# Patient Record
Sex: MALE | Race: WHITE | NOT HISPANIC OR LATINO | Employment: OTHER | URBAN - METROPOLITAN AREA
[De-identification: names, ages, dates, MRNs, and addresses within clinical notes are randomized per-mention and may not be internally consistent; named-entity substitution may affect disease eponyms.]

---

## 2017-05-25 ENCOUNTER — ALLSCRIPTS OFFICE VISIT (OUTPATIENT)
Dept: OTHER | Facility: OTHER | Age: 77
End: 2017-05-25

## 2018-01-16 NOTE — PROCEDURES
Results/Data    Procedure: Electromyogram and Nerve Conduction Study  Indication: Left Upper and Lower Extremity   Referred by Dr Echeverria Friday  The procedure's were discussed with the patient  Written consent was obtained prior to the procedure and is detailed in the patient's record  Prior to the start of the procedure a time out was taken and the identity of the patient was confirmed via name and date of birth with the patient  The correct site and the procedure to be performed were confirmed  The correct side was confirmed if applicable  The positioning of the patient was verified  The availability of the correct equipment was verified  Procedure Start Time: 1:45 pm    Technique: A sterile concentric needle electrode was used  The patient tolerated the procedure well  There were no complications  Results  EMG LEFT UPPER/LOWER EXTREMITY    Motor and sensory conduction studies were performed on the left median, ulnar, peroneal, tibial and sural nerves  The distal motor latencies were normal  The motor action potential amplitudes were normal  Motor conduction velocities of the tibial nerve were slow at 39 m/s in the peroneal nerve below the fibular head slow at 34 m/s  The other motor conduction velocities were normal including conduction velocity of the ulnar nerve across the elbow and peroneal nerve across the fibular head  F waves were prolonged in the lower extremity and normal in the upper extremity    The left sural distal sensory latency was at the upper limit of normal with reduced sensory action potential amplitudes  The median and ulnar sensory latencies were normal with normal palmar conduction with median stimulation  Action potential amplitudes were normal     H  reflexes were not obtainable       Concentric needle EMG was performed in the left APB, FDI, EDC, brachial radialis, biceps, triceps, EDB, tibialis anterior, gastrocnemius medius, vastus lateralis, biceps femoralis short head in the  low cervical (C7) and lumbar (L4 and L5) paraspinal regions  There were complex repetitive discharges in the lumbar paraspinal region at L4 with no evidence of spontaneous activity seen in other muscles tested  Rapid firing large amplitude potentials with reduced interference patterns were noted in the quadriceps region    The other compound motor unit potentials were of normal configuration and interference patterns were full were full for effort  IMPRESSION: This is an abnormal EMG of the left upper and lower extremity due to 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

## 2018-01-16 NOTE — PROCEDURES
Results/Data  Procedure: Electroencephalography (EEG)   Indications for the procedure include Memory Difficulties  Were discussed with the patient  Written consent was obtained prior to the procedure and is detailed in the patient's record  Prior to the start of the procedure, a time out was taken and the identity of the patient was confirmed via name and date of birth with the patient  The correct site(s) and the procedure to be performed were confirmed and the site(s) were marked appropriately  The positioning of the patient and the availabilty of the correct equipment were verified  Certain medications (such as anticonvulsants and tranquilizers), stimulants, and alcohol were avoided for at least 24-48 hours prior to the procedure  Procedure Note:   Performed by: Migue Every  Start Time: 2:30   End Time: 3:00 pm    Electrode(s) Placement: Fp1, Fp2, F7, F3, Fz, F4, F8, T3, C3, CZ, C4, T4, T5, T6, P3, PZ, P4, O1, O2, A1 and A2  They were placed in a bipolar montage, referential montage, average reference montage, laplacian montage  The EEG was performed while the patient was exposed to of photic stimulation and awake and drowsy, but not hyperventilated and not sedated  Findings: EEG    This is a routine 18 channel EEG recording performed on a 31-year-old man with a history of memory difficulty    Background activities during wakefulness consist of mid amplitude cycle 8-9 per second activities emanating from the posterior head region  These activities are reactive to eye opening  Intermingled with background activities are a moderate amount of lower amplitude beta activities emanating from the frontocentral head regions  Episodes of drowsiness and sleep are manifest by attenuation of background activities, V waves and K complexes    Photic stimulation was performed over a wide range of flash frequencies and produced a symmetrical driving response   Hyperventilation was not obtained  Concomitant EKG revealed a sinus rhythm  IMPRESSION: This is a normal routine EEG    KASHMIR Avila  Impression:    Post-Procedure:   the patient tolerated the procedure well  Complications: There were no complications        Signatures   Electronically signed by : Sera Hendrickson MD; Apr 5 2016  5:15PM EST                       (Author)

## 2018-03-08 ENCOUNTER — HOSPITAL ENCOUNTER (INPATIENT)
Facility: HOSPITAL | Age: 78
LOS: 7 days | DRG: 444 | End: 2018-03-15
Attending: EMERGENCY MEDICINE | Admitting: FAMILY MEDICINE
Payer: MEDICARE

## 2018-03-08 ENCOUNTER — APPOINTMENT (EMERGENCY)
Dept: RADIOLOGY | Facility: HOSPITAL | Age: 78
DRG: 444 | End: 2018-03-08
Payer: MEDICARE

## 2018-03-08 ENCOUNTER — APPOINTMENT (EMERGENCY)
Dept: CT IMAGING | Facility: HOSPITAL | Age: 78
DRG: 444 | End: 2018-03-08
Payer: MEDICARE

## 2018-03-08 ENCOUNTER — APPOINTMENT (INPATIENT)
Dept: CT IMAGING | Facility: HOSPITAL | Age: 78
DRG: 444 | End: 2018-03-08
Payer: MEDICARE
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
## 2018-03-08 DIAGNOSIS — R41.82 ALTERED MENTAL STATUS: Primary | ICD-10-CM

## 2018-03-08 DIAGNOSIS — R74.01 TRANSAMINITIS: ICD-10-CM

## 2018-03-08 DIAGNOSIS — E87.6 HYPOKALEMIA: ICD-10-CM

## 2018-03-08 DIAGNOSIS — Z71.3 WEIGHT LOSS COUNSELING, ENCOUNTER FOR: ICD-10-CM

## 2018-03-08 DIAGNOSIS — R17 TOTAL BILIRUBIN, ELEVATED: ICD-10-CM

## 2018-03-08 DIAGNOSIS — Z85.46 H/O PROSTATE CANCER: ICD-10-CM

## 2018-03-08 DIAGNOSIS — K83.1 COMMON BILE DUCT (CBD) OBSTRUCTION: ICD-10-CM

## 2018-03-08 DIAGNOSIS — I21.3 STEMI (ST ELEVATION MYOCARDIAL INFARCTION) (HCC): ICD-10-CM

## 2018-03-08 DIAGNOSIS — E87.1 HYPONATREMIA: ICD-10-CM

## 2018-03-08 LAB
ALBUMIN SERPL BCP-MCNC: 3.3 G/DL (ref 3.5–5)
ALP SERPL-CCNC: 335 U/L (ref 46–116)
ALT SERPL W P-5'-P-CCNC: 209 U/L (ref 12–78)
AMMONIA PLAS-SCNC: <10 UMOL/L (ref 11–35)
AMPHETAMINES SERPL QL SCN: NEGATIVE
AMYLASE SERPL-CCNC: 84 IU/L (ref 25–115)
ANION GAP SERPL CALCULATED.3IONS-SCNC: 10 MMOL/L (ref 4–13)
APAP SERPL-MCNC: <2 UG/ML (ref 10–30)
APTT PPP: 25 SECONDS (ref 23–35)
AST SERPL W P-5'-P-CCNC: 283 U/L (ref 5–45)
ATRIAL RATE: 69 BPM
BACTERIA UR QL AUTO: NORMAL /HPF
BARBITURATES UR QL: NEGATIVE
BASOPHILS # BLD AUTO: 0.02 THOUSANDS/ΜL (ref 0–0.1)
BASOPHILS NFR BLD AUTO: 0 % (ref 0–1)
BENZODIAZ UR QL: NEGATIVE
BILIRUB DIRECT SERPL-MCNC: 3.68 MG/DL (ref 0–0.2)
BILIRUB SERPL-MCNC: 5.5 MG/DL (ref 0.2–1)
BILIRUB UR QL STRIP: ABNORMAL
BUN SERPL-MCNC: 18 MG/DL (ref 5–25)
CALCIUM SERPL-MCNC: 9 MG/DL (ref 8.3–10.1)
CHLORIDE SERPL-SCNC: 91 MMOL/L (ref 100–108)
CLARITY UR: CLEAR
CO2 SERPL-SCNC: 28 MMOL/L (ref 21–32)
COCAINE UR QL: NEGATIVE
COLOR UR: ABNORMAL
CREAT SERPL-MCNC: 1.02 MG/DL (ref 0.6–1.3)
EOSINOPHIL # BLD AUTO: 0.01 THOUSAND/ΜL (ref 0–0.61)
EOSINOPHIL NFR BLD AUTO: 0 % (ref 0–6)
ERYTHROCYTE [DISTWIDTH] IN BLOOD BY AUTOMATED COUNT: 13 % (ref 11.6–15.1)
EST. AVERAGE GLUCOSE BLD GHB EST-MCNC: 111 MG/DL
ETHANOL SERPL-MCNC: <3 MG/DL (ref 0–3)
GFR SERPL CREATININE-BSD FRML MDRD: 72 ML/MIN/1.73SQ M
GLUCOSE SERPL-MCNC: 109 MG/DL (ref 65–140)
GLUCOSE UR STRIP-MCNC: NEGATIVE MG/DL
HBA1C MFR BLD: 5.5 % (ref 4.2–6.3)
HCT VFR BLD AUTO: 44.2 % (ref 36.5–49.3)
HGB BLD-MCNC: 16 G/DL (ref 12–17)
HGB UR QL STRIP.AUTO: NEGATIVE
INR PPP: 0.92 (ref 0.86–1.16)
KETONES UR STRIP-MCNC: ABNORMAL MG/DL
LACTATE SERPL-SCNC: 2.2 MMOL/L (ref 0.5–2)
LEUKOCYTE ESTERASE UR QL STRIP: NEGATIVE
LIPASE SERPL-CCNC: 569 U/L (ref 73–393)
LYMPHOCYTES # BLD AUTO: 0.72 THOUSANDS/ΜL (ref 0.6–4.47)
LYMPHOCYTES NFR BLD AUTO: 10 % (ref 14–44)
MAGNESIUM SERPL-MCNC: 2.5 MG/DL (ref 1.6–2.6)
MCH RBC QN AUTO: 32.7 PG (ref 26.8–34.3)
MCHC RBC AUTO-ENTMCNC: 36.2 G/DL (ref 31.4–37.4)
MCV RBC AUTO: 90 FL (ref 82–98)
METHADONE UR QL: NEGATIVE
MONOCYTES # BLD AUTO: 0.34 THOUSAND/ΜL (ref 0.17–1.22)
MONOCYTES NFR BLD AUTO: 5 % (ref 4–12)
NEUTROPHILS # BLD AUTO: 6.3 THOUSANDS/ΜL (ref 1.85–7.62)
NEUTS SEG NFR BLD AUTO: 85 % (ref 43–75)
NITRITE UR QL STRIP: POSITIVE
NON-SQ EPI CELLS URNS QL MICRO: NORMAL /HPF
NRBC BLD AUTO-RTO: 0 /100 WBCS
OPIATES UR QL SCN: NEGATIVE
P AXIS: -53 DEGREES
PCP UR QL: NEGATIVE
PH UR STRIP.AUTO: 6.5 [PH] (ref 4.5–8)
PHOSPHATE SERPL-MCNC: 3.6 MG/DL (ref 2.3–4.1)
PLATELET # BLD AUTO: 136 THOUSANDS/UL (ref 149–390)
PLATELET # BLD AUTO: 187 THOUSANDS/UL (ref 149–390)
PMV BLD AUTO: 10.1 FL (ref 8.9–12.7)
PMV BLD AUTO: 9.9 FL (ref 8.9–12.7)
POTASSIUM SERPL-SCNC: 2.9 MMOL/L (ref 3.5–5.3)
PR INTERVAL: 126 MS
PROT SERPL-MCNC: 6.4 G/DL (ref 6.4–8.2)
PROT UR STRIP-MCNC: ABNORMAL MG/DL
PROTHROMBIN TIME: 12.5 SECONDS (ref 12.1–14.4)
QRS AXIS: 55 DEGREES
QRSD INTERVAL: 84 MS
QT INTERVAL: 430 MS
QTC INTERVAL: 460 MS
RBC # BLD AUTO: 4.9 MILLION/UL (ref 3.88–5.62)
RBC #/AREA URNS AUTO: NORMAL /HPF
SALICYLATES SERPL-MCNC: <3 MG/DL (ref 3–20)
SODIUM SERPL-SCNC: 129 MMOL/L (ref 136–145)
SP GR UR STRIP.AUTO: 1.01 (ref 1–1.03)
T WAVE AXIS: 81 DEGREES
THC UR QL: NEGATIVE
TROPONIN I SERPL-MCNC: <0.02 NG/ML
TSH SERPL DL<=0.05 MIU/L-ACNC: 1.25 UIU/ML (ref 0.36–3.74)
UROBILINOGEN UR QL STRIP.AUTO: >=8 E.U./DL
VENTRICULAR RATE: 69 BPM
WBC # BLD AUTO: 7.42 THOUSAND/UL (ref 4.31–10.16)
WBC #/AREA URNS AUTO: NORMAL /HPF

## 2018-03-08 PROCEDURE — 84484 ASSAY OF TROPONIN QUANT: CPT | Performed by: PHYSICIAN ASSISTANT

## 2018-03-08 PROCEDURE — 85049 AUTOMATED PLATELET COUNT: CPT | Performed by: INTERNAL MEDICINE

## 2018-03-08 PROCEDURE — 93005 ELECTROCARDIOGRAM TRACING: CPT | Performed by: PHYSICIAN ASSISTANT

## 2018-03-08 PROCEDURE — 36415 COLL VENOUS BLD VENIPUNCTURE: CPT | Performed by: PHYSICIAN ASSISTANT

## 2018-03-08 PROCEDURE — 85610 PROTHROMBIN TIME: CPT | Performed by: PHYSICIAN ASSISTANT

## 2018-03-08 PROCEDURE — 99223 1ST HOSP IP/OBS HIGH 75: CPT | Performed by: INTERNAL MEDICINE

## 2018-03-08 PROCEDURE — 81001 URINALYSIS AUTO W/SCOPE: CPT | Performed by: PHYSICIAN ASSISTANT

## 2018-03-08 PROCEDURE — 71260 CT THORAX DX C+: CPT

## 2018-03-08 PROCEDURE — 85730 THROMBOPLASTIN TIME PARTIAL: CPT | Performed by: PHYSICIAN ASSISTANT

## 2018-03-08 PROCEDURE — 80048 BASIC METABOLIC PNL TOTAL CA: CPT | Performed by: PHYSICIAN ASSISTANT

## 2018-03-08 PROCEDURE — 82150 ASSAY OF AMYLASE: CPT | Performed by: INTERNAL MEDICINE

## 2018-03-08 PROCEDURE — 83036 HEMOGLOBIN GLYCOSYLATED A1C: CPT | Performed by: INTERNAL MEDICINE

## 2018-03-08 PROCEDURE — 93010 ELECTROCARDIOGRAM REPORT: CPT | Performed by: INTERNAL MEDICINE

## 2018-03-08 PROCEDURE — 80076 HEPATIC FUNCTION PANEL: CPT | Performed by: PHYSICIAN ASSISTANT

## 2018-03-08 PROCEDURE — 85025 COMPLETE CBC W/AUTO DIFF WBC: CPT | Performed by: PHYSICIAN ASSISTANT

## 2018-03-08 PROCEDURE — 86255 FLUORESCENT ANTIBODY SCREEN: CPT | Performed by: PSYCHIATRY & NEUROLOGY

## 2018-03-08 PROCEDURE — 84443 ASSAY THYROID STIM HORMONE: CPT | Performed by: PHYSICIAN ASSISTANT

## 2018-03-08 PROCEDURE — 99222 1ST HOSP IP/OBS MODERATE 55: CPT | Performed by: PSYCHIATRY & NEUROLOGY

## 2018-03-08 PROCEDURE — 84100 ASSAY OF PHOSPHORUS: CPT | Performed by: PHYSICIAN ASSISTANT

## 2018-03-08 PROCEDURE — 74177 CT ABD & PELVIS W/CONTRAST: CPT

## 2018-03-08 PROCEDURE — 80329 ANALGESICS NON-OPIOID 1 OR 2: CPT | Performed by: PHYSICIAN ASSISTANT

## 2018-03-08 PROCEDURE — 99285 EMERGENCY DEPT VISIT HI MDM: CPT

## 2018-03-08 PROCEDURE — 83690 ASSAY OF LIPASE: CPT | Performed by: INTERNAL MEDICINE

## 2018-03-08 PROCEDURE — 71046 X-RAY EXAM CHEST 2 VIEWS: CPT

## 2018-03-08 PROCEDURE — 83735 ASSAY OF MAGNESIUM: CPT | Performed by: PHYSICIAN ASSISTANT

## 2018-03-08 PROCEDURE — 80320 DRUG SCREEN QUANTALCOHOLS: CPT | Performed by: PHYSICIAN ASSISTANT

## 2018-03-08 PROCEDURE — 82140 ASSAY OF AMMONIA: CPT | Performed by: INTERNAL MEDICINE

## 2018-03-08 PROCEDURE — 83605 ASSAY OF LACTIC ACID: CPT | Performed by: INTERNAL MEDICINE

## 2018-03-08 PROCEDURE — 80307 DRUG TEST PRSMV CHEM ANLYZR: CPT | Performed by: PHYSICIAN ASSISTANT

## 2018-03-08 PROCEDURE — 70450 CT HEAD/BRAIN W/O DYE: CPT

## 2018-03-08 RX ORDER — SODIUM CHLORIDE 9 MG/ML
75 INJECTION, SOLUTION INTRAVENOUS CONTINUOUS
Status: DISCONTINUED | OUTPATIENT
Start: 2018-03-08 | End: 2018-03-09

## 2018-03-08 RX ORDER — POTASSIUM CHLORIDE 29.8 MG/ML
40 INJECTION INTRAVENOUS ONCE
Status: DISCONTINUED | OUTPATIENT
Start: 2018-03-08 | End: 2018-03-08

## 2018-03-08 RX ORDER — HEPARIN SODIUM 5000 [USP'U]/ML
5000 INJECTION, SOLUTION INTRAVENOUS; SUBCUTANEOUS EVERY 8 HOURS SCHEDULED
Status: DISCONTINUED | OUTPATIENT
Start: 2018-03-08 | End: 2018-03-09 | Stop reason: SDUPTHER

## 2018-03-08 RX ORDER — POTASSIUM CHLORIDE 20 MEQ/1
40 TABLET, EXTENDED RELEASE ORAL ONCE
Status: COMPLETED | OUTPATIENT
Start: 2018-03-08 | End: 2018-03-08

## 2018-03-08 RX ORDER — POTASSIUM CHLORIDE 14.9 MG/ML
20 INJECTION INTRAVENOUS
Status: COMPLETED | OUTPATIENT
Start: 2018-03-08 | End: 2018-03-09

## 2018-03-08 RX ORDER — NICOTINE 21 MG/24HR
1 PATCH, TRANSDERMAL 24 HOURS TRANSDERMAL DAILY
Status: DISCONTINUED | OUTPATIENT
Start: 2018-03-08 | End: 2018-03-15 | Stop reason: HOSPADM

## 2018-03-08 RX ORDER — HYDRALAZINE HYDROCHLORIDE 20 MG/ML
10 INJECTION INTRAMUSCULAR; INTRAVENOUS EVERY 6 HOURS PRN
Status: DISCONTINUED | OUTPATIENT
Start: 2018-03-08 | End: 2018-03-09

## 2018-03-08 RX ADMIN — HEPARIN SODIUM 5000 UNITS: 5000 INJECTION, SOLUTION INTRAVENOUS; SUBCUTANEOUS at 15:57

## 2018-03-08 RX ADMIN — IOHEXOL 100 ML: 350 INJECTION, SOLUTION INTRAVENOUS at 17:26

## 2018-03-08 RX ADMIN — FOLIC ACID 1 MG: 5 INJECTION, SOLUTION INTRAMUSCULAR; INTRAVENOUS; SUBCUTANEOUS at 11:18

## 2018-03-08 RX ADMIN — POTASSIUM CHLORIDE 20 MEQ: 200 INJECTION, SOLUTION INTRAVENOUS at 15:57

## 2018-03-08 RX ADMIN — HEPARIN SODIUM 5000 UNITS: 5000 INJECTION, SOLUTION INTRAVENOUS; SUBCUTANEOUS at 22:12

## 2018-03-08 RX ADMIN — POTASSIUM CHLORIDE 20 MEQ: 200 INJECTION, SOLUTION INTRAVENOUS at 20:52

## 2018-03-08 RX ADMIN — POTASSIUM CHLORIDE 40 MEQ: 1500 TABLET, EXTENDED RELEASE ORAL at 10:07

## 2018-03-08 RX ADMIN — IOHEXOL 50 ML: 240 INJECTION, SOLUTION INTRATHECAL; INTRAVASCULAR; INTRAVENOUS; ORAL at 17:26

## 2018-03-08 RX ADMIN — NICOTINE 1 PATCH: 14 PATCH, EXTENDED RELEASE TRANSDERMAL at 15:57

## 2018-03-08 RX ADMIN — SODIUM CHLORIDE 75 ML/HR: 0.9 INJECTION, SOLUTION INTRAVENOUS at 15:57

## 2018-03-08 RX ADMIN — THIAMINE HYDROCHLORIDE 100 MG: 100 INJECTION, SOLUTION INTRAMUSCULAR; INTRAVENOUS at 09:53

## 2018-03-08 NOTE — CONSULTS
Consultation - Neurology   Prince Amador 78 y o  male MRN: [id number]  Unit/Bed#: ED 17 Encounter: [id number]      Physician Requesting Consult: Sangeeta Carrasco DO  Reason for Consult: Altered mental status  Hx and PE limited by:  None    HPI: Prince Amador is a right handed  78 y o  male who  has been followed up with my colleague with a history of mild cognitive impairment and was last seen in October of last year  Patient was brought in by his family members since over the last 4-5 days has been more confused, disoriented, with severe anorexia, and in the recent past has also lost considerable amount of weight  Patient also has been upset since he did not been the election for , and has been a  in this [place] for several years  After he lost the election the patient went on alcohol binge for the next few months till December but as per his wife he does not drink at this time  Patient also is a chronic smoker and has lost considerable amount of weight  He denies any headaches, blurred vision, diplopia, perioral numbness vertigo or dizziness and denies any motor or sensory symptoms in the upper lower extremities  He has been more and more confused and disoriented in the recent past but denies hallucinations or being confabulatory  He has been socially more withdrawn and has not been eating well, But continues to smoke  He was on multi vitamin supplements Cerefolin for mild cognitive impairment until recently  Over the last 6 months his wife has noticed significant cognitive decline  Review of Systems:  See history of present illness for pertinent neurological symptoms  All other systems are negative      Historical Information   Past Medical History:   Diagnosis Date    Cancer [facility])     prostate, skin     Dementia      Past Surgical History:   Procedure Laterality Date    PROSTATE SURGERY       Social History   History   Smoking Status    Current Every Day Smoker    Packs/day: 0 50 Smokeless Tobacco    Never Used     History   Alcohol Use No     Comment: former     History   Drug use: Unknown       Family History:   History reviewed  No pertinent family history  No Known Allergies    Meds:  All current active meds have been reviewed    Scheduled Meds:  PRN Meds:     Physical Exam:   Objective   Vitals:   Vitals:    03/08/18 1100   BP: 127/67   Pulse: 66   Resp: 15   Temp:    SpO2: 100%   ,Body mass index is 17 28 kg/m²  Patient was examined in emergency department bed  General appearance: Cooperative in no acute distress  Head & neck head is atraumatic and normocephalic  Neck is supple with full range of motion  Cardiovascular: Carotid arteriesno carotid bruits  Neurologic:   Patient is alert awake , disoriented to time and place, high functions could not be evaluated, speech is fluent  No evidence of any aphasia or dysarthria  Cranial nerve examination reveals visual fields are full to threat, pupils equal and reactive, extraocular movements intact and also show evidence of a gaze evoked horizontal nystagmus also noted on primary gaze, fundi showed sharp disc margins, sensation in the V1 V2 V3 distribution is symmetric, no obvious facial asymmetry noted, tongue is midline and gag is adequate  Motor examination reveals normal tone and bulk, no evidence of any drift to the outstretched extremities, strength is 5/5 preserved bilaterally in both upper and lower extremities, deep tendon reflexes are hypoactive throughout, toes are downgoing  Sensory examination to pinprick light touch is diminished in both lower extremities, patient does tend to extinguish double simultaneous stimuli on the left side  Coordination no evidence of any finger-to-nose dysmetria, no evidence of any dysdiadochokinesia,  Gait is normal based Romberg sign is negative  Lab Results:Lab Results:   I have personally reviewed pertinent reports    , CBC:   Results from last 7 days  Lab Units 03/08/18  0831   WBC Thousand/uL 7 42   RBC Million/uL 4 90   HEMOGLOBIN g/dL 16 0   HEMATOCRIT % 44 2   MCV fL 90   PLATELETS Thousands/uL 187   , BMP/CMP:   Results from last 7 days  Lab Units 03/08/18  0831   SODIUM mmol/L 129*   POTASSIUM mmol/L 2 9*   CHLORIDE mmol/L 91*   CO2 mmol/L 28   ANION GAP mmol/L 10   BUN mg/dL 18   CREATININE mg/dL 1 02   GLUCOSE RANDOM mg/dL 109   CALCIUM mg/dL 9 0   AST U/L 283*   ALT U/L 209*   ALK PHOS U/L 335*   TOTAL PROTEIN g/dL 6 4   BILIRUBIN TOTAL mg/dL 5 50*   EGFR ml/min/1 73sq m 72     I have personally reviewed pertinent reports  EEG, Echo, Pathology, and Other Studies: I have personally reviewed pertinent films in PACS    Family, was present at the bedside for history and examination  Assessment:  1  Altered mental status, with nystagmus, hepatic dysfunction, possibly secondary to metabolic encephalopathy, possible Wernicke's encephalopathy  2  Weight loss, nystagmus with altered mental status history of chronic smoking 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

## 2018-03-08 NOTE — ED NOTES
Nurse made aware that no orders from [facility] were put in from 1030 this morning         Tiffanie Diaz RN  03/08/18 0821

## 2018-03-08 NOTE — ED PROVIDER NOTES
History  Chief Complaint   Patient presents with    Altered Mental Status     Family stated that the pt has become more confused since Friday  Family stated he fell on Friday and has two bumps on his head  Pt not eating or drinking a lot  Pt has hx of dementia     79 y/o here for increasing AMS over the past 6 days  His mental status is been slowly declining for the past 1-2 years but rapidly over the past week  He had a fall last Friday which was unwitnessed but does have signs of head trauma  He is now refusing to eat or drink  Family states his mental status waxes and wanes rapidly  At times he will not even know the name of his daughter and wife  No recent illnesses  No fevers or coughs  No nausea vomiting  The patient himself has not been making any complaints recently  Currently denies any complaints  He has past medical history of prostate cancer treated with radiotherapy as well as skin cancer treated with the excision  Wife notes that patient has been coming under increased stress recently  Per wife, he recently lost re-election for  for the [employer], which caused him a great deal of stress  He is apparently drinking 1 bottle of liquor per day  History provided by:  Patient   used: No    Altered Mental Status   Presenting symptoms: behavior changes, confusion, disorientation and memory loss    Presenting symptoms: no combativeness, no lethargy, no partial responsiveness and no unresponsiveness    Severity:  Moderate  Most recent episode: Today  Episode history:  Continuous  Duration:  1 week  Timing:  Constant  Progression:  Worsening  Chronicity: acute on chronic    Context: alcohol use, dementia and head injury    Context: not drug use, not homeless, taking medications as prescribed, not nursing home resident, not recent change in medication, not recent illness and not recent infection    Recent head injury:  Over 24 hours ago  Associated symptoms: decreased appetite    Associated symptoms: no abdominal pain, normal movement, no agitation, no bladder incontinence, no depression, no difficulty breathing, no eye deviation, no fever, no hallucinations, no headaches, no light-headedness, no nausea, no palpitations, no rash, no seizures, no slurred speech, no suicidal behavior, no visual change, no vomiting and no weakness        None       Past Medical History:   Diagnosis Date    Cancer ([facility] [address] )     prostate, skin     Dementia        Past Surgical History:   Procedure Laterality Date    PROSTATE SURGERY         History reviewed  No pertinent family history  I have reviewed and agree with the history as documented  Social History   Substance Use Topics    Smoking status: Current Every Day Smoker     Packs/day: 0 50    Smokeless tobacco: Never Used    Alcohol use No      Comment: former        Review of Systems   Constitutional: Positive for decreased appetite  Negative for activity change, appetite change, chills, diaphoresis, fatigue, fever and unexpected weight change  HENT: Negative for congestion, rhinorrhea, sinus pressure, sore throat and trouble swallowing  Eyes: Negative for photophobia and visual disturbance  Respiratory: Negative for apnea, cough, choking, chest tightness, shortness of breath, wheezing and stridor  Cardiovascular: Negative for chest pain, palpitations and leg swelling  Gastrointestinal: Negative for abdominal distention, abdominal pain, blood in stool, constipation, diarrhea, nausea and vomiting  Genitourinary: Negative for bladder incontinence, decreased urine volume, difficulty urinating, dysuria, enuresis, flank pain, frequency, hematuria and urgency  Musculoskeletal: Negative for arthralgias, myalgias, neck pain and neck stiffness  Skin: Negative for color change, pallor, rash and wound  Allergic/Immunologic: Negative      Neurological: Negative for dizziness, tremors, seizures, syncope, weakness, light-headedness, numbness and headaches  Hematological: Negative  Psychiatric/Behavioral: Positive for confusion and memory loss  Negative for agitation and hallucinations  All other systems reviewed and are negative  Physical Exam  ED Triage Vitals   Temperature Pulse Respirations Blood Pressure SpO2   03/08/18 0812 03/08/18 0812 03/08/18 0812 03/08/18 0812 03/08/18 0812   97 5 °F (36 4 °C) 80 16 137/79 100 %      Temp Source Heart Rate Source Patient Position - Orthostatic VS BP Location FiO2 (%)   03/08/18 0812 03/08/18 0812 03/08/18 0812 03/08/18 0812 --   Oral Monitor Lying Right arm       Pain Score       03/08/18 0930       No Pain           Orthostatic Vital Signs  Vitals:    03/08/18 0812 03/08/18 0930   BP: 137/79 143/81   Pulse: 80 59   Patient Position - Orthostatic VS: Lying Lying       Physical Exam   Constitutional: He is oriented to person, place, and time  He appears well-developed and well-nourished  Non-toxic appearance  He does not have a sickly appearance  He does not appear ill  No distress  HENT:   Head: Normocephalic and atraumatic  Eyes: EOM and lids are normal  Pupils are equal, round, and reactive to light  Neck: Normal range of motion  Neck supple  Cardiovascular: Normal rate, regular rhythm, S1 normal, S2 normal, normal heart sounds, intact distal pulses and normal pulses  Exam reveals no gallop, no distant heart sounds, no friction rub and no decreased pulses  No murmur heard  Pulses:       Radial pulses are 2+ on the right side, and 2+ on the left side  Pulmonary/Chest: Effort normal and breath sounds normal  No accessory muscle usage  No apnea, no tachypnea and no bradypnea  No respiratory distress  He has no decreased breath sounds  He has no wheezes  He has no rhonchi  He has no rales  Abdominal: Soft  Normal appearance and bowel sounds are normal  He exhibits no distension and no mass  There is no tenderness  There is no rigidity, no rebound and no guarding  No hernia  Musculoskeletal: Normal range of motion  He exhibits no edema, tenderness or deformity  Neurological: He is alert and oriented to person, place, and time  No cranial nerve deficit  GCS eye subscore is 4  GCS verbal subscore is 5  GCS motor subscore is 6  GCS 15  AAOx3  No focal neuro deficits  CN II-XII grossly intact  Speech normal, no aphasia or dysarthria  No pronator drift  Cerebellar function normal  Finger to nose slow, but able to perform  PERRL  EOMI  Peripheral vision intact  Horizontal nystagmus is present  Upper and lower extremity strength 5/5 through   strength 5/5 b/l  Gross sensation intact b/l  Skin: Skin is warm, dry and intact  No rash noted  He is not diaphoretic  No erythema  No pallor  Psychiatric: His speech is normal    Nursing note and vitals reviewed        ED Medications  Medications   thiamine (VITAMIN B1) 100 mg in sodium chloride 0 9 % 50 mL IVPB (not administered)   folic acid 1 mg in sodium chloride 0 9 % 50 mL IVPB (not administered)   potassium chloride (K-DUR,KLOR-CON) CR tablet 40 mEq (not administered)       Diagnostic Studies  Results Reviewed     Procedure Component Value Units Date/Time    Salicylate level [[id number]]  (Abnormal) Collected:  03/08/18 0831    Lab Status:  Final result Specimen:  Blood from Arm, Left Updated:  90/92/55 9089     Salicylate Lvl <3 (L) mg/dL     Hepatic function panel [[id number]]  (Abnormal) Collected:  03/08/18 0831    Lab Status:  Final result Specimen:  Blood from Arm, Left Updated:  03/08/18 0911     Total Bilirubin 5 50 (H) mg/dL      Bilirubin, Direct 3 68 (H) mg/dL      Alkaline Phosphatase 335 (H) U/L       (H) U/L       (H) U/L      Total Protein 6 4 g/dL      Albumin 3 3 (L) g/dL     TSH [[id number]]  (Normal) Collected:  03/08/18 0831    Lab Status:  Final result Specimen:  Blood from Arm, Left Updated:  03/08/18 0911     TSH 3RD GENERATON 1 249 uIU/mL     Narrative:         Patients undergoing fluorescein dye angiography may retain small amounts of fluorescein in the body for 48-72 hours post procedure  Samples containing fluorescein can produce falsely depressed TSH values  If the patient had this procedure,a specimen should be resubmitted post fluorescein clearance  Acetaminophen level [[id number]]  (Abnormal) Collected:  03/08/18 0831    Lab Status:  Final result Specimen:  Blood from Arm, Left Updated:  03/08/18 0911     Acetaminophen Level <2 0 (L) ug/mL     Basic metabolic panel [[id number]]  (Abnormal) Collected:  03/08/18 0831    Lab Status:  Final result Specimen:  Blood from Arm, Left Updated:  03/08/18 9316     Sodium 129 (L) mmol/L      Potassium 2 9 (L) mmol/L      Chloride 91 (L) mmol/L      CO2 28 mmol/L      Anion Gap 10 mmol/L      BUN 18 mg/dL      Creatinine 1 02 mg/dL      Glucose 109 mg/dL      Calcium 9 0 mg/dL      eGFR 72 ml/min/1 73sq m     Narrative:         National Kidney Disease Education Program recommendations are as follows:  GFR calculation is accurate only with a steady state creatinine  Chronic Kidney disease less than 60 ml/min/1 73 sq  meters  Kidney failure less than 15 ml/min/1 73 sq  meters      Magnesium [[id number]]  (Normal) Collected:  03/08/18 0831    Lab Status:  Final result Specimen:  Blood from Arm, Left Updated:  03/08/18 0911     Magnesium 2 5 mg/dL     Phosphorus [[id number]]  (Normal) Collected:  03/08/18 0831    Lab Status:  Final result Specimen:  Blood from Arm, Left Updated:  03/08/18 0911     Phosphorus 3 6 mg/dL     Ethanol [[id number]]  (Normal) Collected:  03/08/18 0831    Lab Status:  Final result Specimen:  Blood from Arm, Left Updated:  03/08/18 0910     Ethanol Lvl <3 mg/dL     Troponin I [[id number]]  (Normal) Collected:  03/08/18 0831    Lab Status:  Final result Specimen:  Blood from Arm, Left Updated:  03/08/18 0900     Troponin I <0 02 ng/mL     Narrative:         [employer] Chemistry analyzer 99% cutoff is > 0 04 ng/mL in network labs    o cTnI 99% cutoff is useful only when applied to patients in the clinical setting of myocardial ischemia  o cTnI 99% cutoff should be interpreted in the context of clinical history, ECG findings and possibly cardiac imaging to establish correct diagnosis  o cTnI 99% cutoff may be suggestive but clearly not indicative of a coronary event without the clinical setting of myocardial ischemia  Protime-INR [[id number]]  (Normal) Collected:  03/08/18 0831    Lab Status:  Final result Specimen:  Blood from Arm, Left Updated:  03/08/18 0851     Protime 12 5 seconds      INR 0 92    APTT [[id number]]  (Normal) Collected:  03/08/18 0831    Lab Status:  Final result Specimen:  Blood from Arm, Left Updated:  03/08/18 0851     PTT 25 seconds     Narrative: Therapeutic Heparin Range = 60-90 seconds    CBC and differential [[id number]]  (Abnormal) Collected:  03/08/18 0831    Lab Status:  Final result Specimen:  Blood from Arm, Left Updated:  03/08/18 0839     WBC 7 42 Thousand/uL      RBC 4 90 Million/uL      Hemoglobin 16 0 g/dL      Hematocrit 44 2 %      MCV 90 fL      MCH 32 7 pg      MCHC 36 2 g/dL      RDW 13 0 %      MPV 9 9 fL      Platelets 097 Thousands/uL      nRBC 0 /100 WBCs      Neutrophils Relative 85 (H) %      Lymphocytes Relative 10 (L) %      Monocytes Relative 5 %      Eosinophils Relative 0 %      Basophils Relative 0 %      Neutrophils Absolute 6 30 Thousands/µL      Lymphocytes Absolute 0 72 Thousands/µL      Monocytes Absolute 0 34 Thousand/µL      Eosinophils Absolute 0 01 Thousand/µL      Basophils Absolute 0 02 Thousands/µL     UA w Reflex to Microscopic w Reflex to Culture [[id number]]     Lab Status:  No result Specimen:  Urine     Rapid drug screen, urine [[id number]]     Lab Status:  No result Specimen:  Urine                  XR chest 2 views   Final Result by Isha Jin DO (03/08 0920)      No acute cardiopulmonary disease              Workstation performed: [id number]         CT head without contrast   Final Result by Aly Virk MD (03/08 4601)      No CT evidence of acute intracranial process  Chronic microangiopathy  Mild progressive enlargement of the lateral ventricles commensurate with generalized volume loss and age  No acute hydrocephalus                    Workstation performed: [id number]                    Procedures  ECG 12 Lead Documentation  Date/Time: 3/8/2018 9:41 AM  Performed by: Golden Valladares  Authorized by: Claudette Brenner     Indications / Diagnosis:  Ams  ECG reviewed by me, the ED Provider: yes    Patient location:  ED  Previous ECG:     Previous ECG:  Unavailable    Comparison to cardiac monitor: Yes    Interpretation:     Interpretation: abnormal    Quality:     Tracing quality:  Limited by artifact  Rate:     ECG rate:  69    ECG rate assessment: normal    Rhythm:     Rhythm: sinus rhythm    Ectopy:     Ectopy: PAC    QRS:     QRS axis:  Normal    QRS intervals:  Normal  Conduction:     Conduction: normal    ST segments:     ST segments:  Normal  T waves:     T waves: non-specific             Phone Contacts  ED Phone Contact    ED Course  ED Course          HEART Risk Score    Flowsheet Row Most Recent Value   History  0 Filed at: 03/08/2018 0940   ECG  1 Filed at: 03/08/2018 0940   Age  2 Filed at: 03/08/2018 0940   Risk Factors  0 Filed at: 03/08/2018 0940   Troponin  0 Filed at: 03/08/2018 0940   Heart Score Risk Calculator   History  0 Filed at: 03/08/2018 0940   ECG  1 Filed at: 03/08/2018 0940   Age  2 Filed at: 03/08/2018 0940   Risk Factors  0 Filed at: 03/08/2018 0940   Troponin  0 Filed at: 03/08/2018 0940   HEART Score  3 Filed at: 03/08/2018 0940   HEART Score  3 Filed at: 03/08/2018 0940          NIH Stroke Scale    Flowsheet Row Most Recent Value   Level of Consciousness (1a )  0 Filed at: 03/08/2018 0940   LOC Questions (1b )  0 Filed at: 03/08/2018 0940   LOC Commands (1c )  0 Filed at: 03/08/2018 0940   Best Gaze (2 )  0 Filed at: 03/08/2018 0940   Visual (3 )  0 Filed at: 03/08/2018 0940   Facial Palsy (4 )  0 Filed at: 03/08/2018 0940   Motor Arm, Left (5a )  0 Filed at: 03/08/2018 0940   Motor Arm, Right (5b )  0 Filed at: 03/08/2018 0940   Motor Leg, Left (6a )  0 Filed at: 03/08/2018 0940   Motor Leg, Right (6b )  0 Filed at: 03/08/2018 0940   Limb Ataxia (7 )  0 Filed at: 03/08/2018 0940   Sensory (8 )  0 Filed at: 03/08/2018 0940   Best Language (9 )  0 Filed at: 03/08/2018 0940   Dysarthria (10 )  0 Filed at: 03/08/2018 0940   Extinction and Inattention (11 ) (Formerly Neglect)  0 Filed at: 03/08/2018 0940   Total  0 Filed at: 03/08/2018 0940                        MDM  Number of Diagnoses or Management Options  Altered mental status: new and requires workup  Fall: new and requires workup  Hypokalemia: new and requires workup  Hyponatremia: new and requires workup  Total bilirubin, elevated: new and requires workup  Transaminitis: new and requires workup  Diagnosis management comments: DDx including but not limited to: metabolic abnormality, intracranial etiology, cardiac etiology, substance abuse, infectious etiology including UTI, thyroid disease, hyperammonemia  Plan: Cardiac workup  COMA panel  Pt not a tPA candidate, symptoms present for 1 week, recent trauma  Amount and/or Complexity of Data Reviewed  Clinical lab tests: reviewed and ordered  Tests in the radiology section of CPT®: reviewed and ordered  Discuss the patient with other providers: yes  Independent visualization of images, tracings, or specimens: yes    Risk of Complications, Morbidity, and/or Mortality  Presenting problems: moderate  Management options: low  General comments: 29-year-old male with altered mental status  CT head negative for trauma or other signs of acute abnormalities  Laboratory evaluation shows transaminitis with elevated total bilirubin  Could be related to his recent alcohol abuse  He is hyponatremic and hypokalemic    All these findings in conjunction with his physical exam are concerning for possible Wernicke ease encephalopathy  He was given IV thiamine and folic acid  Potassium was repleted  Given his altered mental status and severity of his transaminitis as well as falls at home he will need inpatient admission  I discussed the case with on-call Internal Medicine who agrees with plan  Will have Neurology consult  I do not suspect stroke or TIA  Patient Progress  Patient progress: stable    CritCare Time    Disposition  Final diagnoses: Altered mental status   Transaminitis   Hyponatremia   Hypokalemia   Total bilirubin, elevated   Fall     Time reflects when diagnosis was documented in both MDM as applicable and the Disposition within this note     Time User Action Codes Description Comment    3/8/2018  9:33 AM Danella Duane L Add [R41 82] Altered mental status     3/8/2018  9:33 AM Danella Duane L Add [R74 0] Transaminitis     3/8/2018  9:33 AM Danella Duane L Add [E87 1] Hyponatremia     3/8/2018  9:33 AM Danella Duane L Add [E87 6] Hypokalemia     3/8/2018  9:33 AM Danella Duane L Add [R17] Total bilirubin, elevated     3/8/2018  9:38 AM Ketty Elizhouse Add [M46  XXXA] Fall       ED Disposition     ED Disposition Condition Comment    Admit  Case was discussed with Dr Nicholas Ortiz and the patient's admission status was agreed to be Admission Status: inpatient status to the service of Dr Nicholas Ortiz  Follow-up Information    None       Patient's Medications    No medications on file     No discharge procedures on file      ED Provider  Electronically Signed by           William Moseley PA-C  03/08/18 6967

## 2018-03-08 NOTE — H&P
History and Physical - [facility] Internal Medicine    Patient Information: Ghulam Bender 78 y o  male MRN: [id number]  Unit/Bed#: -01 Encounter: [id number]  Admitting Physician: Leandra Badillo MD  PCP: Javier Begum DO  Date of Admission:  03/08/18    Assessment/Plan:    Hospital Problem List:     Principal Problem:    Altered mental status  Active Problems:    Transaminitis    Hypokalemia    Fall    Total bilirubin, elevated    Weight loss counseling, encounter for      Plan for the Primary Problem(s):    1  Altered mental status  Unknown cause at this time  Possibly hepatic encephalopathy versus early onset dementia  This could simply be also due to severe depression  Patient reports a lot of social/psychosocial/financial troubles  Wernicke encephalopathy family comes to mind  Case discussed with Neurology  MRI of the brain will be obtained  Due to extensive weight loss  Will obtain CT chest abdomen pelvis  Patient also reports some abdominal pain ongoing  2   Alcohol abuse  Family reports daily alcohol abuse  Patient denies this  3/4 of Moiz Lease some more  Less strength approximately 15 days ago  Will closely monitor in telemetry setting for acute withdrawals      3  Hypokalemia/hyponatremia  Will replace  Asymptomatic repeat labs in a m  VTE Prophylaxis: Heparin  / sequential compression device   Code Status:  Full code  POLST: There is no POLST form on file for this patient (pre-hospital)    Anticipated Length of Stay:  Patient will be admitted on an Inpatient basis with an anticipated length of stay of  greater than 2 midnights  Justification for Hospital Stay:  Altered mental status    Total Time for Visit, including Counseling / Coordination of Care: 45 minutes  Greater than 50% of this total time spent on direct patient counseling and coordination of care      Chief Complaint:   Brought here by family due to not acting right    History of Present Illness:    Roberts Butts Mary Ch is very pleasant  78 y o  male who  was brought in by his family members since over the last 4-5 days has been more confused, disoriented, with severe anorexia, and in the recent past has also lost considerable amount of weight  patient's family also report the symptoms actually started approximately 1-2 years ago but have been progressively in substantially worse within the last week or so  Patient also has been upset since he did not been the election for , and has been a  in this [place] for several years  After he lost the election the patient went on alcohol binge for the next few months till December but as per his wife he does not drink at this time  Patient also is a chronic smoker and has lost considerable amount of weight  He denies any headaches, blurred vision, diplopia, perioral numbness vertigo or dizziness and denies any motor or sensory symptoms in the upper lower extremities  He has been more and more confused and disoriented in the recent past but denies hallucinations or being confabulatory  He has been socially more withdrawn and has not been eating well, But continues to smoke  Reporting more than 20 lb weight loss within last 6 months  Review of Systems:    Review of Systems    Past Medical and Surgical History:     Past Medical History:   Diagnosis Date    Cancer [facility])     prostate, skin     Dementia        Past Surgical History:   Procedure Laterality Date    PROSTATE SURGERY         Meds/Allergies:    Prior to Admission medications    Medication Sig Start Date End Date Taking? 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   Patient Pre-hospital Living Situation:  At home  Patient Pre-hospital Level of Mobility:  Independent  Patient Pre-hospital Diet Restrictions:  None  Substance Use History:   History   Alcohol Use No     Comment: former     History   Smoking Status    Current Every Day Smoker    Packs/day: 0 50   Smokeless Tobacco    Never Used     History   Drug use: Unknown       Family History:    non-contributory    Physical Exam:     Vitals:   Blood Pressure: 118/75 (03/08/18 1426)  Pulse: 91 (03/08/18 1426)  Temperature: 97 5 °F (36 4 °C) (03/08/18 1426)  Temp Source: Oral (03/08/18 1426)  Respirations: 16 (03/08/18 1426)  Height: 5' 9" (175 3 cm) (03/08/18 1426)  Weight - Scale: 60 3 kg (132 lb 15 oz) (Previous weight was stated by wife) (03/08/18 1426)  SpO2: 98 % (03/08/18 1426)    Physical Exam  Constitutional:  Well developed, well nourished, no acute distress, non-toxic appearance   Eyes:  PERRL, conjunctiva normal   HENT:  Atraumatic, external ears normal, nose normal, oropharynx moist, no pharyngeal exudates  Neck- normal range of motion, no tenderness, supple   Respiratory:  No respiratory distress, normal breath sounds, no rales, no wheezing   Cardiovascular:  Normal rate, normal rhythm, no murmurs, no gallops, no rubs   GI:  Soft, nondistended, normal bowel sounds, nontender, no organomegaly, no mass, no rebound, no guarding   :  No costovertebral angle tenderness   Musculoskeletal:  No edema, no tenderness, no deformities  Back- no tenderness  Integument:  Well hydrated, no rash   Lymphatic:  No lymphadenopathy noted   Neurologic:  Alert & oriented x 3, unable to obtain full neurologic exam patient noncompliance does not want also questions  I would like to go home     Additional Data:     Lab Results: I have personally reviewed pertinent reports          Results from last 7 days  Lab Units 03/08/18  0831   WBC Thousand/uL 7 42   HEMOGLOBIN g/dL 16 0   HEMATOCRIT % 44 2   PLATELETS Thousands/uL 187   NEUTROS PCT % 85*   LYMPHS PCT % 10*   MONOS PCT % 5   EOS PCT % 0       Results from last 7 days  Lab Units 03/08/18  0831   SODIUM mmol/L 129*   POTASSIUM mmol/L 2 9*   CHLORIDE mmol/L 91*   CO2 mmol/L 28   BUN mg/dL 18   CREATININE mg/dL 1 02   CALCIUM mg/dL 9 0   TOTAL PROTEIN g/dL 6 4   BILIRUBIN TOTAL mg/dL 5 50*   ALK PHOS U/L 335*   ALT U/L 209*   AST U/L 283*   GLUCOSE RANDOM mg/dL 109       Results from last 7 days  Lab Units 03/08/18  0831   INR  0 92       Imaging: I have personally reviewed pertinent reports  Xr Chest 2 Views    Result Date: 3/8/2018  Narrative: CHEST INDICATION:  Fall, altered mental status  COMPARISON:  None EXAM PERFORMED/VIEWS:  XR CHEST PA & LATERAL FINDINGS: Cardiomediastinal silhouette appears unremarkable  The lungs are clear  Probable nipple shadows noted  No pneumothorax or pleural effusion  Osseous structures appear within normal limits for patient age  Impression: No acute cardiopulmonary disease  Workstation performed: [id number]     Ct Head Without Contrast    Result Date: 3/8/2018  Narrative: CT BRAIN - WITHOUT CONTRAST INDICATION: fall, AMS COMPARISON:  MRI brain 4/12/2016 TECHNIQUE:  CT examination of the brain was performed  In addition to axial images, coronal 2D reformatted images were created and submitted for interpretation  Radiation dose length product (DLP) for this visit:  990 mGy-cm   This examination, like all CT scans performed in the [facility], was performed utilizing techniques to minimize radiation dose exposure, including the use of iterative reconstruction and automated exposure control  IMAGE QUALITY:  Diagnostic  FINDINGS: PARENCHYMA:  No intracranial mass, mass effect or midline shift  No CT signs of acute infarction  No acute intracranial hemorrhage  Periventricular white matter hypodensity is a nonspecific finding likely representing chronic microangiopathy   Calcification bilateral cavernous internal carotid arteries  VENTRICLES AND EXTRA-AXIAL SPACES:  Mild progressive enlargement of the lateral ventricles likely commiserate with generalized volume loss and age  No acute hydrocephalus  Mild prominence of CSF spaces diffusely attributed to generalized volume loss  VISUALIZED ORBITS AND PARANASAL SINUSES:  Unremarkable  CALVARIUM AND EXTRACRANIAL SOFT TISSUES:  Normal      Impression: No CT evidence of acute intracranial process  Chronic microangiopathy  Mild progressive enlargement of the lateral ventricles commensurate with noncontributory generalized volume loss and age  No acute hydrocephalus  Workstation performed: [id number]       EKG, Pathology, and Other Studies Reviewed on Admission:   · EKG:  Noncontributory    Allscripts / Epic Records Reviewed: Yes     ** Please Note: This note has been constructed using a voice recognition system   **

## 2018-03-09 ENCOUNTER — ANESTHESIA (INPATIENT)
Dept: PERIOP | Facility: HOSPITAL | Age: 78
DRG: 444 | End: 2018-03-09
Payer: MEDICARE

## 2018-03-09 ENCOUNTER — APPOINTMENT (INPATIENT)
Dept: RADIOLOGY | Facility: HOSPITAL | Age: 78
DRG: 444 | End: 2018-03-09
Payer: MEDICARE

## 2018-03-09 ENCOUNTER — ANESTHESIA EVENT (INPATIENT)
Dept: PERIOP | Facility: HOSPITAL | Age: 78
DRG: 444 | End: 2018-03-09
Payer: MEDICARE

## 2018-03-09 PROBLEM — K83.1 COMMON BILE DUCT (CBD) OBSTRUCTION: Status: ACTIVE | Noted: 2018-03-08

## 2018-03-09 LAB
ALBUMIN SERPL BCP-MCNC: 2.5 G/DL (ref 3.5–5)
ALP SERPL-CCNC: 301 U/L (ref 46–116)
ALT SERPL W P-5'-P-CCNC: 337 U/L (ref 12–78)
AMMONIA PLAS-SCNC: 19 UMOL/L (ref 11–35)
ANION GAP SERPL CALCULATED.3IONS-SCNC: 11 MMOL/L (ref 4–13)
ANION GAP SERPL CALCULATED.3IONS-SCNC: 12 MMOL/L (ref 4–13)
AST SERPL W P-5'-P-CCNC: 425 U/L (ref 5–45)
BILIRUB SERPL-MCNC: 8.3 MG/DL (ref 0.2–1)
BUN SERPL-MCNC: 12 MG/DL (ref 5–25)
BUN SERPL-MCNC: 14 MG/DL (ref 5–25)
CALCIUM SERPL-MCNC: 7.9 MG/DL (ref 8.3–10.1)
CALCIUM SERPL-MCNC: 8.4 MG/DL (ref 8.3–10.1)
CHLORIDE SERPL-SCNC: 96 MMOL/L (ref 100–108)
CHLORIDE SERPL-SCNC: 99 MMOL/L (ref 100–108)
CO2 SERPL-SCNC: 23 MMOL/L (ref 21–32)
CO2 SERPL-SCNC: 24 MMOL/L (ref 21–32)
CREAT SERPL-MCNC: 0.86 MG/DL (ref 0.6–1.3)
CREAT SERPL-MCNC: 0.9 MG/DL (ref 0.6–1.3)
ERYTHROCYTE [DISTWIDTH] IN BLOOD BY AUTOMATED COUNT: 13.3 % (ref 11.6–15.1)
FOLATE SERPL-MCNC: 17.8 NG/ML (ref 3.1–17.5)
GFR SERPL CREATININE-BSD FRML MDRD: 83 ML/MIN/1.73SQ M
GFR SERPL CREATININE-BSD FRML MDRD: 85 ML/MIN/1.73SQ M
GLUCOSE SERPL-MCNC: 67 MG/DL (ref 65–140)
GLUCOSE SERPL-MCNC: 91 MG/DL (ref 65–140)
HCT VFR BLD AUTO: 38.5 % (ref 36.5–49.3)
HGB BLD-MCNC: 13.7 G/DL (ref 12–17)
INR PPP: 1.1 (ref 0.86–1.16)
LACTATE SERPL-SCNC: 2.3 MMOL/L (ref 0.5–2)
MAGNESIUM SERPL-MCNC: 1.9 MG/DL (ref 1.6–2.6)
MCH RBC QN AUTO: 32.4 PG (ref 26.8–34.3)
MCHC RBC AUTO-ENTMCNC: 35.6 G/DL (ref 31.4–37.4)
MCV RBC AUTO: 91 FL (ref 82–98)
PHOSPHATE SERPL-MCNC: 2.8 MG/DL (ref 2.3–4.1)
PLATELET # BLD AUTO: 122 THOUSANDS/UL (ref 149–390)
PLATELET # BLD AUTO: 88 THOUSANDS/UL (ref 149–390)
PMV BLD AUTO: 10 FL (ref 8.9–12.7)
PMV BLD AUTO: 9.7 FL (ref 8.9–12.7)
POTASSIUM SERPL-SCNC: 2.9 MMOL/L (ref 3.5–5.3)
POTASSIUM SERPL-SCNC: 3.5 MMOL/L (ref 3.5–5.3)
PROT SERPL-MCNC: 5.3 G/DL (ref 6.4–8.2)
PROTHROMBIN TIME: 14.4 SECONDS (ref 12.1–14.4)
RBC # BLD AUTO: 4.23 MILLION/UL (ref 3.88–5.62)
SODIUM SERPL-SCNC: 131 MMOL/L (ref 136–145)
SODIUM SERPL-SCNC: 134 MMOL/L (ref 136–145)
VIT B12 SERPL-MCNC: >6000 PG/ML (ref 100–900)
WBC # BLD AUTO: 8.88 THOUSAND/UL (ref 4.31–10.16)

## 2018-03-09 PROCEDURE — 43264 ERCP REMOVE DUCT CALCULI: CPT | Performed by: INTERNAL MEDICINE

## 2018-03-09 PROCEDURE — 43273 ENDOSCOPIC PANCREATOSCOPY: CPT | Performed by: INTERNAL MEDICINE

## 2018-03-09 PROCEDURE — 80048 BASIC METABOLIC PNL TOTAL CA: CPT | Performed by: PHYSICIAN ASSISTANT

## 2018-03-09 PROCEDURE — 85027 COMPLETE CBC AUTOMATED: CPT | Performed by: INTERNAL MEDICINE

## 2018-03-09 PROCEDURE — 97163 PT EVAL HIGH COMPLEX 45 MIN: CPT

## 2018-03-09 PROCEDURE — G8978 MOBILITY CURRENT STATUS: HCPCS

## 2018-03-09 PROCEDURE — 43275 ERCP REMOVE FORGN BODY DUCT: CPT | Performed by: INTERNAL MEDICINE

## 2018-03-09 PROCEDURE — 83735 ASSAY OF MAGNESIUM: CPT | Performed by: PHYSICIAN ASSISTANT

## 2018-03-09 PROCEDURE — 99222 1ST HOSP IP/OBS MODERATE 55: CPT | Performed by: PHYSICIAN ASSISTANT

## 2018-03-09 PROCEDURE — 82140 ASSAY OF AMMONIA: CPT | Performed by: PSYCHIATRY & NEUROLOGY

## 2018-03-09 PROCEDURE — G8979 MOBILITY GOAL STATUS: HCPCS

## 2018-03-09 PROCEDURE — C1726 CATH, BAL DIL, NON-VASCULAR: HCPCS | Performed by: INTERNAL MEDICINE

## 2018-03-09 PROCEDURE — 83605 ASSAY OF LACTIC ACID: CPT | Performed by: PHYSICIAN ASSISTANT

## 2018-03-09 PROCEDURE — C1769 GUIDE WIRE: HCPCS | Performed by: INTERNAL MEDICINE

## 2018-03-09 PROCEDURE — 97167 OT EVAL HIGH COMPLEX 60 MIN: CPT

## 2018-03-09 PROCEDURE — 84100 ASSAY OF PHOSPHORUS: CPT | Performed by: PHYSICIAN ASSISTANT

## 2018-03-09 PROCEDURE — 82746 ASSAY OF FOLIC ACID SERUM: CPT | Performed by: PSYCHIATRY & NEUROLOGY

## 2018-03-09 PROCEDURE — 74328 X-RAY BILE DUCT ENDOSCOPY: CPT

## 2018-03-09 PROCEDURE — C2617 STENT, NON-COR, TEM W/O DEL: HCPCS | Performed by: INTERNAL MEDICINE

## 2018-03-09 PROCEDURE — 80053 COMPREHEN METABOLIC PANEL: CPT | Performed by: INTERNAL MEDICINE

## 2018-03-09 PROCEDURE — 82607 VITAMIN B-12: CPT | Performed by: PSYCHIATRY & NEUROLOGY

## 2018-03-09 PROCEDURE — 0F798DZ DILATION OF COMMON BILE DUCT WITH INTRALUMINAL DEVICE, VIA NATURAL OR ARTIFICIAL OPENING ENDOSCOPIC: ICD-10-PCS | Performed by: INTERNAL MEDICINE

## 2018-03-09 PROCEDURE — G8988 SELF CARE GOAL STATUS: HCPCS

## 2018-03-09 PROCEDURE — 85610 PROTHROMBIN TIME: CPT | Performed by: INTERNAL MEDICINE

## 2018-03-09 PROCEDURE — 85049 AUTOMATED PLATELET COUNT: CPT | Performed by: PHYSICIAN ASSISTANT

## 2018-03-09 PROCEDURE — 0FC98ZZ EXTIRPATION OF MATTER FROM COMMON BILE DUCT, VIA NATURAL OR ARTIFICIAL OPENING ENDOSCOPIC: ICD-10-PCS | Performed by: INTERNAL MEDICINE

## 2018-03-09 PROCEDURE — G8987 SELF CARE CURRENT STATUS: HCPCS

## 2018-03-09 DEVICE — BILIARY STENT
Type: IMPLANTABLE DEVICE | Site: BILE DUCT | Status: NON-FUNCTIONAL
Brand: ADVANIX™ BILIARY
Removed: 2018-04-13

## 2018-03-09 RX ORDER — METOCLOPRAMIDE HYDROCHLORIDE 5 MG/ML
10 INJECTION INTRAMUSCULAR; INTRAVENOUS ONCE
Status: COMPLETED | OUTPATIENT
Start: 2018-03-09 | End: 2018-03-09

## 2018-03-09 RX ORDER — SODIUM CHLORIDE, SODIUM LACTATE, POTASSIUM CHLORIDE, CALCIUM CHLORIDE 600; 310; 30; 20 MG/100ML; MG/100ML; MG/100ML; MG/100ML
INJECTION, SOLUTION INTRAVENOUS CONTINUOUS PRN
Status: DISCONTINUED | OUTPATIENT
Start: 2018-03-09 | End: 2018-03-09 | Stop reason: SURG

## 2018-03-09 RX ORDER — POTASSIUM CHLORIDE 14.9 MG/ML
20 INJECTION INTRAVENOUS ONCE
Status: COMPLETED | OUTPATIENT
Start: 2018-03-09 | End: 2018-03-10

## 2018-03-09 RX ORDER — PROMETHAZINE HYDROCHLORIDE 25 MG/ML
12.5 INJECTION, SOLUTION INTRAMUSCULAR; INTRAVENOUS ONCE AS NEEDED
Status: DISCONTINUED | OUTPATIENT
Start: 2018-03-09 | End: 2018-03-09 | Stop reason: HOSPADM

## 2018-03-09 RX ORDER — ONDANSETRON 2 MG/ML
INJECTION INTRAMUSCULAR; INTRAVENOUS AS NEEDED
Status: DISCONTINUED | OUTPATIENT
Start: 2018-03-09 | End: 2018-03-09 | Stop reason: SURG

## 2018-03-09 RX ORDER — MIDAZOLAM HYDROCHLORIDE 1 MG/ML
INJECTION INTRAMUSCULAR; INTRAVENOUS AS NEEDED
Status: DISCONTINUED | OUTPATIENT
Start: 2018-03-09 | End: 2018-03-09 | Stop reason: SURG

## 2018-03-09 RX ORDER — SODIUM CHLORIDE, SODIUM GLUCONATE, SODIUM ACETATE, POTASSIUM CHLORIDE, MAGNESIUM CHLORIDE, SODIUM PHOSPHATE, DIBASIC, AND POTASSIUM PHOSPHATE .53; .5; .37; .037; .03; .012; .00082 G/100ML; G/100ML; G/100ML; G/100ML; G/100ML; G/100ML; G/100ML
100 INJECTION, SOLUTION INTRAVENOUS CONTINUOUS
Status: DISCONTINUED | OUTPATIENT
Start: 2018-03-09 | End: 2018-03-10

## 2018-03-09 RX ORDER — ONDANSETRON 2 MG/ML
4 INJECTION INTRAMUSCULAR; INTRAVENOUS ONCE AS NEEDED
Status: DISCONTINUED | OUTPATIENT
Start: 2018-03-09 | End: 2018-03-09 | Stop reason: HOSPADM

## 2018-03-09 RX ORDER — LORAZEPAM 2 MG/ML
1 INJECTION INTRAMUSCULAR ONCE
Status: DISCONTINUED | OUTPATIENT
Start: 2018-03-09 | End: 2018-03-09 | Stop reason: HOSPADM

## 2018-03-09 RX ORDER — FOLIC ACID 1 MG/1
1 TABLET ORAL DAILY
Status: DISCONTINUED | OUTPATIENT
Start: 2018-03-09 | End: 2018-03-15 | Stop reason: HOSPADM

## 2018-03-09 RX ORDER — HEPARIN SODIUM 5000 [USP'U]/ML
5000 INJECTION, SOLUTION INTRAVENOUS; SUBCUTANEOUS EVERY 8 HOURS SCHEDULED
Status: DISCONTINUED | OUTPATIENT
Start: 2018-03-09 | End: 2018-03-15 | Stop reason: HOSPADM

## 2018-03-09 RX ORDER — FENTANYL CITRATE 50 UG/ML
INJECTION, SOLUTION INTRAMUSCULAR; INTRAVENOUS AS NEEDED
Status: DISCONTINUED | OUTPATIENT
Start: 2018-03-09 | End: 2018-03-09 | Stop reason: SURG

## 2018-03-09 RX ORDER — CHLORHEXIDINE GLUCONATE 0.12 MG/ML
15 RINSE ORAL EVERY 12 HOURS SCHEDULED
Status: DISCONTINUED | OUTPATIENT
Start: 2018-03-09 | End: 2018-03-15 | Stop reason: HOSPADM

## 2018-03-09 RX ORDER — METOCLOPRAMIDE HYDROCHLORIDE 5 MG/ML
10 INJECTION INTRAMUSCULAR; INTRAVENOUS ONCE AS NEEDED
Status: DISCONTINUED | OUTPATIENT
Start: 2018-03-09 | End: 2018-03-09 | Stop reason: HOSPADM

## 2018-03-09 RX ORDER — FENTANYL CITRATE/PF 50 MCG/ML
25 SYRINGE (ML) INJECTION
Status: DISCONTINUED | OUTPATIENT
Start: 2018-03-09 | End: 2018-03-09 | Stop reason: HOSPADM

## 2018-03-09 RX ORDER — SODIUM CHLORIDE, SODIUM LACTATE, POTASSIUM CHLORIDE, CALCIUM CHLORIDE 600; 310; 30; 20 MG/100ML; MG/100ML; MG/100ML; MG/100ML
50 INJECTION, SOLUTION INTRAVENOUS CONTINUOUS
Status: DISCONTINUED | OUTPATIENT
Start: 2018-03-09 | End: 2018-03-09

## 2018-03-09 RX ORDER — LIDOCAINE HYDROCHLORIDE 10 MG/ML
INJECTION, SOLUTION INFILTRATION; PERINEURAL AS NEEDED
Status: DISCONTINUED | OUTPATIENT
Start: 2018-03-09 | End: 2018-03-09 | Stop reason: SURG

## 2018-03-09 RX ORDER — ALBUTEROL SULFATE 2.5 MG/3ML
2.5 SOLUTION RESPIRATORY (INHALATION) ONCE AS NEEDED
Status: DISCONTINUED | OUTPATIENT
Start: 2018-03-09 | End: 2018-03-09 | Stop reason: HOSPADM

## 2018-03-09 RX ORDER — SODIUM CHLORIDE, SODIUM GLUCONATE, SODIUM ACETATE, POTASSIUM CHLORIDE, MAGNESIUM CHLORIDE, SODIUM PHOSPHATE, DIBASIC, AND POTASSIUM PHOSPHATE .53; .5; .37; .037; .03; .012; .00082 G/100ML; G/100ML; G/100ML; G/100ML; G/100ML; G/100ML; G/100ML
1000 INJECTION, SOLUTION INTRAVENOUS ONCE
Status: COMPLETED | OUTPATIENT
Start: 2018-03-09 | End: 2018-03-10

## 2018-03-09 RX ORDER — PROPOFOL 10 MG/ML
INJECTION, EMULSION INTRAVENOUS AS NEEDED
Status: DISCONTINUED | OUTPATIENT
Start: 2018-03-09 | End: 2018-03-09 | Stop reason: SURG

## 2018-03-09 RX ORDER — THIAMINE MONONITRATE (VIT B1) 100 MG
100 TABLET ORAL DAILY
Status: DISCONTINUED | OUTPATIENT
Start: 2018-03-09 | End: 2018-03-15 | Stop reason: HOSPADM

## 2018-03-09 RX ORDER — SUCCINYLCHOLINE CHLORIDE 20 MG/ML
INJECTION INTRAMUSCULAR; INTRAVENOUS AS NEEDED
Status: DISCONTINUED | OUTPATIENT
Start: 2018-03-09 | End: 2018-03-09 | Stop reason: SURG

## 2018-03-09 RX ADMIN — SODIUM CHLORIDE, SODIUM GLUCONATE, SODIUM ACETATE, POTASSIUM CHLORIDE, MAGNESIUM CHLORIDE, SODIUM PHOSPHATE, DIBASIC, AND POTASSIUM PHOSPHATE 100 ML/HR: .53; .5; .37; .037; .03; .012; .00082 INJECTION, SOLUTION INTRAVENOUS at 16:45

## 2018-03-09 RX ADMIN — SODIUM CHLORIDE, SODIUM LACTATE, POTASSIUM CHLORIDE, AND CALCIUM CHLORIDE: .6; .31; .03; .02 INJECTION, SOLUTION INTRAVENOUS at 10:42

## 2018-03-09 RX ADMIN — SODIUM CHLORIDE, SODIUM LACTATE, POTASSIUM CHLORIDE, AND CALCIUM CHLORIDE: .6; .31; .03; .02 INJECTION, SOLUTION INTRAVENOUS at 12:15

## 2018-03-09 RX ADMIN — HEPARIN SODIUM 5000 UNITS: 5000 INJECTION, SOLUTION INTRAVENOUS; SUBCUTANEOUS at 06:37

## 2018-03-09 RX ADMIN — PIPERACILLIN SODIUM,TAZOBACTAM SODIUM 3.38 G: 3; .375 INJECTION, POWDER, FOR SOLUTION INTRAVENOUS at 21:17

## 2018-03-09 RX ADMIN — POTASSIUM CHLORIDE 20 MEQ: 200 INJECTION, SOLUTION INTRAVENOUS at 15:51

## 2018-03-09 RX ADMIN — SODIUM CHLORIDE 75 ML/HR: 0.9 INJECTION, SOLUTION INTRAVENOUS at 07:41

## 2018-03-09 RX ADMIN — GLUCAGON HYDROCHLORIDE 0.5 MG: KIT at 11:56

## 2018-03-09 RX ADMIN — NICOTINE 1 PATCH: 14 PATCH, EXTENDED RELEASE TRANSDERMAL at 17:28

## 2018-03-09 RX ADMIN — FENTANYL CITRATE 50 MCG: 50 INJECTION, SOLUTION INTRAMUSCULAR; INTRAVENOUS at 11:42

## 2018-03-09 RX ADMIN — SUCCINYLCHOLINE CHLORIDE 100 MG: 20 INJECTION, SOLUTION INTRAMUSCULAR; INTRAVENOUS at 11:42

## 2018-03-09 RX ADMIN — ONDANSETRON 4 MG: 2 INJECTION INTRAMUSCULAR; INTRAVENOUS at 12:34

## 2018-03-09 RX ADMIN — SODIUM CHLORIDE, SODIUM LACTATE, POTASSIUM CHLORIDE, AND CALCIUM CHLORIDE 50 ML/HR: .6; .31; .03; .02 INJECTION, SOLUTION INTRAVENOUS at 14:20

## 2018-03-09 RX ADMIN — PIPERACILLIN SODIUM,TAZOBACTAM SODIUM 3.38 G: 3; .375 INJECTION, POWDER, FOR SOLUTION INTRAVENOUS at 09:54

## 2018-03-09 RX ADMIN — GLUCAGON HYDROCHLORIDE 0.5 MG: KIT at 12:20

## 2018-03-09 RX ADMIN — PIPERACILLIN SODIUM,TAZOBACTAM SODIUM 3.38 G: 3; .375 INJECTION, POWDER, FOR SOLUTION INTRAVENOUS at 17:20

## 2018-03-09 RX ADMIN — METOCLOPRAMIDE 10 MG: 5 INJECTION, SOLUTION INTRAMUSCULAR; INTRAVENOUS at 09:54

## 2018-03-09 RX ADMIN — SODIUM CHLORIDE, SODIUM GLUCONATE, SODIUM ACETATE, POTASSIUM CHLORIDE, MAGNESIUM CHLORIDE, SODIUM PHOSPHATE, DIBASIC, AND POTASSIUM PHOSPHATE 100 ML/HR: .53; .5; .37; .037; .03; .012; .00082 INJECTION, SOLUTION INTRAVENOUS at 14:57

## 2018-03-09 RX ADMIN — MIDAZOLAM 2 MG: 1 INJECTION INTRAMUSCULAR; INTRAVENOUS at 11:37

## 2018-03-09 RX ADMIN — FENTANYL CITRATE 25 MCG: 50 INJECTION, SOLUTION INTRAMUSCULAR; INTRAVENOUS at 12:45

## 2018-03-09 RX ADMIN — LIDOCAINE HYDROCHLORIDE 50 MG: 10 INJECTION, SOLUTION INFILTRATION; PERINEURAL at 11:42

## 2018-03-09 RX ADMIN — CHLORHEXIDINE GLUCONATE 15 ML: 1.2 RINSE ORAL at 22:40

## 2018-03-09 RX ADMIN — SODIUM CHLORIDE, SODIUM GLUCONATE, SODIUM ACETATE, POTASSIUM CHLORIDE, MAGNESIUM CHLORIDE, SODIUM PHOSPHATE, DIBASIC, AND POTASSIUM PHOSPHATE 1000 ML: .53; .5; .37; .037; .03; .012; .00082 INJECTION, SOLUTION INTRAVENOUS at 15:43

## 2018-03-09 RX ADMIN — FENTANYL CITRATE 25 MCG: 50 INJECTION, SOLUTION INTRAMUSCULAR; INTRAVENOUS at 12:30

## 2018-03-09 RX ADMIN — PROPOFOL 200 MG: 10 INJECTION, EMULSION INTRAVENOUS at 11:42

## 2018-03-09 RX ADMIN — POTASSIUM CHLORIDE 20 MEQ: 200 INJECTION, SOLUTION INTRAVENOUS at 19:02

## 2018-03-09 RX ADMIN — HEPARIN SODIUM 5000 UNITS: 5000 INJECTION, SOLUTION INTRAVENOUS; SUBCUTANEOUS at 22:40

## 2018-03-09 RX ADMIN — HEPARIN SODIUM 5000 UNITS: 5000 INJECTION, SOLUTION INTRAVENOUS; SUBCUTANEOUS at 15:01
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
## 2018-03-09 NOTE — CONSULTS
Consultation - [facility]  Malaika Baughs 78 y o  male MRN: [id number]  Unit/Bed#: -01 Encounter: [id number]         Reason for Consult / Principal Problem: AMS and transaminitis     HPI:  All Worley is a 68-year-old male who presented to the ED with worsening altered mental status  Much of the patient's history was obtained by the EHR and patient's daughter who was at the bedside  She reports that his mental status has been declining for the past year  She reports that 8 months ago the patient began abusing alcohol  Last Friday, the patient also had a unwitnessed fall  Patient developed very poor appetite over the past week as well  Given this recent history and declining mental status, the patient's wife and daughter were concerned and decided to have him be evaluated in the emergency room  To their knowledge, he was not complaining of any GI symptoms  When asked about fevers or chills, the daughter reports that the patient is always cold  On admission, ALT and AST elevated at 209 and 283 respectively  Patient also has a elevation in alkaline phosphatase, and total bilirubin on admission was 5 5  No white count  CT of the abdomen that revealed that the patient had a prominent CBD measuring up to 1 2 cm  One or more loose and stones noted in the CBD as well  The gallbladder is also distended with stones  Other findings included a liver that is diffusely decreased in density with fatty infiltration  The patient was seen this morning with his daughter at the bedside  Patient continues to deny abdominal pain, nausea or vomiting  Review of Systems:    CONSTITUTIONAL: Denies any fever, chills, or rigors  Good appetite, and no recent weight loss  HEENT: No earache or tinnitus  Denies hearing loss or visual disturbances  CARDIOVASCULAR: No chest pain or palpitations  RESPIRATORY: Denies any cough, hemoptysis, shortness of breath or dyspnea on exertion    GASTROINTESTINAL: As noted in the History of Present Illness  GENITOURINARY: No problems with urination  Denies any hematuria or dysuria  NEUROLOGIC: No dizziness or vertigo, denies headaches  MUSCULOSKELETAL: Denies any muscle or joint pain  SKIN: Denies skin rashes or itching  ENDOCRINE: Denies excessive thirst  Denies intolerance to heat or cold  PSYCHOSOCIAL: Denies depression or anxiety  Denies any recent memory loss  Historical Information   Past Medical History:   Diagnosis Date    Cancer [facility])     prostate, skin     Dementia      Past Surgical History:   Procedure Laterality Date    PROSTATE SURGERY       Social History   History   Alcohol Use No     Comment: former     History   Drug use: Unknown     History   Smoking Status    Current Every Day Smoker    Packs/day: 0 50   Smokeless Tobacco    Never Used     History reviewed  No pertinent family history  Meds/Allergies     Current Facility-Administered Medications   Medication Dose Route Frequency    heparin (porcine) subcutaneous injection 5,000 Units  5,000 Units Subcutaneous Q8H [facility]    hydrALAZINE (APRESOLINE) injection 10 mg  10 mg Intravenous Q6H PRN    nicotine (NICODERM CQ) 14 mg/24hr TD 24 hr patch 1 patch  1 patch Transdermal Daily    piperacillin-tazobactam (ZOSYN) 3 375 g in sodium chloride 0 9 % 50 mL IVPB  3 375 g Intravenous Q6H    sodium chloride 0 9 % infusion  75 mL/hr Intravenous Continuous       No Known Allergies      Objective     Blood pressure 105/75, pulse 60, temperature 97 9 °F (36 6 °C), temperature source Oral, resp  rate 18, height 5' 9" (1 753 m), weight 60 kg (132 lb 4 4 oz), SpO2 99 %  Intake/Output Summary (Last 24 hours) at 03/09/18 0912  Last data filed at 03/09/18 0740   Gross per 24 hour   Intake             1055 ml   Output              300 ml   Net              755 ml         PHYSICAL EXAM:      General Appearance:   Alert and oriented x 2  Cooperative, and in no respiratory distress   Scleral icterus noted  No asterixis  HEENT:   Normocephalic, atraumatic, anicteric      Neck:  Supple, symmetrical, trachea midline   Lungs:   Clear to auscultation bilaterally; no rales, rhonchi or wheezing; respirations unlabored    Heart[de-identified]   Regular rate and rhythm   Abdomen:   Soft, non-tender, non-distended; normal bowel sounds; no masses, no organomegaly    Genitalia:   Deferred    Rectal:   Deferred    Extremities:  No cyanosis, clubbing or edema    Pulses:  2+ and symmetric all extremities    Skin:  Skin color, texture, turgor normal, no rashes or lesions    Lymph nodes:  No palpable cervical or supraclavicular lymphadenopathy        Lab Results:     Results from last 7 days  Lab Units 03/09/18  0447  03/08/18  0831   WBC Thousand/uL 8 88  --  7 42   HEMOGLOBIN g/dL 13 7  --  16 0   HEMATOCRIT % 38 5  --  44 2   PLATELETS Thousands/uL 122*  < > 187   NEUTROS PCT %  --   --  85*   LYMPHS PCT %  --   --  10*   MONOS PCT %  --   --  5   EOS PCT %  --   --  0   < > = values in this interval not displayed  Results from last 7 days  Lab Units 03/09/18  0447   SODIUM mmol/L 131*   POTASSIUM mmol/L 3 5   CHLORIDE mmol/L 96*   CO2 mmol/L 24   BUN mg/dL 12   CREATININE mg/dL 0 90   CALCIUM mg/dL 8 4   TOTAL PROTEIN g/dL 5 3*   BILIRUBIN TOTAL mg/dL 8 30*   ALK PHOS U/L 301*   ALT U/L 337*   AST U/L 425*   GLUCOSE RANDOM mg/dL 91       Results from last 7 days  Lab Units 03/09/18  0121   INR  1 10       Results from last 7 days  Lab Units 03/08/18  1801   LIPASE u/L 569*   AMYLASE IU/L 84       Imaging Studies: I have personally reviewed pertinent imaging studies  Xr Chest 2 Views    Result Date: 3/8/2018  Impression: No acute cardiopulmonary disease  Workstation performed: [id number]     Ct Head Without Contrast    Result Date: 3/8/2018  Impression: No CT evidence of acute intracranial process  Chronic microangiopathy  Mild progressive enlargement of the lateral ventricles commensurate with generalized volume loss and age  No acute hydrocephalus  Workstation performed: [id number]     Ct Chest Abdomen Pelvis W Contrast    Result Date: 3/8/2018  Impression: 1  No obvious esophageal abnormality suspicious for malignancy but endoscopy would be more sensitive  2   A few tiny pulmonary nodules measuring up to 3 mm in the right upper lobe  Based on current Fleischner Society 2017 Guidelines on incidental pulmonary nodule, patients with a known malignancy are at increased risk of metastasis and should receive initial three month follow-up chest CT  3   Prominent CBD with one or more lucent stones compatible with choledocholithiasis  Recommend follow-up with ERCP or MRCP  4  Gallbladder is distended with lucent stones  Consider follow-up evaluation as warranted  5  Additional findings as noted above  Workstation performed: [id number]       ASSESSMENT and PLAN:    Principal Problem:    Altered mental status  Active Problems:    Transaminitis    Hypokalemia    Fall    Total bilirubin, elevated    Weight loss counseling, encounter for    Common bile duct (CBD) obstruction    1) Mixed transaminitis with CBD stones seen on CT - AST and ALT trending upward  Total bilirubin at 8 today  Patient is not a reliable historian  However, he denies any abdominal pain, nausea or vomiting  No fevers or leukocytosis documented  Patient is at risk for cholangitis given recent altered mental status  I discussed the case with the patient's primary team and nursing  ERCP was explained to the daughter who was present during this encounter and offered much of his history    Patient's daughter or wife will be giving consent   - Start Zosyn for suspected cholangitis given altered mental status  - Patient's last dose of heparin was at 6:00 a m  this morning, hold heparin  - Surgery consulted  - NPO now  - Continue to monitor abdominal exams, fever and leukocytosis  - We will plan for ERCP possibly today      The patient was seen and examined by Dr Luis Manuel Carreon, all key medical decisions were made with Dr Lawrence Dias  Thank you for allowing us to participate in the care of this pleasant patient  We will follow up with you closely
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
## 2018-03-09 NOTE — OP NOTE
**** GI/ENDOSCOPY REPORT ****:     PATIENT NAME: Beverly Robert  - VISIT ID:  Patient ID: [id number]   YOB: 1940     INTRODUCTION:Endoscopic Retrograde Cholangiopancreatography - A 78 male   patient presents for inpatient Endoscopic Retrograde   Cholangiopancreatography at [facility]  INDICATIONS: Jaundiced  CONSENT:  The benefits, risks, and alternatives to the procedure were   discussed and informed consent was obtained from the patient  PREPARATION: EKG, pulse, pulse oximetry, and blood pressure were monitored   throughout the procedure  ASA Classification: Class 3 - Patient has severe   systemic disturbance that may or may not be related to the disorder   requiring surgery  The patient was kept NPO for eight hours prior to the   procedure  MEDICATIONS: Anesthesia-check records Anesthesia administered by   anesthesiologist      PROCEDURE: The endoscope was passed through the mouth under direct   visualization and advanced to the duodenum  The scope was withdrawn and   the mucosa was carefully examined  FINDINGS:  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 and the CBD was   cannulated sideways  It was injected and over ten large >1cm stones were   noted  A sphincterotomy was performed and the ampulla was dilated with a   10x4mm XL balloon  Multiple stones and pus were removed  Multiple large   stones remain  A 7Fx9cm stents was deployed and there was good drainage  COMPLICATIONS: There were no complications  IMPRESSIONS: 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 and the CBD was   cannulated sideways  It was injected and over ten large >1cm stones were   noted  A sphincterotomy was performed and the ampulla was dilated with a   10x4mm XL balloon  Multiple stones and pus were removed  Multiple large   stones remain  A 7Fx9cm stents was deployed and there was good drainage  RECOMMENDATIONS: Zosyn  ERCP with stone removal in 6 weeks   Alcohol cessation  ESTIMATED BLOOD LOSS: None  PROCEDURE CODES: 00564 - ERCP with balloon dilation (w/ sphincterotomy);   and 28115 - ERCP with sphincterotomy 86535 - ERCP with stent placement   73020 - ERCP with stone removal     ICD-9 Codes: 782 4 Jaundice, unspecified, not of      ICD-10 Codes: R17 Unspecified jaundice     PERFORMED BY: KASHMIR Howard III  on 2018  Version 1, electronically signed by KASHMIR Pratt  on   2018 at 12:40
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
## 2018-03-09 NOTE — CONSULTS
GENERAL SURGERY CONSULT     Alva Little 78 y o  male MRN: [id number]  Unit/Bed#: OR POOL Encounter: [id number]      Assessment/Plan   Patient Active Problem List   Diagnosis    Transaminitis    Hypokalemia    Fall    Altered mental status    Total bilirubin, elevated    Weight loss counseling, encounter for    Common bile duct (CBD) obstruction     Patient was CBD stones pending ERCP today  Will re-evaluate patient tomorrow, if he is doing well and has tolerated anesthesia with no difficulty will tentatively plan for lap libia on Sunday  Given his history of mental status changes alcohol use I would like to give him appeared of time to recover from anesthesia before proceeding with surgery  Also evaluate for possible post ERCP pancreatitis  Discussed the plan with the patient wife and daughter present and will further discuss surgical plan after evaluation tomorrow  Chief Complaint choledocholithiasis    HPI: Alva Little is a 78y o  year old male who presents with initially mental status changes  Patient had an acute worsening starting last Friday during the stone arm with forgetfulness and questionable falls  Patient has had poor oral intake that became acutely worse starting last Friday as well  Patient denies abdominal pain  Brought in for evaluation  CT scan showed evidence of common bile duct dilatation with obstructing stones  Patient is scheduled to undergo ERCP today  Surgery consulted for laparoscopic cholecystectomy        ROS:  12 Point ROS reviewed and negative except for:  Mental status changes poor oral intake    Historical Information   Past Medical History:   Diagnosis Date    Cancer ([address] )     prostate, skin     Dementia      Past Surgical History:   Procedure Laterality Date    PROSTATE SURGERY       Social History   History   Alcohol Use No     Comment: former     History   Drug Use No     History   Smoking Status    Current Every Day Smoker    Packs/day: 0 25 Smokeless Tobacco    Never Used     Family History: no pertinent family history  No Known Allergies  Meds/Allergies   all current active meds have been reviewed      Objective   Vitals: Blood pressure 115/76, pulse 72, temperature 97 8 °F (36 6 °C), temperature source Oral, resp  rate 16, height 5' 9" (1 753 m), weight 60 kg (132 lb 4 4 oz), SpO2 99 %  ,Body mass index is 19 53 kg/m²  Intake/Output Summary (Last 24 hours) at 03/09/18 1134  Last data filed at 03/09/18 0740   Gross per 24 hour   Intake             1005 ml   Output              300 ml   Net              705 ml     Invasive Devices     Peripheral Intravenous Line            Peripheral IV 03/09/18 Right Forearm less than 1 day                Physical Exam:    General appearance: Awake alert oriented no acute distress    HEENT: Sclera icterus present  Neck: Trachea is midline, no adenopathy  Lungs: No respiratory distress, clear to auscultation bilaterally  Heart[de-identified] RRR  Abdomen: soft, nondistended, nontender  Extremities: No edema, nontender  Skin:No rashes or lesions  Neurologic: No weakness or loss of sensation  Psych: Affect appropriate    CT:  Dilated common bile duct with debris present within  Largely dilated gallbladder with obvious stones present  No pericholecystic fluid      Pilar Montes De Oca MD  3/9/2018
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
## 2018-03-09 NOTE — ANESTHESIA POSTPROCEDURE EVALUATION
Post-Op Assessment Note      CV Status:  Stable    Mental Status:  Alert and awake    Hydration Status:  Stable    PONV Controlled:  None    Airway Patency:  Patent and adequate    Post Op Vitals Reviewed: Yes          Staff: AnesthesiologistDAYANNA           BP   138/72   Temp (P) 97 9 °F (36 6 °C) (03/09/18 1251)    Pulse  88   Resp   18   SpO2 (P) 100 % (03/09/18 1251)
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
## 2018-03-09 NOTE — PLAN OF CARE
Discharge to home or other facility with appropriate resources Progressing      Maintains adequate nutritional intake Progressing      Absence or prevention of progression during hospitalization Progressing      Patient/family/caregiver demonstrates understanding of disease process, treatment plan, medications, and discharge instructions Progressing      Electrolytes maintained within normal limits Progressing      Fluid balance maintained Progressing      Achieves stable or improved neurological status Progressing      Verbalizes/displays adequate comfort level or baseline comfort level Progressing      Patient will remain free of falls Progressing      Maintain or return to baseline ADL function Progressing      Skin integrity remains intact Progressing

## 2018-03-09 NOTE — SOCIAL WORK
Pt's daughter followed up with Complex CM to make her aware she contacted pt's  and pt does not have POA/AD  CM inquired as to if pt was previously  and pt's daughter confirmed he was  Pt's daughter reports she has a sibling who lives out of the area  CM explained if pt's daughter, Alexx Solitario is unable to make decisions healthcare representative would default to her  CM further explained she wanted pt's daughter to be aware but in the event pt cannot make decisions for himself a care team meeting would occur to further discuss  Pt's daughter understood and is in agreement  Pt's daughter confirmed pt uses [facility] in [place]  CM updated ICU AP  Complex CM to follow pt through dc  Pt has no other needs at this time

## 2018-03-09 NOTE — ANESTHESIA PREPROCEDURE EVALUATION
Review of Systems/Medical History  Patient summary reviewed  Chart reviewed  No history of anesthetic complications (family history: father  of intraoperative MI during a hernia surgery)     Cardiovascular  EKG reviewed,   Comment: Unusual P axis, possible ectopic atrial rhythm with Premature supraventricular complexes and with occasional Premature ventricular complexes  Nonspecific ST abnormality  Abnormal ECG  No previous ECGs available  Confirmed by Alfonso Marvin ([id number]) on 3/8/2018 3:20:32 PM,  Pulmonary  Negative pulmonary ROS        GI/Hepatic      Comment: transaminitis  Common bile duct obstruction  Cholangitis  Sepsis  Lactemia, resolved       Negative  ROS        Endo/Other  Negative endo/other ROS      GYN  Negative gynecology ROS          Hematology  Negative hematology ROS      Musculoskeletal  Negative musculoskeletal ROS        Neurology  Negative neurology ROS      Psychology   Negative psychology ROS              Physical Exam    Airway    Mallampati score: II  TM Distance: >3 FB  Neck ROM: full     Dental   No notable dental hx     Cardiovascular  Cardiovascular exam normal    Pulmonary  Pulmonary exam normal Breath sounds clear to auscultation,     Other Findings        Anesthesia Plan  ASA Score- 2 Emergent    Anesthesia Type- general with ASA Monitors  Additional Monitors:   Airway Plan: ETT  Plan Factors- Patient instructed to abstain from smoking on day of procedure  Patient did not smoke on day of surgery  Induction- intravenous and rapid sequence induction  Postoperative Plan- Plan for postoperative opioid use  Planned trial extubation    Informed Consent- Anesthetic plan and risks discussed with patient  I personally reviewed this patient with the CRNA  Discussed and agreed on the Anesthesia Plan with the CRNA             Lab Results   Component Value Date    WBC 8 88 2018    HGB 13 7 2018    HCT 38 5 2018    MCV 91 2018     (L) 03/09/2018     Lab Results   Component Value Date    GLUCOSE 91 03/09/2018    CALCIUM 8 4 03/09/2018     (L) 03/09/2018    K 3 5 03/09/2018    CO2 24 03/09/2018    CL 96 (L) 03/09/2018    BUN 12 03/09/2018    CREATININE 0 90 03/09/2018     Lab Results   Component Value Date    INR 1 10 03/09/2018    INR 0 92 03/08/2018    PROTIME 14 4 03/09/2018    PROTIME 12 5 03/08/2018     Lab Results   Component Value Date    PTT 25 03/08/2018         I, Dr Emi Linares, the attending physician, have personally seen and evaluated the patient prior to anesthetic care  I have reviewed the pre-anesthetic record, and other medical records if appropriate to the anesthetic care  If a CRNA is involved in the case, I have reviewed the CRNA assessment, if present, and agree  The patient is in a suitable condition to proceed with my formulated anesthetic plan

## 2018-03-10 LAB
ALBUMIN SERPL BCP-MCNC: 1.8 G/DL (ref 3.5–5)
ALP SERPL-CCNC: 208 U/L (ref 46–116)
ALT SERPL W P-5'-P-CCNC: 205 U/L (ref 12–78)
ANION GAP SERPL CALCULATED.3IONS-SCNC: 13 MMOL/L (ref 4–13)
AST SERPL W P-5'-P-CCNC: 175 U/L (ref 5–45)
BASOPHILS # BLD AUTO: 0.01 THOUSANDS/ΜL (ref 0–0.1)
BASOPHILS NFR BLD AUTO: 0 % (ref 0–1)
BILIRUB DIRECT SERPL-MCNC: 3.34 MG/DL (ref 0–0.2)
BILIRUB SERPL-MCNC: 3.9 MG/DL (ref 0.2–1)
BUN SERPL-MCNC: 12 MG/DL (ref 5–25)
CALCIUM SERPL-MCNC: 7.5 MG/DL (ref 8.3–10.1)
CHLORIDE SERPL-SCNC: 101 MMOL/L (ref 100–108)
CO2 SERPL-SCNC: 21 MMOL/L (ref 21–32)
CREAT SERPL-MCNC: 0.82 MG/DL (ref 0.6–1.3)
EOSINOPHIL # BLD AUTO: 0.02 THOUSAND/ΜL (ref 0–0.61)
EOSINOPHIL NFR BLD AUTO: 0 % (ref 0–6)
ERYTHROCYTE [DISTWIDTH] IN BLOOD BY AUTOMATED COUNT: 13.7 % (ref 11.6–15.1)
GFR SERPL CREATININE-BSD FRML MDRD: 87 ML/MIN/1.73SQ M
GLUCOSE SERPL-MCNC: 71 MG/DL (ref 65–140)
HCT VFR BLD AUTO: 31.7 % (ref 36.5–49.3)
HGB BLD-MCNC: 11.1 G/DL (ref 12–17)
LACTATE SERPL-SCNC: 1.3 MMOL/L (ref 0.5–2)
LYMPHOCYTES # BLD AUTO: 0.49 THOUSANDS/ΜL (ref 0.6–4.47)
LYMPHOCYTES NFR BLD AUTO: 5 % (ref 14–44)
MAGNESIUM SERPL-MCNC: 2.2 MG/DL (ref 1.6–2.6)
MCH RBC QN AUTO: 32.9 PG (ref 26.8–34.3)
MCHC RBC AUTO-ENTMCNC: 35 G/DL (ref 31.4–37.4)
MCV RBC AUTO: 94 FL (ref 82–98)
MONOCYTES # BLD AUTO: 0.34 THOUSAND/ΜL (ref 0.17–1.22)
MONOCYTES NFR BLD AUTO: 4 % (ref 4–12)
NEUTROPHILS # BLD AUTO: 8.02 THOUSANDS/ΜL (ref 1.85–7.62)
NEUTS SEG NFR BLD AUTO: 89 % (ref 43–75)
NRBC BLD AUTO-RTO: 0 /100 WBCS
PHOSPHATE SERPL-MCNC: 3.2 MG/DL (ref 2.3–4.1)
PLATELET # BLD AUTO: 84 THOUSANDS/UL (ref 149–390)
PMV BLD AUTO: 11.1 FL (ref 8.9–12.7)
POTASSIUM SERPL-SCNC: 3.7 MMOL/L (ref 3.5–5.3)
PROT SERPL-MCNC: 4.4 G/DL (ref 6.4–8.2)
RBC # BLD AUTO: 3.37 MILLION/UL (ref 3.88–5.62)
SODIUM SERPL-SCNC: 135 MMOL/L (ref 136–145)
WBC # BLD AUTO: 9 THOUSAND/UL (ref 4.31–10.16)

## 2018-03-10 PROCEDURE — 83605 ASSAY OF LACTIC ACID: CPT | Performed by: PHYSICIAN ASSISTANT

## 2018-03-10 PROCEDURE — 84100 ASSAY OF PHOSPHORUS: CPT | Performed by: PHYSICIAN ASSISTANT

## 2018-03-10 PROCEDURE — 99232 SBSQ HOSP IP/OBS MODERATE 35: CPT | Performed by: PSYCHIATRY & NEUROLOGY

## 2018-03-10 PROCEDURE — 99232 SBSQ HOSP IP/OBS MODERATE 35: CPT | Performed by: INTERNAL MEDICINE

## 2018-03-10 PROCEDURE — 85025 COMPLETE CBC W/AUTO DIFF WBC: CPT | Performed by: PHYSICIAN ASSISTANT

## 2018-03-10 PROCEDURE — 80048 BASIC METABOLIC PNL TOTAL CA: CPT | Performed by: PHYSICIAN ASSISTANT

## 2018-03-10 PROCEDURE — 80076 HEPATIC FUNCTION PANEL: CPT | Performed by: PHYSICIAN ASSISTANT

## 2018-03-10 PROCEDURE — 83735 ASSAY OF MAGNESIUM: CPT | Performed by: PHYSICIAN ASSISTANT

## 2018-03-10 RX ADMIN — PIPERACILLIN SODIUM,TAZOBACTAM SODIUM 3.38 G: 3; .375 INJECTION, POWDER, FOR SOLUTION INTRAVENOUS at 02:31

## 2018-03-10 RX ADMIN — CHLORHEXIDINE GLUCONATE 15 ML: 1.2 RINSE ORAL at 08:22

## 2018-03-10 RX ADMIN — PIPERACILLIN SODIUM,TAZOBACTAM SODIUM 3.38 G: 3; .375 INJECTION, POWDER, FOR SOLUTION INTRAVENOUS at 21:52

## 2018-03-10 RX ADMIN — FOLIC ACID 1 MG: 1 TABLET ORAL at 08:23

## 2018-03-10 RX ADMIN — Medication 100 MG: at 08:23

## 2018-03-10 RX ADMIN — PIPERACILLIN SODIUM,TAZOBACTAM SODIUM 3.38 G: 3; .375 INJECTION, POWDER, FOR SOLUTION INTRAVENOUS at 08:35

## 2018-03-10 RX ADMIN — HEPARIN SODIUM 5000 UNITS: 5000 INJECTION, SOLUTION INTRAVENOUS; SUBCUTANEOUS at 06:26

## 2018-03-10 RX ADMIN — HEPARIN SODIUM 5000 UNITS: 5000 INJECTION, SOLUTION INTRAVENOUS; SUBCUTANEOUS at 21:52

## 2018-03-10 RX ADMIN — PIPERACILLIN SODIUM,TAZOBACTAM SODIUM 3.38 G: 3; .375 INJECTION, POWDER, FOR SOLUTION INTRAVENOUS at 16:21

## 2018-03-10 RX ADMIN — Medication 1 TABLET: at 08:23

## 2018-03-10 RX ADMIN — CHLORHEXIDINE GLUCONATE 15 ML: 1.2 RINSE ORAL at 21:52

## 2018-03-10 RX ADMIN — HEPARIN SODIUM 5000 UNITS: 5000 INJECTION, SOLUTION INTRAVENOUS; SUBCUTANEOUS at 16:21

## 2018-03-10 RX ADMIN — NICOTINE 1 PATCH: 14 PATCH, EXTENDED RELEASE TRANSDERMAL at 08:24

## 2018-03-10 NOTE — PLAN OF CARE
DISCHARGE PLANNING     Discharge to home or other facility with appropriate resources Progressing        DISCHARGE PLANNING - CARE MANAGEMENT     Discharge to post-acute care or home with appropriate resources Progressing        GASTROINTESTINAL - ADULT     Maintains adequate nutritional intake Progressing        INFECTION - ADULT     Absence or prevention of progression during hospitalization Progressing        Knowledge Deficit     Patient/family/caregiver demonstrates understanding of disease process, treatment plan, medications, and discharge instructions Progressing        METABOLIC, FLUID AND ELECTROLYTES - ADULT     Electrolytes maintained within normal limits Progressing     Fluid balance maintained Progressing        NEUROSENSORY - ADULT     Achieves stable or improved neurological status Progressing        Nutrition/Hydration-ADULT     Nutrient/Hydration intake appropriate for improving, restoring or maintaining nutritional needs Progressing        PAIN - ADULT     Verbalizes/displays adequate comfort level or baseline comfort level Progressing        Prexisting or High Potential for Compromised Skin Integrity     Skin integrity is maintained or improved Progressing        SAFETY ADULT     Patient will remain free of falls Progressing     Maintain or return to baseline ADL function Progressing        SKIN/TISSUE INTEGRITY - ADULT     Skin integrity remains intact Progressing

## 2018-03-10 NOTE — PROGRESS NOTES
Progress Note - Critical Care   Chandler Choudhury 78 y o  male MRN: [id number]  Unit/Bed#:  Encounter: [id number]    Attending Physician: Olive Maya MD      ______________________________________________________________________  Assessment and Plan:   Principal Problem:    Altered mental status  Active Problems:    Transaminitis    Hypokalemia    Fall    Total bilirubin, elevated    Weight loss counseling, encounter for    Common bile duct (CBD) obstruction  Resolved Problems:    * No resolved hospital problems  *        Neuro:   Encephalopathy-this has resolved  Most likely related to infection on admission  Continue to monitor closely  ETOH use-CIWA protocol, monitor for withdrawal symptoms  [facility] ICU  Sleep hygiene    CV:   Has been hemodynamically stable, continue to monitor    Pulm:   Encourage good pulmonary hygiene    GI:   Choledocholithiasis status post ERCP-sphincterotomy with multiple stones in the common bile duct and yuliana purulent drainage  Initially there was concern for potential ulcers/sepsis developing overnight, however the patient has remained hemodynamically stable and afebrile  Will continue broad-spectrum antibiotics with Zosyn  House diet as tolerated    : Monitor renal indices and I/Os    F/E/N:   Monitor lytes and replace as needed    ID:   Choledocholithiasis status post ERCP with sphincterectomy and stone removal-fortunately patient has remained hemodynamically stable and afebrile  Continue antibiotics  Heme:   Subcu heparin for DVT prophylaxis    Endo:   Monitor glucose on daily BMP     Msk/Skin:   Early mobilization and out of bed as able    Disposition:   Can be transferred back to [facility] today    Code Status: Level 1 - Full Code  ____________________________________________________________________    Chief Complaint:  No complaints    24 Hour Events:     Review of Systems   Constitutional: Negative for chills and fever  HENT: Negative  Eyes: Negative  Respiratory: Negative for cough, shortness of breath, wheezing and stridor  Cardiovascular: Negative for chest pain, palpitations and leg swelling  Gastrointestinal: Negative for abdominal distention, abdominal pain, blood in stool, diarrhea, nausea and vomiting  Endocrine: Negative  Genitourinary: Negative for dysuria, flank pain, frequency and urgency  Musculoskeletal: Negative  Skin: Negative for rash and wound  Allergic/Immunologic: Negative  Neurological: Negative for dizziness, syncope, facial asymmetry, weakness, light-headedness, numbness and headaches  Hematological: Negative for adenopathy  Does not bruise/bleed easily  Psychiatric/Behavioral: Negative       ____________________________________________________________________  Physical Exam   Constitutional: Oriented to person, place, and time and well-developed, well-nourished, and in no acute distress  Head: Normocephalic and atraumatic  Eyes: Conjunctivae are normal  Pupils are equal, round, and reactive to light  Sclera icteric  Neck: Neck supple  No JVD present  Cardiovascular: Normal rate, regular rhythm and normal heart sounds  Exam reveals no gallop and no friction rub  No murmur heard  Pulmonary/Chest: Effort normal and breath sounds normal    Abdominal: Soft  Bowel sounds are normal  No distension  No tenderness  Genitourinary: Deferred   Musculoskeletal: Normal range of motion  No clubbing, cyanosis, or edema  Lymphadenopathy:   No cervical adenopathy  Neurological: Alert and oriented to person, place, and time  No cranial nerve deficit  GCS score is 15  Skin: Skin is warm and dry  No rash noted     Psychiatric: Mood and affect normal      ________________________________________________________  Vitals:    03/10/18 0000 03/10/18 0100 03/10/18 0200 03/10/18 0300   BP: 103/69 116/69 110/66 116/71   Pulse: 67 64 66 67   Resp: 19 16 15 18   Temp:       TempSrc:       SpO2: 99% 99% 100% 100%   Weight: Height:           Temperature:   Temp (24hrs), Av °F (36 7 °C), Min:97 5 °F (36 4 °C), Max:98 9 °F (37 2 °C)    Current Temperature: 98 9 °F (37 2 °C)  Weights:   IBW: 70 7 kg    Body mass index is 18 13 kg/m²  Weight (last 2 days)     Date/Time   Weight    18 1435  55 7 (122 8)    18 0600  60 (132 28)    18 1426  60 3 (132 94)    Weight: Previous weight was stated by wife at 18 1426    18 0812  53 1 (117)            Hemodynamic Monitoring:  N/A     Non-Invasive/Invasive Ventilation Settings:  Respiratory    Lab Data (Last 4 hours)    None         O2/Vent Data (Last 4 hours)    None              No results found for: PHART, OKF8XKJ, PO2ART, UJZ8EEC, G7VHKFSI, BEART, SOURCE  SpO2: SpO2: 99 %  Intake and Outputs:  I/O        07 -  07 - 03/10 0700    P  O   180    I V  (mL/kg)  4105 (73 7)    IV Piggyback 55 200    Total Intake(mL/kg) 55 (0 9) 4485 (80 5)    Urine (mL/kg/hr) 300 1275 (1)    Total Output 300 1275    Net -245 +3210          Unmeasured Urine Occurrence 4 x           Nutrition:        Diet Orders            Start     Ordered    18 1633  Diet Regular; Regular House  Diet effective now     Question Answer Comment   Diet Type Regular    Regular Regular House    RD to adjust diet per protocol?  Yes        18 1633    18 1633  Dietary nutrition supplements  Once     Question Answer Comment   Select Supplement: Ensure Enlive-Vanilla    Frequency Breakfast, Lunch        18 1633          Labs:     Results from last 7 days  Lab Units 18  1453 18  0447 18  1618 18  0831   WBC Thousand/uL  --  8 88  --  7 42   HEMOGLOBIN g/dL  --  13 7  --  16 0   HEMATOCRIT %  --  38 5  --  44 2   PLATELETS Thousands/uL 88* 122* 136* 187   NEUTROS PCT %  --   --   --  85*   MONOS PCT %  --   --   --  5       Results from last 7 days  Lab Units 18  1452 18  0447 18  0831   SODIUM mmol/L 134* 131* 129* POTASSIUM mmol/L 2 9* 3 5 2 9*   CHLORIDE mmol/L 99* 96* 91*   CO2 mmol/L 23 24 28   BUN mg/dL 14 12 18   CREATININE mg/dL 0 86 0 90 1 02   CALCIUM mg/dL 7 9* 8 4 9 0   TOTAL PROTEIN g/dL  --  5 3* 6 4   BILIRUBIN TOTAL mg/dL  --  8 30* 5 50*   ALK PHOS U/L  --  301* 335*   ALT U/L  --  337* 209*   AST U/L  --  425* 283*   GLUCOSE RANDOM mg/dL 67 91 109       Results from last 7 days  Lab Units 03/09/18  1452 03/08/18  0831   MAGNESIUM mg/dL 1 9 2 5     Lab Results   Component Value Date    PHOS 2 8 03/09/2018    PHOS 3 6 03/08/2018        Results from last 7 days  Lab Units 03/09/18  0121 03/08/18  0831   INR  1 10 0 92   PTT seconds  --  25       0  Lab Value Date/Time   TROPONINI <0 02 03/08/2018 0831       Results from last 7 days  Lab Units 03/09/18  1453 03/08/18  1619   LACTIC ACID mmol/L 2 3* 2 2*     ABG:No results found for: PHART, GOI7YMX, PO2ART, NDZ0ZHY, F0VTATJV, BEART, SOURCE    EKG: Nsr  Micro:  No results found for: Alric Humbles, WOUNDCULT, SPUTUMCULTUR  Allergies: No Known Allergies  Medications:   Scheduled Meds:  Current Facility-Administered Medications:  chlorhexidine 15 mL [employer] Q12H [address] Vandana Devi PA-C    folic acid 1 mg Oral Daily Vandana Devi PA-C    heparin (porcine) 5,000 Units Subcutaneous [facility] Vandana Devi PA-C    multi-electrolyte 100 mL/hr Intravenous Continuous Vandana Devi PA-C Last Rate: 100 mL/hr (03/09/18 1645)   multivitamin-minerals 1 tablet Oral Daily Vandana Devi PA-C    nicotine 1 patch Transdermal Daily Reynold Merritt MD    piperacillin-tazobactam 3 375 g Intravenous Q6H Pete Ignacio III, MD Last Rate: 3 375 g (03/10/18 0231)   thiamine 100 mg Oral Daily Vandana Devi, PA-C      Continuous Infusions:  multi-electrolyte 100 mL/hr Last Rate: 100 mL/hr (03/09/18 2017)     PRN Meds:     VTE Pharmacologic Prophylaxis: Heparin  VTE Mechanical Prophylaxis: sequential compression device  Invasive lines and devices:   Invasive Devices     Peripheral Intravenous Line            Peripheral IV 03/09/18 Right Forearm 1 day    Peripheral IV 03/09/18 Left Wrist less than 1 day                     Portions of the record may have been created with voice recognition software  Occasional wrong word or "sound a like" substitutions may have occurred due to the inherent limitations of voice recognition software  Read the chart carefully and recognize, using context, where substitutions have occurred      Linus Gosper

## 2018-03-10 NOTE — PROGRESS NOTES
Progress Note - Neurology   Reginaldo Ruiz 78 y o  male MRN: [id number]  Unit/Bed#:  Encounter: [id number]      Subjective: Vin Newby is awake and alert without complaints at present  Daughter is at the bedside  She reports a subacute decline in mental status earlier this week prompting hospitalization  He underwent ERCP yesterday and was found to have cholangitis secondary to gallstones with yuliana pus  Several gallstones were removed and he is placed on antibiotic therapy  His daughter reports that he is much closer to baseline at present, continues to be intermittently confused but nothing compared to a how he was earlier this week  He currently denies any headaches or dizziness  He denies any localizing numbness or weakness    ROS:  Pertinent review of systems as per HPI, otherwise negative     Vitals:   Vitals:    03/10/18 0800   BP: 113/74   Pulse: 64   Resp: 16   Temp:    SpO2: 100%   ,Body mass index is 18 13 kg/m²  MEDS:    Current Facility-Administered Medications:  chlorhexidine 15 mL [employer] Q12H [address] Cecil Andrews    folic acid 1 mg Oral Daily Ivis Diaz PA-C    heparin (porcine) 5,000 Units Subcutaneous [facility] Ivis Diaz PA-C    multivitamin-minerals 1 tablet Oral Daily Cecil Andrews    nicotine 1 patch Transdermal Daily Selin Sultana MD    piperacillin-tazobactam 3 375 g Intravenous Q6H Eugenia Russell III, MD Last Rate: 3 375 g (03/10/18 0835)   thiamine 100 mg Oral Daily Ivis Diaz PA-C    :    Physical Exam:  GENERAL:  Well-developed well-nourished man in no acute distress  HEENT/NECK: Head is atraumatic normocephalic, neck is supple  NEUROLOGIC:  Mental Status: Awake and alert without aphasia  [address] was March 10, 2018 and knew it was Saturday  He was able to recall 3 of 3 objects immediately and again after a few minutes  Cranial Nerves: Extraocular movements are full   Face is symmetrical  Motor:  No drift is noted on arm extension  No asterixis is noted  Sensory:  Intact cortical function  Coordination:  Finger-to-nose testing is performed accurately  Reflexes:  1/4 and symmetrical          Lab Results: I have personally reviewed pertinent reports  Imaging Studies: I have personally reviewed pertinent films in PACS, CT scan of the head performed at admission demonstrates no acute intracranial process with mild generalized atrophy without evidence of hydrocephalus      Assessment/plan,  Change in mental status secondary to metabolic encephalopathy related to:  cholangitis in the face of elevated liver function studies, definitely improved following ERCP and antibiotics  Mild cognitive impairment - continue current management  Question benefit of an MRI of the brain at this point given results of recent CT head  He will follow up with me as an outpatient        Tnoi Douglas MD  3/10/2018,12:09 PM    Dictation voice to text software has been used in the creation of this document

## 2018-03-10 NOTE — PROGRESS NOTES
Progress Note - Nadiya Higginbotham 78 y o  male MRN: [id number]    Unit/Bed#:  Encounter: [id number]      Assessment:  Choledocholithiasis with developing cholangitis  S/p ERCP with sphincterotomy and stent placement, retained CBD stones    Plan:  Patient will need repeat ERCP in 6 weeks  Continue IV antibiotics  Defer lap libia until duct is cleared  Patient may require surgical CBD exploration if repeat ERCP is unable to clear the duct  No other surgical issues at this time  Please call if any further concerns    Subjective:   Patient feels better    Objective:     Vitals: Blood pressure 121/71, pulse 58, temperature 98 6 °F (37 °C), temperature source Oral, resp  rate 17, height 5' 9" (1 753 m), weight 55 7 kg (122 lb 12 7 oz), SpO2 100 %  ,Body mass index is 18 13 kg/m²  Intake/Output Summary (Last 24 hours) at 03/10/18 0933  Last data filed at 03/10/18 0600   Gross per 24 hour   Intake             3885 ml   Output             1275 ml   Net             2610 ml       Physical Exam:  Awake somewhat somnolent but no acute distress  Abdomen is soft nontender       Invasive Devices     Peripheral Intravenous Line            Peripheral IV 03/09/18 Right Forearm 1 day    Peripheral IV 03/09/18 Left Wrist less than 1 day                Lab, Imaging and other studies: I have personally reviewed pertinent reports     and Total bilirubin 3 9 from 8 3, ERCP showing innumerable large stones in the CBD  VTE Pharmacologic Prophylaxis: Heparin  VTE Mechanical Prophylaxis: sequential compression device

## 2018-03-11 LAB
ALBUMIN SERPL BCP-MCNC: 1.8 G/DL (ref 3.5–5)
ALP SERPL-CCNC: 196 U/L (ref 46–116)
ALT SERPL W P-5'-P-CCNC: 148 U/L (ref 12–78)
AST SERPL W P-5'-P-CCNC: 85 U/L (ref 5–45)
BILIRUB DIRECT SERPL-MCNC: 1.41 MG/DL (ref 0–0.2)
BILIRUB SERPL-MCNC: 1.9 MG/DL (ref 0.2–1)
LACTATE SERPL-SCNC: 0.7 MMOL/L (ref 0.5–2)
PROT SERPL-MCNC: 4.6 G/DL (ref 6.4–8.2)

## 2018-03-11 PROCEDURE — 97116 GAIT TRAINING THERAPY: CPT

## 2018-03-11 PROCEDURE — 83605 ASSAY OF LACTIC ACID: CPT | Performed by: INTERNAL MEDICINE

## 2018-03-11 PROCEDURE — 80076 HEPATIC FUNCTION PANEL: CPT | Performed by: INTERNAL MEDICINE

## 2018-03-11 PROCEDURE — 97110 THERAPEUTIC EXERCISES: CPT

## 2018-03-11 PROCEDURE — 99233 SBSQ HOSP IP/OBS HIGH 50: CPT | Performed by: INTERNAL MEDICINE

## 2018-03-11 RX ADMIN — NICOTINE 1 PATCH: 14 PATCH, EXTENDED RELEASE TRANSDERMAL at 08:42

## 2018-03-11 RX ADMIN — PIPERACILLIN SODIUM,TAZOBACTAM SODIUM 3.38 G: 3; .375 INJECTION, POWDER, FOR SOLUTION INTRAVENOUS at 03:41

## 2018-03-11 RX ADMIN — Medication 100 MG: at 08:41

## 2018-03-11 RX ADMIN — HEPARIN SODIUM 5000 UNITS: 5000 INJECTION, SOLUTION INTRAVENOUS; SUBCUTANEOUS at 16:14

## 2018-03-11 RX ADMIN — HEPARIN SODIUM 5000 UNITS: 5000 INJECTION, SOLUTION INTRAVENOUS; SUBCUTANEOUS at 21:41

## 2018-03-11 RX ADMIN — Medication 1 TABLET: at 08:41

## 2018-03-11 RX ADMIN — PIPERACILLIN SODIUM,TAZOBACTAM SODIUM 3.38 G: 3; .375 INJECTION, POWDER, FOR SOLUTION INTRAVENOUS at 21:41

## 2018-03-11 RX ADMIN — FOLIC ACID 1 MG: 1 TABLET ORAL at 08:41

## 2018-03-11 RX ADMIN — CHLORHEXIDINE GLUCONATE 15 ML: 1.2 RINSE ORAL at 08:54

## 2018-03-11 RX ADMIN — PIPERACILLIN SODIUM,TAZOBACTAM SODIUM 3.38 G: 3; .375 INJECTION, POWDER, FOR SOLUTION INTRAVENOUS at 08:54

## 2018-03-11 RX ADMIN — PIPERACILLIN SODIUM,TAZOBACTAM SODIUM 3.38 G: 3; .375 INJECTION, POWDER, FOR SOLUTION INTRAVENOUS at 16:13

## 2018-03-11 RX ADMIN — CHLORHEXIDINE GLUCONATE 15 ML: 1.2 RINSE ORAL at 21:41

## 2018-03-11 RX ADMIN — HEPARIN SODIUM 5000 UNITS: 5000 INJECTION, SOLUTION INTRAVENOUS; SUBCUTANEOUS at 05:09

## 2018-03-11 NOTE — PLAN OF CARE
Problem: PHYSICAL THERAPY ADULT  Goal: Performs mobility at highest level of function for planned discharge setting  See evaluation for individualized goals  Treatment/Interventions: Functional transfer training, LE strengthening/ROM, Elevations, Therapeutic exercise, Endurance training, Cognitive reorientation, Patient/family training, Equipment eval/education, Bed mobility, Gait training, Spoke to nursing, Spoke to case management, OT, Family  Equipment Recommended:  (continue trials with RW vs SPC)       See flowsheet documentation for full assessment, interventions and recommendations  Outcome: Progressing  Prognosis: Good  Problem List: Decreased strength, Decreased endurance, Impaired balance, Decreased mobility, Decreased cognition, Decreased safety awareness  Assessment: pt demonstrating progress c PT  required encouragement for ambulation from PT + dtr  able to complete bed mobility c (S) c encouragement to perform s (A)  progressed ambulation distance to 70'x2 + 15' c RW c standing rests btwn trials  min verbal cues required for safe RW advancement  initiated B/L LE ther ex in supine x10 reps c AROM  pt encouraged to sit OOB in chair, however declined at this time  pt's dtr stated she will (A) him OOB>chair for meals  pt + family educated on importance of sitting OOB in preventing further medical complications  family also encouraged to ambulate pt TID or c (A) of nsg staff  pt's dtr also instructed on technique for LE ther ex to perform c pt  will cont skilled PT to further maximize functional mobility + improve quality of life  cont to recommend STR upon d/c   Barriers to Discharge: Decreased caregiver support, Inaccessible home environment     Recommendation: Short-term skilled PT     PT - OK to Discharge: Yes (to STR )    See flowsheet documentation for full assessment

## 2018-03-11 NOTE — PLAN OF CARE
GASTROINTESTINAL - ADULT     Maintains adequate nutritional intake Progressing        INFECTION - ADULT     Absence or prevention of progression during hospitalization Progressing        Knowledge Deficit     Patient/family/caregiver demonstrates understanding of disease process, treatment plan, medications, and discharge instructions Progressing        METABOLIC, FLUID AND ELECTROLYTES - ADULT     Electrolytes maintained within normal limits Progressing     Fluid balance maintained Progressing        NEUROSENSORY - ADULT     Achieves stable or improved neurological status Progressing        Nutrition/Hydration-ADULT     Nutrient/Hydration intake appropriate for improving, restoring or maintaining nutritional needs Progressing        PAIN - ADULT     Verbalizes/displays adequate comfort level or baseline comfort level Progressing        Prexisting or High Potential for Compromised Skin Integrity     Skin integrity is maintained or improved Progressing        SAFETY ADULT     Patient will remain free of falls Progressing     Maintain or return to baseline ADL function Progressing        SKIN/TISSUE INTEGRITY - ADULT     Skin integrity remains intact Progressing

## 2018-03-11 NOTE — PROGRESS NOTES
Progress Note- Doc Din 78 y o  male MRN: [id number]    Unit/Bed#: -01 Encounter: [id number]      Assessment and Plan:    Choledocholithiasis:  He has recovered well since his ERCP and will need elective ERCP to remove the stent and the remaining CBD stones in about six weeks  His liver enzymes are improving and we will check him again tomorrow to make sure they are continuing to trend down     ______________________________________________________________________    Subjective:     He has tried a he had an ERCP with sphincterotomy, stone extraction, and stent placement because not all of the stones could be removed  He has done well since the procedure and currently denies any abdominal pain, nausea, vomiting, or bleeding      Medication Administration - last 24 hours from 03/09/2018 1924 to 03/10/2018 1924       Date/Time Order Dose Route Action Action by     03/10/2018 1337 nicotine (NICODERM CQ) 14 mg/24hr TD 24 hr patch 1 patch   Transdermal MAR Unhold Fred Lee RN     03/10/2018 1334 nicotine (NICODERM CQ) 14 mg/24hr TD 24 hr patch 1 patch   Transdermal MAR Hold Automatic Transfer Provider     03/10/2018 0824 nicotine (NICODERM CQ) 14 mg/24hr TD 24 hr patch 1 patch 1 patch Transdermal Medication Applied Feliz Núñez RN     03/10/2018 5451 nicotine (NICODERM CQ) 14 mg/24hr TD 24 hr patch 1 patch 1 patch Transdermal Patch Removed Feliz Núñez RN     03/10/2018 1439 sodium chloride 0 9 % infusion 0 mL/hr Intravenous Stopped Fred Lee RN     03/10/2018 1621 piperacillin-tazobactam (ZOSYN) 3 375 g in sodium chloride 0 9 % 50 mL IVPB 3 375 g Intravenous [facility] Filemon Lee RN     03/10/2018 1337 piperacillin-tazobactam (ZOSYN) 3 375 g in sodium chloride 0 9 % 50 mL IVPB   Intravenous MAR Unhold Fred Lee RN     03/10/2018 1334 piperacillin-tazobactam (ZOSYN) 3 375 g in sodium chloride 0 9 % 50 mL IVPB   Intravenous [facility] Hold Automatic Transfer Provider     03/10/2018 1534 piperacillin-tazobactam (ZOSYN) 3 375 g in sodium chloride 0 9 % 50 mL IVPB 3 375 g Intravenous [address] [place] Boy, RN     03/10/2018 0231 piperacillin-tazobactam (ZOSYN) 3 375 g in sodium chloride 0 9 % 50 mL IVPB 3 375 g Intravenous New Bag Diane Donaldson, RN     03/09/2018 2201 piperacillin-tazobactam (ZOSYN) 3 375 g in sodium chloride 0 9 % 50 mL IVPB 0 g Intravenous Stopped Diane Donaldson, RN     03/09/2018 2117 piperacillin-tazobactam (ZOSYN) 3 375 g in sodium chloride 0 9 % 50 mL IVPB 3 375 g Intravenous New Bag Diane Donaldson, RN     03/10/2018 1439 lactated ringers infusion 0 mL/hr Intravenous Stopped Fred Gonzalez, RN     03/10/2018 3387 multi-electrolyte (ISOLYTE-S PH 7 4 equivalent) IV solution 0 mL/hr Intravenous Stopped [place] Bernardo, RN     03/10/2018 1337 chlorhexidine (PERIDEX) 0 12 % oral rinse 15 mL   Swish & Spit MAR Unhold Fred Gonzalez, RN     03/10/2018 1334 chlorhexidine (PERIDEX) 0 12 % oral rinse 15 mL   Swish & Spit [facility] Hold Automatic Transfer Provider     03/10/2018 0822 chlorhexidine (PERIDEX) 0 12 % oral rinse 15 mL 15 mL Swish & Spit Given Vonever Chang, RN     03/09/2018 2240 chlorhexidine (PERIDEX) 0 12 % oral rinse 15 mL 15 mL Swish & Spit Given Diane Donaldson, RN     03/10/2018 1621 heparin (porcine) subcutaneous injection 5,000 Units 5,000 Units Subcutaneous Given Fred Gonzalez, RN     03/10/2018 1337 heparin (porcine) subcutaneous injection 5,000 Units   Subcutaneous MAR Unhold Fred Gonzalez, RN     03/10/2018 1334 heparin (porcine) subcutaneous injection 5,000 Units   Subcutaneous MAR Hold Automatic Transfer Provider     03/10/2018 0626 heparin (porcine) subcutaneous injection 5,000 Units 5,000 Units Subcutaneous Given Diane Donaldson, RN     03/09/2018 2240 heparin (porcine) subcutaneous injection 5,000 Units 5,000 Units Subcutaneous Given Diane Donaldson, RN     03/10/2018 1448 thiamine (VITAMIN B1) tablet 100 mg   Oral MAR Domenic Gonzalez, MARTI     03/10/2018 9299 thiamine (VITAMIN B1) tablet 100 mg   Oral MAR Hold Automatic Transfer Provider     03/10/2018 7861 thiamine (VITAMIN B1) tablet 100 mg 100 mg Oral Given Hesham Alireza, RN     73/34/6574 5155 folic acid (FOLVITE) tablet 1 mg   Oral MAR Unhold Fred Gonzalez, RN     59/61/8981 4532 folic acid (FOLVITE) tablet 1 mg   Oral MAR Hold Automatic Transfer Provider     60/44/6226 8058 folic acid (FOLVITE) tablet 1 mg 1 mg Oral Given Hesham Alireza, RN     03/10/2018 1337 multivitamin-minerals (CENTRUM) tablet 1 tablet   Oral MAR Unhold Fred Gonzalez, RN     03/10/2018 1334 multivitamin-minerals (CENTRUM) tablet 1 tablet   Oral MAR Hold Automatic Transfer Provider     03/10/2018 0823 multivitamin-minerals (CENTRUM) tablet 1 tablet 1 tablet Oral Given Hesham Alireza, RN     03/10/2018 1439 potassium chloride 20 mEq IVPB (premix) 0 mEq Intravenous Stopped Fred Gonzalez, MARTI     03/10/2018 1438 potassium chloride 20 mEq IVPB (premix) 0 mEq Intravenous Stopped Fred Gonzalez, MARTI     03/10/2018 1439 multi-electrolyte (ISOLYTE-S PH 7 4) bolus 1,000 mL 0 mL Intravenous Stopped Fred Gonzalez, MARTI          Objective:     Vitals: Blood pressure 101/68, pulse 61, temperature 97 9 °F (36 6 °C), temperature source Oral, resp  rate 18, height 5' 9" (1 753 m), weight 55 7 kg (122 lb 12 7 oz), SpO2 98 %  ,Body mass index is 18 13 kg/m²        Intake/Output Summary (Last 24 hours) at 03/10/18 1924  Last data filed at 03/10/18 4585   Gross per 24 hour   Intake             1375 ml   Output             1225 ml   Net              150 ml       Physical Exam:   General Appearance: Awake and alert, in no acute distress  Abdomen: Soft, non-tender, non-distended; bowel sounds normal; no masses or no organomegaly    Invasive Devices     Peripheral Intravenous Line            Peripheral IV 03/09/18 Left Wrist 1 day    Peripheral IV 03/09/18 Right Forearm 1 day                Lab Results:  Admission on 03/08/2018   Component Date Value    WBC 03/08/2018 7 42     RBC 03/08/2018 4 90     Hemoglobin 03/08/2018 16 0     Hematocrit 03/08/2018 44 2     MCV 03/08/2018 90     MCH 03/08/2018 32 7     MCHC 03/08/2018 36 2     RDW 03/08/2018 13 0     MPV 03/08/2018 9 9     Platelets 47/52/7068 187     nRBC 03/08/2018 0     Neutrophils Relative 03/08/2018 85*    Lymphocytes Relative 03/08/2018 10*    Monocytes Relative 03/08/2018 5     Eosinophils Relative 03/08/2018 0     Basophils Relative 03/08/2018 0     Neutrophils Absolute 03/08/2018 6 30     Lymphocytes Absolute 03/08/2018 0 72     Monocytes Absolute 03/08/2018 0 34     Eosinophils Absolute 03/08/2018 0 01     Basophils Absolute 03/08/2018 0 02     Sodium 03/08/2018 129*    Potassium 03/08/2018 2 9*    Chloride 03/08/2018 91*    CO2 03/08/2018 28     Anion Gap 03/08/2018 10     BUN 03/08/2018 18     Creatinine 03/08/2018 1 02     Glucose 03/08/2018 109     Calcium 03/08/2018 9 0     eGFR 03/08/2018 72     Total Bilirubin 03/08/2018 5 50*    Bilirubin, Direct 03/08/2018 3 68*    Alkaline Phosphatase 03/08/2018 335*    AST 03/08/2018 283*    ALT 03/08/2018 209*    Total Protein 03/08/2018 6 4     Albumin 03/08/2018 3 3*    Protime 03/08/2018 12 5     INR 03/08/2018 0 92     PTT 03/08/2018 25     Magnesium 03/08/2018 2 5     Phosphorus 03/08/2018 3 6     Troponin I 03/08/2018 <0 02     Ventricular Rate 03/08/2018 69     Atrial Rate 03/08/2018 69     OK Interval 03/08/2018 126     QRSD Interval 03/08/2018 84     QT Interval 03/08/2018 430     QTC Interval 03/08/2018 460     P Axis 03/08/2018 -53     QRS Axis 03/08/2018 55     T Wave Axis 03/08/2018 81     Color, UA 03/08/2018 Giuliana     Clarity, UA 03/08/2018 Clear     Specific Gravity, UA 03/08/2018 1 010     pH, UA 03/08/2018 6 5     Leukocytes, UA 03/08/2018 Negative     Nitrite, UA 03/08/2018 Positive*    Protein, UA 03/08/2018 Trace*    Glucose, UA 03/08/2018 Negative     Ketones, UA 03/08/2018 Trace*    Urobilinogen, UA 03/08/2018 >=8 0*    Bilirubin, UA 03/08/2018 Large*    Blood, UA 03/08/2018 Negative     TSH 3RD GENERATON 03/08/2018 1 249     Amph/Meth UR 03/08/2018 Negative     Barbiturate Ur 03/08/2018 Negative     Benzodiazepine Urine 03/08/2018 Negative     Cocaine Urine 03/08/2018 Negative     Methadone Urine 03/08/2018 Negative     Opiate Urine 03/08/2018 Negative     PCP Ur 03/08/2018 Negative     THC Urine 03/08/2018 Negative     Ethanol Lvl 59/86/9210 <3     Salicylate Lvl 91/54/2228 <3*    Acetaminophen Level 03/08/2018 <2 0*    Platelets 12/03/5580 136*    MPV 03/08/2018 10 1     Hemoglobin A1C 03/08/2018 5 5     EAG 03/08/2018 111     Ammonia 03/08/2018 <10*    Amylase 03/08/2018 84     Lipase 03/08/2018 569*    LACTIC ACID 03/08/2018 2 2*    RBC, UA 03/08/2018 None Seen     WBC, UA 03/08/2018 None Seen     Epithelial Cells 03/08/2018 None Seen     Bacteria, UA 03/08/2018 None Seen     Protime 03/09/2018 14 4     INR 03/09/2018 1 10     Vitamin B-12 03/09/2018 >6000*    Folate 03/09/2018 17 8*    Ammonia 03/09/2018 19     Sodium 03/09/2018 131*    Potassium 03/09/2018 3 5     Chloride 03/09/2018 96*    CO2 03/09/2018 24     Anion Gap 03/09/2018 11     BUN 03/09/2018 12     Creatinine 03/09/2018 0 90     Glucose 03/09/2018 91     Calcium 03/09/2018 8 4     AST 03/09/2018 425*    ALT 03/09/2018 337*    Alkaline Phosphatase 03/09/2018 301*    Total Protein 03/09/2018 5 3*    Albumin 03/09/2018 2 5*    Total Bilirubin 03/09/2018 8 30*    eGFR 03/09/2018 83     WBC 03/09/2018 8 88     RBC 03/09/2018 4 23     Hemoglobin 03/09/2018 13 7     Hematocrit 03/09/2018 38 5     MCV 03/09/2018 91     MCH 03/09/2018 32 4     MCHC 03/09/2018 35 6     RDW 03/09/2018 13 3     Platelets 79/02/0947 122*    MPV 03/09/2018 9 7     Sodium 03/09/2018 134*    Potassium 03/09/2018 2 9*    Chloride 03/09/2018 99*    CO2 03/09/2018 23     Anion Gap 03/09/2018 12     BUN 03/09/2018 14     Creatinine 03/09/2018 0 86     Glucose 03/09/2018 67     Calcium 03/09/2018 7 9*    eGFR 03/09/2018 85     Magnesium 03/09/2018 1 9     Phosphorus 03/09/2018 2 8     LACTIC ACID 03/09/2018 2 3*    Platelets 84/64/8450 88*    MPV 03/09/2018 10 0     WBC 03/10/2018 9 00     RBC 03/10/2018 3 37*    Hemoglobin 03/10/2018 11 1*    Hematocrit 03/10/2018 31 7*    MCV 03/10/2018 94     MCH 03/10/2018 32 9     MCHC 03/10/2018 35 0     RDW 03/10/2018 13 7     MPV 03/10/2018 11 1     Platelets 42/77/1904 84*    nRBC 03/10/2018 0     Neutrophils Relative 03/10/2018 89*    Lymphocytes Relative 03/10/2018 5*    Monocytes Relative 03/10/2018 4     Eosinophils Relative 03/10/2018 0     Basophils Relative 03/10/2018 0     Neutrophils Absolute 03/10/2018 8 02*    Lymphocytes Absolute 03/10/2018 0 49*    Monocytes Absolute 03/10/2018 0 34     Eosinophils Absolute 03/10/2018 0 02     Basophils Absolute 03/10/2018 0 01     Sodium 03/10/2018 135*    Potassium 03/10/2018 3 7     Chloride 03/10/2018 101     CO2 03/10/2018 21     Anion Gap 03/10/2018 13     BUN 03/10/2018 12     Creatinine 03/10/2018 0 82     Glucose 03/10/2018 71     Calcium 03/10/2018 7 5*    eGFR 03/10/2018 87     Magnesium 03/10/2018 2 2     Phosphorus 03/10/2018 3 2     Total Bilirubin 03/10/2018 3 90*    Bilirubin, Direct 03/10/2018 3 34*    Alkaline Phosphatase 03/10/2018 208*    AST 03/10/2018 175*    ALT 03/10/2018 205*    Total Protein 03/10/2018 4 4*    Albumin 03/10/2018 1 8*    LACTIC ACID 03/10/2018 1 3        Imaging Studies: I have personally reviewed pertinent imaging studies

## 2018-03-11 NOTE — PROGRESS NOTES
[facility] Progress Note  Patient: Herlinda Horner 78 y o  male   MRN: [id number]  PCP: Park Abdullahi DO  Unit/Bed#: -Michael Encounter: [id number]  Date Of Visit: 18    Assessment:    Principal Problem:    Altered mental status  Active Problems:    Transaminitis    Hypokalemia    Fall    Total bilirubin, elevated    Weight loss counseling, encounter for    Common bile duct (CBD) obstruction      Plan:    1  Altered mentation  Likely multifactorial including transaminitis/cholangitis hepatic encephalopathy/metabolic encephalopathy due to recent acute kidney injury  Status post ERCP with removal of stones  Significantly improved mentation notice  Family reports cognitively patient is currently at [address] all current care for now  Including treatment with Zosyn  Repeat labs in a m  Initiate PT/OT consultation  He LFT results imaging/procedure resolved discussed with family       VTE Pharmacologic Prophylaxis:   Pharmacologic: Heparin  Mechanical VTE Prophylaxis in Place: Yes    Patient Centered Rounds: I have performed bedside rounds with nursing staff today  Discussions with Specialists or Other Care Team Provider:  Yes    Education and Discussions with Family / Patient:  Yes    Time Spent for Care: 45 minutes  More than 50% of total time spent on counseling and coordination of care as described above  Current Length of Stay: 3 day(s)    Current Patient Status: Inpatient   Certification Statement: The patient will continue to require additional inpatient hospital stay due to :  Sally/cholangitis    Discharge Plan / Estimated Discharge Date:   To be determined              Code Status: Level 1 - Full Code      Subjective:   No complaint    Objective:     Vitals:   Temp (24hrs), Av 1 °F (36 7 °C), Min:97 4 °F (36 3 °C), Max:98 7 °F (37 1 °C)    HR:  [58-64] 59  Resp:  [17-18] 18  BP: (101-124)/(68-85) 113/83  SpO2:  [97 %-99 %] 97 %  Body mass index is 18 2 kg/m²  Input and Output Summary (last 24 hours): Intake/Output Summary (Last 24 hours) at 03/11/18 1015  Last data filed at 03/11/18 0716   Gross per 24 hour   Intake              180 ml   Output              725 ml   Net             -545 ml       Physical Exam:     Physical Exam  Constitutional:  Well developed, well nourished, no acute distress, non-toxic appearance   Eyes:  PERRL, conjunctiva normal   HENT:  Atraumatic, external ears normal, nose normal, oropharynx moist, no pharyngeal exudates  Neck- normal range of motion, no tenderness, supple   Respiratory:  No respiratory distress, normal breath sounds, no rales, no wheezing   Cardiovascular:  Normal rate, normal rhythm, no murmurs, no gallops, no rubs   GI:  Soft, nondistended, normal bowel sounds, nontender, no organomegaly, no mass, no rebound, no guarding   :  No costovertebral angle tenderness   Musculoskeletal:  No edema, no tenderness, no deformities   Back- no tenderness  Integument:  Well hydrated, no rash   Lymphatic:  No lymphadenopathy noted   Neurologic:  Alert & oriented x 3, CN 2-12 normal, normal motor function, normal sensory function, no focal deficits noted   Psychiatric:  Speech and behavior appropriate  Additional Data:     Labs:      Results from last 7 days  Lab Units 03/10/18  0522   WBC Thousand/uL 9 00   HEMOGLOBIN g/dL 11 1*   HEMATOCRIT % 31 7*   PLATELETS Thousands/uL 84*   NEUTROS PCT % 89*   LYMPHS PCT % 5*   MONOS PCT % 4   EOS PCT % 0       Results from last 7 days  Lab Units 03/11/18  0435 03/10/18  0522   SODIUM mmol/L  --  135*   POTASSIUM mmol/L  --  3 7   CHLORIDE mmol/L  --  101   CO2 mmol/L  --  21   BUN mg/dL  --  12   CREATININE mg/dL  --  0 82   CALCIUM mg/dL  --  7 5*   TOTAL PROTEIN g/dL 4 6* 4 4*   BILIRUBIN TOTAL mg/dL 1 90* 3 90*   ALK PHOS U/L 196* 208*   ALT U/L 148* 205*   AST U/L 85* 175*   GLUCOSE RANDOM mg/dL  --  71       Results from last 7 days  Lab Units 03/09/18  0121   INR  1 10       * I Have Reviewed All Lab Data Listed Above  * Additional Pertinent Lab Tests Reviewed: [facility] Admission Reviewed    Imaging:    Imaging Reports Reviewed Today Include:  S  Imaging Personally Reviewed by Myself Includes:  S    Recent Cultures (last 7 days):           Last 24 Hours Medication List:     Current Facility-Administered Medications:  chlorhexidine 15 mL Swish & Spit Q12H [facility] Roberta Denney PA-C    folic acid 1 mg Oral Daily Roberta Denney PA-C    heparin (porcine) 5,000 Units Subcutaneous [facility] Roberta Denney PA-C    multivitamin-minerals 1 tablet Oral Daily Roberta Denney PA-C    nicotine 1 patch Transdermal Daily Andrea Gu MD    piperacillin-tazobactam 3 375 g Intravenous Q6H Abel Simpson III, MD Last Rate: 3 375 g (03/11/18 0854)   thiamine 100 mg Oral Daily Roberta Denney PA-C         Today, Patient Was Seen By: Andrea Gu MD    ** Please Note: This note has been constructed using a voice recognition system   **

## 2018-03-11 NOTE — PLAN OF CARE
DISCHARGE PLANNING     Discharge to home or other facility with appropriate resources Progressing        DISCHARGE PLANNING - CARE MANAGEMENT     Discharge to post-acute care or home with appropriate resources Progressing        GASTROINTESTINAL - ADULT     Maintains adequate nutritional intake Progressing        INFECTION - ADULT     Absence or prevention of progression during hospitalization Progressing        Knowledge Deficit     Patient/family/caregiver demonstrates understanding of disease process, treatment plan, medications, and discharge instructions Progressing        METABOLIC, FLUID AND ELECTROLYTES - ADULT     Electrolytes maintained within normal limits Progressing     Fluid balance maintained Progressing        NEUROSENSORY - ADULT     Achieves stable or improved neurological status Progressing        Nutrition/Hydration-ADULT     Nutrient/Hydration intake appropriate for improving, restoring or maintaining nutritional needs Progressing        PAIN - ADULT     Verbalizes/displays adequate comfort level or baseline comfort level Progressing        Potential for Falls     Patient will remain free of falls Progressing        Prexisting or High Potential for Compromised Skin Integrity     Skin integrity is maintained or improved Progressing        SAFETY ADULT     Patient will remain free of falls Progressing     Maintain or return to baseline ADL function Progressing        SKIN/TISSUE INTEGRITY - ADULT     Skin integrity remains intact Progressing

## 2018-03-11 NOTE — PHYSICAL THERAPY NOTE
PT Progress Note (25min)  (15:10-15:35)       03/11/18 8865   Pain Assessment   Pain Assessment No/denies pain   Restrictions/Precautions   Other Precautions Cognitive; Bed Alarm; Chair Alarm; Fall Risk;Aspiration   General   Chart Reviewed Yes   Response to Previous Treatment Patient with no complaints from previous session  Family/Caregiver Present Yes  (pt's dtr "Megan Matute")   Cognition   Orientation Level Oriented to person;Oriented to time   Subjective   Subjective pt agreed to PT session c encouragement  pt's dtr present + supportive  Bed Mobility   Supine to Sit 5  Supervision   Additional items HOB elevated; Increased time required;Verbal cues   Sit to Supine 5  Supervision   Additional items Verbal cues; Increased time required   Transfers   Sit to Stand 4  Minimal assistance   Additional items Assist x 1;Verbal cues; Increased time required   Stand to Sit 4  Minimal assistance   Additional items Assist x 1;Verbal cues; Increased time required   Ambulation/Elevation   Gait pattern Narrow FELICITAS; Decreased foot clearance; Inconsistent lavon   Gait Assistance 4  Minimal assist   Additional items Assist x 1;Verbal cues   Assistive Device Rolling walker   Distance 70'x2 + 15' c standing rests   Balance   Static Sitting Good   Dynamic Sitting Good   Static Standing Fair -   Dynamic Standing Fair -   Ambulatory Fair -   Activity Tolerance   Activity Tolerance Patient limited by fatigue   Nurse Made Aware Ivan   Exercises   Quad Sets Supine;10 reps;AROM; Bilateral   Heelslides Supine;10 reps;AROM; Bilateral   Hip Abduction Supine;10 reps;AROM; Bilateral   Knee AROM Short Arc Quad Supine;10 reps;AROM  (SLR)   Ankle Pumps Supine;10 reps;AROM; Bilateral   TKR (family instructed on ther ex)   Assessment   Prognosis Good   Problem List Decreased strength;Decreased endurance; Impaired balance;Decreased mobility; Decreased cognition;Decreased safety awareness   Assessment pt demonstrating progress c PT  required encouragement for ambulation from PT + dtr  able to complete bed mobility c (S) c encouragement to perform s (A)  progressed ambulation distance to 70'x2 + 15' c RW c standing rests btwn trials  min verbal cues required for safe RW advancement  initiated B/L LE ther ex in supine x10 reps c AROM  pt encouraged to sit OOB in chair, however declined at this time  pt's dtr stated she will (A) him OOB>chair for meals  pt + family educated on importance of sitting OOB in preventing further medical complications  family also encouraged to ambulate pt TID or c (A) of nsg staff  pt's dtr also instructed on technique for LE ther ex to perform c pt  will cont skilled PT to further maximize functional mobility + improve quality of life  cont to recommend STR upon d/c    Barriers to Discharge Decreased caregiver support; Inaccessible home environment   Goals   Patient Goals "to go home"  STG Expiration Date 03/19/18   Treatment Day 1   Plan   Treatment/Interventions Functional transfer training;LE strengthening/ROM; Elevations; Therapeutic exercise; Endurance training;Patient/family training;Equipment eval/education; Bed mobility;Gait training;Spoke to nursing;Family   Progress Progressing toward goals   PT Frequency 5x/wk   Recommendation   Recommendation Short-term skilled PT   PT - OK to Discharge Yes  (to STR )     Clarissa Preciado, PT

## 2018-03-12 ENCOUNTER — APPOINTMENT (INPATIENT)
Dept: RADIOLOGY | Facility: HOSPITAL | Age: 78
DRG: 444 | End: 2018-03-12
Payer: MEDICARE

## 2018-03-12 ENCOUNTER — APPOINTMENT (INPATIENT)
Dept: MRI IMAGING | Facility: HOSPITAL | Age: 78
DRG: 444 | End: 2018-03-12
Payer: MEDICARE

## 2018-03-12 LAB
ALBUMIN SERPL BCP-MCNC: 2 G/DL (ref 3.5–5)
ALP SERPL-CCNC: 215 U/L (ref 46–116)
ALT SERPL W P-5'-P-CCNC: 113 U/L (ref 12–78)
ANION GAP SERPL CALCULATED.3IONS-SCNC: 9 MMOL/L (ref 4–13)
AST SERPL W P-5'-P-CCNC: 48 U/L (ref 5–45)
BASOPHILS # BLD AUTO: 0.01 THOUSANDS/ΜL (ref 0–0.1)
BASOPHILS NFR BLD AUTO: 0 % (ref 0–1)
BILIRUB SERPL-MCNC: 1.8 MG/DL (ref 0.2–1)
BUN SERPL-MCNC: 10 MG/DL (ref 5–25)
CALCIUM SERPL-MCNC: 7.6 MG/DL (ref 8.3–10.1)
CHLORIDE SERPL-SCNC: 101 MMOL/L (ref 100–108)
CO2 SERPL-SCNC: 23 MMOL/L (ref 21–32)
CREAT SERPL-MCNC: 0.64 MG/DL (ref 0.6–1.3)
EOSINOPHIL # BLD AUTO: 0.04 THOUSAND/ΜL (ref 0–0.61)
EOSINOPHIL NFR BLD AUTO: 1 % (ref 0–6)
ERYTHROCYTE [DISTWIDTH] IN BLOOD BY AUTOMATED COUNT: 14 % (ref 11.6–15.1)
GFR SERPL CREATININE-BSD FRML MDRD: 96 ML/MIN/1.73SQ M
GLUCOSE SERPL-MCNC: 85 MG/DL (ref 65–140)
HCT VFR BLD AUTO: 32.6 % (ref 36.5–49.3)
HGB BLD-MCNC: 11.2 G/DL (ref 12–17)
LYMPHOCYTES # BLD AUTO: 0.81 THOUSANDS/ΜL (ref 0.6–4.47)
LYMPHOCYTES NFR BLD AUTO: 18 % (ref 14–44)
MCH RBC QN AUTO: 32.5 PG (ref 26.8–34.3)
MCHC RBC AUTO-ENTMCNC: 34.4 G/DL (ref 31.4–37.4)
MCV RBC AUTO: 95 FL (ref 82–98)
MONOCYTES # BLD AUTO: 0.36 THOUSAND/ΜL (ref 0.17–1.22)
MONOCYTES NFR BLD AUTO: 8 % (ref 4–12)
NEUTROPHILS # BLD AUTO: 3.31 THOUSANDS/ΜL (ref 1.85–7.62)
NEUTS SEG NFR BLD AUTO: 72 % (ref 43–75)
NRBC BLD AUTO-RTO: 0 /100 WBCS
PLATELET # BLD AUTO: 107 THOUSANDS/UL (ref 149–390)
PMV BLD AUTO: 10.8 FL (ref 8.9–12.7)
POTASSIUM SERPL-SCNC: 3.3 MMOL/L (ref 3.5–5.3)
PROT SERPL-MCNC: 4.9 G/DL (ref 6.4–8.2)
RBC # BLD AUTO: 3.45 MILLION/UL (ref 3.88–5.62)
SODIUM SERPL-SCNC: 133 MMOL/L (ref 136–145)
WBC # BLD AUTO: 4.6 THOUSAND/UL (ref 4.31–10.16)

## 2018-03-12 PROCEDURE — 85025 COMPLETE CBC W/AUTO DIFF WBC: CPT | Performed by: INTERNAL MEDICINE

## 2018-03-12 PROCEDURE — 80053 COMPREHEN METABOLIC PANEL: CPT | Performed by: INTERNAL MEDICINE

## 2018-03-12 PROCEDURE — 74022 RADEX COMPL AQT ABD SERIES: CPT

## 2018-03-12 PROCEDURE — 70551 MRI BRAIN STEM W/O DYE: CPT

## 2018-03-12 PROCEDURE — 99233 SBSQ HOSP IP/OBS HIGH 50: CPT | Performed by: INTERNAL MEDICINE

## 2018-03-12 PROCEDURE — 99232 SBSQ HOSP IP/OBS MODERATE 35: CPT | Performed by: INTERNAL MEDICINE

## 2018-03-12 RX ORDER — POTASSIUM CHLORIDE 29.8 MG/ML
40 INJECTION INTRAVENOUS ONCE
Status: DISCONTINUED | OUTPATIENT
Start: 2018-03-12 | End: 2018-03-12

## 2018-03-12 RX ORDER — POTASSIUM CHLORIDE 14.9 MG/ML
20 INJECTION INTRAVENOUS
Status: COMPLETED | OUTPATIENT
Start: 2018-03-12 | End: 2018-03-13

## 2018-03-12 RX ADMIN — CHLORHEXIDINE GLUCONATE 15 ML: 1.2 RINSE ORAL at 08:51

## 2018-03-12 RX ADMIN — HEPARIN SODIUM 5000 UNITS: 5000 INJECTION, SOLUTION INTRAVENOUS; SUBCUTANEOUS at 21:41

## 2018-03-12 RX ADMIN — NICOTINE 1 PATCH: 14 PATCH, EXTENDED RELEASE TRANSDERMAL at 08:50

## 2018-03-12 RX ADMIN — HEPARIN SODIUM 5000 UNITS: 5000 INJECTION, SOLUTION INTRAVENOUS; SUBCUTANEOUS at 05:25

## 2018-03-12 RX ADMIN — Medication 100 MG: at 08:51

## 2018-03-12 RX ADMIN — HEPARIN SODIUM 5000 UNITS: 5000 INJECTION, SOLUTION INTRAVENOUS; SUBCUTANEOUS at 15:47

## 2018-03-12 RX ADMIN — FOLIC ACID 1 MG: 1 TABLET ORAL at 08:51

## 2018-03-12 RX ADMIN — PIPERACILLIN SODIUM,TAZOBACTAM SODIUM 3.38 G: 3; .375 INJECTION, POWDER, FOR SOLUTION INTRAVENOUS at 08:57

## 2018-03-12 RX ADMIN — PIPERACILLIN SODIUM,TAZOBACTAM SODIUM 3.38 G: 3; .375 INJECTION, POWDER, FOR SOLUTION INTRAVENOUS at 20:04

## 2018-03-12 RX ADMIN — POTASSIUM CHLORIDE 20 MEQ: 200 INJECTION, SOLUTION INTRAVENOUS at 17:03

## 2018-03-12 RX ADMIN — PIPERACILLIN SODIUM,TAZOBACTAM SODIUM 3.38 G: 3; .375 INJECTION, POWDER, FOR SOLUTION INTRAVENOUS at 17:03

## 2018-03-12 RX ADMIN — POTASSIUM CHLORIDE 20 MEQ: 200 INJECTION, SOLUTION INTRAVENOUS at 14:22

## 2018-03-12 RX ADMIN — CHLORHEXIDINE GLUCONATE 15 ML: 1.2 RINSE ORAL at 20:04

## 2018-03-12 RX ADMIN — PIPERACILLIN SODIUM,TAZOBACTAM SODIUM 3.38 G: 3; .375 INJECTION, POWDER, FOR SOLUTION INTRAVENOUS at 02:54

## 2018-03-12 RX ADMIN — Medication 1 TABLET: at 08:51

## 2018-03-12 NOTE — PROGRESS NOTES
[facility] Progress Note  Patient: Yanelis Sanchez 78 y o  male   MRN: [id number]  PCP: Concetta Bailey DO  Unit/Bed#: MS [facility] Encounter: [id number]  Date Of Visit: 03/12/18    Assessment:    Principal Problem:    Altered mental status  Active Problems:    Transaminitis    Hypokalemia    Fall    Total bilirubin, elevated    Weight loss counseling, encounter for    Common bile duct (CBD) obstruction      Plan:    1  Altered mentation  Likely multifactorial including transaminitis/cholangitis hepatic encephalopathy/metabolic encephalopathy due to recent acute kidney injury  As per GI recommendations continue with Zosyn additional 24 hours  For total of 5 days  Status post ERCP with removal of stones  Significantly improved mentation noticed by myself as well as family  Family reports cognitively patient is currently at [facility] is still confused but it improving   Continue all current care for now  Including treatment with Zosyn  Repeat labs in a   Initiate PT/OT consultation  Case discussed with Gastroenterology no further recommendations monitor LFTs for 1-2 days if improving can be discharged, needs to follow up for repeat ERCP  X4 weeks  He LFT results imaging/procedure resolved discussed with family daughter and wife by bedside  2   Hypokalemia  Resolved    3  Ambulatory dysfunction  PT/OT consultation pending  May need inpatient rehabilitation this yet needs to be determined    4  Weight loss  Moderate protein-calorie malnutrition in setting of chronic illness as evidenced by poor po intake as evidenced by somewhat hollow orbitals with slightly dark circles, temples with a slight depression, visible clavicle and 27% (46 lb) weight loss in 9 months treated with nutritional supplements    Disposition:  1-2 days home with VNA versus acute rehabilitation            VTE Pharmacologic Prophylaxis:   Pharmacologic: Heparin  Mechanical VTE Prophylaxis in Place: Yes    Patient Centered Rounds: I have performed bedside rounds with nursing staff today  Discussions with Specialists or Other Care Team Provider:  Yes    Education and Discussions with Family / Patient:  Yes    Time Spent for Care: 45 minutes  More than 50% of total time spent on counseling and coordination of care as described above  Current Length of Stay: 4 day(s)    Current Patient Status: Inpatient   Certification Statement: The patient will continue to require additional inpatient hospital stay due to :  Sally/cholangitis    Discharge Plan / Estimated Discharge Date: To be determined              Code Status: Level 1 - Full Code      Subjective:   No complaint    Objective:     Vitals:   Temp (24hrs), Av 1 °F (36 7 °C), Min:97 7 °F (36 5 °C), Max:98 3 °F (36 8 °C)    HR:  [55-58] 57  Resp:  [18] 18  BP: (115-133)/(76-89) 126/82  SpO2:  [97 %-99 %] 98 %  Body mass index is 21 06 kg/m²  Input and Output Summary (last 24 hours): Intake/Output Summary (Last 24 hours) at 18 1128  Last data filed at 18 9569   Gross per 24 hour   Intake              330 ml   Output              950 ml   Net             -620 ml       Physical Exam:     Physical Exam  Constitutional:  Well developed, well nourished, no acute distress, non-toxic appearance   Eyes:  PERRL, conjunctiva normal   HENT:  Atraumatic, external ears normal, nose normal, oropharynx moist, no pharyngeal exudates  Neck- normal range of motion, no tenderness, supple   Respiratory:  No respiratory distress, normal breath sounds, no rales, no wheezing   Cardiovascular:  Normal rate, normal rhythm, no murmurs, no gallops, no rubs   GI:  Soft, nondistended, normal bowel sounds, nontender, no organomegaly, no mass, no rebound, no guarding   :  No costovertebral angle tenderness   Musculoskeletal:  No edema, no tenderness, no deformities   Back- no tenderness  Integument:  Well hydrated, no rash   Lymphatic:  No lymphadenopathy noted   Neurologic:  Alert & oriented x 3, CN 2-12 normal, normal motor function, normal sensory function, no focal deficits noted   Psychiatric:  Speech and behavior appropriate  Additional Data:     Labs:      Results from last 7 days  Lab Units 03/12/18  0609   WBC Thousand/uL 4 60   HEMOGLOBIN g/dL 11 2*   HEMATOCRIT % 32 6*   PLATELETS Thousands/uL 107*   NEUTROS PCT % 72   LYMPHS PCT % 18   MONOS PCT % 8   EOS PCT % 1       Results from last 7 days  Lab Units 03/12/18  0609   SODIUM mmol/L 133*   POTASSIUM mmol/L 3 3*   CHLORIDE mmol/L 101   CO2 mmol/L 23   BUN mg/dL 10   CREATININE mg/dL 0 64   CALCIUM mg/dL 7 6*   TOTAL PROTEIN g/dL 4 9*   BILIRUBIN TOTAL mg/dL 1 80*   ALK PHOS U/L 215*   ALT U/L 113*   AST U/L 48*   GLUCOSE RANDOM mg/dL 85       Results from last 7 days  Lab Units 03/09/18  0121   INR  1 10       * I Have Reviewed All Lab Data Listed Above  * Additional Pertinent Lab Tests Reviewed: [facility] Admission Reviewed    Imaging:    Imaging Reports Reviewed Today Include:  S  Imaging Personally Reviewed by Myself Includes:  S    Recent Cultures (last 7 days):           Last 24 Hours Medication List:     Current Facility-Administered Medications:  chlorhexidine 15 mL [employer] Q12H [address] Pj Contreras PA-C    folic acid 1 mg Oral Daily Pj Contreras PA-C    heparin (porcine) 5,000 Units Subcutaneous [facility] Pj Contreras PA-C    multivitamin-minerals 1 tablet Oral Daily Pj Contreras PA-C    nicotine 1 patch Transdermal Daily Estefany Cao MD    piperacillin-tazobactam 3 375 g Intravenous Q6H Maikel Cabral III, MD Last Rate: 3 375 g (03/12/18 0857)   thiamine 100 mg Oral Daily Pj Contreras PA-C         Today, Patient Was Seen By: Estefany Cao MD    ** Please Note: This note has been constructed using a voice recognition system   **

## 2018-03-12 NOTE — CASE MANAGEMENT
Continued Stay Review    Date: 3/12    Vital Signs: /75 (BP Location: Left arm)   Pulse 60   Temp 97 9 °F (36 6 °C) (Oral)   Resp 18   Ht 5' 9" (1 753 m)   Wt 64 7 kg (142 lb 10 2 oz)   SpO2 99%   BMI 21 06 kg/m²     Medications:   Scheduled Meds:   Current Facility-Administered Medications:  chlorhexidine 15 mL [employer] Q12H [address] Dufm Sandusky, PA-C    folic acid 1 mg Oral Daily Dufm Sandusky, PA-C    heparin (porcine) 5,000 Units Subcutaneous [facility] Dufm Sandusky, PA-C    multivitamin-minerals 1 tablet Oral Daily Dufm Sandusky, PA-C    nicotine 1 patch Transdermal Daily Trena Troncoso MD    piperacillin-tazobactam 3 375 g Intravenous Q6H Jeevan Medrano MD Last Rate: 3 375 g (03/12/18 0857)   thiamine 100 mg Oral Daily Dufm Sandusky, PA-C      Continuous Infusions:    PRN Meds:     Abnormal Labs/Diagnostic Results:  Lab Units 03/12/18  0609   WBC Thousand/uL 4 60   HEMOGLOBIN g/dL 11 2*   HEMATOCRIT % 32 6*   PLATELETS Thousands/uL 107*     Results from last 7 days  Lab Units 03/12/18  0609   SODIUM mmol/L 133*   POTASSIUM mmol/L 3 3*   CHLORIDE mmol/L 101   CO2 mmol/L 23   BUN mg/dL 10   CREATININE mg/dL 0 64   CALCIUM mg/dL 7 6*   TOTAL PROTEIN g/dL 4 9*   BILIRUBIN TOTAL mg/dL 1 80*   ALK PHOS U/L 215*   ALT U/L 113*   AST U/L 48*   GLUCOSE RANDOM mg/dL 85      Age/Sex: 78 y o  male     Assessment/Plan:   Principal Problem:    Altered mental status  Active Problems:    Transaminitis    Hypokalemia    Fall    Total bilirubin, elevated    Weight loss counseling, encounter for    Common bile duct (CBD) obstruction        Plan:     1  Altered mentation  Likely multifactorial including transaminitis/cholangitis hepatic encephalopathy/metabolic encephalopathy due to recent acute kidney injury  As per GI recommendations continue with Zosyn additional 24 hours  For total of 5 days  Status post ERCP with removal of stones    Significantly improved mentation noticed by myself as well as family  Family reports cognitively patient is currently at [facility] is still confused but it improving   Continue all current care for now  Including treatment with Zosyn  Repeat labs in a m  Initiate PT/OT consultation  Case discussed with Gastroenterology no further recommendations monitor LFTs for 1-2 days if improving can be discharged, needs to follow up for repeat ERCP  X4 weeks  He LFT results imaging/procedure resolved discussed with family daughter and wife by bedside      2  Hypokalemia  Resolved     3  Ambulatory dysfunction  PT/OT consultation pending  May need inpatient rehabilitation this yet needs to be determined     4   Weight loss  Moderate protein-calorie malnutrition in setting of chronic illness as evidenced by poor po intake as evidenced by somewhat hollow orbitals with slightly dark circles, temples with a slight depression, visible clavicle and 27% (46 lb) weight loss in 9 months treated with nutritional supplements     Disposition:  1-2 days home with VNA versus acute rehabilitation      VTE Pharmacologic Prophylaxis:   Pharmacologic: Heparin  Mechanical VTE Prophylaxis in Place:  Yes     Current Length of Stay: 4 day(s)     Current Patient Status: Inpatient   Certification Statement: The patient will continue to require additional inpatient hospital stay due to :  Sally/cholangitis       Discharge Plan: TBD

## 2018-03-12 NOTE — PLAN OF CARE
GASTROINTESTINAL - ADULT     Maintains adequate nutritional intake Progressing        INFECTION - ADULT     Absence or prevention of progression during hospitalization Progressing        Knowledge Deficit     Patient/family/caregiver demonstrates understanding of disease process, treatment plan, medications, and discharge instructions Progressing        METABOLIC, FLUID AND ELECTROLYTES - ADULT     Electrolytes maintained within normal limits Progressing     Fluid balance maintained Progressing        NEUROSENSORY - ADULT     Achieves stable or improved neurological status Progressing        Nutrition/Hydration-ADULT     Nutrient/Hydration intake appropriate for improving, restoring or maintaining nutritional needs Progressing        PAIN - ADULT     Verbalizes/displays adequate comfort level or baseline comfort level Progressing        Potential for Falls     Patient will remain free of falls Progressing        Prexisting or High Potential for Compromised Skin Integrity     Skin integrity is maintained or improved Progressing        SAFETY ADULT     Patient will remain free of falls Progressing     Maintain or return to baseline ADL function Progressing        SKIN/TISSUE INTEGRITY - ADULT     Skin integrity remains intact Progressing
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
## 2018-03-13 LAB
BASOPHILS # BLD AUTO: 0.02 THOUSANDS/ΜL (ref 0–0.1)
BASOPHILS NFR BLD AUTO: 0 % (ref 0–1)
EOSINOPHIL # BLD AUTO: 0.02 THOUSAND/ΜL (ref 0–0.61)
EOSINOPHIL NFR BLD AUTO: 0 % (ref 0–6)
ERYTHROCYTE [DISTWIDTH] IN BLOOD BY AUTOMATED COUNT: 14.2 % (ref 11.6–15.1)
HCT VFR BLD AUTO: 34.4 % (ref 36.5–49.3)
HGB BLD-MCNC: 11.8 G/DL (ref 12–17)
LYMPHOCYTES # BLD AUTO: 0.88 THOUSANDS/ΜL (ref 0.6–4.47)
LYMPHOCYTES NFR BLD AUTO: 19 % (ref 14–44)
MCH RBC QN AUTO: 32.2 PG (ref 26.8–34.3)
MCHC RBC AUTO-ENTMCNC: 34.3 G/DL (ref 31.4–37.4)
MCV RBC AUTO: 94 FL (ref 82–98)
MONOCYTES # BLD AUTO: 0.3 THOUSAND/ΜL (ref 0.17–1.22)
MONOCYTES NFR BLD AUTO: 6 % (ref 4–12)
NEUTROPHILS # BLD AUTO: 3.35 THOUSANDS/ΜL (ref 1.85–7.62)
NEUTS SEG NFR BLD AUTO: 71 % (ref 43–75)
NRBC BLD AUTO-RTO: 0 /100 WBCS
PLATELET # BLD AUTO: 130 THOUSANDS/UL (ref 149–390)
PMV BLD AUTO: 10.4 FL (ref 8.9–12.7)
RBC # BLD AUTO: 3.66 MILLION/UL (ref 3.88–5.62)
WBC # BLD AUTO: 4.74 THOUSAND/UL (ref 4.31–10.16)

## 2018-03-13 PROCEDURE — 99233 SBSQ HOSP IP/OBS HIGH 50: CPT | Performed by: INTERNAL MEDICINE

## 2018-03-13 PROCEDURE — 97535 SELF CARE MNGMENT TRAINING: CPT

## 2018-03-13 PROCEDURE — 97530 THERAPEUTIC ACTIVITIES: CPT

## 2018-03-13 PROCEDURE — 85025 COMPLETE CBC W/AUTO DIFF WBC: CPT | Performed by: INTERNAL MEDICINE

## 2018-03-13 PROCEDURE — 97110 THERAPEUTIC EXERCISES: CPT

## 2018-03-13 RX ORDER — MORPHINE SULFATE 2 MG/ML
1 INJECTION, SOLUTION INTRAMUSCULAR; INTRAVENOUS ONCE
Status: COMPLETED | OUTPATIENT
Start: 2018-03-13 | End: 2018-03-13

## 2018-03-13 RX ORDER — POTASSIUM CHLORIDE 20 MEQ/1
40 TABLET, EXTENDED RELEASE ORAL DAILY
Status: COMPLETED | OUTPATIENT
Start: 2018-03-13 | End: 2018-03-14

## 2018-03-13 RX ADMIN — HEPARIN SODIUM 5000 UNITS: 5000 INJECTION, SOLUTION INTRAVENOUS; SUBCUTANEOUS at 05:17

## 2018-03-13 RX ADMIN — Medication 100 MG: at 09:00

## 2018-03-13 RX ADMIN — HEPARIN SODIUM 5000 UNITS: 5000 INJECTION, SOLUTION INTRAVENOUS; SUBCUTANEOUS at 21:14

## 2018-03-13 RX ADMIN — PIPERACILLIN SODIUM,TAZOBACTAM SODIUM 3.38 G: 3; .375 INJECTION, POWDER, FOR SOLUTION INTRAVENOUS at 15:20

## 2018-03-13 RX ADMIN — CHLORHEXIDINE GLUCONATE 15 ML: 1.2 RINSE ORAL at 20:26

## 2018-03-13 RX ADMIN — PIPERACILLIN SODIUM,TAZOBACTAM SODIUM 3.38 G: 3; .375 INJECTION, POWDER, FOR SOLUTION INTRAVENOUS at 20:18

## 2018-03-13 RX ADMIN — MORPHINE SULFATE 1 MG: 2 INJECTION, SOLUTION INTRAMUSCULAR; INTRAVENOUS at 16:47

## 2018-03-13 RX ADMIN — HEPARIN SODIUM 5000 UNITS: 5000 INJECTION, SOLUTION INTRAVENOUS; SUBCUTANEOUS at 15:20

## 2018-03-13 RX ADMIN — FOLIC ACID 1 MG: 1 TABLET ORAL at 09:00

## 2018-03-13 RX ADMIN — CHLORHEXIDINE GLUCONATE 15 ML: 1.2 RINSE ORAL at 08:59

## 2018-03-13 RX ADMIN — PIPERACILLIN SODIUM,TAZOBACTAM SODIUM 3.38 G: 3; .375 INJECTION, POWDER, FOR SOLUTION INTRAVENOUS at 09:00

## 2018-03-13 RX ADMIN — Medication 1 TABLET: at 09:00

## 2018-03-13 RX ADMIN — POTASSIUM CHLORIDE 40 MEQ: 1500 TABLET, EXTENDED RELEASE ORAL at 15:20

## 2018-03-13 RX ADMIN — PIPERACILLIN SODIUM,TAZOBACTAM SODIUM 3.38 G: 3; .375 INJECTION, POWDER, FOR SOLUTION INTRAVENOUS at 03:11

## 2018-03-13 NOTE — OCCUPATIONAL THERAPY NOTE
OCCUPATIONAL THERAPY TREATMENT  NOTE       03/13/18 1315   Restrictions/Precautions   Weight Bearing Precautions Per Order No   Other Precautions Cognitive; Chair Alarm; Bed Alarm;Aspiration   Pain Assessment   Pain Assessment No/denies pain   Pain Score No Pain   Bed Mobility   Rolling R 4  Minimal assistance   Additional items Assist x 1;Bedrails; Increased time required;Verbal cues   Rolling L 4  Minimal assistance   Additional items Assist x 1;Bedrails; Increased time required;Verbal cues   Additional items (Pt able to slide up in bed with use of bed rails /vc's)   Additional Comments Pt refused to attempt txfer stating he was OOB all day yesterday  Transfers   Additional Comments Pt refused to attempt txfers   Cognition   Arousal/Participation Alert; Responsive; Cooperative;Persistent stimuli required   Attention Attends with cues to redirect   Orientation Level Oriented X4   Memory Decreased recall of biographical information   Following Commands Follows one step commands with increased time or repetition   Additional Activities   Additional Activities (Pt able to successfully perform PLB technique)   Activity Tolerance   Activity Tolerance Patient limited by fatigue;Treatment limited secondary to agitation   Assessment   Assessment Pt required encouragement to participate in skilled OT session today, with focus on improving functional mobility and safety awarenss  Pt supine in bed upon start of session, insisting on remaining in  bed due to fatigue  Pt slightly agitated but became more cooperative as session progressed, however pt refused request to txfer bed>RW  Pt demonstrated poor activity tolerance requiring periodic rest breaks during presented tasks  Pt  educated in energy conservation awareness/ techniques and bed mobility, and safe txfer technique   Pt performance demonstrated fair carryover of learned techniques and strategies to facilitate safety during self care and daily routine, however pt continues to demonstrate deficits in the areas of activity tolerance   Pt would continue to benefit from skilled OT POC to facilitate return to PLOF  Plan   Treatment Interventions ADL retraining;Visual perceptual retraining;Functional transfer training;UE strengthening/ROM; Endurance training;Cognitive reorientation;Patient/family training;Equipment evaluation/education; Fine motor coordination activities; Compensatory technique education;Continued evaluation; Energy conservation; Activityengagement   Goal Expiration Date 03/23/18   OT Frequency 3-5x/wk   Recommendation   OT Discharge Recommendation Short Term Rehab   OT - OK to Discharge Yes  (upon medical clearance)   Modified Ashley Scale   Modified Ashley Scale 4                                                       ANDRIY Aldrich/KALEB

## 2018-03-13 NOTE — PHYSICAL THERAPY NOTE
Physical Therapy Treatment Note       03/13/18 1345   Pain Assessment   Pain Assessment No/denies pain   Pain Score No Pain   Restrictions/Precautions   Weight Bearing Precautions Per Order No   Braces or Orthoses (none per pt)   Other Precautions Cognitive; Chair Alarm; Bed Alarm;Aspiration;Telemetry; Fall Risk   General   Chart Reviewed Yes   Response to Previous Treatment Patient with no complaints from previous session  Family/Caregiver Present No   Cognition   Overall Cognitive Status Impaired   Arousal/Participation Alert; Responsive; Cooperative;Persistent stimuli required   Attention Attends with cues to redirect   Orientation Level Oriented X4   Memory Decreased recall of biographical information   Following Commands Follows one step commands with increased time or repetition   Comments pt agreeable to bedlevel session   Subjective   Subjective "I do not want to get OOB today, like I told everyone else"   Bed Mobility   Additional Comments pt refusing to transfer OOB today, education provided for importance of mobility to deter secondary effects of immobility, pt continuing to refuse   Endurance Deficit   Endurance Deficit Yes   Activity Tolerance   Activity Tolerance Patient limited by fatigue   Medical Staff Made Aware yes Patrick Rodriguez  Spoke to [employer] concerning pt's progress   Nurse Made Aware yes, RN Amber Elizondo verbalized pt appropriate to see, made aware of session outcome/recs   Exercises   Heelslides Supine;10 reps;AROM; Bilateral   Hip Abduction Supine;10 reps;AROM; Bilateral   Hip Adduction Supine;10 reps;AROM; Bilateral   Knee AROM Short Arc Quad Supine;10 reps;AROM; Bilateral   Ankle Pumps Supine;10 reps;AROM; Bilateral   Assessment   Prognosis Fair   Problem List Decreased strength;Decreased endurance; Impaired balance;Decreased mobility; Decreased cognition;Decreased safety awareness   Assessment Pt seen for PT treatment session this date with interventions consisting of bedlevel Therapeutic exercise consisting of: AROM 10 B LE in supine position  Pt agreeable to bedlevel PT treatment session upon arrival, pt found supine in bed w/ HOB elevated, in no apparent distress, A&O x 4 and responsive  In comparison to previous session, pt with no improvements as evidenced by refusal to transfer OOB/mobilize at this time, education provided for importance of such- pt continued to refuse  Pt agreeable to bedlevel exercises, pt able to perform in full range pain free with cues for technique  Post session: bed alarm engaged, all needs in reach and RN notified of session findings/recommendations and NSG staff educated/encouraged to ambulate pt ad merrick with A of 1  Continue to recommend STR vs  Home PT at time of d/c in order to maximize pt's functional independence and safety w/ mobility  Pt continues to be functioning below baseline level, and remains limited 2* factors listed above  PT will continue to see pt while here in order to address the deficits listed above and provide interventions consistent w/ POC in effort to achieve STGs  Barriers to Discharge Decreased caregiver support; Inaccessible home environment   Goals   Patient Goals to rest   STG Expiration Date 03/19/18   Treatment Day 2   Plan   Treatment/Interventions Functional transfer training;LE strengthening/ROM; Elevations; Therapeutic exercise; Endurance training;Cognitive reorientation;Patient/family training;Equipment eval/education; Bed mobility;Gait training;Spoke to nursing;Spoke to case management;OT   Progress Slow progress, decreased activity tolerance   PT Frequency 5x/wk   Recommendation   Recommendation Short-term skilled PT   Equipment Recommended (TBD)   PT - OK to Discharge Yes  (when medically cleared if to STR)       Kalpana Villafana, PT, DPT    Time of PT treatment session: [id number]

## 2018-03-13 NOTE — PROGRESS NOTES
[facility]ist Service - Internal Medicine Progress Note      PATIENT INFORMATION      Patient: Taylor Green 78 y o  male   MRN: [id number]  PCP: Char Mccormack DO  Unit/Bed#: [facility] Encounter: [id number]  Date Of Visit: 18       ASSESSMENTS       1  Jaundice - Choledocholithiasis  2  Acute Encephalopathy on admission   2  Abnormal LFTs - Alcoholic liver disease  3  Protein calorie malnutrition  4  Hypokalemia  5  Physical deconditioning  6  Tobacco abuse      PLAN       · Status post ERCP with sphincterotomy and stenting with removal of multiple stones - LFTs are generally improving - continue as needed pain control - avoid hepatotoxins if possible - gastroenterology following   · CT of head/MRI brain negative for acute etiology - acute encephalopathy on admission likely attributed to transaminitis/jaundice with improvement to baseline mentation status post ERCP with sphincterotomy  · Counseled on alcohol cessation/abstinence - continue folic acid/thiamine supplementation   · BMI of 17 19 with decreased appetite and evidence of muscle wasting - on Ensure supplementation with meals  · Replete and monitor serum potassium - serum magnesium within normal limits - encourage increased nutritional intake  · Will appreciate PT/OT evaluation  · Transdermal nicotine patch on board - counseled on tobacco cessation      DVT Prophylaxis:  Heparin SC       SUBJECTIVE     Seen and examined earlier this morning with wife and daughter at bedside  He remains generally tired/week  Still complains of a decreased appetite  No acute complaints at this time  OBJECTIVE     Vitals:   Temp (24hrs), Av 8 °F (36 6 °C), Min:97 5 °F (36 4 °C), Max:98 °F (36 7 °C)    HR:  [52-67] 58  Resp:  [16-20] 20  BP: (110-144)/(74-91) 126/74  SpO2:  [98 %-100 %] 99 %  Body mass index is 17 19 kg/m²  Input and Output Summary (last 24 hours):        Intake/Output Summary (Last 24 hours) at 18 1234  Last data filed at 03/13/18 0341   Gross per 24 hour   Intake              474 ml   Output              650 ml   Net             -176 ml       Physical Exam:     GENERAL:  Frail/cachectic - no acute distress  HEAD:  Normocephalic - atraumatic - temporal wasting noted   EYES: PERRL - EOMI   MOUTH:  Mucosa moist  NECK:  Supple - full range of motion  CARDIAC:  Regular rate/rhythm - S1/S2 positive  PULMONARY:  Clear breath sounds bilaterally - nonlabored respirations  ABDOMEN:  Soft - nontender/nondistended - active bowel sounds  MUSCULOSKELETAL:  Motor strength/range of motion deconditioned   NEUROLOGIC:  Alert/oriented x 3  SKIN:  chronic wrinkles/blemishes - improving jaundice   PSYCHIATRIC:  Flat affect       ADDITIONAL DATA     Labs & Recent Cultures:       Results from last 7 days  Lab Units 03/13/18  0511   WBC Thousand/uL 4 74   HEMOGLOBIN g/dL 11 8*   HEMATOCRIT % 34 4*   PLATELETS Thousands/uL 130*   NEUTROS PCT % 71   LYMPHS PCT % 19   MONOS PCT % 6   EOS PCT % 0       Results from last 7 days  Lab Units 03/12/18  0609   SODIUM mmol/L 133*   POTASSIUM mmol/L 3 3*   CHLORIDE mmol/L 101   CO2 mmol/L 23   BUN mg/dL 10   CREATININE mg/dL 0 64   CALCIUM mg/dL 7 6*   TOTAL PROTEIN g/dL 4 9*   BILIRUBIN TOTAL mg/dL 1 80*   ALK PHOS U/L 215*   ALT U/L 113*   AST U/L 48*   GLUCOSE RANDOM mg/dL 85       Results from last 7 days  Lab Units 03/09/18  0121   INR  1 10         Last 24 Hours Medication List:     Current Facility-Administered Medications:  chlorhexidine 15 mL Swish & Spit Q12H [facility] Carrie Hobson PA-C    folic acid 1 mg Oral Daily Carrie Hobson PA-C    heparin (porcine) 5,000 Units Subcutaneous Q8H [facility] Carrie Hobson PA-C    multivitamin-minerals 1 tablet Oral Daily Carrie Hobson PA-C    nicotine 1 patch Transdermal Daily Deneen Swift MD    piperacillin-tazobactam 3 375 g Intravenous Q6H Stevie Garrett III, MD Last Rate: 3 375 g (03/13/18 0900)   potassium chloride 40 mEq Oral Daily Carmen Biswas MD thiamine 100 mg Oral Daily Sukumar Ibanez PA-C           Time Spent for Care: 37 minutes  More than 50% of total time spent on counseling and coordination of care as described above  Current Length of Stay: 5 day(s)      Code Status: Level 1 - Full Code          ** Please Note: This note is constructed using a voice recognition dictation system   **

## 2018-03-13 NOTE — PLAN OF CARE
Problem: PHYSICAL THERAPY ADULT  Goal: Performs mobility at highest level of function for planned discharge setting  See evaluation for individualized goals  Treatment/Interventions: Functional transfer training, LE strengthening/ROM, Elevations, Therapeutic exercise, Endurance training, Cognitive reorientation, Patient/family training, Equipment eval/education, Bed mobility, Gait training, Spoke to nursing, Spoke to case management, OT, Family  Equipment Recommended:  (continue trials with RW vs SPC)       See flowsheet documentation for full assessment, interventions and recommendations  Outcome: Progressing  Prognosis: Fair  Problem List: Decreased strength, Decreased endurance, Impaired balance, Decreased mobility, Decreased cognition, Decreased safety awareness  Assessment: Pt seen for PT treatment session this date with interventions consisting of bedlevel Therapeutic exercise consisting of: AROM 10 B LE in supine position  Pt agreeable to bedlevel PT treatment session upon arrival, pt found supine in bed w/ HOB elevated, in no apparent distress, A&O x 4 and responsive  In comparison to previous session, pt with no improvements as evidenced by refusal to transfer OOB/mobilize at this time, education provided for importance of such- pt continued to refuse  Pt agreeable to bedlevel exercises, pt able to perform in full range pain free with cues for technique  Post session: bed alarm engaged, all needs in reach and RN notified of session findings/recommendations and NSG staff educated/encouraged to ambulate pt ad merrick with A of 1  Continue to recommend STR vs  Home PT at time of d/c in order to maximize pt's functional independence and safety w/ mobility  Pt continues to be functioning below baseline level, and remains limited 2* factors listed above   PT will continue to see pt while here in order to address the deficits listed above and provide interventions consistent w/ POC in effort to achieve STGs   Barriers to Discharge: Decreased caregiver support, Inaccessible home environment     Recommendation: Short-term skilled PT     PT - OK to Discharge: Yes (when medically cleared if to STR)    See flowsheet documentation for full assessment

## 2018-03-13 NOTE — PLAN OF CARE
Problem: DISCHARGE PLANNING - CARE MANAGEMENT  Goal: Discharge to post-acute care or home with appropriate resources  INTERVENTIONS:  - Conduct assessment to determine patient/family and health care team treatment goals, and need for post-acute services based on payer coverage, community resources, and patient preferences, and barriers to discharge  - Address psychosocial, clinical, and financial barriers to discharge as identified in assessment in conjunction with the patient/family and health care team  - Arrange appropriate level of post-acute services according to patients   needs and preference and payer coverage in collaboration with the physician and health care team  - Communicate with and update the patient/family, physician, and health care team regarding progress on the discharge plan  - Arrange appropriate transportation to post-acute venues   Outcome: Progressing  CM met with pt and daughter at bedside  Both pt and daughter are interested in acute rehab-only at [facility]  He is not open to the [facility]  CM will place referral and keep pt and family informed of decision  CM left message with PT to eval pt today if possible  Cm also discussed STR and pt is not open to this at the present time

## 2018-03-13 NOTE — PLAN OF CARE
Problem: OCCUPATIONAL THERAPY ADULT  Goal: Performs self-care activities at highest level of function for planned discharge setting  See evaluation for individualized goals  Treatment Interventions: ADL retraining, Functional transfer training, UE strengthening/ROM, Endurance training, Cognitive reorientation, Patient/family training, Equipment evaluation/education, Compensatory technique education, Continued evaluation, Energy conservation, Activityengagement          See flowsheet documentation for full assessment, interventions and recommendations  Outcome: Progressing  Limitation: Decreased ADL status, Decreased endurance, Decreased Safe judgement during ADL, Decreased self-care trans, Decreased high-level ADLs  Prognosis: Good  Assessment: Pt required encouragement to participate in skilled OT session today, with focus on improving functional mobility and safety awarenss  Pt supine in bed upon start of session, insisting on remaining in  bed due to fatigue  Pt slightly agitated but became more cooperative as session progressed, however pt refused request to txfer bed>RW  Pt demonstrated poor activity tolerance requiring periodic rest breaks during presented tasks  Pt  educated in energy conservation awareness/ techniques and bed mobility, and safe txfer technique  Pt performance demonstrated fair carryover of learned techniques and strategies to facilitate safety during self care and daily routine, however pt continues to demonstrate deficits in the areas of activity tolerance   Pt would continue to benefit from skilled OT POC to facilitate return to PLOF        OT Discharge Recommendation: Short Term Rehab  OT - OK to Discharge: Yes (upon medical clearance)

## 2018-03-14 LAB
ALBUMIN SERPL BCP-MCNC: 2.3 G/DL (ref 3.5–5)
ALP SERPL-CCNC: 227 U/L (ref 46–116)
ALT SERPL W P-5'-P-CCNC: 79 U/L (ref 12–78)
ANION GAP SERPL CALCULATED.3IONS-SCNC: 7 MMOL/L (ref 4–13)
AST SERPL W P-5'-P-CCNC: 29 U/L (ref 5–45)
BASOPHILS # BLD AUTO: 0.03 THOUSANDS/ΜL (ref 0–0.1)
BASOPHILS NFR BLD AUTO: 1 % (ref 0–1)
BILIRUB SERPL-MCNC: 1.4 MG/DL (ref 0.2–1)
BUN SERPL-MCNC: 11 MG/DL (ref 5–25)
CALCIUM SERPL-MCNC: 8.6 MG/DL (ref 8.3–10.1)
CHLORIDE SERPL-SCNC: 103 MMOL/L (ref 100–108)
CO2 SERPL-SCNC: 24 MMOL/L (ref 21–32)
CREAT SERPL-MCNC: 0.71 MG/DL (ref 0.6–1.3)
EOSINOPHIL # BLD AUTO: 0.04 THOUSAND/ΜL (ref 0–0.61)
EOSINOPHIL NFR BLD AUTO: 1 % (ref 0–6)
ERYTHROCYTE [DISTWIDTH] IN BLOOD BY AUTOMATED COUNT: 14.2 % (ref 11.6–15.1)
GFR SERPL CREATININE-BSD FRML MDRD: 92 ML/MIN/1.73SQ M
GLUCOSE SERPL-MCNC: 103 MG/DL (ref 65–140)
HCT VFR BLD AUTO: 35.1 % (ref 36.5–49.3)
HGB BLD-MCNC: 11.9 G/DL (ref 12–17)
LYMPHOCYTES # BLD AUTO: 1.06 THOUSANDS/ΜL (ref 0.6–4.47)
LYMPHOCYTES NFR BLD AUTO: 20 % (ref 14–44)
MCH RBC QN AUTO: 32.3 PG (ref 26.8–34.3)
MCHC RBC AUTO-ENTMCNC: 33.9 G/DL (ref 31.4–37.4)
MCV RBC AUTO: 95 FL (ref 82–98)
MONOCYTES # BLD AUTO: 0.43 THOUSAND/ΜL (ref 0.17–1.22)
MONOCYTES NFR BLD AUTO: 8 % (ref 4–12)
NEUTROPHILS # BLD AUTO: 3.48 THOUSANDS/ΜL (ref 1.85–7.62)
NEUTS SEG NFR BLD AUTO: 65 % (ref 43–75)
NRBC BLD AUTO-RTO: 0 /100 WBCS
PLATELET # BLD AUTO: 159 THOUSANDS/UL (ref 149–390)
PMV BLD AUTO: 10.7 FL (ref 8.9–12.7)
POTASSIUM SERPL-SCNC: 4.2 MMOL/L (ref 3.5–5.3)
PROT SERPL-MCNC: 5.5 G/DL (ref 6.4–8.2)
RBC # BLD AUTO: 3.68 MILLION/UL (ref 3.88–5.62)
SODIUM SERPL-SCNC: 134 MMOL/L (ref 136–145)
WBC # BLD AUTO: 5.33 THOUSAND/UL (ref 4.31–10.16)

## 2018-03-14 PROCEDURE — 80053 COMPREHEN METABOLIC PANEL: CPT | Performed by: INTERNAL MEDICINE

## 2018-03-14 PROCEDURE — 97116 GAIT TRAINING THERAPY: CPT

## 2018-03-14 PROCEDURE — 85025 COMPLETE CBC W/AUTO DIFF WBC: CPT | Performed by: INTERNAL MEDICINE

## 2018-03-14 PROCEDURE — G8988 SELF CARE GOAL STATUS: HCPCS

## 2018-03-14 PROCEDURE — G8987 SELF CARE CURRENT STATUS: HCPCS

## 2018-03-14 PROCEDURE — 97530 THERAPEUTIC ACTIVITIES: CPT

## 2018-03-14 PROCEDURE — 97535 SELF CARE MNGMENT TRAINING: CPT

## 2018-03-14 PROCEDURE — 97110 THERAPEUTIC EXERCISES: CPT

## 2018-03-14 PROCEDURE — 99232 SBSQ HOSP IP/OBS MODERATE 35: CPT | Performed by: FAMILY MEDICINE

## 2018-03-14 RX ADMIN — PIPERACILLIN SODIUM,TAZOBACTAM SODIUM 3.38 G: 3; .375 INJECTION, POWDER, FOR SOLUTION INTRAVENOUS at 02:13

## 2018-03-14 RX ADMIN — PIPERACILLIN SODIUM,TAZOBACTAM SODIUM 3.38 G: 3; .375 INJECTION, POWDER, FOR SOLUTION INTRAVENOUS at 14:47

## 2018-03-14 RX ADMIN — HEPARIN SODIUM 5000 UNITS: 5000 INJECTION, SOLUTION INTRAVENOUS; SUBCUTANEOUS at 21:03

## 2018-03-14 RX ADMIN — CHLORHEXIDINE GLUCONATE 15 ML: 1.2 RINSE ORAL at 20:25

## 2018-03-14 RX ADMIN — POTASSIUM CHLORIDE 40 MEQ: 1500 TABLET, EXTENDED RELEASE ORAL at 10:30

## 2018-03-14 RX ADMIN — NICOTINE 1 PATCH: 14 PATCH, EXTENDED RELEASE TRANSDERMAL at 10:29

## 2018-03-14 RX ADMIN — FOLIC ACID 1 MG: 1 TABLET ORAL at 10:30

## 2018-03-14 RX ADMIN — HEPARIN SODIUM 5000 UNITS: 5000 INJECTION, SOLUTION INTRAVENOUS; SUBCUTANEOUS at 05:37

## 2018-03-14 RX ADMIN — PIPERACILLIN SODIUM,TAZOBACTAM SODIUM 3.38 G: 3; .375 INJECTION, POWDER, FOR SOLUTION INTRAVENOUS at 10:31

## 2018-03-14 RX ADMIN — Medication 100 MG: at 10:30

## 2018-03-14 RX ADMIN — CHLORHEXIDINE GLUCONATE 15 ML: 1.2 RINSE ORAL at 10:29

## 2018-03-14 RX ADMIN — PIPERACILLIN SODIUM,TAZOBACTAM SODIUM 3.38 G: 3; .375 INJECTION, POWDER, FOR SOLUTION INTRAVENOUS at 20:24

## 2018-03-14 RX ADMIN — Medication 1 TABLET: at 10:30

## 2018-03-14 RX ADMIN — HEPARIN SODIUM 5000 UNITS: 5000 INJECTION, SOLUTION INTRAVENOUS; SUBCUTANEOUS at 14:47

## 2018-03-14 NOTE — PLAN OF CARE
Problem: DISCHARGE PLANNING - CARE MANAGEMENT  Goal: Discharge to post-acute care or home with appropriate resources  INTERVENTIONS:  - Conduct assessment to determine patient/family and health care team treatment goals, and need for post-acute services based on payer coverage, community resources, and patient preferences, and barriers to discharge  - Address psychosocial, clinical, and financial barriers to discharge as identified in assessment in conjunction with the patient/family and health care team  - Arrange appropriate level of post-acute services according to patients   needs and preference and payer coverage in collaboration with the physician and health care team  - Communicate with and update the patient/family, physician, and health care team regarding progress on the discharge plan  - Arrange appropriate transportation to post-acute venues   Outcome: Progressing  CM met with pt and family at bedside  Pt did work with PT and OT today (yesterday he had refused)  Elena from [address] [place] [address] notified of this and note is in  She will review and let CM know decision  Pt continues to refuse STR

## 2018-03-14 NOTE — OCCUPATIONAL THERAPY NOTE
Occupational Therapy Treatment Note      Juliann Praseamus    3/14/2018    Patient Active Problem List   Diagnosis    Transaminitis    Hypokalemia    Fall    Altered mental status    Total bilirubin, elevated    Weight loss counseling, encounter for    Common bile duct (CBD) obstruction       Past Medical History:   Diagnosis Date    Cancer [facility])     prostate, skin     Dementia        Past Surgical History:   Procedure Laterality Date    ERCP N/A 3/9/2018    Procedure: ENDOSCOPIC RETROGRADE CHOLANGIOPANCREATOGRAPHY (ERCP); Surgeon: Cecelia Caraballo MD;  Location: [facility];  Service: Gastroenterology    PROSTATE SURGERY          03/14/18 1208   ADL   Grooming Assistance 4  Minimal Assistance   Grooming Deficit Setup;Steadying;Verbal cueing;Supervision/safety; Increased time to complete   Toileting Assistance  4  Minimal Assistance   Toileting Deficit Steadying;Setup;Verbal cueing;Supervison/safety; Increased time to complete   Bed Mobility   Rolling R 4  Minimal assistance   Additional items Assist x 1;Bedrails; Increased time required;Verbal cues   Rolling L 4  Minimal assistance   Additional items Assist x 1;Bedrails; Increased time required;Verbal cues   Supine to Sit 4  Minimal assistance   Additional items Assist x 1;Verbal cues; Increased time required;LE management;HOB elevated; Bedrails   Sit to Supine 4  Minimal assistance   Additional items Assist x 1;HOB elevated; Bedrails;Verbal cues;LE management; Increased time required   Transfers   Sit to Stand 4  Minimal assistance   Additional items Assist x 1;Bedrails; Increased time required;Verbal cues   Stand to Sit 4  Minimal assistance   Additional items Assist x 1;Bedrails; Increased time required;Verbal cues   Functional Mobility   Functional Mobility 4  Minimal assistance   Additional Comments rw and vcs   Toilet Transfers   Toilet Transfer From [place] Type To and from   Toilet Transfer to Raised toilet seat with rails   Toilet Transfer Technique Ambulating   Toilet Transfers Minimal assistance   Cognition   Overall Cognitive Status Impaired   Arousal/Participation Alert; Cooperative;Responsive   Attention Attends with cues to redirect   Orientation Level Oriented X4   Memory Decreased recall of biographical information   Following Commands Follows one step commands with increased time or repetition   Comments Pt agreeable to OT tx session, but very irritable and short with responses throughout   Additional Activities   Additional Activities Family conference   Activity Tolerance   Activity Tolerance Patient tolerated treatment well  (Pt olegario about 7-10 minuts of continous dynamic standing task )   Medical Staff Made Aware yes, spoke with pts RN and CM  made aware of outcomes   Assessment   Assessment Pt participated in skilled OT session today addressing the following interventions ADL retraining with proper body mechanics, Patient / Family Education, Transfer Training, Engergy Conservation Training, Therapeutic Activity, Deep and/or pursed lipped breathing, Safety Awareness, Fall Prevention, DME training, Activity tolerance training, and Standing tolerance training  Patient agreeable to OT treatment session, upon arrival patient was found alert, responsive  and in no apparent distress  In comparison to previous session, patient with improvements in act participation, act olegario, safety, ADLs, and xfers    Patient demonstrated ability to participate and complete tasks as requested  Patient requiring vcs and encouragement throughout for participation  Patient continues to be functioning below baseline level, occupational performance remains limited secondary to factors listed above and increased risk for falls and injury  From OT standpoint, recommendation at time of d/c would be STR     Patient to benefit from continued Occupational Therapy treatment while in the hospital to address deficits as defined above and maximize level of functional independence with ADLs and functional mobility  Plan   Treatment Interventions ADL retraining;Functional transfer training; Endurance training;Patient/family training; Compensatory technique education;Continued evaluation; Energy conservation; Activityengagement;Equipment evaluation/education   OT Frequency 3-5x/wk   Recommendation   OT Discharge Recommendation Short Term Rehab   OT - OK to Discharge Yes  (when medically cleared and agreeable to STR)   Barthel Index   Feeding 10   Bathing 0   Grooming Score 0   Dressing Score 5   Bladder Score 10   Bowels Score 10   Toilet Use Score 5   Transfers (Bed/Chair) Score 10   Mobility (Level Surface) Score 5   Stairs Score 0   Barthel Index Score 55     Glenna Quinteros MS OTR/L

## 2018-03-14 NOTE — SOCIAL WORK
CM met with pt and family at bedside  Pt did work with PT and OT today (yesterday he had refused)  Elena from [facility] notified of this and note is in  She will review and let CM know decision  Pt continues to refuse STR

## 2018-03-14 NOTE — PLAN OF CARE
Problem: PHYSICAL THERAPY ADULT  Goal: Performs mobility at highest level of function for planned discharge setting  See evaluation for individualized goals  Treatment/Interventions: Functional transfer training, LE strengthening/ROM, Elevations, Therapeutic exercise, Endurance training, Cognitive reorientation, Patient/family training, Equipment eval/education, Bed mobility, Gait training, Spoke to nursing, Spoke to case management, OT, Family  Equipment Recommended:  (continue trials with RW vs SPC)       See flowsheet documentation for full assessment, interventions and recommendations  Outcome: Progressing  Prognosis: Good  Problem List: Decreased strength, Decreased endurance, Impaired balance, Decreased mobility, Decreased cognition, Decreased safety awareness  Assessment: Pt seen for PT treatment session this date with interventions consisting of gait training w/ emphasis on improving pt's ability to ambulate level surfaces x 50' x2 with min-mod A provided by therapist with trials of RW vs no AD; Therapeutic exercise consisting of: AROM 10 B LE in sitting and supine position  Pt agreeable to PT treatment session upon arrival, pt found supine in bed w/ HOB elevated, in no apparent distress, A&O x 4 and responsive  Pt motivated and agreeable to mobilize OOB this date; still flat affect; dtr and wife present to provide encouragement  In comparison to previous session, pt with significant improvements in gait trial with RW vs no AD, note pt requiring moderate assistance without use of RW 2* gait deviations and unsteadiness with staggerred steps and veering; pt able to continue bedlevel [place] with complete range pain free  Post session: pt returned BTB, all needs in reach and RN notified of session findings/recommendations and NSG staff educated/encouraged to ambulate pt ad merrick with use of RW and staff A   Continue to recommend aggressive STR at time of d/c in order to maximize pt's functional independence and safety w/ mobility  Pt continues to be functioning below baseline level, and remains limited 2* factors listed above  PT will continue to see pt while here in order to address the deficits listed above and provide interventions consistent w/ POC in effort to achieve STGs  Barriers to Discharge: Decreased caregiver support, Inaccessible home environment     Recommendation: Short-term skilled PT     PT - OK to Discharge: Yes (when medically cleared if to STR)    See flowsheet documentation for full assessment

## 2018-03-14 NOTE — ASSESSMENT & PLAN NOTE
Improved   It was likely secondary to acute event, Status port ERCP with sphincterectomy and stenting removal of multiples stones  Closely monitor

## 2018-03-14 NOTE — PLAN OF CARE
Problem: Nutrition/Hydration-ADULT  Goal: Nutrient/Hydration intake appropriate for improving, restoring or maintaining nutritional needs  Monitor and assess patient's nutrition/hydration status for malnutrition (ex- brittle hair, bruises, dry skin, pale skin and conjunctiva, muscle wasting, smooth red tongue, and disorientation)  Collaborate with interdisciplinary team and initiate plan and interventions as ordered  Monitor patient's weight and dietary intake as ordered or per policy  Utilize nutrition screening tool and intervene per policy  Determine patient's food preferences and provide high-protein, high-caloric foods as appropriate  INTERVENTIONS:  - Monitor oral intake, urinary output, labs, and treatment plans  - Assess nutrition and hydration status and recommend course of action  - Evaluate amount of meals eaten  - Assist patient with eating if necessary   - Allow adequate time for meals  - Recommend/ encourage appropriate diets, oral nutritional supplements, and vitamin/mineral supplements  - Order, calculate, and assess calorie counts as needed  - Assess need for intravenous fluids  - Provide specific nutrition/hydration education as appropriate  - Include patient/family/caregiver in decisions related to nutrition    Outcome: Progressing  Pt drinking 4 Ensure Enlive/day, PO intake at meals slightly improved

## 2018-03-14 NOTE — PROGRESS NOTES
Progress Note - Steffi Trevino 1940, 78 y o  male MRN: [id number]    Unit/Bed#: -01 Encounter: [id number]    Primary Care Provider: Lynn Ortega DO   Date and time admitted to hospital: 3/8/2018  8:03 AM        * Altered mental status   Assessment & Plan    Improved  It was likely secondary to acute event, Status port ERCP with sphincterectomy and stenting removal of multiples stones  Closely monitor          Total bilirubin, elevated   Assessment & Plan    Still elevated with decrease in trending  Closely monitor        Fall   Assessment & Plan    Doing better  PT/OT Short term rehab  Pending disposition         Hypokalemia   Assessment & Plan    improved        Transaminitis   Assessment & Plan    improved          VTE Pharmacologic Prophylaxis:   Pharmacologic: Heparin  Mechanical VTE Prophylaxis in Place: Yes    Patient Centered Rounds: I have performed bedside rounds with nursing staff today  Discussions with Specialists or Other Care Team Provider:     Education and Discussions with Family / Patient: patient and family member at bedside     Time Spent for Care: 20 minutes  More than 50% of total time spent on counseling and coordination of care as described above  Current Length of Stay: 6 day(s)    Current Patient Status: Inpatient   Certification Statement: The patient will continue to require additional inpatient hospital stay due to acute above conditions     Discharge Plan: likely next 24-48hrs    Code Status: Level 1 - Full Code      Subjective:   Patient reported still being weak but some improvement compared as yesterday  Patient denied any nausea, vomiting or abdominal pain    Objective:     Vitals:   Temp (24hrs), Av 8 °F (36 6 °C), Min:97 5 °F (36 4 °C), Max:98 °F (36 7 °C)    HR:  [55-71] 61  Resp:  [18-20] 18  BP: (106-142)/(60-79) 137/74  SpO2:  [98 %-99 %] 99 %  Body mass index is 16 7 kg/m²  Input and Output Summary (last 24 hours):        Intake/Output Summary (Last 24 hours) at 03/14/18 1012  Last data filed at 03/14/18 0524   Gross per 24 hour   Intake               50 ml   Output              675 ml   Net             -625 ml       Physical Exam:     Physical Exam   Constitutional: He is oriented to person, place, and time  No distress  Cardiovascular: Normal rate and normal heart sounds  Exam reveals no gallop  No murmur heard  Pulmonary/Chest: No respiratory distress  He has no wheezes  He has no rales  He exhibits no tenderness  Abdominal: He exhibits no distension and no mass  There is no tenderness  There is no rebound and no guarding  Musculoskeletal: He exhibits no edema, tenderness or deformity  Neurological: He is alert and oriented to person, place, and time  Coordination abnormal    Skin: Skin is warm  He is not diaphoretic  Additional Data:     Labs:      Results from last 7 days  Lab Units 03/14/18  0529   WBC Thousand/uL 5 33   HEMOGLOBIN g/dL 11 9*   HEMATOCRIT % 35 1*   PLATELETS Thousands/uL 159   NEUTROS PCT % 65   LYMPHS PCT % 20   MONOS PCT % 8   EOS PCT % 1       Results from last 7 days  Lab Units 03/14/18  0530   SODIUM mmol/L 134*   POTASSIUM mmol/L 4 2   CHLORIDE mmol/L 103   CO2 mmol/L 24   BUN mg/dL 11   CREATININE mg/dL 0 71   CALCIUM mg/dL 8 6   TOTAL PROTEIN g/dL 5 5*   BILIRUBIN TOTAL mg/dL 1 40*   ALK PHOS U/L 227*   ALT U/L 79*   AST U/L 29   GLUCOSE RANDOM mg/dL 103       Results from last 7 days  Lab Units 03/09/18  0121   INR  1 10       * I Have Reviewed All Lab Data Listed Above  * Additional Pertinent Lab Tests Reviewed:  All Labs Within Last 24 Hours Reviewed    Imaging:    Imaging Reports Reviewed Today Include:  Imaging Personally Reviewed by Myself Includes:     Recent Cultures (last 7 days):           Last 24 Hours Medication List:     Current Facility-Administered Medications:  chlorhexidine 15 mL Swish [employer] Spit Q12H [address] Princella Bussing, PA-C    folic acid 1 mg Oral Daily Princella Bussing, PA-C    heparin (porcine) 5,000 Units Subcutaneous [facility] Zhou Paul PA-C    multivitamin-minerals 1 tablet Oral Daily Cecil Bradley    nicotine 1 patch Transdermal Daily Trena Troncoso MD    piperacillin-tazobactam 3 375 g Intravenous Q6H Jeevan Medrano MD Last Rate: Stopped (03/14/18 0243)   potassium chloride 40 mEq Oral Daily Toni Baltazar MD    thiamine 100 mg Oral Daily Zhou Paul PA-C         Today, Patient Was Seen By: Negar Perea MD    ** Please Note: Dragon 360 Dictation voice to text software may have been used in the creation of this document   **

## 2018-03-14 NOTE — PHYSICAL THERAPY NOTE
Physical Therapy Treatment Note       03/14/18 0909   Pain Assessment   Pain Assessment No/denies pain   Pain Score No Pain   Restrictions/Precautions   Weight Bearing Precautions Per Order No   Braces or Orthoses (none per pt)   Other Precautions Cognitive; Chair Alarm; Bed Alarm;Aspiration;Telemetry; Fall Risk   General   Chart Reviewed Yes   Response to Previous Treatment Patient with no complaints from previous session  Family/Caregiver Present Yes  (dtr and wife)   Cognition   Overall Cognitive Status Impaired   Arousal/Participation Alert; Responsive; Cooperative   Attention Attends with cues to redirect   Orientation Level Oriented X4   Memory Decreased recall of biographical information   Following Commands Follows one step commands with increased time or repetition   Comments pt agreeable to PT session   Subjective   Subjective "I feel good, I want to get walk"   Bed Mobility   Supine to Sit 4  Minimal assistance   Additional items Assist x 1;HOB elevated; Bedrails; Increased time required;Verbal cues   Sit to Supine 4  Minimal assistance   Additional items Assist x 1; Increased time required;Verbal cues   Transfers   Sit to Stand 4  Minimal assistance  (CGA)   Additional items Assist x 1; Armrests; Increased time required;Verbal cues   Stand to Sit 4  Minimal assistance  (CGA)   Additional items Assist x 1; Armrests; Increased time required;Verbal cues   Ambulation/Elevation   Gait pattern Decreased foot clearance; Short stride; Step to; Foward flexed; Inconsistent lavon  (staggered steps, veering, lateral sway)   Gait Assistance 4  Minimal assist  (min A with RW, mod A without AD)   Additional items Assist x 1;Verbal cues; Tactile cues  (for RW management)   Assistive Device Rolling walker   Distance 50' x2   Stair Management Assistance Not tested   Endurance Deficit   Endurance Deficit Yes   Activity Tolerance   Activity Tolerance Patient limited by fatigue   Medical Staff Made Aware yes spoke to Dr Pietro Bernal- verbalized pt appropriate to see and mobilize   Nurse Made Aware yes, RN Tova Calhoun verbalized pt appropriate to see, made aware of session outcome/recs   Exercises   Quad Sets Supine;10 reps;AROM; Bilateral   Glute Sets Supine;10 reps;AROM; Bilateral   Hip Abduction Supine;10 reps;AROM; Bilateral   Hip Adduction Supine;10 reps;AROM; Bilateral   Knee AROM Long Arc Quad Sitting;10 reps;AROM; Bilateral   Ankle Pumps Supine;10 reps;AROM; Bilateral   Marching Sitting;10 reps;AROM; Bilateral   Assessment   Prognosis Good   Problem List Decreased strength;Decreased endurance; Impaired balance;Decreased mobility; Decreased cognition;Decreased safety awareness   Assessment Pt seen for PT treatment session this date with interventions consisting of gait training w/ emphasis on improving pt's ability to ambulate level surfaces x 50' x2 with min-mod A provided by therapist with trials of RW vs no AD; Therapeutic exercise consisting of: AROM 10 B LE in sitting and supine position  Pt agreeable to PT treatment session upon arrival, pt found supine in bed w/ HOB elevated, in no apparent distress, A&O x 4 and responsive  Pt motivated and agreeable to mobilize OOB this date; still flat affect; dtr and wife present to provide encouragement  In comparison to previous session, pt with significant improvements in gait trial with RW vs no AD, note pt requiring moderate assistance without use of RW 2* gait deviations and unsteadiness with staggerred steps and veering; pt able to continue bedlevel [place] with complete range pain free  Post session: pt returned BTB, all needs in reach and RN notified of session findings/recommendations and NSG staff educated/encouraged to ambulate pt ad merrick with use of RW and staff A  Continue to recommend aggressive STR at time of d/c in order to maximize pt's functional independence and safety w/ mobility  Pt continues to be functioning below baseline level, and remains limited 2* factors listed above   PT will continue to see pt while here in order to address the deficits listed above and provide interventions consistent w/ POC in effort to achieve STGs  Barriers to Discharge Decreased caregiver support; Inaccessible home environment   Goals   Patient Goals to get stronger; family's goal is for [facility]   STG Expiration Date 03/19/18   Treatment Day 3   Plan   Treatment/Interventions Functional transfer training;LE strengthening/ROM; Elevations; Therapeutic exercise; Endurance training;Cognitive reorientation;Patient/family training;Equipment eval/education; Bed mobility;Gait training;Spoke to nursing;Spoke to case management;Spoke to MD;Family   Progress Progressing toward goals   PT Frequency 5x/wk   Recommendation   Recommendation Short-term skilled PT   Equipment Recommended (continue trials with RW, TBD at STR)   PT - OK to Discharge Yes  (when medically cleared if to STR)       Kisha Velazquez, PT, DPT    Time of PT treatment session: [phone]

## 2018-03-15 ENCOUNTER — HOSPITAL ENCOUNTER (OUTPATIENT)
Facility: HOSPITAL | Age: 78
Discharge: HOME HEALTH | End: 2018-03-28
Attending: PHYSICAL MEDICINE & REHABILITATION | Admitting: PHYSICAL MEDICINE & REHABILITATION

## 2018-03-15 VITALS
RESPIRATION RATE: 18 BRPM | WEIGHT: 114.86 LBS | SYSTOLIC BLOOD PRESSURE: 123 MMHG | TEMPERATURE: 97.7 F | BODY MASS INDEX: 17.01 KG/M2 | DIASTOLIC BLOOD PRESSURE: 87 MMHG | HEART RATE: 66 BPM | HEIGHT: 69 IN | OXYGEN SATURATION: 99 %

## 2018-03-15 PROBLEM — Z85.46 H/O PROSTATE CANCER: Status: ACTIVE | Noted: 2018-03-15

## 2018-03-15 LAB
ALBUMIN SERPL BCP-MCNC: 2.5 G/DL (ref 3.5–5)
ALP SERPL-CCNC: 218 U/L (ref 46–116)
ALT SERPL W P-5'-P-CCNC: 74 U/L (ref 12–78)
ANION GAP SERPL CALCULATED.3IONS-SCNC: 10 MMOL/L (ref 4–13)
AST SERPL W P-5'-P-CCNC: 28 U/L (ref 5–45)
BILIRUB SERPL-MCNC: 1.4 MG/DL (ref 0.2–1)
BUN SERPL-MCNC: 11 MG/DL (ref 5–25)
CALCIUM SERPL-MCNC: 8.7 MG/DL (ref 8.3–10.1)
CHLORIDE SERPL-SCNC: 101 MMOL/L (ref 100–108)
CO2 SERPL-SCNC: 21 MMOL/L (ref 21–32)
CREAT SERPL-MCNC: 0.75 MG/DL (ref 0.6–1.3)
GFR SERPL CREATININE-BSD FRML MDRD: 90 ML/MIN/1.73SQ M
GLUCOSE SERPL-MCNC: 95 MG/DL (ref 65–140)
HU1 AB TITR SER: NORMAL TITER
HU2 AB TITR SER IF: NORMAL TITER
POTASSIUM SERPL-SCNC: 4.5 MMOL/L (ref 3.5–5.3)
PROT SERPL-MCNC: 5.9 G/DL (ref 6.4–8.2)
SODIUM SERPL-SCNC: 132 MMOL/L (ref 136–145)

## 2018-03-15 PROCEDURE — 99238 HOSP IP/OBS DSCHRG MGMT 30/<: CPT | Performed by: FAMILY MEDICINE

## 2018-03-15 PROCEDURE — 80053 COMPREHEN METABOLIC PANEL: CPT | Performed by: INTERNAL MEDICINE

## 2018-03-15 RX ORDER — FOLIC ACID 1 MG/1
1 TABLET ORAL DAILY
Qty: 30 TABLET | Refills: 0 | Status: SHIPPED | OUTPATIENT
Start: 2018-03-16 | End: 2018-05-23 | Stop reason: SDUPTHER

## 2018-03-15 RX ORDER — TAMSULOSIN HYDROCHLORIDE 0.4 MG/1
0.4 CAPSULE ORAL
Qty: 30 CAPSULE | Refills: 0 | Status: SHIPPED | OUTPATIENT
Start: 2018-03-15 | End: 2018-05-23 | Stop reason: SDUPTHER

## 2018-03-15 RX ADMIN — PIPERACILLIN SODIUM,TAZOBACTAM SODIUM 3.38 G: 3; .375 INJECTION, POWDER, FOR SOLUTION INTRAVENOUS at 02:25

## 2018-03-15 RX ADMIN — CHLORHEXIDINE GLUCONATE 15 ML: 1.2 RINSE ORAL at 09:03

## 2018-03-15 RX ADMIN — PIPERACILLIN SODIUM,TAZOBACTAM SODIUM 3.38 G: 3; .375 INJECTION, POWDER, FOR SOLUTION INTRAVENOUS at 09:02

## 2018-03-15 RX ADMIN — NICOTINE 1 PATCH: 14 PATCH, EXTENDED RELEASE TRANSDERMAL at 09:02

## 2018-03-15 RX ADMIN — FOLIC ACID 1 MG: 1 TABLET ORAL at 09:03

## 2018-03-15 RX ADMIN — Medication 1 TABLET: at 09:03

## 2018-03-15 RX ADMIN — HEPARIN SODIUM 5000 UNITS: 5000 INJECTION, SOLUTION INTRAVENOUS; SUBCUTANEOUS at 05:00

## 2018-03-15 RX ADMIN — Medication 100 MG: at 09:03

## 2018-03-15 NOTE — DISCHARGE SUMMARY
Discharge- Brinton Prader 1940, 78 y o  male MRN: [id number]    Unit/Bed#: -01 Encounter: [id number]    Primary Care Provider: Kavitha Mayes DO   Date and time admitted to hospital: 3/8/2018  8:03 AM        * Altered mental status   Assessment & Plan    Improved  It was likely secondary to acute event, Status port ERCP with sphincterectomy and stenting removal of multiples stones            H/O prostate cancer   Assessment & Plan    Outpatient follow up  flomax         Total bilirubin, elevated   Assessment & Plan    Still elevated with decrease in trending, outpatient follow up           [address] [place] [address]    Doing better  Discharge to  Short term rehab          Hypokalemia   Assessment & Plan    improved        Transaminitis   Assessment & Plan    improved          Disposition:     Other: Acute inpatient rehab    Consultations During Hospital Stay:  · Gastroenterology    Procedures Performed:   ERCP  Significant Findings / Test Results:     ·     Incidental Findings:   ·     Test Results Pending at Discharge (will require follow up): Outpatient Tests Requested:  ·     Complications:      Hospital Course:     Brinton Prader is a 78 y o  male patient who originally presented to the hospital on 3/8/2018 due to acute mental status, which is was most likely secondary to choledocholithiasis  Patient was evaluated by Gastroenterology Department which performance ERCP with stent placement  Patient was on IV antibiotic  Patient has been improved for the last 24 hours  Patient with some ambulatory dysfunction a weakness reason why he was evaluated by PT OT a recommended short  inpatient rehab treatment       Condition at Discharge: stable     Discharge Day Visit / Exam:     Subjective:  I am better  Vitals: Blood Pressure: 123/87 (03/15/18 0700)  Pulse: 66 (03/15/18 0700)  Temperature: 97 7 °F (36 5 °C) (03/15/18 0700)  Temp Source: Oral (03/15/18 0700)  Respirations: 18 (03/15/18 0700)  Height: 5' 9" (175 3 cm) (03/08/18 1426)  Weight - Scale: 52 1 kg (114 lb 13 8 oz) (03/15/18 0458)  SpO2: 99 % (03/15/18 0700)  Exam:   Physical Exam   Constitutional: He is oriented to person, place, and time  No distress  Neck: Normal range of motion  Neck supple  No JVD present  No tracheal deviation present  No thyromegaly present  Cardiovascular: Normal rate, normal heart sounds and intact distal pulses  Exam reveals no gallop  No murmur heard  Pulmonary/Chest: No respiratory distress  He has no wheezes  He has no rales  He exhibits no tenderness  Abdominal: He exhibits no distension and no mass  There is no tenderness  There is no rebound and no guarding  Musculoskeletal: He exhibits no edema, tenderness or deformity  Lymphadenopathy:     He has no cervical adenopathy  Neurological: He is alert and oriented to person, place, and time  He has normal reflexes  No cranial nerve deficit  Skin: Skin is warm  He is not diaphoretic  Discussion with Family: family member at bed side    Discharge instructions/Information to patient and family:   See after visit summary for information provided to patient and family  Provisions for Follow-Up Care:  See after visit summary for information related to follow-up care and any pertinent home health orders  Planned Readmission: none     Discharge Statement:  I spent 30 minutes discharging the patient  This time was spent on the day of discharge  I had direct contact with the patient on the day of discharge  Greater than 50% of the total time was spent examining patient, answering all patient questions, arranging and discussing plan of care with patient as well as directly providing post-discharge instructions  Additional time then spent on discharge activities  Discharge Medications:  See after visit summary for reconciled discharge medications provided to patient and family        ** Please Note: This note has been constructed using a voice recognition system **

## 2018-03-15 NOTE — SOCIAL WORK
CM met with pt and family at bedside  Pt to be discharged to [facility] today via w/c  IMM reviewed and signed  Copy to pt and copy to MR for scanning

## 2018-03-15 NOTE — ASSESSMENT & PLAN NOTE
Improved   It was likely secondary to acute event, Status port ERCP with sphincterectomy and stenting removal of multiples stones

## 2018-03-15 NOTE — PLAN OF CARE
Problem: DISCHARGE PLANNING - CARE MANAGEMENT  Goal: Discharge to post-acute care or home with appropriate resources  INTERVENTIONS:  - Conduct assessment to determine patient/family and health care team treatment goals, and need for post-acute services based on payer coverage, community resources, and patient preferences, and barriers to discharge  - Address psychosocial, clinical, and financial barriers to discharge as identified in assessment in conjunction with the patient/family and health care team  - Arrange appropriate level of post-acute services according to patient's   needs and preference and payer coverage in collaboration with the physician and health care team  - Communicate with and update the patient/family, physician, and health care team regarding progress on the discharge plan  - Arrange appropriate transportation to post-acute venues   Outcome: Completed Date Met: 03/15/18  CM met with pt and family at bedside  Pt to be discharged to [facility] today via w/c  IMM reviewed and signed  Copy to pt and copy to MR for scanning

## 2018-03-15 NOTE — PLAN OF CARE
DISCHARGE PLANNING     Discharge to home or other facility with appropriate resources Adequate for Discharge        GASTROINTESTINAL - ADULT     Maintains adequate nutritional intake Adequate for Discharge        INFECTION - ADULT     Absence or prevention of progression during hospitalization Adequate for Discharge        Knowledge Deficit     Patient/family/caregiver demonstrates understanding of disease process, treatment plan, medications, and discharge instructions Adequate for Discharge        METABOLIC, FLUID AND ELECTROLYTES - ADULT     Electrolytes maintained within normal limits Adequate for Discharge     Fluid balance maintained Adequate for Discharge        NEUROSENSORY - ADULT     Achieves stable or improved neurological status Adequate for Discharge        Nutrition/Hydration-ADULT     Nutrient/Hydration intake appropriate for improving, restoring or maintaining nutritional needs Adequate for Discharge        PAIN - ADULT     Verbalizes/displays adequate comfort level or baseline comfort level Adequate for Discharge        Potential for Falls     Patient will remain free of falls Adequate for Discharge        Prexisting or High Potential for Compromised Skin Integrity     Skin integrity is maintained or improved Adequate for Discharge        SAFETY ADULT     Patient will remain free of falls Adequate for Discharge     Maintain or return to baseline ADL function Adequate for Discharge        SKIN/TISSUE INTEGRITY - ADULT     Skin integrity remains intact Adequate for Discharge

## 2018-03-16 LAB
ALBUMIN SERPL BCP-MCNC: 2.7 G/DL (ref 3.5–5)
ALP SERPL-CCNC: 209 U/L (ref 46–116)
ALT SERPL W P-5'-P-CCNC: 71 U/L (ref 12–78)
ANION GAP SERPL CALCULATED.3IONS-SCNC: 8 MMOL/L (ref 4–13)
AST SERPL W P-5'-P-CCNC: 31 U/L (ref 5–45)
BASOPHILS # BLD AUTO: 0.05 THOUSANDS/ΜL (ref 0–0.1)
BASOPHILS NFR BLD AUTO: 1 % (ref 0–1)
BILIRUB SERPL-MCNC: 1.3 MG/DL (ref 0.2–1)
BUN SERPL-MCNC: 13 MG/DL (ref 5–25)
CALCIUM SERPL-MCNC: 9.2 MG/DL (ref 8.3–10.1)
CHLORIDE SERPL-SCNC: 103 MMOL/L (ref 100–108)
CO2 SERPL-SCNC: 24 MMOL/L (ref 21–32)
CREAT SERPL-MCNC: 0.83 MG/DL (ref 0.6–1.3)
EOSINOPHIL # BLD AUTO: 0.04 THOUSAND/ΜL (ref 0–0.61)
EOSINOPHIL NFR BLD AUTO: 1 % (ref 0–6)
ERYTHROCYTE [DISTWIDTH] IN BLOOD BY AUTOMATED COUNT: 14.6 % (ref 11.6–15.1)
GFR SERPL CREATININE-BSD FRML MDRD: 86 ML/MIN/1.73SQ M
GLUCOSE SERPL-MCNC: 104 MG/DL (ref 65–140)
HCT VFR BLD AUTO: 39.3 % (ref 36.5–49.3)
HGB BLD-MCNC: 13.5 G/DL (ref 12–17)
LYMPHOCYTES # BLD AUTO: 1.15 THOUSANDS/ΜL (ref 0.6–4.47)
LYMPHOCYTES NFR BLD AUTO: 24 % (ref 14–44)
MCH RBC QN AUTO: 32.5 PG (ref 26.8–34.3)
MCHC RBC AUTO-ENTMCNC: 34.4 G/DL (ref 31.4–37.4)
MCV RBC AUTO: 95 FL (ref 82–98)
MONOCYTES # BLD AUTO: 0.41 THOUSAND/ΜL (ref 0.17–1.22)
MONOCYTES NFR BLD AUTO: 9 % (ref 4–12)
NEUTROPHILS # BLD AUTO: 2.89 THOUSANDS/ΜL (ref 1.85–7.62)
NEUTS SEG NFR BLD AUTO: 60 % (ref 43–75)
NRBC BLD AUTO-RTO: 0 /100 WBCS
PLATELET # BLD AUTO: 238 THOUSANDS/UL (ref 149–390)
PMV BLD AUTO: 9.9 FL (ref 8.9–12.7)
POTASSIUM SERPL-SCNC: 4.5 MMOL/L (ref 3.5–5.3)
PREALB SERPL-MCNC: 27.6 MG/DL (ref 18–40)
PROT SERPL-MCNC: 6.2 G/DL (ref 6.4–8.2)
RBC # BLD AUTO: 4.15 MILLION/UL (ref 3.88–5.62)
SODIUM SERPL-SCNC: 135 MMOL/L (ref 136–145)
WBC # BLD AUTO: 4.8 THOUSAND/UL (ref 4.31–10.16)

## 2018-03-16 PROCEDURE — 84134 ASSAY OF PREALBUMIN: CPT | Performed by: PHYSICAL MEDICINE & REHABILITATION

## 2018-03-16 PROCEDURE — 80053 COMPREHEN METABOLIC PANEL: CPT | Performed by: PHYSICAL MEDICINE & REHABILITATION

## 2018-03-16 PROCEDURE — 85025 COMPLETE CBC W/AUTO DIFF WBC: CPT | Performed by: PHYSICAL MEDICINE & REHABILITATION

## 2018-03-19 LAB
ANION GAP SERPL CALCULATED.3IONS-SCNC: 9 MMOL/L (ref 4–13)
BASOPHILS # BLD AUTO: 0.05 THOUSANDS/ΜL (ref 0–0.1)
BASOPHILS NFR BLD AUTO: 1 % (ref 0–1)
BUN SERPL-MCNC: 17 MG/DL (ref 5–25)
CALCIUM SERPL-MCNC: 9.5 MG/DL (ref 8.3–10.1)
CHLORIDE SERPL-SCNC: 102 MMOL/L (ref 100–108)
CO2 SERPL-SCNC: 26 MMOL/L (ref 21–32)
CREAT SERPL-MCNC: 0.85 MG/DL (ref 0.6–1.3)
EOSINOPHIL # BLD AUTO: 0.05 THOUSAND/ΜL (ref 0–0.61)
EOSINOPHIL NFR BLD AUTO: 1 % (ref 0–6)
ERYTHROCYTE [DISTWIDTH] IN BLOOD BY AUTOMATED COUNT: 15.1 % (ref 11.6–15.1)
GFR SERPL CREATININE-BSD FRML MDRD: 85 ML/MIN/1.73SQ M
GLUCOSE P FAST SERPL-MCNC: 103 MG/DL (ref 65–99)
GLUCOSE SERPL-MCNC: 103 MG/DL (ref 65–140)
HCT VFR BLD AUTO: 39.3 % (ref 36.5–49.3)
HGB BLD-MCNC: 13.1 G/DL (ref 12–17)
LYMPHOCYTES # BLD AUTO: 1.17 THOUSANDS/ΜL (ref 0.6–4.47)
LYMPHOCYTES NFR BLD AUTO: 18 % (ref 14–44)
MCH RBC QN AUTO: 32 PG (ref 26.8–34.3)
MCHC RBC AUTO-ENTMCNC: 33.3 G/DL (ref 31.4–37.4)
MCV RBC AUTO: 96 FL (ref 82–98)
MONOCYTES # BLD AUTO: 0.48 THOUSAND/ΜL (ref 0.17–1.22)
MONOCYTES NFR BLD AUTO: 8 % (ref 4–12)
NEUTROPHILS # BLD AUTO: 4.55 THOUSANDS/ΜL (ref 1.85–7.62)
NEUTS SEG NFR BLD AUTO: 71 % (ref 43–75)
NRBC BLD AUTO-RTO: 0 /100 WBCS
PLATELET # BLD AUTO: 277 THOUSANDS/UL (ref 149–390)
PMV BLD AUTO: 9.5 FL (ref 8.9–12.7)
POTASSIUM SERPL-SCNC: 4.6 MMOL/L (ref 3.5–5.3)
RBC # BLD AUTO: 4.09 MILLION/UL (ref 3.88–5.62)
SODIUM SERPL-SCNC: 137 MMOL/L (ref 136–145)
WBC # BLD AUTO: 6.4 THOUSAND/UL (ref 4.31–10.16)

## 2018-03-19 PROCEDURE — 85025 COMPLETE CBC W/AUTO DIFF WBC: CPT | Performed by: PHYSICAL MEDICINE & REHABILITATION

## 2018-03-19 PROCEDURE — 80048 BASIC METABOLIC PNL TOTAL CA: CPT | Performed by: PHYSICAL MEDICINE & REHABILITATION

## 2018-03-26 LAB
ANION GAP SERPL CALCULATED.3IONS-SCNC: 9 MMOL/L (ref 4–13)
BASOPHILS # BLD AUTO: 0.07 THOUSANDS/ΜL (ref 0–0.1)
BASOPHILS NFR BLD AUTO: 1 % (ref 0–1)
BUN SERPL-MCNC: 17 MG/DL (ref 5–25)
CALCIUM SERPL-MCNC: 8.9 MG/DL (ref 8.3–10.1)
CHLORIDE SERPL-SCNC: 104 MMOL/L (ref 100–108)
CO2 SERPL-SCNC: 25 MMOL/L (ref 21–32)
CREAT SERPL-MCNC: 0.82 MG/DL (ref 0.6–1.3)
EOSINOPHIL # BLD AUTO: 0.05 THOUSAND/ΜL (ref 0–0.61)
EOSINOPHIL NFR BLD AUTO: 1 % (ref 0–6)
ERYTHROCYTE [DISTWIDTH] IN BLOOD BY AUTOMATED COUNT: 14.9 % (ref 11.6–15.1)
GFR SERPL CREATININE-BSD FRML MDRD: 87 ML/MIN/1.73SQ M
GLUCOSE P FAST SERPL-MCNC: 103 MG/DL (ref 65–99)
GLUCOSE SERPL-MCNC: 103 MG/DL (ref 65–140)
HCT VFR BLD AUTO: 39 % (ref 36.5–49.3)
HGB BLD-MCNC: 13 G/DL (ref 12–17)
LYMPHOCYTES # BLD AUTO: 1.2 THOUSANDS/ΜL (ref 0.6–4.47)
LYMPHOCYTES NFR BLD AUTO: 22 % (ref 14–44)
MCH RBC QN AUTO: 32 PG (ref 26.8–34.3)
MCHC RBC AUTO-ENTMCNC: 33.3 G/DL (ref 31.4–37.4)
MCV RBC AUTO: 96 FL (ref 82–98)
MONOCYTES # BLD AUTO: 0.53 THOUSAND/ΜL (ref 0.17–1.22)
MONOCYTES NFR BLD AUTO: 10 % (ref 4–12)
NEUTROPHILS # BLD AUTO: 3.65 THOUSANDS/ΜL (ref 1.85–7.62)
NEUTS SEG NFR BLD AUTO: 66 % (ref 43–75)
NRBC BLD AUTO-RTO: 0 /100 WBCS
PLATELET # BLD AUTO: 268 THOUSANDS/UL (ref 149–390)
PMV BLD AUTO: 9.1 FL (ref 8.9–12.7)
POTASSIUM SERPL-SCNC: 4.2 MMOL/L (ref 3.5–5.3)
RBC # BLD AUTO: 4.06 MILLION/UL (ref 3.88–5.62)
SODIUM SERPL-SCNC: 138 MMOL/L (ref 136–145)
WBC # BLD AUTO: 5.54 THOUSAND/UL (ref 4.31–10.16)

## 2018-03-26 PROCEDURE — 80048 BASIC METABOLIC PNL TOTAL CA: CPT | Performed by: PHYSICAL MEDICINE & REHABILITATION

## 2018-03-26 PROCEDURE — 85025 COMPLETE CBC W/AUTO DIFF WBC: CPT | Performed by: PHYSICAL MEDICINE & REHABILITATION

## 2018-03-29 ENCOUNTER — TELEPHONE (OUTPATIENT)
Dept: GASTROENTEROLOGY | Facility: CLINIC | Age: 78
End: 2018-03-29

## 2018-03-29 PROBLEM — K83.1 COMMON BILE DUCT OBSTRUCTION: Status: ACTIVE | Noted: 2018-03-29

## 2018-04-06 ENCOUNTER — OFFICE VISIT (OUTPATIENT)
Dept: INTERNAL MEDICINE CLINIC | Facility: CLINIC | Age: 78
End: 2018-04-06
Payer: MEDICARE

## 2018-04-06 ENCOUNTER — APPOINTMENT (OUTPATIENT)
Dept: LAB | Facility: HOSPITAL | Age: 78
End: 2018-04-06
Payer: MEDICARE

## 2018-04-06 ENCOUNTER — APPOINTMENT (OUTPATIENT)
Dept: LAB | Facility: CLINIC | Age: 78
DRG: 314 | End: 2018-04-06
Payer: MEDICARE

## 2018-04-06 VITALS
BODY MASS INDEX: 19.7 KG/M2 | HEART RATE: 88 BPM | DIASTOLIC BLOOD PRESSURE: 92 MMHG | HEIGHT: 69 IN | WEIGHT: 133 LBS | SYSTOLIC BLOOD PRESSURE: 146 MMHG | OXYGEN SATURATION: 98 %

## 2018-04-06 DIAGNOSIS — F10.20 ALCOHOLISM (HCC): ICD-10-CM

## 2018-04-06 DIAGNOSIS — Z71.3 WEIGHT LOSS COUNSELING, ENCOUNTER FOR: ICD-10-CM

## 2018-04-06 DIAGNOSIS — F02.81 DEMENTIA ASSOCIATED WITH OTHER UNDERLYING DISEASE WITH BEHAVIORAL DISTURBANCE (HCC): ICD-10-CM

## 2018-04-06 DIAGNOSIS — R41.3 MEMORY DIFFICULTY: ICD-10-CM

## 2018-04-06 DIAGNOSIS — K83.1 COMMON BILE DUCT OBSTRUCTION: Primary | ICD-10-CM

## 2018-04-06 PROBLEM — F02.818 DEMENTIA ASSOCIATED WITH OTHER UNDERLYING DISEASE WITH BEHAVIORAL DISTURBANCE: Status: ACTIVE | Noted: 2018-04-06

## 2018-04-06 LAB
ALBUMIN SERPL BCP-MCNC: 3.5 G/DL (ref 3.5–5)
ALP SERPL-CCNC: 111 U/L (ref 46–116)
ALT SERPL W P-5'-P-CCNC: 46 U/L (ref 12–78)
AMMONIA PLAS-SCNC: 11 UMOL/L (ref 11–35)
ANION GAP SERPL CALCULATED.3IONS-SCNC: 11 MMOL/L (ref 4–13)
AST SERPL W P-5'-P-CCNC: 19 U/L (ref 5–45)
BASOPHILS # BLD AUTO: 0.05 THOUSANDS/ΜL (ref 0–0.1)
BASOPHILS NFR BLD AUTO: 1 % (ref 0–1)
BILIRUB SERPL-MCNC: 0.9 MG/DL (ref 0.2–1)
BUN SERPL-MCNC: 13 MG/DL (ref 5–25)
CALCIUM SERPL-MCNC: 9.3 MG/DL (ref 8.3–10.1)
CHLORIDE SERPL-SCNC: 102 MMOL/L (ref 100–108)
CO2 SERPL-SCNC: 26 MMOL/L (ref 21–32)
CREAT SERPL-MCNC: 0.88 MG/DL (ref 0.6–1.3)
EOSINOPHIL # BLD AUTO: 0.02 THOUSAND/ΜL (ref 0–0.61)
EOSINOPHIL NFR BLD AUTO: 0 % (ref 0–6)
ERYTHROCYTE [DISTWIDTH] IN BLOOD BY AUTOMATED COUNT: 14.3 % (ref 11.6–15.1)
ERYTHROCYTE [SEDIMENTATION RATE] IN BLOOD: 27 MM/HOUR (ref 0–10)
GFR SERPL CREATININE-BSD FRML MDRD: 84 ML/MIN/1.73SQ M
GLUCOSE SERPL-MCNC: 116 MG/DL (ref 65–140)
HCT VFR BLD AUTO: 43.3 % (ref 36.5–49.3)
HGB BLD-MCNC: 14.3 G/DL (ref 12–17)
LYMPHOCYTES # BLD AUTO: 1.08 THOUSANDS/ΜL (ref 0.6–4.47)
LYMPHOCYTES NFR BLD AUTO: 20 % (ref 14–44)
MCH RBC QN AUTO: 31.7 PG (ref 26.8–34.3)
MCHC RBC AUTO-ENTMCNC: 33 G/DL (ref 31.4–37.4)
MCV RBC AUTO: 96 FL (ref 82–98)
MONOCYTES # BLD AUTO: 0.38 THOUSAND/ΜL (ref 0.17–1.22)
MONOCYTES NFR BLD AUTO: 7 % (ref 4–12)
NEUTROPHILS # BLD AUTO: 3.97 THOUSANDS/ΜL (ref 1.85–7.62)
NEUTS SEG NFR BLD AUTO: 72 % (ref 43–75)
NRBC BLD AUTO-RTO: 0 /100 WBCS
PLATELET # BLD AUTO: 219 THOUSANDS/UL (ref 149–390)
PMV BLD AUTO: 9.4 FL (ref 8.9–12.7)
POTASSIUM SERPL-SCNC: 4.2 MMOL/L (ref 3.5–5.3)
PREALB SERPL-MCNC: 37 MG/DL (ref 18–40)
PROT SERPL-MCNC: 7.2 G/DL (ref 6.4–8.2)
RBC # BLD AUTO: 4.51 MILLION/UL (ref 3.88–5.62)
SODIUM SERPL-SCNC: 139 MMOL/L (ref 136–145)
T4 FREE SERPL-MCNC: 0.86 NG/DL (ref 0.76–1.46)
TSH SERPL DL<=0.05 MIU/L-ACNC: 1.61 UIU/ML (ref 0.36–3.74)
WBC # BLD AUTO: 5.54 THOUSAND/UL (ref 4.31–10.16)

## 2018-04-06 PROCEDURE — 36415 COLL VENOUS BLD VENIPUNCTURE: CPT

## 2018-04-06 PROCEDURE — 80053 COMPREHEN METABOLIC PANEL: CPT

## 2018-04-06 PROCEDURE — 82140 ASSAY OF AMMONIA: CPT

## 2018-04-06 PROCEDURE — 86803 HEPATITIS C AB TEST: CPT

## 2018-04-06 PROCEDURE — 84443 ASSAY THYROID STIM HORMONE: CPT

## 2018-04-06 PROCEDURE — 85025 COMPLETE CBC W/AUTO DIFF WBC: CPT

## 2018-04-06 PROCEDURE — 84439 ASSAY OF FREE THYROXINE: CPT

## 2018-04-06 PROCEDURE — 99205 OFFICE O/P NEW HI 60 MIN: CPT | Performed by: INTERNAL MEDICINE

## 2018-04-06 PROCEDURE — 85652 RBC SED RATE AUTOMATED: CPT

## 2018-04-06 PROCEDURE — 84134 ASSAY OF PREALBUMIN: CPT
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
## 2018-04-07 LAB — HCV AB SER QL: NORMAL

## 2018-04-12 ENCOUNTER — ANESTHESIA EVENT (OUTPATIENT)
Dept: PERIOP | Facility: HOSPITAL | Age: 78
DRG: 314 | End: 2018-04-12
Payer: MEDICARE

## 2018-04-13 ENCOUNTER — HOSPITAL ENCOUNTER (INPATIENT)
Facility: HOSPITAL | Age: 78
LOS: 3 days | Discharge: HOME HEALTH | DRG: 314 | End: 2018-04-16
Attending: INTERNAL MEDICINE | Admitting: INTERNAL MEDICINE
Payer: MEDICARE

## 2018-04-13 ENCOUNTER — APPOINTMENT (OUTPATIENT)
Dept: RADIOLOGY | Facility: HOSPITAL | Age: 78
DRG: 314 | End: 2018-04-13
Payer: MEDICARE

## 2018-04-13 ENCOUNTER — APPOINTMENT (INPATIENT)
Dept: NON INVASIVE DIAGNOSTICS | Facility: HOSPITAL | Age: 78
DRG: 314 | End: 2018-04-13
Attending: ANESTHESIOLOGY
Payer: MEDICARE

## 2018-04-13 ENCOUNTER — APPOINTMENT (OUTPATIENT)
Dept: CT IMAGING | Facility: HOSPITAL | Age: 78
DRG: 314 | End: 2018-04-13
Payer: MEDICARE

## 2018-04-13 ENCOUNTER — ANESTHESIA (OUTPATIENT)
Dept: PERIOP | Facility: HOSPITAL | Age: 78
DRG: 314 | End: 2018-04-13
Payer: MEDICARE

## 2018-04-13 DIAGNOSIS — J96.01 ACUTE HYPOXEMIC RESPIRATORY FAILURE (HCC): ICD-10-CM

## 2018-04-13 DIAGNOSIS — Z72.0 TOBACCO ABUSE: ICD-10-CM

## 2018-04-13 DIAGNOSIS — R31.0 HEMATURIA, GROSS: ICD-10-CM

## 2018-04-13 DIAGNOSIS — J18.9 HCAP (HEALTHCARE-ASSOCIATED PNEUMONIA): ICD-10-CM

## 2018-04-13 DIAGNOSIS — I46.9 CARDIAC ARREST (HCC): ICD-10-CM

## 2018-04-13 DIAGNOSIS — R74.01 TRANSAMINITIS: Primary | ICD-10-CM

## 2018-04-13 DIAGNOSIS — K83.1 COMMON BILE DUCT (CBD) OBSTRUCTION: ICD-10-CM

## 2018-04-13 LAB
ALBUMIN SERPL BCP-MCNC: 3.2 G/DL (ref 3.5–5)
ALP SERPL-CCNC: 116 U/L (ref 46–116)
ALT SERPL W P-5'-P-CCNC: 395 U/L (ref 12–78)
ANION GAP SERPL CALCULATED.3IONS-SCNC: 9 MMOL/L (ref 4–13)
APTT PPP: 27 SECONDS (ref 23–35)
AST SERPL W P-5'-P-CCNC: 350 U/L (ref 5–45)
BASE EXCESS BLDA CALC-SCNC: -5 MMOL/L (ref -2–3)
BASE EXCESS BLDA CALC-SCNC: -5.3 MMOL/L
BILIRUB SERPL-MCNC: 1.2 MG/DL (ref 0.2–1)
BUN SERPL-MCNC: 19 MG/DL (ref 5–25)
CA-I BLD-SCNC: 1.12 MMOL/L (ref 1.12–1.32)
CA-I BLD-SCNC: 1.25 MMOL/L (ref 1.12–1.32)
CALCIUM SERPL-MCNC: 8.7 MG/DL
CHLORIDE SERPL-SCNC: 104 MMOL/L (ref 100–108)
CO2 SERPL-SCNC: 23 MMOL/L (ref 21–32)
CREAT SERPL-MCNC: 0.65 MG/DL (ref 0.6–1.3)
ERYTHROCYTE [DISTWIDTH] IN BLOOD BY AUTOMATED COUNT: 14.2 % (ref 11.6–15.1)
GFR SERPL CREATININE-BSD FRML MDRD: 95 ML/MIN/1.73SQ M
GLUCOSE SERPL-MCNC: 173 MG/DL (ref 65–140)
GLUCOSE SERPL-MCNC: 239 MG/DL (ref 65–140)
HCO3 BLDA-SCNC: 19.6 MMOL/L (ref 22–28)
HCO3 BLDA-SCNC: 22.4 MMOL/L (ref 22–28)
HCT VFR BLD AUTO: 41.8 % (ref 36.5–49.3)
HCT VFR BLD AUTO: 42.9 % (ref 36.5–49.3)
HCT VFR BLD CALC: 41 % (ref 36.5–49.3)
HGB BLD-MCNC: 14.1 G/DL (ref 12–17)
HGB BLD-MCNC: 14.5 G/DL (ref 12–17)
HGB BLDA-MCNC: 13.9 G/DL (ref 12–17)
HOROWITZ INDEX BLDA+IHG-RTO: 50 MM[HG]
INR PPP: 1.07 (ref 0.86–1.16)
LACTATE SERPL-SCNC: 0.9 MMOL/L (ref 0.5–2)
MAGNESIUM SERPL-MCNC: 2.1 MG/DL (ref 1.6–2.6)
MCH RBC QN AUTO: 32.2 PG (ref 26.8–34.3)
MCHC RBC AUTO-ENTMCNC: 33.8 G/DL (ref 31.4–37.4)
MCV RBC AUTO: 95 FL (ref 82–98)
O2 CT BLDA-SCNC: 20.5 ML/DL (ref 16–23)
OXYHGB MFR BLDA: 94.9 % (ref 94–97)
PCO2 BLD: 24 MMOL/L (ref 21–32)
PCO2 BLD: 47.4 MM HG (ref 36–44)
PCO2 BLDA: 36.3 MM HG (ref 36–44)
PEEP RESPIRATORY: 5 CM[H2O]
PH BLD: 7.28 [PH] (ref 7.35–7.45)
PH BLDA: 7.35 [PH] (ref 7.35–7.45)
PHOSPHATE SERPL-MCNC: 3.9 MG/DL (ref 2.3–4.1)
PLATELET # BLD AUTO: 200 THOUSANDS/UL (ref 149–390)
PMV BLD AUTO: 9.5 FL (ref 8.9–12.7)
PO2 BLD: 348 MM HG (ref 75–129)
PO2 BLDA: 91.4 MM HG (ref 75–129)
POTASSIUM BLD-SCNC: 3.5 MMOL/L (ref 3.5–5.3)
POTASSIUM SERPL-SCNC: 3.8 MMOL/L (ref 3.5–5.3)
PROT SERPL-MCNC: 6.4 G/DL (ref 6.4–8.2)
PROTHROMBIN TIME: 14.1 SECONDS (ref 12.1–14.4)
RBC # BLD AUTO: 4.51 MILLION/UL (ref 3.88–5.62)
SAO2 % BLD FROM PO2: 100 % (ref 95–98)
SODIUM BLD-SCNC: 139 MMOL/L (ref 136–145)
SODIUM SERPL-SCNC: 136 MMOL/L (ref 136–145)
SPECIMEN SOURCE: ABNORMAL
SPECIMEN SOURCE: ABNORMAL
TROPONIN I SERPL-MCNC: 0.15 NG/ML
TSH SERPL DL<=0.05 MIU/L-ACNC: 1.94 UIU/ML (ref 0.36–3.74)
VENT AC: 14
VENT- AC: AC
VT SETTING VENT: 450 ML
WBC # BLD AUTO: 17.07 THOUSAND/UL (ref 4.31–10.16)

## 2018-04-13 PROCEDURE — 93312 ECHO TRANSESOPHAGEAL: CPT

## 2018-04-13 PROCEDURE — 99222 1ST HOSP IP/OBS MODERATE 55: CPT | Performed by: INTERNAL MEDICINE

## 2018-04-13 PROCEDURE — 5A1935Z RESPIRATORY VENTILATION, LESS THAN 24 CONSECUTIVE HOURS: ICD-10-PCS | Performed by: INTERNAL MEDICINE

## 2018-04-13 PROCEDURE — 0F798DZ DILATION OF COMMON BILE DUCT WITH INTRALUMINAL DEVICE, VIA NATURAL OR ARTIFICIAL OPENING ENDOSCOPIC: ICD-10-PCS | Performed by: INTERNAL MEDICINE

## 2018-04-13 PROCEDURE — 82330 ASSAY OF CALCIUM: CPT

## 2018-04-13 PROCEDURE — 82947 ASSAY GLUCOSE BLOOD QUANT: CPT

## 2018-04-13 PROCEDURE — 74177 CT ABD & PELVIS W/CONTRAST: CPT

## 2018-04-13 PROCEDURE — 82803 BLOOD GASES ANY COMBINATION: CPT

## 2018-04-13 PROCEDURE — C2617 STENT, NON-COR, TEM W/O DEL: HCPCS | Performed by: INTERNAL MEDICINE

## 2018-04-13 PROCEDURE — 84484 ASSAY OF TROPONIN QUANT: CPT | Performed by: NURSE PRACTITIONER

## 2018-04-13 PROCEDURE — C1726 CATH, BAL DIL, NON-VASCULAR: HCPCS | Performed by: INTERNAL MEDICINE

## 2018-04-13 PROCEDURE — 84295 ASSAY OF SERUM SODIUM: CPT

## 2018-04-13 PROCEDURE — C1769 GUIDE WIRE: HCPCS | Performed by: INTERNAL MEDICINE

## 2018-04-13 PROCEDURE — 85018 HEMOGLOBIN: CPT | Performed by: NURSE PRACTITIONER

## 2018-04-13 PROCEDURE — 83735 ASSAY OF MAGNESIUM: CPT | Performed by: NURSE PRACTITIONER

## 2018-04-13 PROCEDURE — 0T2BX0Z CHANGE DRAINAGE DEVICE IN BLADDER, EXTERNAL APPROACH: ICD-10-PCS | Performed by: INTERNAL MEDICINE

## 2018-04-13 PROCEDURE — 71275 CT ANGIOGRAPHY CHEST: CPT

## 2018-04-13 PROCEDURE — 43276 ERCP STENT EXCHANGE W/DILATE: CPT | Performed by: INTERNAL MEDICINE

## 2018-04-13 PROCEDURE — 85730 THROMBOPLASTIN TIME PARTIAL: CPT | Performed by: NURSE PRACTITIONER

## 2018-04-13 PROCEDURE — 84132 ASSAY OF SERUM POTASSIUM: CPT

## 2018-04-13 PROCEDURE — 84100 ASSAY OF PHOSPHORUS: CPT | Performed by: NURSE PRACTITIONER

## 2018-04-13 PROCEDURE — 82330 ASSAY OF CALCIUM: CPT | Performed by: NURSE PRACTITIONER

## 2018-04-13 PROCEDURE — 43273 ENDOSCOPIC PANCREATOSCOPY: CPT | Performed by: INTERNAL MEDICINE

## 2018-04-13 PROCEDURE — 43264 ERCP REMOVE DUCT CALCULI: CPT | Performed by: INTERNAL MEDICINE

## 2018-04-13 PROCEDURE — 83605 ASSAY OF LACTIC ACID: CPT | Performed by: NURSE PRACTITIONER

## 2018-04-13 PROCEDURE — 0F7C8ZZ DILATION OF AMPULLA OF VATER, VIA NATURAL OR ARTIFICIAL OPENING ENDOSCOPIC: ICD-10-PCS | Performed by: INTERNAL MEDICINE

## 2018-04-13 PROCEDURE — 84443 ASSAY THYROID STIM HORMONE: CPT | Performed by: NURSE PRACTITIONER

## 2018-04-13 PROCEDURE — 02HV33Z INSERTION OF INFUSION DEVICE INTO SUPERIOR VENA CAVA, PERCUTANEOUS APPROACH: ICD-10-PCS | Performed by: ANESTHESIOLOGY

## 2018-04-13 PROCEDURE — 82805 BLOOD GASES W/O2 SATURATION: CPT | Performed by: NURSE PRACTITIONER

## 2018-04-13 PROCEDURE — 94002 VENT MGMT INPAT INIT DAY: CPT

## 2018-04-13 PROCEDURE — 80053 COMPREHEN METABOLIC PANEL: CPT | Performed by: NURSE PRACTITIONER

## 2018-04-13 PROCEDURE — 85027 COMPLETE CBC AUTOMATED: CPT | Performed by: NURSE PRACTITIONER

## 2018-04-13 PROCEDURE — BF101ZZ FLUOROSCOPY OF BILE DUCTS USING LOW OSMOLAR CONTRAST: ICD-10-PCS | Performed by: INTERNAL MEDICINE

## 2018-04-13 PROCEDURE — 0FC98ZZ EXTIRPATION OF MATTER FROM COMMON BILE DUCT, VIA NATURAL OR ARTIFICIAL OPENING ENDOSCOPIC: ICD-10-PCS | Performed by: INTERNAL MEDICINE

## 2018-04-13 PROCEDURE — 85610 PROTHROMBIN TIME: CPT | Performed by: NURSE PRACTITIONER

## 2018-04-13 PROCEDURE — 5A12012 PERFORMANCE OF CARDIAC OUTPUT, SINGLE, MANUAL: ICD-10-PCS | Performed by: INTERNAL MEDICINE

## 2018-04-13 PROCEDURE — 85014 HEMATOCRIT: CPT | Performed by: NURSE PRACTITIONER

## 2018-04-13 PROCEDURE — C9113 INJ PANTOPRAZOLE SODIUM, VIA: HCPCS | Performed by: NURSE PRACTITIONER

## 2018-04-13 PROCEDURE — 0FPB8DZ REMOVAL OF INTRALUMINAL DEVICE FROM HEPATOBILIARY DUCT, VIA NATURAL OR ARTIFICIAL OPENING ENDOSCOPIC: ICD-10-PCS | Performed by: INTERNAL MEDICINE

## 2018-04-13 PROCEDURE — 74328 X-RAY BILE DUCT ENDOSCOPY: CPT

## 2018-04-13 PROCEDURE — 85014 HEMATOCRIT: CPT

## 2018-04-13 PROCEDURE — 94760 N-INVAS EAR/PLS OXIMETRY 1: CPT

## 2018-04-13 PROCEDURE — 03HY32Z INSERTION OF MONITORING DEVICE INTO UPPER ARTERY, PERCUTANEOUS APPROACH: ICD-10-PCS | Performed by: ANESTHESIOLOGY

## 2018-04-13 DEVICE — BILIARY STENT
Type: IMPLANTABLE DEVICE | Site: ESOPHAGUS | Status: FUNCTIONAL
Brand: ADVANIX™ BILIARY

## 2018-04-13 RX ORDER — SODIUM CHLORIDE, SODIUM LACTATE, POTASSIUM CHLORIDE, CALCIUM CHLORIDE 600; 310; 30; 20 MG/100ML; MG/100ML; MG/100ML; MG/100ML
100 INJECTION, SOLUTION INTRAVENOUS CONTINUOUS
Status: DISCONTINUED | OUTPATIENT
Start: 2018-04-13 | End: 2018-04-13

## 2018-04-13 RX ORDER — PROPOFOL 10 MG/ML
5-65 INJECTION, EMULSION INTRAVENOUS
Status: DISCONTINUED | OUTPATIENT
Start: 2018-04-13 | End: 2018-04-14

## 2018-04-13 RX ORDER — METOCLOPRAMIDE HYDROCHLORIDE 5 MG/ML
10 INJECTION INTRAMUSCULAR; INTRAVENOUS ONCE AS NEEDED
Status: DISCONTINUED | OUTPATIENT
Start: 2018-04-13 | End: 2018-04-13

## 2018-04-13 RX ORDER — PROMETHAZINE HYDROCHLORIDE 25 MG/ML
12.5 INJECTION, SOLUTION INTRAMUSCULAR; INTRAVENOUS ONCE AS NEEDED
Status: DISCONTINUED | OUTPATIENT
Start: 2018-04-13 | End: 2018-04-13

## 2018-04-13 RX ORDER — LIDOCAINE HYDROCHLORIDE 10 MG/ML
INJECTION, SOLUTION INFILTRATION; PERINEURAL AS NEEDED
Status: DISCONTINUED | OUTPATIENT
Start: 2018-04-13 | End: 2018-04-13 | Stop reason: SURG

## 2018-04-13 RX ORDER — SODIUM CHLORIDE, SODIUM LACTATE, POTASSIUM CHLORIDE, CALCIUM CHLORIDE 600; 310; 30; 20 MG/100ML; MG/100ML; MG/100ML; MG/100ML
125 INJECTION, SOLUTION INTRAVENOUS CONTINUOUS
Status: DISCONTINUED | OUTPATIENT
Start: 2018-04-13 | End: 2018-04-13

## 2018-04-13 RX ORDER — PROPOFOL 10 MG/ML
INJECTION, EMULSION INTRAVENOUS AS NEEDED
Status: DISCONTINUED | OUTPATIENT
Start: 2018-04-13 | End: 2018-04-13 | Stop reason: SURG

## 2018-04-13 RX ORDER — FENTANYL CITRATE 50 UG/ML
50 INJECTION, SOLUTION INTRAMUSCULAR; INTRAVENOUS EVERY 2 HOUR PRN
Status: DISCONTINUED | OUTPATIENT
Start: 2018-04-13 | End: 2018-04-14

## 2018-04-13 RX ORDER — ONDANSETRON 2 MG/ML
4 INJECTION INTRAMUSCULAR; INTRAVENOUS ONCE AS NEEDED
Status: DISCONTINUED | OUTPATIENT
Start: 2018-04-13 | End: 2018-04-14

## 2018-04-13 RX ORDER — PANTOPRAZOLE SODIUM 40 MG/1
40 INJECTION, POWDER, FOR SOLUTION INTRAVENOUS
Status: DISCONTINUED | OUTPATIENT
Start: 2018-04-13 | End: 2018-04-16 | Stop reason: HOSPADM

## 2018-04-13 RX ORDER — FENTANYL CITRATE 50 UG/ML
INJECTION, SOLUTION INTRAMUSCULAR; INTRAVENOUS AS NEEDED
Status: DISCONTINUED | OUTPATIENT
Start: 2018-04-13 | End: 2018-04-13 | Stop reason: SURG

## 2018-04-13 RX ORDER — SODIUM CHLORIDE, SODIUM GLUCONATE, SODIUM ACETATE, POTASSIUM CHLORIDE, MAGNESIUM CHLORIDE, SODIUM PHOSPHATE, DIBASIC, AND POTASSIUM PHOSPHATE .53; .5; .37; .037; .03; .012; .00082 G/100ML; G/100ML; G/100ML; G/100ML; G/100ML; G/100ML; G/100ML
100 INJECTION, SOLUTION INTRAVENOUS CONTINUOUS
Status: DISCONTINUED | OUTPATIENT
Start: 2018-04-13 | End: 2018-04-14

## 2018-04-13 RX ORDER — FENTANYL CITRATE/PF 50 MCG/ML
25 SYRINGE (ML) INJECTION
Status: DISCONTINUED | OUTPATIENT
Start: 2018-04-13 | End: 2018-04-13

## 2018-04-13 RX ORDER — EPINEPHRINE 0.1 MG/ML
SYRINGE (ML) INJECTION CODE/TRAUMA/SEDATION MEDICATION
Status: COMPLETED | OUTPATIENT
Start: 2018-04-13 | End: 2018-04-13

## 2018-04-13 RX ORDER — HEPARIN SODIUM 5000 [USP'U]/ML
5000 INJECTION, SOLUTION INTRAVENOUS; SUBCUTANEOUS EVERY 8 HOURS SCHEDULED
Status: DISCONTINUED | OUTPATIENT
Start: 2018-04-13 | End: 2018-04-16 | Stop reason: HOSPADM

## 2018-04-13 RX ORDER — CHLORHEXIDINE GLUCONATE 0.12 MG/ML
15 RINSE ORAL EVERY 12 HOURS SCHEDULED
Status: DISCONTINUED | OUTPATIENT
Start: 2018-04-13 | End: 2018-04-16 | Stop reason: HOSPADM

## 2018-04-13 RX ADMIN — PIPERACILLIN SODIUM,TAZOBACTAM SODIUM 3.38 G: 3; .375 INJECTION, POWDER, FOR SOLUTION INTRAVENOUS at 17:22

## 2018-04-13 RX ADMIN — IOHEXOL 100 ML: 350 INJECTION, SOLUTION INTRAVENOUS at 15:12

## 2018-04-13 RX ADMIN — FENTANYL CITRATE 100 MCG: 50 INJECTION, SOLUTION INTRAMUSCULAR; INTRAVENOUS at 13:35

## 2018-04-13 RX ADMIN — PANTOPRAZOLE SODIUM 40 MG: 40 INJECTION, POWDER, FOR SOLUTION INTRAVENOUS at 16:19

## 2018-04-13 RX ADMIN — SODIUM CHLORIDE, SODIUM LACTATE, POTASSIUM CHLORIDE, AND CALCIUM CHLORIDE 125 ML/HR: .6; .31; .03; .02 INJECTION, SOLUTION INTRAVENOUS at 13:15

## 2018-04-13 RX ADMIN — PROPOFOL 150 MG: 10 INJECTION, EMULSION INTRAVENOUS at 13:35

## 2018-04-13 RX ADMIN — SODIUM CHLORIDE, SODIUM GLUCONATE, SODIUM ACETATE, POTASSIUM CHLORIDE, MAGNESIUM CHLORIDE, SODIUM PHOSPHATE, DIBASIC, AND POTASSIUM PHOSPHATE 100 ML/HR: .53; .5; .37; .037; .03; .012; .00082 INJECTION, SOLUTION INTRAVENOUS at 16:21

## 2018-04-13 RX ADMIN — HEPARIN SODIUM 5000 UNITS: 5000 INJECTION, SOLUTION INTRAVENOUS; SUBCUTANEOUS at 21:06

## 2018-04-13 RX ADMIN — PROPOFOL 65 MCG/KG/MIN: 10 INJECTION, EMULSION INTRAVENOUS at 17:22

## 2018-04-13 RX ADMIN — LIDOCAINE HYDROCHLORIDE 50 MG: 10 INJECTION, SOLUTION INFILTRATION; PERINEURAL at 13:35

## 2018-04-13 RX ADMIN — CHLORHEXIDINE GLUCONATE 15 ML: 1.2 RINSE ORAL at 21:05

## 2018-04-13 RX ADMIN — EPINEPHRINE 1 MG: 0.1 INJECTION, SOLUTION ENDOTRACHEAL; INTRACARDIAC; INTRAVENOUS at 14:27

## 2018-04-13 RX ADMIN — PIPERACILLIN SODIUM,TAZOBACTAM SODIUM 3.38 G: 3; .375 INJECTION, POWDER, FOR SOLUTION INTRAVENOUS at 22:35

## 2018-04-13 RX ADMIN — EPINEPHRINE 1 MG: 0.1 INJECTION, SOLUTION ENDOTRACHEAL; INTRACARDIAC; INTRAVENOUS at 14:23

## 2018-04-13 RX ADMIN — FENTANYL CITRATE 50 MCG: 50 INJECTION, SOLUTION INTRAMUSCULAR; INTRAVENOUS at 22:35

## 2018-04-13 RX ADMIN — EPINEPHRINE 1 MG: 0.1 INJECTION, SOLUTION ENDOTRACHEAL; INTRACARDIAC; INTRAVENOUS at 14:24

## 2018-04-13 RX ADMIN — PROPOFOL 65 MCG/KG/MIN: 10 INJECTION, EMULSION INTRAVENOUS at 17:37

## 2018-04-13 RX ADMIN — FENTANYL CITRATE 50 MCG: 50 INJECTION, SOLUTION INTRAMUSCULAR; INTRAVENOUS at 16:19

## 2018-04-13 RX ADMIN — PROPOFOL 70 MCG/KG/MIN: 10 INJECTION, EMULSION INTRAVENOUS at 22:05

## 2018-04-13 RX ADMIN — GLUCAGON HYDROCHLORIDE 0.5 MG: KIT at 14:01

## 2018-04-13 RX ADMIN — DEXAMETHASONE SODIUM PHOSPHATE 10 MG: 10 INJECTION INTRAMUSCULAR; INTRAVENOUS at 13:40

## 2018-04-13 NOTE — ANESTHESIA PROCEDURE NOTES
Procedure Performed: ROBERTO Anesthesia  Start Time: 4/13/2018 2:50 PM  End Time:        Preanesthesia Checklist    Patient identified, IV assessed, risks and benefits discussed, monitors and equipment assessed, procedure being performed at surgeon's request and anesthesia consent obtained  Procedure    Diagnostic Indications for ROBERTO:  hemodynamic monitoring  Type of ROBERTO: complete ROBERTO with interpretation  Images Saved: ultrasound permanent image saved  Physician Requesting Echo: Nieves Sanchez  Location performed: OR  Intubated  Bite block placed  Heart visualized  Insertion of ROBERTO Probe:  Easy  Probe Type:  Multiplane  Modalities:  Color flow mapping, pulse wave Doppler, continuous wave Doppler and 2D only  Echocardiographic and Doppler Measurements    PREPROCEDURE    LEFT VENTRICLE:  Systolic Function: normal  Ejection Fraction: 60%  Cavity size: normal  Regional Wall Motion Abnormalities: mild anterior hypokinesis  RIGHT VENTRICLE:  Systolic Function: normal   Cavity size normal  Hypertrophy present  AORTIC VALVE:  Leaflets: normal  Leaflet motions normal and normal  Stenosis: none  Regurgitation: mild  MITRAL VALVE:  Leaflets: normal  Leaflet Motions: normal  Regurgitation: moderate  Stenosis: none  TRICUSPID VALVE:  Leaflets: normal  Leaflet Motions: normal  Stenosis: none  Regurgitation: trace  PULMONIC VALVE:  Leaflets: normal  Regurgitation: none  Stenosis: none  ASCENDING AORTA:  Size:  normal  Dissection not present  AORTIC ARCH:  Size:  normal  dissection not present  Grade 3: atheroma protruding < 0 5 cm into lumen  DESCENDING AORTA:  Size: normal  Dissection not present  Grade 3: atheroma protruding < 0 5 cm into lumen          RIGHT ATRIUM:  Size:  normal  No spontaneous echo contrast     LEFT ATRIUM:  Size: normal  No spontaneous echo contrast     LEFT ATRIAL APPENDAGE:  Size: normal  No spontaneous echo contrast                 OTHER FINDINGS:  Pericardium:  normal  Pleural Effusion:  none          POSTPROCEDURE

## 2018-04-13 NOTE — RESPIRATORY THERAPY NOTE
patient received from [facility]  S/p cardiac arrest requiring intubation and mechanical ventilation to maintain adequate ventilation and oxygenation   patient being sedated without apparent respiratory distress  Plan: continue current support until further orders

## 2018-04-13 NOTE — ANESTHESIA PREPROCEDURE EVALUATION
Review of Systems/Medical History  Patient summary reviewed  Chart reviewed  No history of anesthetic complications     Cardiovascular  Negative cardio ROS Exercise tolerance: good,     Pulmonary  Smoker cigarette smoker  Cumulative Pack Years: 48,        GI/Hepatic  Negative GI/hepatic ROS          Negative  ROS        Endo/Other  Negative endo/other ROS      GYN  Negative gynecology ROS          Hematology  Negative hematology ROS      Musculoskeletal  Negative musculoskeletal ROS        Neurology      Comment: Dementia Psychology   Negative psychology ROS              Physical Exam    Airway    Mallampati score: II  TM Distance: >3 FB  Neck ROM: full     Dental   No notable dental hx     Cardiovascular  Comment: Negative ROS,     Pulmonary      Other Findings        Anesthesia Plan  ASA Score- 2     Anesthesia Type- general with ASA Monitors  Additional Monitors:   Airway Plan: ETT  Plan Factors- Patient instructed to abstain from smoking on day of procedure  Patient did not smoke on day of surgery  Induction- intravenous  Postoperative Plan- Plan for postoperative opioid use  Planned trial extubation    Informed Consent- Anesthetic plan and risks discussed with patient and healthcare power of   I personally reviewed this patient with the CRNA  Discussed and agreed on the Anesthesia Plan with the CRNA  Sam Dozier

## 2018-04-13 NOTE — ANESTHESIA PROCEDURE NOTES
Arterial Line Insertion  Date/Time: 4/13/2018 2:43 PM  Performed by: Brenda Ramirez  Authorized by: Bakari Huitron   Consent: The procedure was performed in an emergent situation  Preparation: Patient was prepped and draped in the usual sterile fashion  Indications: multiple ABGs and hemodynamic monitoring  Orientation:  RightLocation: radial artery    Sedation:  Patient sedated: GETA    Needle gauge: 20  Seldinger technique: Seldinger technique used  Number of attempts: 1  Post-procedure: dressing applied  Post-procedure CNS: unchanged  Patient tolerance: Patient tolerated the procedure well with no immediate complications

## 2018-04-13 NOTE — PERIOPERATIVE NURSING NOTE
Immediately following the procedure, the patient was turned form the OR table onto the stretcher  Patient's color turned blue  Patient remained intubated  A CODE was called  Chest compressions started by Dr Omega Danielson  Anesthesia MD responded    See anesthesia record  21  code called, compression started by Dr Felicity Ruht  1423 1mg epinephrine administered IV push by anesthesia  1424 1mg epinephrine administered IV push by anesthesia  1424 chest compressions continued by PANDA Samson  1425 pulse returned  1427 1mg epinephrine administered IV push by anesthesia  1431 echo performed by Dr Sky Mclean   1435 A line inserted right radial by Elmer Jimenez  3810 Central line inserted by Jamil Oden  1450 ROBERTO done by Dr Sky Mclean  1454 12 Lead EKG by Brittni Acevedo  3445  Patient transported to CT Scan on monitor and a defibrilator and CRNA

## 2018-04-13 NOTE — OP NOTE
**** GI/ENDOSCOPY REPORT ****:     PATIENT NAME: Dipti Bhatti  - VISIT ID:  Patient ID: [id number]   YOB: 1940     INTRODUCTION:Endoscopic Retrograde Cholangiopancreatography - A 78 male   patient presents for outpatient Endoscopic Retrograde   Cholangiopancreatography at [facility]  INDICATIONS: Jaundiced  Stones  CONSENT:  The benefits, risks, and alternatives to the procedure were   discussed and informed consent was obtained from the patient  PREPARATION: EKG, pulse, pulse oximetry, and blood pressure were monitored   throughout the procedure  ASA Classification: Class 3 - Patient has severe   systemic disturbance that may or may not be related to the disorder   requiring surgery  The patient was kept NPO for eight hours prior to the   procedure  MEDICATIONS: Anesthesia-check records Anesthesia administered by   anesthesiologist      PROCEDURE: The endoscope was passed through the mouth under direct   visualization and advanced to the duodenum  The scope was withdrawn and   the mucosa was carefully examined  FINDINGS:  The stent was removed and the duct was cannulated  The ampulla   was dilated with a 10x4mm XL balloon and a 12-15mm CRE balloon  Innumerable cholesterols and pigment stones were removed with a 12-15mm   balloon and a 2 5cm baskets  Over 20 stones were removed  An occlusion   cholangiogram was normal  A 7Fx9cm stents was placed in good position  COMPLICATIONS: There were no complications  IMPRESSIONS: The stent was removed and the duct was cannulated  The   ampulla was dilated with a 10x4mm XL balloon and a 12-15mm CRE balloon  Innumerable cholesterols and pigment stones were removed with a 12-15mm   balloon and a 2 5cm baskets  Over 20 stones were removed  An occlusion   cholangiogram was normal  A 7Fx9cm stents was placed in good position  RECOMMENDATIONS: [place] per surgery  ERCP with stent removal in 6-8 weeks  ESTIMATED BLOOD LOSS: None  PROCEDURE CODES: 54061 - ERCP with balloon dilation 81527 - ERCP with   removal or change of stent 36528 - ERCP with stone removal     ICD-9 Codes: 782 4 Jaundice, unspecified, not of      ICD-10 Codes: R17 Unspecified jaundice     PERFORMED BY: KASHMIR Isaacs III  on 2018  Version 1, electronically signed by KASHMIR Higgins  on   2018 at 14:38

## 2018-04-13 NOTE — ANESTHESIA POSTPROCEDURE EVALUATION
Post-Op Assessment Note      CV Status:  Stable    Mental Status:  Somnolent    Hydration Status:  Stable    PONV Controlled:  None  Airway: intubated    Post Op Vitals Reviewed: Yes          Staff: Anesthesiologist, CRNA       Comments: Patient transferred to [facility] via CT scan; reprot to ICU staff; VSS          /87 (04/13/18 1610)    Temp (!) 97 2 °F (36 2 °C) (04/13/18 1610)    Pulse 97 (04/13/18 1610)   Resp 14 (04/13/18 1610)    SpO2 100 % (04/13/18 1610)

## 2018-04-13 NOTE — CONSULTS
Consultation - [facility]  Alexia Matt 78 y o  male MRN: [id number]  Unit/Bed#:  Encounter: [id number]      Inpatient consult to gastroenterology  Consult performed by: Han Acevedo  Consult ordered by: Que Bynum           Reason for Consult / Principal Problem:  Code status post ERCP    HPI: Alexia Matt is a 78y o  year old male who presented to the hospital for an outpatient ERCP  He was in the hospital a few months ago for cholangitis and choledocholithiasis and alcohol withdrawal and alcoholic liver disease  The patient has done very well over the last 2 months with a biliary stent in place  He he has been eating he has been abstaining from alcohol and he has been gaining and strength  The ERCP was was long but uneventful  I removed over 20 cholesterol and pigment stones  I did do a dilation of the ampulla which was in a position with in a duodenal diverticulum in the 2nd portion of the duodenum  The patient was turned over and he turned blue and a code was called and the patient was resuscitated  The patient is currently intubated and sedated in the ICU and I have been communicating with his family throughout the afternoon  I have just reviewed his CT with the Department of Radiology and it appears that there is a small retroperitoneal perforation surrounding the duodenum likely the source of which is the diverticulum with the dilation in the ERCP    REVIEW OF SYSTEMS:     CONSTITUTIONAL: Denies any fever, chills, or rigors  Good appetite, and no recent weight loss  HEENT: No earache or tinnitus  Denies hearing loss or visual disturbances  CARDIOVASCULAR: No chest pain or palpitations  RESPIRATORY: Denies any cough, hemoptysis, shortness of breath or dyspnea on exertion  GASTROINTESTINAL: As noted in the History of Present Illness  GENITOURINARY: No problems with urination  Denies any hematuria or dysuria    NEUROLOGIC: No dizziness or vertigo, denies headaches  MUSCULOSKELETAL: Denies any muscle or joint pain  SKIN: Denies skin rashes or itching  ENDOCRINE: Denies excessive thirst  Denies intolerance to heat or cold  PSYCHOSOCIAL: Denies depression or anxiety  Denies any recent memory loss  Historical Information   Past Medical History:   Diagnosis Date    Basal cell carcinoma     Cancer ([address] )     prostate, skin     Dementia     Malignant neoplasm of prostate [facility])      Past Surgical History:   Procedure Laterality Date    ERCP N/A 3/9/2018    Procedure: ENDOSCOPIC RETROGRADE CHOLANGIOPANCREATOGRAPHY (ERCP);   Surgeon: Tanesha Calderon MD;  Location: [facility];  Service: Gastroenterology    PROSTATE SURGERY       Social History   History   Alcohol Use No     Comment: former     History   Drug Use No     History   Smoking Status    Current Every Day Smoker    Packs/day: 0 25   Smokeless Tobacco    Never Used     Family History   Problem Relation Age of Onset    Cancer Mother        Meds/Allergies     Prescriptions Prior to Admission   Medication    folic acid (FOLVITE) 1 mg tablet    melatonin 1 mg    Ffnmaynbw-Zcjcfefhh-Dsojmrbxfy (CEREFOLIN NAC PO)    tamsulosin (FLOMAX) 0 4 mg     Current Facility-Administered Medications   Medication Dose Route Frequency    chlorhexidine (PERIDEX) 0 12 % oral rinse 15 mL  15 mL Swish & Spit Q12H [address]    fentaNYL (SUBLIMAZE) injection 25 mcg  25 mcg Intravenous Q3 min PRN    heparin (porcine) subcutaneous injection 5,000 Units  5,000 Units Subcutaneous Q8H [address]    HYDROmorphone (DILAUDID) injection 0 4 mg  0 4 mg Intravenous Q5 Min PRN    iohexol (OMNIPAQUE) 240 MG/ML solution 100 mL  100 mL Oral Once in imaging    lactated ringers infusion  125 mL/hr Intravenous Continuous    lactated ringers infusion  100 mL/hr Intravenous Continuous    metoclopramide (REGLAN) injection 10 mg  10 mg Intravenous Once PRN    multi-electrolyte (ISOLYTE-S PH 7 4 equivalent) IV solution  100 mL/hr Intravenous Continuous    ondansetron (ZOFRAN) injection 4 mg  4 mg Intravenous Once PRN    pantoprazole (PROTONIX) injection 40 mg  40 mg Intravenous Q24H BINDU    promethazine (PHENERGAN) injection 12 5 mg  12 5 mg Intravenous Once PRN       No Known Allergies    Objective   Blood pressure 109/71, pulse 77, temperature 97 8 °F (36 6 °C), temperature source Temporal, resp  rate 20, height 5' 8" (1 727 m), weight 61 2 kg (134 lb 14 7 oz), SpO2 97 %  Intake/Output Summary (Last 24 hours) at 04/13/18 1606  Last data filed at 04/13/18 1546   Gross per 24 hour   Intake              800 ml   Output                0 ml   Net              800 ml         PHYSICAL EXAM:      General Appearance:   Alert, cooperative, no distress, appears stated age    HEENT:   Normocephalic, atraumatic, anicteric      Neck:  Supple, symmetrical, trachea midline, no adenopathy;    thyroid: no enlargement/tenderness/nodules; no carotid  bruit or JVD    Lungs:   Clear to auscultation bilaterally; no rales, rhonchi or wheezing; respirations unlabored    Heart[de-identified]   S1 and S2 normal; regular rate and rhythm; no murmur, rub, or gallop     Abdomen:   Soft, non-tender, non-distended; normal bowel sounds; no masses, no organomegaly    Genitalia:   Deferred    Rectal:   Deferred    Extremities:  No cyanosis, clubbing or edema    Pulses:  2+ and symmetric all extremities    Skin:  Skin color, texture, turgor normal, no rashes or lesions    Lymph nodes:  No palpable cervical, axillary or inguinal lymphadenopathy        Lab Results:   Admission on 04/13/2018   Component Date Value    pH, Art i-STAT 04/13/2018 7 283*    pCO2, Art i-STAT 04/13/2018 47 4*    pO2, ART i-STAT 04/13/2018 348 0*    BE, i-STAT 04/13/2018 -5*    HCO3, Art i-STAT 04/13/2018 22 4     CO2, i-STAT 04/13/2018 24     O2 Sat, i-STAT 04/13/2018 100*    SODIUM, I-STAT 04/13/2018 139     Potassium, i-STAT 04/13/2018 3 5     Calcium, Ionized i-STAT 04/13/2018 1 25     Hct, i-STAT 04/13/2018 41     Hgb, i-STAT 04/13/2018 13 9     Glucose, i-STAT 04/13/2018 239*    Specimen Type 04/13/2018 ARTERIAL        Imaging Studies: I have personally reviewed pertinent imaging studies  I have reviewed his CT of the chest abdomen pelvis with Radiology    ASSESSMENT & PLAN:  Principal Problem:    Common bile duct obstruction    He is a 78year old male with choledocholithiasis status post ERCP with stone removal and stent placement and ampulla dilatation with code after the procedures likely secondary to lack of oxygenation  Currently he is intubated and he is sedated in the ICU  The patient has sustained a small retroperitoneal perforation likely secondary to the dilation of the ampulla sitting within the diverticulum in the 2nd portion of the duodenum  There is a stent in place and I would recommend no surgical intervention for this injury  It will seal on its own  The patient should be made NPO tonight for 24 hours and he should be placed on broad-spectrum antibiotics        Further management should be per the ICU team    The patient will need a follow-up ERCP in approximately 8 weeks to remove the stent an to be fully sure that all the stones are out which I suspect they are    James Hassan MD  7/61/4529,4:58 PM

## 2018-04-13 NOTE — ANESTHESIA PROCEDURE NOTES
Central Line Insertion  Performed by: Lexi Herrerao by: Hemanth Palmer   Date/Time: 4/13/2018 2:47 PM  Catheter Type:  triple lumen  Indications: vascular access  Location details: right internal jugular  Catheter size: 7 Fr  Patient position: Trendelenburg    Sedation:  Patient sedated: GETA  Assessment: blood return through all ports and free fluid flow  Preparation: skin prepped with 2% chlorhexidine  Skin prep agent dried: skin prep agent completely dried prior to procedure  Sterile barriers: all five maximum sterile barriers used - cap, mask, sterile gown, sterile gloves, and large sterile sheet  Hand hygiene: hand hygiene performed prior to central venous catheter insertion  Ultrasound guidance: yes  sterile gel and probe cover used in ultrasound-guided central venous catheter insertionultrasound permanent image saved  Consent: The procedure was performed in an emergent situation    Pre-procedure: landmarks identified  Number of attempts: 1  Successful placement: yes  Post-procedure: line sutured,  dressing applied and chlorhexidine patch applied  Patient tolerance: Patient tolerated the procedure well with no immediate complications

## 2018-04-13 NOTE — H&P
History and Physical - Critical Care   Alexia Matt 78 y o  male MRN: [id number]  Unit/Bed#:  Encounter: [id number]    Reason for Admission / Chief Complaint: cardiac arrest    History of Present Illness:  Alexia Matt is a 78 y o  male who presents with a past medical history of alcohol abuse and worsening dementia over the past 1-2 years and prostate CA  He was recently admitted in March for evaluation of altered mental status and increasing weight loss  During this admission he had laboratory derangements; transaminitis and an elevated T bili  Imaging of the abdomen/pelvis obtained at that time showing choledocholithiasis  He was taken for on ERCP on 3/9 and was s/p sphincterotomy and stenting with multiple stones in the bile duct  He was admitted to the critical care service at that time for monitoring  He completed an antibiotic course and was to return in 6 weeks for repeat ERCP stone removal in 6 weeks time  He was discharged on 3/15  He has abstained from alcohol since discharge  He came in today for an elective ERCP  Post procedure, while moving to the OR table to the stretcher, the patient became cyanotic and proceeded to cardiac arrest   Compressions were started immediately  He was given epinephrine x 2 and atropine  ROSC was achieved quickly  His   He did have purposeful movement s/p arrest   ROBERTO was done in the OR, with no significant abnormalities  The patient was transported to CT scan and then to the ICU for further management and care  History obtained from chart review and the patient  Past Medical History:  Past Medical History:   Diagnosis Date    Basal cell carcinoma     Cancer ([facility] [address] )     prostate, skin     Dementia     Malignant neoplasm of prostate [facility])        Past Surgical History:  Past Surgical History:   Procedure Laterality Date    ERCP N/A 3/9/2018    Procedure: ENDOSCOPIC RETROGRADE CHOLANGIOPANCREATOGRAPHY (ERCP);   Surgeon: Bakari Suh III, MD;  Location: [facility];  Service: Gastroenterology    PROSTATE SURGERY         Past Family History:  Family History   Problem Relation Age of Onset    Cancer Mother        Social History:  History   Smoking Status    Current Every Day Smoker    Packs/day: 0 25   Smokeless Tobacco    Never Used     History   Alcohol Use No     Comment: former     History   Drug Use No     Marital Status: /Civil Union    Medications:  Current Facility-Administered Medications   Medication Dose Route Frequency    chlorhexidine (PERIDEX) 0 12 % oral rinse 15 mL  15 mL Swish & Spit Q12H [address]    EPINEPHrine (ADRENALIN) 1 mg/10 mL injection    Code/Trauma/Sedation Med    fentaNYL (SUBLIMAZE) injection 25 mcg  25 mcg Intravenous Q3 min PRN    heparin (porcine) subcutaneous injection 5,000 Units  5,000 Units Subcutaneous Q8H [address]    HYDROmorphone (DILAUDID) injection 0 4 mg  0 4 mg Intravenous Q5 Min PRN    iohexol (OMNIPAQUE) 240 MG/ML solution 100 mL  100 mL Oral Once in imaging    lactated ringers infusion  125 mL/hr Intravenous Continuous    lactated ringers infusion  100 mL/hr Intravenous Continuous    metoclopramide (REGLAN) injection 10 mg  10 mg Intravenous Once PRN    multi-electrolyte (ISOLYTE-S PH 7 4 equivalent) IV solution  100 mL/hr Intravenous Continuous    ondansetron (ZOFRAN) injection 4 mg  4 mg Intravenous Once PRN    pantoprazole (PROTONIX) injection 40 mg  40 mg Intravenous Q24H [address]INDU    promethazine (PHENERGAN) injection 12 5 mg  12 5 mg Intravenous Once PRN     Home medications:  Prior to Admission medications    Medication Sig Start Date End Date Taking?  Authorizing Provider   folic acid (FOLVITE) 1 mg tablet Take 1 tablet (1 mg total) by mouth daily 3/16/18  Yes Mee Merchant MD   melatonin 1 mg Take 1 mg by mouth daily at bedtime Patient takes 1 1/2 mg tablets   Yes Historical Provider, MD   Azbamsihy-Itcivuhls-Xnaebfvyoc (CEREFOLIN NAC PO) Take 1 tablet by mouth 2 (two) times a day   Yes Historical Provider, MD   tamsulosin (FLOMAX) 0 4 mg Take 1 capsule (0 4 mg total) by mouth daily with dinner 3/15/18  Yes Atif Guerrero MD     Allergies:  No Known Allergies    ROS:   Review of Systems   Unable to perform ROS: Intubated       Vitals:  Vitals:    18 1303   BP: 109/71   Pulse: 77   Resp: 20   Temp: 97 8 °F (36 6 °C)   TempSrc: Temporal   SpO2: 97%   Weight: 61 2 kg (134 lb 14 7 oz)   Height: 5' 8" (1 727 m)     Temperature:   Temp (24hrs), Av 8 °F (36 6 °C), Min:97 8 °F (36 6 °C), Max:97 8 °F (36 6 °C)    Current Temperature: 97 8 °F (36 6 °C)    Weights:   IBW: 68 4 kg  Body mass index is 20 51 kg/m²  Hemodynamic Monitoring:  N/A     Non-Invasive/Invasive Ventilation Settings:  Respiratory    Lab Data (Last 4 hours)    None         O2/Vent Data (Last 4 hours)    None              No results found for: PHART, ZIK8NTX, PO2ART, WMM2LKD, L8VUYLVW, BEART, SOURCE  SpO2: SpO2: 96 %    Physical Exam  PHYSICAL EXAM  General :   Chronically ill appearing male, intubated and sedated  Neuro:   GCS= 10T CN 2-12 intact  Non Focal  HEENT:  Normocephalic, atraumatic, Pupils 3 brisk bilat  PERRLA, EOMI, hearing grossly intact  Symmetrical facial expressions without droop or slurred speech  Tongue midline without fasiculations  Neck:  No JVD, FROM, No masses/adenopathy  Back:   Symmetrical, atruamatic, spinous process pain free on palpation  Cardiovascular:   No heaves,lifts,thrills  S1/S2 [email or web address]  No noted MRGC  Pulmonary:   Symmetrical expansion of chest  Resp even & unlabored, per vent   GI :   Abd S, distended NT + BSX4 quads  No palp/pulsitile masses  :  Velazquez catheter with hematuria   Musculoskeletal:  Symmetrical  No obvious deformity  FROM in UE & LE  Muscle strength 5/5  No lymphadenopathy  Extrem warm to touch  DTR grossly intact  Brachial/radial/femoral/popliteal/pedal/posterior tibial pulses +2  No lesions noted  CN 2-12 grossly intact  No rhomberg   Sensation and motor function intact  Labs: Invalid input(s):  EOSPCT       Invalid input(s): LABALBU                     0  Lab Value Date/Time   TROPONINI <0 02 03/08/2018 0831       Imaging:   CTA chest ct abdomen pelvis w contrast   Final Result      There is retroperitoneal air surrounding the duodenum (series 3 image 38 )  There is also a small amount of air in the superior mesenteric vein (series 3 image 39 ) These findings indicate bowel injury, likely due to recent instrumentation of the    duodenum  Again seen are gallstones in the gallbladder  A biliary stent has been placed  There is no evidence of pulmonary embolism  There is mild dependent atelectasis in both lower lobes  I personally discussed this study with Nikky Watt III on 4/13/2018 at 4:02 PM                         Workstation performed: [id number]         FL ERCP biliary only   Final Result      Please see procedure report for further details  Workstation performed: [id number]         XR chest pa only    (Results Pending)     EKG: NSR with RBBB This was personally reviewed by myself  Micro:  No results found for: Lorena Monroy, LEANN, [facility]    ______________________________________________________________________    Assessment:   1  Cardiac arrest unclear etiology PE vs  ETT obstruction  2  Hypoxic respiratory failure  3  choledocholithiasis s/p sphincterotomy with stenting (3/9) and ERCP with stone removal (4/13)  4  History of alcohol abuse, abstinent for approximately 2 months  5  transaminitis   6  Small retroperitoneal likely secondary to instrumentation   7  Dependent atelectasis   8  History of dementia   9  History of prostate CA  10   Hematuria likely secondary to traumatic sharif catheter insertion     Plan:      Neuro:   -patient is purposefully moving all extremities post cardiac arrest  -will start on propofol gtt with fentanyl IVP to maintain RASS of -1  -[facility] ICU   -sleep hygiene   -patient has a baseline dementia  According the patient's wife, he is normally awake alert and oriented  However, he has difficulty with short term memory loss,  "story telling", and hallucinations   CV:   -continue to monitor hemodynamics  -arterial line and central line inserted in the OR  -not currently requiring pressor therapy  -ROBERTO done in OR showing no pro found wall motion abnormalities  -trend troponins   -12 lead EKG no ischemic changes, ST with RBBB   Lung:   -continue mechanical ventilation  -pulmonary toileting  -repeat abg  -CT PE study showing no pulmonary embolism to account for cardiac arrest, event was likely secondary to ETT obstruction   GI:   -NPO  -OGT inserted in OR for gastric decompression  -mild perforation noted on CT scan likely from instrumentation  -will start zosyn for empiric abdominal coverage  -protonix for GI prophylaxis    FEN:   -monitor electrolytes and replete as necessary  -continue maintenance IVF   :   -yuliana hematuria post sharif catheter insertion   -irrigate sharif catheter q 2 hours until clear, may require CBI and urology consultation if continues   -monitor urine output closely   ID:   -will start zosyn for abdominal coverage in light of perforation  -check culture data if patient spikes fever or WBC continues to rise   Heme:   -heparin for dvt prophylaxis, dose currently held in the setting of hematuria   -patient is not on systemic anticoagulation at home    Endo:  -monitor glucose via bmp    Msk/Skin:   -turn and reposition q 2 hours while in bed  -PT/OT when appropriate    Disposition:   -will admit to ICU s/p cardiac arrest     ______________________________________________________________________    VTE Pharmacologic Prophylaxis: Sequential compression device (Venodyne)  and Heparin  VTE Mechanical Prophylaxis: sequential compression device    Invasive lines and devices:   Invasive Devices     Central Venous Catheter Line            CVC Central Lines 04/13/18 Triple less than 1 day          Peripheral Intravenous Line            Peripheral IV 04/13/18 Right Hand less than 1 day          Arterial Line            Arterial Line 04/13/18 Right Radial less than 1 day          Airway            ETT  Cuffed;Oral 8 mm less than 1 day              Code Status: Level 1 - Full Code  POA:    POLST:      Given critical illness, patient length of stay will require greater than two midnights  Counseling / Coordination of Care  Total Critical Care time spent 46 minutes excluding procedures, teaching and family updates  Portions of the record may have been created with voice recognition software  Occasional wrong word or "sound a like" substitutions may have occurred due to the inherent limitations of voice recognition software  Read the chart carefully and recognize, using context, where substitutions have occurred        SAAD Tinsley

## 2018-04-14 ENCOUNTER — APPOINTMENT (INPATIENT)
Dept: RADIOLOGY | Facility: HOSPITAL | Age: 78
DRG: 314 | End: 2018-04-14
Payer: MEDICARE

## 2018-04-14 PROBLEM — I46.9 CARDIAC ARREST (HCC): Status: ACTIVE | Noted: 2018-04-14

## 2018-04-14 PROBLEM — J96.01 ACUTE HYPOXEMIC RESPIRATORY FAILURE (HCC): Status: ACTIVE | Noted: 2018-04-14

## 2018-04-14 LAB
ALBUMIN SERPL BCP-MCNC: 2.9 G/DL (ref 3.5–5)
ALP SERPL-CCNC: 94 U/L (ref 46–116)
ALT SERPL W P-5'-P-CCNC: 298 U/L (ref 12–78)
ANION GAP SERPL CALCULATED.3IONS-SCNC: 8 MMOL/L (ref 4–13)
AST SERPL W P-5'-P-CCNC: 132 U/L (ref 5–45)
BASOPHILS # BLD MANUAL: 0 THOUSAND/UL (ref 0–0.1)
BASOPHILS NFR MAR MANUAL: 0 % (ref 0–1)
BILIRUB SERPL-MCNC: 1 MG/DL (ref 0.2–1)
BUN SERPL-MCNC: 16 MG/DL (ref 5–25)
CA-I BLD-SCNC: 1.09 MMOL/L (ref 1.12–1.32)
CALCIUM SERPL-MCNC: 8.6 MG/DL
CHLORIDE SERPL-SCNC: 106 MMOL/L (ref 100–108)
CO2 SERPL-SCNC: 23 MMOL/L (ref 21–32)
CREAT SERPL-MCNC: 0.65 MG/DL (ref 0.6–1.3)
EOSINOPHIL # BLD MANUAL: 0 THOUSAND/UL (ref 0–0.4)
EOSINOPHIL NFR BLD MANUAL: 0 % (ref 0–6)
ERYTHROCYTE [DISTWIDTH] IN BLOOD BY AUTOMATED COUNT: 14.6 % (ref 11.6–15.1)
GFR SERPL CREATININE-BSD FRML MDRD: 95 ML/MIN/1.73SQ M
GLUCOSE SERPL-MCNC: 115 MG/DL (ref 65–140)
GLUCOSE SERPL-MCNC: 126 MG/DL (ref 65–140)
HCT VFR BLD AUTO: 41.3 % (ref 36.5–49.3)
HGB BLD-MCNC: 13.7 G/DL (ref 12–17)
LYMPHOCYTES # BLD AUTO: 0.61 THOUSAND/UL (ref 0.6–4.47)
LYMPHOCYTES # BLD AUTO: 5 % (ref 14–44)
MAGNESIUM SERPL-MCNC: 2.2 MG/DL (ref 1.6–2.6)
MCH RBC QN AUTO: 31.9 PG (ref 26.8–34.3)
MCHC RBC AUTO-ENTMCNC: 33.2 G/DL (ref 31.4–37.4)
MCV RBC AUTO: 96 FL (ref 82–98)
MONOCYTES # BLD AUTO: 0.24 THOUSAND/UL (ref 0–1.22)
MONOCYTES NFR BLD: 2 % (ref 4–12)
NEUTROPHILS # BLD MANUAL: 11.18 THOUSAND/UL (ref 1.85–7.62)
NEUTS BAND NFR BLD MANUAL: 6 % (ref 0–8)
NEUTS SEG NFR BLD AUTO: 86 % (ref 43–75)
NRBC BLD AUTO-RTO: 0 /100 WBCS
PHOSPHATE SERPL-MCNC: 4.2 MG/DL (ref 2.3–4.1)
PLATELET # BLD AUTO: 181 THOUSANDS/UL (ref 149–390)
PLATELET BLD QL SMEAR: ADEQUATE
PMV BLD AUTO: 9.3 FL (ref 8.9–12.7)
POTASSIUM SERPL-SCNC: 4.1 MMOL/L (ref 3.5–5.3)
PROT SERPL-MCNC: 6.1 G/DL (ref 6.4–8.2)
RBC # BLD AUTO: 4.3 MILLION/UL (ref 3.88–5.62)
SMUDGE CELLS BLD QL SMEAR: PRESENT
SODIUM SERPL-SCNC: 137 MMOL/L (ref 136–145)
TOTAL CELLS COUNTED SPEC: 100
TROPONIN I SERPL-MCNC: 0.14 NG/ML
VARIANT LYMPHS # BLD AUTO: 1 %
WBC # BLD AUTO: 12.15 THOUSAND/UL (ref 4.31–10.16)

## 2018-04-14 PROCEDURE — 84100 ASSAY OF PHOSPHORUS: CPT | Performed by: NURSE PRACTITIONER

## 2018-04-14 PROCEDURE — 85027 COMPLETE CBC AUTOMATED: CPT | Performed by: NURSE PRACTITIONER

## 2018-04-14 PROCEDURE — 82948 REAGENT STRIP/BLOOD GLUCOSE: CPT

## 2018-04-14 PROCEDURE — G8978 MOBILITY CURRENT STATUS: HCPCS

## 2018-04-14 PROCEDURE — 71045 X-RAY EXAM CHEST 1 VIEW: CPT

## 2018-04-14 PROCEDURE — 85007 BL SMEAR W/DIFF WBC COUNT: CPT | Performed by: NURSE PRACTITIONER

## 2018-04-14 PROCEDURE — G8979 MOBILITY GOAL STATUS: HCPCS

## 2018-04-14 PROCEDURE — 83735 ASSAY OF MAGNESIUM: CPT | Performed by: NURSE PRACTITIONER

## 2018-04-14 PROCEDURE — 84484 ASSAY OF TROPONIN QUANT: CPT | Performed by: NURSE PRACTITIONER

## 2018-04-14 PROCEDURE — 94003 VENT MGMT INPAT SUBQ DAY: CPT

## 2018-04-14 PROCEDURE — 82330 ASSAY OF CALCIUM: CPT | Performed by: NURSE PRACTITIONER

## 2018-04-14 PROCEDURE — 99222 1ST HOSP IP/OBS MODERATE 55: CPT | Performed by: UROLOGY

## 2018-04-14 PROCEDURE — 97163 PT EVAL HIGH COMPLEX 45 MIN: CPT

## 2018-04-14 PROCEDURE — 94760 N-INVAS EAR/PLS OXIMETRY 1: CPT

## 2018-04-14 PROCEDURE — C9113 INJ PANTOPRAZOLE SODIUM, VIA: HCPCS | Performed by: NURSE PRACTITIONER

## 2018-04-14 PROCEDURE — 80053 COMPREHEN METABOLIC PANEL: CPT | Performed by: NURSE PRACTITIONER

## 2018-04-14 RX ORDER — TAMSULOSIN HYDROCHLORIDE 0.4 MG/1
0.4 CAPSULE ORAL
Status: DISCONTINUED | OUTPATIENT
Start: 2018-04-14 | End: 2018-04-16 | Stop reason: HOSPADM

## 2018-04-14 RX ORDER — METRONIDAZOLE 500 MG/1
500 TABLET ORAL EVERY 8 HOURS SCHEDULED
Status: DISCONTINUED | OUTPATIENT
Start: 2018-04-14 | End: 2018-04-16 | Stop reason: HOSPADM

## 2018-04-14 RX ORDER — ACETAMINOPHEN 325 MG/1
650 TABLET ORAL EVERY 6 HOURS PRN
Status: DISCONTINUED | OUTPATIENT
Start: 2018-04-14 | End: 2018-04-14

## 2018-04-14 RX ORDER — GUAIFENESIN 600 MG
600 TABLET, EXTENDED RELEASE 12 HR ORAL 2 TIMES DAILY
Status: DISCONTINUED | OUTPATIENT
Start: 2018-04-14 | End: 2018-04-16 | Stop reason: HOSPADM

## 2018-04-14 RX ORDER — CALCIUM GLUCONATE 94 MG/ML
INJECTION, SOLUTION INTRAVENOUS
Status: DISCONTINUED
Start: 2018-04-14 | End: 2018-04-14 | Stop reason: WASHOUT

## 2018-04-14 RX ORDER — FOLIC ACID 1 MG/1
1 TABLET ORAL DAILY
Status: DISCONTINUED | OUTPATIENT
Start: 2018-04-14 | End: 2018-04-16 | Stop reason: HOSPADM

## 2018-04-14 RX ORDER — CIPROFLOXACIN 500 MG/1
500 TABLET, FILM COATED ORAL EVERY 12 HOURS SCHEDULED
Status: DISCONTINUED | OUTPATIENT
Start: 2018-04-14 | End: 2018-04-16

## 2018-04-14 RX ORDER — IBUPROFEN 400 MG/1
400 TABLET ORAL EVERY 6 HOURS PRN
Status: DISCONTINUED | OUTPATIENT
Start: 2018-04-14 | End: 2018-04-16 | Stop reason: HOSPADM

## 2018-04-14 RX ADMIN — PIPERACILLIN SODIUM,TAZOBACTAM SODIUM 3.38 G: 3; .375 INJECTION, POWDER, FOR SOLUTION INTRAVENOUS at 04:56

## 2018-04-14 RX ADMIN — CHLORHEXIDINE GLUCONATE 15 ML: 1.2 RINSE ORAL at 21:58

## 2018-04-14 RX ADMIN — PROPOFOL 65 MCG/KG/MIN: 10 INJECTION, EMULSION INTRAVENOUS at 02:12

## 2018-04-14 RX ADMIN — FENTANYL CITRATE 50 MCG: 50 INJECTION, SOLUTION INTRAMUSCULAR; INTRAVENOUS at 05:29

## 2018-04-14 RX ADMIN — METRONIDAZOLE 500 MG: 500 TABLET ORAL at 15:18

## 2018-04-14 RX ADMIN — PROPOFOL 30 MCG/KG/MIN: 10 INJECTION, EMULSION INTRAVENOUS at 04:53

## 2018-04-14 RX ADMIN — FOLIC ACID 1 MG: 1 TABLET ORAL at 14:10

## 2018-04-14 RX ADMIN — CHLORHEXIDINE GLUCONATE 15 ML: 1.2 RINSE ORAL at 09:11

## 2018-04-14 RX ADMIN — METRONIDAZOLE 500 MG: 500 TABLET ORAL at 21:58

## 2018-04-14 RX ADMIN — NICOTINE 7 MG: 7 PATCH TRANSDERMAL at 05:00

## 2018-04-14 RX ADMIN — CIPROFLOXACIN 500 MG: 500 TABLET, FILM COATED ORAL at 21:59

## 2018-04-14 RX ADMIN — HEPARIN SODIUM 5000 UNITS: 5000 INJECTION, SOLUTION INTRAVENOUS; SUBCUTANEOUS at 21:58

## 2018-04-14 RX ADMIN — IBUPROFEN 400 MG: 400 TABLET ORAL at 17:18

## 2018-04-14 RX ADMIN — CALCIUM GLUCONATE 1 G: 98 INJECTION, SOLUTION INTRAVENOUS at 07:25

## 2018-04-14 RX ADMIN — GUAIFENESIN 600 MG: 600 TABLET, EXTENDED RELEASE ORAL at 17:27

## 2018-04-14 RX ADMIN — IBUPROFEN 400 MG: 400 TABLET ORAL at 10:19

## 2018-04-14 RX ADMIN — METRONIDAZOLE 500 MG: 500 TABLET ORAL at 10:14

## 2018-04-14 RX ADMIN — PANTOPRAZOLE SODIUM 40 MG: 40 INJECTION, POWDER, FOR SOLUTION INTRAVENOUS at 09:12

## 2018-04-14 RX ADMIN — SODIUM CHLORIDE, SODIUM GLUCONATE, SODIUM ACETATE, POTASSIUM CHLORIDE, MAGNESIUM CHLORIDE, SODIUM PHOSPHATE, DIBASIC, AND POTASSIUM PHOSPHATE 100 ML/HR: .53; .5; .37; .037; .03; .012; .00082 INJECTION, SOLUTION INTRAVENOUS at 03:12

## 2018-04-14 RX ADMIN — TAMSULOSIN HYDROCHLORIDE 0.4 MG: 0.4 CAPSULE ORAL at 17:19

## 2018-04-14 RX ADMIN — HEPARIN SODIUM 5000 UNITS: 5000 INJECTION, SOLUTION INTRAVENOUS; SUBCUTANEOUS at 05:00

## 2018-04-14 RX ADMIN — HEPARIN SODIUM 5000 UNITS: 5000 INJECTION, SOLUTION INTRAVENOUS; SUBCUTANEOUS at 14:10

## 2018-04-14 RX ADMIN — CIPROFLOXACIN 500 MG: 500 TABLET, FILM COATED ORAL at 10:14

## 2018-04-14 NOTE — CONSULTS
Consultation - Urology   Sergey Watt 78 y o  male MRN: [id number]  Unit/Bed#: -01 Encounter: [id number] 4/14/2018           Assessment/Plan      Assessment:  Gross hematuria-likely catheter related -resolved  Urinary retention  Prostate cancer status post prostate brachytherapy    Plan:  Agree with present therapy  Would resume TAMSULOSIN  Voiding trial once patient is ambulatory  His upper tracts are normal by CT scan  He should have outpatient urologic follow-up to assure that he is voiding well and to follow up on hematuria  Consider outpatient cystoscopy especially if microscopic hematuria persists on follow-up  History of Present Illness   79-year-old male with remote history of prostate cancer  He is status post prostate brachytherapy  He reports he was voiding well on Flomax prior to coming to the hospital   He denies prior gross hematuria or symptoms of infection  Patient experienced a cardiac arrest during an ERCP  Patient was resuscitated  Velazquez catheter was placed  Patient developed gross hematuria and the catheter was replaced with a larger catheter and irrigations performed  The urine has now cleared  Patient is seen in the ICU and is recovering nicely    Urology has been consulted for the gross hematuria and for timing of a voiding trial           Attending: Alejandra Mendez MD  Reason for Consult / Principal Problem:  Patient Active Problem List   Diagnosis    Transaminitis    Hypokalemia    Fall    Altered mental status    Total bilirubin, elevated    Weight loss counseling, encounter for    Common bile duct (CBD) obstruction    H/O prostate cancer    Common bile duct obstruction    Memory difficulty    Alcoholism ([address] )    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    Cardiac arrest ([address] )    Acute hypoxemic respiratory failure ([address] )       Consults    Review of Systems   Constitutional: Negative for chills, diaphoresis, fatigue and fever  HENT: Negative  Eyes: Negative  Respiratory: Negative  Cardiovascular: Negative  Endocrine: Negative  Musculoskeletal: Negative  Skin: Negative  Allergic/Immunologic: Negative  Neurological: Negative  Hematological: Negative  Psychiatric/Behavioral: Negative  Historical Information   No Known Allergies  Past Medical History:   Diagnosis Date    Basal cell carcinoma     Cancer ([address] )     prostate, skin     Choledocholithiasis     Dementia     Malignant neoplasm of prostate [facility])      Past Surgical History:   Procedure Laterality Date    ERCP N/A 3/9/2018    Procedure: ENDOSCOPIC RETROGRADE CHOLANGIOPANCREATOGRAPHY (ERCP);   Surgeon: Parvez Mendoza MD;  Location: [facility];  Service: Gastroenterology    ERCP  04/13/2018    stenting    PROSTATE SURGERY       Social History   History   Alcohol Use No     Comment: former     History   Drug Use No     History   Smoking Status    Current Every Day Smoker    Packs/day: 0 25   Smokeless Tobacco    Never Used     Family History: non-contributory    Meds/Allergies   all current active meds have been reviewed    Current Facility-Administered Medications:     chlorhexidine (PERIDEX) 0 12 % oral rinse 15 mL, 15 mL, Swish & Spit, Q12H [facility], [facility], CRNP, 15 mL at 04/14/18 0911    ciprofloxacin (CIPRO) tablet 500 mg, 500 mg, Oral, Q12H [facility], [facility], CRNP, 500 mg at 48/49/74 2243    folic acid (FOLVITE) tablet 1 mg, 1 mg, Oral, Daily, [facility], CRNP    heparin (porcine) subcutaneous injection 5,000 Units, 5,000 Units, Subcutaneous, Q8H [facility], 5,000 Units at 04/14/18 0500 **AND** Platelet count, , , Once, [facility], CRNP    ibuprofen (MOTRIN) tablet 400 mg, 400 mg, Oral, Q6H PRN, [facility], CRNP, 400 mg at 04/14/18 1019    iohexol (OMNIPAQUE) 240 MG/ML solution 100 mL, 100 mL, Oral, Once in imaging, Allen Win III, MD    metroNIDAZOLE (FLAGYL) tablet 500 mg, 500 mg, Oral, Q8H [address], SAAD Issa, 500 mg at 04/14/18 1014    nicotine (NICODERM CQ) 7 mg/24hr TD 24 hr patch 7 mg, 7 mg, Transdermal, Daily, Rosamaria Libman, CRNP, 7 mg at 04/14/18 0500    pantoprazole (PROTONIX) injection 40 mg, 40 mg, Intravenous, Q24H [address], SAAD Issa, 40 mg at 04/14/18 0912    tamsulosin (FLOMAX) capsule 0 4 mg, 0 4 mg, Oral, Daily With Dinner, SAAD Issa    Current Facility-Administered Medications:  chlorhexidine 15 mL Swish & Spit Q12H [address] SAAD Issa   ciprofloxacin 500 mg Oral Q12H [address], SAAD   folic acid 1 mg Oral Daily SAAD Issa   heparin (porcine) 5,000 Units Subcutaneous [facility] SAAD Issa   ibuprofen 400 mg Oral Q6H PRN SAAD Issa   iohexol 100 mL Oral Once in [address], MD   metroNIDAZOLE 500 mg Oral [facility] SAAD Issa   nicotine 7 mg Transdermal Daily Rosamaria Libman, CRNP   pantoprazole 40 mg Intravenous Q24H [address] SAAD Issa   tamsulosin 0 4 mg Oral Daily With Dinner SAAD Issa     ibuprofen    iohexol       Objective   Vitals: Blood pressure 112/61, pulse 75, temperature 98 7 °F (37 1 °C), temperature source Oral, resp  rate 20, height 5' 8" (1 727 m), weight 64 2 kg (141 lb 8 6 oz), SpO2 98 %  I/O last 24 hours: In: 3469 5 [I V :3219 5; IV Piggyback:250]  Out: 2065 [Urine:2065]    Invasive Devices     Peripheral Intravenous Line            Peripheral IV 04/13/18 Right Hand 1 day                Physical Exam   Constitutional: He is oriented to person, place, and time  He appears well-developed and well-nourished  HENT:   Head: Normocephalic and atraumatic  Eyes: Conjunctivae are normal    Neck: Neck supple  Cardiovascular: Normal rate  Pulmonary/Chest: Effort normal    Abdominal: Soft  He exhibits no distension and no mass  There is no tenderness  There is no rebound and no guarding     No hernia   Genitourinary: Penis normal  Genitourinary Comments: Velazquez catheter in place-urine is now clear  Testes descended and normal to palpation   Neurological: He is alert and oriented to person, place, and time  Skin: Skin is warm and dry  Psychiatric: He has a normal mood and affect  His behavior is normal  Judgment and thought content normal    Vitals reviewed  Lab Results:   I have personally reviewed pertinent reports  CBC:   Lab Results   Component Value Date    WBC 12 15 (H) 04/14/2018    HGB 13 7 04/14/2018    HCT 41 3 04/14/2018    MCV 96 04/14/2018     04/14/2018    MCH 31 9 04/14/2018    MCHC 33 2 04/14/2018    RDW 14 6 04/14/2018    MPV 9 3 04/14/2018    NRBC 0 04/14/2018     CMP:   Lab Results   Component Value Date     04/14/2018     04/14/2018    CO2 23 04/14/2018    ANIONGAP 8 04/14/2018    BUN 16 04/14/2018    CREATININE 0 65 04/14/2018    GLUCOSE 115 04/14/2018    GLUCOSE 239 (H) 04/13/2018    CALCIUM 8 6 04/14/2018     (H) 04/14/2018     (H) 04/14/2018    ALKPHOS 94 04/14/2018    PROT 6 1 (L) 04/14/2018    BILITOT 1 00 04/14/2018    EGFR 95 04/14/2018     Urinalysis: No results found for: Rubbie Bjornstad, SPECGRAV, PHUR, LEUKOCYTESUR, NITRITE, PROTEINUA, GLUCOSEU, KETONESU, BILIRUBINUR, BLOODU  Urine Culture: No results found for: URINECX  PSA: No results found for: PSA    Imaging Studies: I have personally reviewed pertinent films in PACS  Cta Chest Ct Abdomen Pelvis W Contrast    Result Date: 4/13/2018  Narrative: CT PULMONARY ANGIOGRAM OF THE CHEST AND CT ABDOMEN AND PELVIS WITH INTRAVENOUS CONTRAST INDICATION:   Post operative infection, abdomen pelvis  Patient is status post ERCP earlier today  Numerous common bile duct stones were removed  A common bile duct stent was placed  Patient has history of esophageal cancer  COMPARISON: Chest, abdomen, and pelvic CT from 3/8/2018  TECHNIQUE:  CT examination of the chest, abdomen and pelvis was performed    Thin section CT angiographic technique was used in the chest in order to evaluate for pulmonary embolus and coronal 3D MIP postprocessing was performed on the acquisition scanner  Axial, sagittal, and coronal 2D reformatted images were created from the source data and submitted for interpretation  Radiation dose length product (DLP) for this visit:  870 mGy-cm   This examination, like all CT scans performed in the [facility], was performed utilizing techniques to minimize radiation dose exposure, including the use of iterative reconstruction and automated exposure control  IV Contrast:  100 mL of iohexol (OMNIPAQUE) Enteric Contrast:  Enteric contrast was not administered  FINDINGS: CHEST PULMONARY ARTERIAL TREE:  No pulmonary embolus is seen  LUNGS:  Mild dependent atelectasis in both lower lobes  Previously seen small left lower lobe pulmonary nodule is not seen today, likely obscured by atelectasis  There is no tracheal or endobronchial lesion  PLEURA:  Unremarkable  HEART/AORTA:  Unremarkable for patient's age  There is a right IJ central line in place  MEDIASTINUM AND COURT:  Unremarkable  CHEST WALL AND LOWER NECK:   Unremarkable  ABDOMEN LIVER/BILIARY TREE:  There is a large amount of pneumobilia in the liver which is expected finding from recent ERCP and stent placement (series 3 image 39 )  There is a common bile duct stent in place  GALLBLADDER:  Multiple gallstones again seen in the gallbladder  There is also air in the gallbladder currently  SPLEEN:  Unremarkable  PANCREAS:  Unremarkable  ADRENAL GLANDS:  Unremarkable  KIDNEYS/URETERS:  One or more sharply circumscribed subcentimeter renal hypodensities are noted  These lesions are too small to accurately characterize, but are statistically most likely to represent benign cortical renal cyst(s)    According to the guidelines published in the [facility] Paper of the ACR Incidental Findings Committee (Radiology 2010), no further workup of these lesions is recommended  STOMACH AND BOWEL:  There is retroperitoneal air surrounding the duodenum and extending into the anterior right pararenal space (series 3 image 38 ) Bowel loops are otherwise unremarkable  APPENDIX:  No findings to suggest appendicitis  ABDOMINOPELVIC CAVITY:  See above regarding retroperitoneal air  There is no intraperitoneal free air  No ascites  No abscess  VESSELS:  There is a small amount of air in the superior mesenteric vein (series 3 image 39 ) PELVIS REPRODUCTIVE ORGANS:  Radiation implant seeds are seen in the prostate  URINARY BLADDER:  Unremarkable  ABDOMINAL WALL/INGUINAL REGIONS:  Unremarkable  OSSEOUS STRUCTURES:  No acute fracture or destructive osseous lesion  Impression: There is retroperitoneal air surrounding the duodenum (series 3 image 38 )  There is also a small amount of air in the superior mesenteric vein (series 3 image 39 ) These findings indicate bowel injury, likely due to recent instrumentation of the duodenum  Again seen are gallstones in the gallbladder  A biliary stent has been placed  There is no evidence of pulmonary embolism  There is mild dependent atelectasis in both lower lobes  I personally discussed this study with Azam Mendez III on 4/13/2018 at 4:02 PM  Workstation performed: [id number]     Fl Ercp Biliary Only    Result Date: 4/13/2018  Narrative: ERCP INDICATION:  Stent placement  COMPARISON:  3/9/2018 IMAGES:  8 FLUOROSCOPY TIME:   1 MINUTE 41 SECONDS FINDINGS: Fluoroscopic guidance was provided for performance of ERCP  BILIARY: CBD stent was placed  Impression: Please see procedure report for further details  Workstation performed: [id number]     Xr Chest Portable Icu    Result Date: 4/14/2018  Narrative: CHEST INDICATION:   respiratory failure  COMPARISON:  Chest radiographs March 8, 2018 and CT chest abdomen pelvis April 13, 2018  EXAM PERFORMED/VIEWS:  XR CHEST PORTABLE ICU FINDINGS: The heart is mildly enlarged    Atherosclerotic changes in the aorta  Lungs are well aerated  Vague groundglass infiltrate left lower lobe  Correlate for early pneumonia  Endotracheal tube with tip approximately 2 cm above ravi  Orogastric tube with tip at EG junction  Right internal jugular central line with tip at cavoatrial junction  Osseous structures appear within normal limits for patient age  Impression: 1  Vague groundglass infiltrate left lower lobe  Correlate for early pneumonia  2   Malpositioned orogastric tube with tip at EG junction  The study was marked in [facility] for immediate notification  Workstation performed: [id number]       EKG, Pathology, and Other Studies: I have personally reviewed pertinent reports        David South MD

## 2018-04-14 NOTE — PLAN OF CARE
Problem: PHYSICAL THERAPY ADULT  Goal: Performs mobility at highest level of function for planned discharge setting  See evaluation for individualized goals  Treatment/Interventions: Functional transfer training, LE strengthening/ROM, Elevations, Therapeutic exercise, Endurance training, Patient/family training, Equipment eval/education, Bed mobility, Gait training, Cognitive reorientation, Spoke to nursing, Spoke to advanced practitioner, Family  Equipment Recommended:  (TBD)       See flowsheet documentation for full assessment, interventions and recommendations  Prognosis: Good  Problem List: Decreased strength, Decreased endurance, Impaired balance, Decreased mobility, Decreased cognition, Impaired judgement, Decreased safety awareness  Assessment: Pt is 78 y o  male seen for PT evaluation on 4/14/2018 s/p admit to [facility] on 4/13/2018 w/ Common bile duct obstruction  Pt presents s/p cardiac arrest s/p ERCP in the OR  Pt recently hospitalized for cholangitis and choledocholithiasis, he was here for outpatient ERCP  After procedure was completed, he returned to supine position and became cyanotic and developed PEA arrest  ACLS initiated and ROSC returned quickly  Note pt was recently admitted in March for AMS and weight loss was d/c'ed on 3/15/18 to [facility], was there for 3 weeks, and [facility]  PT consulted to assess pt's functional mobility and d/c needs  Order placed for PT eval and tx, w/ up w/ A order  Performed at least 2 patient identifiers during session: Name and wristband  Comorbidities affecting pt's physical performance at time of assessment include: basal cell CA, CA, dementia, malignant neoplasm of prostate  PTA, pt was independent w/ all functional mobility w/ no AD/DME, ambulates unrestricted distances and all terrain, has 0-3 TAYE, lives w/ wife in 2 level home and retired   Personal factors affecting pt at time of IE include: inaccessible home environment, ambulating w/ assistive device, stairs to enter home, inability to navigate level surfaces w/o external assistance, decreased cognition, positive fall history, decreased initiation and engagement and limited insight into impairments  Please find objective findings from PT assessment regarding body systems outlined above with impairments and limitations including weakness, impaired balance, decreased endurance, gait deviations, decreased activity tolerance, decreased safety awareness, impaired judgement, fall risk and decreased cognition, as well as mobility assessment (need for CGA-min assist w/ all phases of mobility when usually ambulating independently and need for cueing for mobility technique)  The following objective measures performed on IE also reveal limitations: Barthel Index: 35/100 and Modified [place]: 4 (moderate/severe disability)  Pt's clinical presentation is currently unstable/unpredictable seen in pt's presentation of need for input for task focus and mobility technique, dementia with limited and inconsistent command following, need for CGA-min assist w/ all phases of mobility when usually mobilizing independently, on telemetry monitoring, unstable medical status and pending further medical management  Pt to benefit from continued PT tx to address deficits as defined above and maximize level of f  Barriers to Discharge: Inaccessible home environment     Recommendation: Home PT, Home with family support, OT consult (anticipated pending progress)     PT - OK to Discharge: No    See flowsheet documentation for full assessment

## 2018-04-14 NOTE — PHYSICAL THERAPY NOTE
Physical Therapy Evaluation     Patient's Name: Dat Crenshaw    Admitting Diagnosis  Common bile duct obstruction [K83 1]    Problem List  Patient Active Problem List   Diagnosis    Transaminitis    Hypokalemia    Fall    Altered mental status    Total bilirubin, elevated    Weight loss counseling, encounter for    Common bile duct (CBD) obstruction    H/O prostate cancer    Common bile duct obstruction    Memory difficulty    [facility]    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    Cardiac arrest ([facility]    Acute hypoxemic respiratory failure ([facility] )       Past Medical History  Past Medical History:   Diagnosis Date    Basal cell carcinoma     Cancer ([facility] )     prostate, skin     Choledocholithiasis     Dementia     Malignant neoplasm of prostate [facility])        Past Surgical History  Past Surgical History:   Procedure Laterality Date    ERCP N/A 3/9/2018    Procedure: ENDOSCOPIC RETROGRADE CHOLANGIOPANCREATOGRAPHY (ERCP); Surgeon: Rico Noe MD;  Location: [facility] OR;  Service: Gastroenterology    ERCP  04/13/2018    stenting    PROSTATE SURGERY          04/14/18 0940   Note Type   Note type Eval/Treat   Pain Assessment   Pain Assessment No/denies pain   Pain Score No Pain   Home Living   Type of [address] [place] [address] Two level;Stairs to enter with rails; Ramped entrance  (shower upstairs, ramp via garage)   Bathroom Shower/Tub Tub/shower unit  (showers 1x/wk, sponge bathes daily)   Bathroom Toilet Standard   Bathroom Equipment Grab bars in shower; Shower chair   [address]  (doesn't use at baseline)   Additional Comments Pt navigates FF of stairs to 2nd floor shower with unilateral handrail   Prior Function   Level of [place] Independent with ADLs and functional mobility; Needs assistance with IADLs   Lives With Spouse   Receives Help From Family   ADL Assistance Independent  (needs encouragement from wife to remind to perform)   IADLs Needs assistance  (wife performs at baseline)   Falls in the last 6 months 5 to 10  (several months ago, none since being home from [facility])   Vocational Retired   Comments Pt with recent hospitalization, went to [facility], was d/c'ed 3 weeks after  THen had home PT/OT/ST/RN/CM, no longer receiving home PT/OT and still has ST  Wife providing home environment and pLOF information 2* pt's cognitive impairments  Restrictions/Precautions   Weight Bearing Precautions Per Order No   Braces or Orthoses (none per pt)   Other Precautions Impulsive;Cognitive; Chair Alarm; Bed Alarm;Multiple lines;Telemetry; Fall Risk   General   Family/Caregiver Present Yes  (wife)   Cognition   Overall Cognitive Status Impaired   Arousal/Participation Alert   Orientation Level Oriented to person;Oriented to place;Oriented to time   Memory Decreased short term memory;Decreased recall of recent events   Following Commands Follows one step commands with increased time or repetition   Comments pt agreeable to PT IE   RUE Assessment   RUE Assessment WFL   LUE Assessment   LUE Assessment WFL   RLE Assessment   RLE Assessment WFL  (grossly assessed with functional mobility: at least 3+/5)   LLE Assessment   LLE Assessment WFL  (grossly assessed with functional mobility: at least 3+/5)   Coordination   Movements are Fluid and Coordinated 1   Sensation WFL  (pt denying any numbness/tingling)   Light Touch   RLE Light Touch Grossly intact   LLE Light Touch Grossly intact   Bed Mobility   Supine to Sit 4  Minimal assistance  (log roll technique)   Additional items Assist x 1;HOB elevated; Bedrails; Increased time required;Verbal cues;LE management   Sit to Supine Unable to assess  (pt OOB to recliner post session, chair alarm on)   Additional Comments max vc for task sequencing for safety   Transfers   Sit to Stand 4  Minimal assistance  (CGA)   Additional items Assist x 1; Armrests; Increased time required; Impulsive;Verbal cues   Stand to Sit 4  Minimal assistance  (CGA)   Additional items Assist x 1; Armrests; Increased time required; Impulsive;Verbal cues   Additional Comments vitals pre mobility: 74bpm, 107/63mmHg, 97% SpO2 on RA  vitals post mobility: 72bpm, 122/68mmHg, 96% SpO2 on RA   Ambulation/Elevation   Gait pattern Decreased foot clearance; Foward flexed  (unsteady, veering)   Gait Assistance 4  Minimal assist  (CGA)   Additional items Assist x 1;Verbal cues; Tactile cues  (for RW management; impulsive)   Assistive Device Rolling walker   Distance 20'   Stair Management Assistance Not tested   Balance   Static Sitting Good   Dynamic Sitting Fair +   Static Standing Fair   Dynamic Standing Fair -   Ambulatory Fair -   Endurance Deficit   Endurance Deficit Yes   Activity Tolerance   Activity Tolerance Patient limited by fatigue   Medical Staff Made Aware yes CRNP upgraded OOB order, pt appropriate to see   Nurse Made Aware yes, RN Jessy Brooks verbalized pt appropriate to see, made aware of session outcome/recs  Cleared pt for OOB mobility, present to provide SBA for mobility 2* safety  Assessment   Prognosis Good   Problem List Decreased strength;Decreased endurance; Impaired balance;Decreased mobility; Decreased cognition; Impaired judgement;Decreased safety awareness   Assessment Pt is 78 y o  male seen for PT evaluation on 4/14/2018 s/p admit to [facility] on 4/13/2018 w/ Common bile duct obstruction  Pt presents s/p cardiac arrest s/p ERCP in the OR  Pt recently hospitalized for cholangitis and choledocholithiasis, he was here for outpatient ERCP  After procedure was completed, he returned to supine position and became cyanotic and developed PEA arrest  ACLS initiated and ROSC returned quickly  Note pt was recently admitted in March for AMS and weight loss was d/c'ed on 3/15/18 to [facility], was there for 3 weeks, and returned [facility] with [facility]  PT consulted to assess pt's functional mobility and d/c needs  Order placed for PT eval and tx, w/ up w/ A order  Performed at least 2 patient identifiers during session: Name and wristband  Comorbidities affecting pt's physical performance at time of assessment include: basal cell CA, CA, dementia, malignant neoplasm of prostate  PTA, pt was independent w/ all functional mobility w/ no AD/DME, ambulates unrestricted distances and all terrain, has 0-3 TAYE, lives w/ wife in 2 level home and retired  Personal factors affecting pt at time of IE include: inaccessible home environment, ambulating w/ assistive device, stairs to enter home, inability to navigate level surfaces w/o external assistance, decreased cognition, positive fall history, decreased initiation and engagement and limited insight into impairments  Please find objective findings from PT assessment regarding body systems outlined above with impairments and limitations including weakness, impaired balance, decreased endurance, gait deviations, decreased activity tolerance, decreased safety awareness, impaired judgement, fall risk and decreased cognition, as well as mobility assessment (need for CGA-min assist w/ all phases of mobility when usually ambulating independently and need for cueing for mobility technique)  The following objective measures performed on IE also reveal limitations: Barthel Index: 35/100 and Modified Mayo: 4 (moderate/severe disability)  Pt's clinical presentation is currently unstable/unpredictable seen in pt's presentation of need for input for task focus and mobility technique, dementia with limited and inconsistent command following, need for CGA-min assist w/ all phases of mobility when usually mobilizing independently, on telemetry monitoring, unstable medical status and pending further medical management  Pt to benefit from continued PT tx to address deficits as defined above and maximize level of functional independent mobility and consistency   From PT/mobility standpoint, recommendation at time of d/c would be anticipated Home PT with family support pending progress in order to facilitate return to PLOF  Barriers to Discharge Inaccessible home environment   Goals   Patient Goals to return home   STG Expiration Date 04/24/18   Short Term Goal #1 In 7-10 days: Increase bilateral LE strength 1/2 grade to facilitate independent mobility, Perform all bed mobility tasks independently to decrease caregiver burden, Perform all transfers modified independent to improve independence, Ambulate > 150 ft  with least restrictive assistive device modified independent w/o LOB and w/ normalized gait pattern 100% of the time, Navigate 13 stairs with distant S with unilateral handrail to facilitate return to previous living environment, Increase all balance 1/2 grade to decrease risk for falls, Complete exercise program independently, Tolerate 4 hr OOB to faciliate upright tolerance and Improve Barthel Index score to 50 or greater to facilitate independence    Treatment Day 0   Plan   Treatment/Interventions Functional transfer training;LE strengthening/ROM; Elevations; Therapeutic exercise; Endurance training;Patient/family training;Equipment eval/education; Bed mobility;Gait training;Cognitive reorientation;Spoke to nursing;Spoke to advanced practitioner;Family   PT Frequency 5x/wk   Recommendation   Recommendation Home PT; Home with family support;OT consult  (anticipated pending progress)   Equipment Recommended (TBD)   PT - OK to Discharge No   Modified Ashley Scale   Modified Essex Scale 4   Barthel Index   Feeding 5   Bathing 0   Grooming Score 0   Dressing Score 5   Bladder Score 0  (Velazquez)   Bowels Score 10   Toilet Use Score 5   Transfers (Bed/Chair) Score 10   Mobility (Level Surface) Score 0   Stairs Score 0   Barthel Index Score 35         Irma Costa, PT, DPT

## 2018-04-14 NOTE — RESPIRATORY THERAPY NOTE
Airway clearance protocol completed at this time  Incentive spirometer done with pt  Pt's goal is 640; pt got 1250 and did incentive spirometer 10 times  Wife stated he will need to be told to do the incentive spirometer  Pt tolerated well with encouragement; instructed pt to do incentive spirometer 10 times per hour  No indication for flutter valve; pt's lungs are clear at this time

## 2018-04-14 NOTE — PROGRESS NOTES
Progress Note - ICU Transfer to Step down/med  surg  - Taisha Junior 78 y o  male MRN: [id number]    Unit/Bed#:  Encounter: [id number]    Code Status: Level 1 - Full Code  POA:    POLST:      Reason for ICU adm: cardiac arrest    Active problems:   Patient Active Problem List   Diagnosis    Transaminitis    Hypokalemia    Fall    Altered mental status    Total bilirubin, elevated    Weight loss counseling, encounter for    Common bile duct (CBD) obstruction    H/O prostate cancer    Common bile duct obstruction    Memory difficulty    [facility]    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    [facility]    Acute hypoxemic respiratory failure ([facility] )       Consultants:   GI     History of Present Illness/Summary of clinical course:   Taisha Junior is a 78 y o  male who presents with a past medical history of alcohol abuse and worsening dementia over the past 1-2 years and prostate CA  He was recently admitted in March for evaluation of altered mental status and increasing weight loss  During this admission he had laboratory derangements; transaminitis and an elevated T bili  Imaging of the abdomen/pelvis obtained at that time showing choledocholithiasis  He was taken for on ERCP on 3/9 and was s/p sphincterotomy and stenting with multiple stones in the bile duct  He was admitted to the critical care service at that time for monitoring  He completed an antibiotic course and was to return in 6 weeks for repeat ERCP stone removal in 6 weeks time  He was discharged on 3/15  He has abstained from alcohol since discharge  He came in today for an elective ERCP  He will require repeat ERCP for stent removal in 6-8 weeks  Post procedure, while moving to the OR table to the stretcher, the patient became cyanotic and proceeded to cardiac arrest   Compressions were started immediately  He was given epinephrine x 2 and atropine    ROSC was achieved quickly  His   He did have purposeful movement s/p arrest  ROBERTO was done in the OR, with no significant abnormalities  The patient was transported to CT scan and then to the ICU for further management and care  CT PE was negative  He was extubated today without incident  There was a mild perforation noted on CT scan likely from instrumentation during the procedure  Self sealing is anticipated, there is no surgical intervention needed at this point  He was started on Zosyn for abdominal coverage, this has been transitioned to ciprofloxacin and flagyl for a total abx course of five days  On arrival to the ICU a sharif catheter was placed with return of yuliana hematuria  This is likely secondary to urethral trauma during the insertion  This cleared after a three way catheter was placed and irrigated  He did not require CBI  Urology saw today and recommended removal of the catheter after OOB  He tolerated OOB and the catheter was removed  Recent or scheduled procedures:   XR chest portable ICU   Final Result   1  Vague groundglass infiltrate left lower lobe  Correlate for early pneumonia  2   Malpositioned orogastric tube with tip at EG junction  The study was marked in [facility] for immediate notification  Workstation performed: [id number]         CTA chest ct abdomen pelvis w contrast   Final Result      There is retroperitoneal air surrounding the duodenum (series 3 image 38 )  There is also a small amount of air in the superior mesenteric vein (series 3 image 39 ) These findings indicate bowel injury, likely due to recent instrumentation of the    duodenum  Again seen are gallstones in the gallbladder  A biliary stent has been placed  There is no evidence of pulmonary embolism  There is mild dependent atelectasis in both lower lobes               I personally discussed this study with Giovanna Love III on 4/13/2018 at 4:02 PM                         Workstation performed: [id number]         FL ERCP biliary only   Final Result      Please see procedure report for further details  Workstation performed: [id number]             Outstanding/pending diagnostics:   Echo        Mobilization Plan:   oob to chair    Nutrition Plan: advance diet as tolerated    Discharge Plan:    Patient should be ready for discharge to home with services    Initial PT/OT/ST Recommendations: pending    Initial /Plan: discharge with home services     Specific Diagnosis Plan:    Sepsis: Source: abdominal , Antibiotics: ciprofloxacin/flagyl , Length:  Five days    Need for Infectious Disease consult: no       Spoke with Dr Tian Rm at  regarding transfer  Portions of the record may have been created with voice recognition software  Occasional wrong word or "sound a like" substitutions may have occurred due to the inherent limitations of voice recognition software  Read the chart carefully and recognize, using context, where substitutions have occurred

## 2018-04-14 NOTE — PROGRESS NOTES
Progress Note - Critical Care   Angela Mitchell 78 y o  male MRN: [id number]  Unit/Bed#:  Encounter: [id number]    Attending Physician: Deidra Duvall MD      ______________________________________________________________________  Assessment and Plan:   Principal Problem:    Common bile duct obstruction  Active Problems:    Alcoholism ([facility] )    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    Cardiac arrest ([facility] )    Acute hypoxemic respiratory failure ([facility] )  Resolved Problems:    * No resolved hospital problems  *        Neuro:  Monitor for delirium  Serial neuro exams  Baseline dementia noted  CV:  Hemodynamically stable status post cardiac arrest   No pressors nor inotropic agents required  Arrest likely related to respiratory insufficiency  Pulm:  SBT in the morning, weaning sedation at 5:00 a m     Bronchodilators as needed  Possible extubation  VAP prophylaxis with chlorhexidine gluconate  GI:  PPI for GI prophylaxis  Possible small duodenal perforation s/p stenting, will check with GI regarding feeding  Will need a repeat ERCP with stent removal in 6-8 weeks per GI  :  Hematuria developed after Velazquez placement  Suspect intraurethral balloon inflation  Velazquez changed to 3 way [address] [place] [address] capable of CBI  Urine returned clear yellow  Would leave Velazquez catheter in place until evaluated by Urology  F/E/N: Fluids for hydration  Replete electrolytes as needed  To address feedings in am with GI  ID: Zosyn for possible nonop perforation (suspected due to retroperitoneal air noted postoperatively)  Heme:  Hemoglobin and platelet count stable  Endo:  Q 6 hours blood sugars  Insulin per protocol if needed  Msk/Skin:  Turn q 2 hours  Meticulous skin care  PT OT as needed  Disposition:  Continue ICU level of care      Code Status: Level 1 - Full Code    Counseling / Coordination of Care  Total Critical Care time spent 32 minutes excluding procedures, teaching and family updates  ______________________________________________________________________    24 Hour Events:   Cardiac arrest status post ERCP in the operating room yesterday  Patient with restoration of neurologic status post ROSC  PE and MI were ruled out, no regional wall motion abnormalities were detected on echo  Possible small perforation near the ampulla which is now stented  No surgical intervention required for same per GI  Hematuria noted post Velazquez placement   ______________________________________________________________________    Physical Exam:   GEN:  Well nourished, well developed, appears stated age, no acute distress  HEENT:  Sclera icteric, mucous membranes pink and moist, conjunctiva pink, no rizwan/rhinorrhea  Neck:  No lymphadenopathy, normal ROM, supple, no bruits  CV :  S1S2, no murmurs, rubs or gallops  Intact distal pulses  No JVD  Resp:  Lungs CTA =B/L, with occasional wheezing  No subq air or crepitus  Symmetrical expansion  No cough noted  GI :  Abd soft, nontender, no guarding/rebound, nondistended, normoactive bowel sounds X4 quads, no organomegaly appreciated  :  Hematuria cleared after replacement of Velazquez  Neuro:  CN II-XII grossly intact, nonfocal exam, speech clear, GCS 15  Psych:  Appropriate affect      ______________________________________________________________________  Blood pressure 113/70, pulse 79, temperature 98 4 °F (36 9 °C), resp  rate 16, height 5' 8" (1 727 m), weight 61 2 kg (134 lb 14 7 oz), SpO2 98 %  Temperature:   Temp (24hrs), Av 3 °F (36 3 °C), Min:96 3 °F (35 7 °C), Max:98 4 °F (36 9 °C)    Current Temperature: 98 4 °F (36 9 °C)  Weights:   IBW: 68 4 kg    Body mass index is 20 51 kg/m²    Weight (last 2 days)     Date/Time   Weight    18 1303  61 2 (134 92)              Non-Invasive/Invasive Ventilation Settings:  Respiratory    Lab Data (Last 4 hours)    None         O2/Vent Data (Last 4 hours)       0036          Drager Vent Mode AC/VC+       Resp Rate (BPM) (BPM) 14       VT (mL) (mL) 450       Insp Time (S) (S) 1       FIO2 (%) (%) 50       PEEP (cmH2O) (cmH2O) 5       Rise Time (%) (%) 0 2       MV (Obs) 5 86                 Lab Results   Component Value Date    PHART 7 350 04/13/2018    IZW3AGS 36 3 04/13/2018    PO2ART 91 4 04/13/2018    SFI6RIF 19 6 (L) 04/13/2018    BEART -5 3 04/13/2018    SOURCE Line, Arterial 04/13/2018     SpO2: SpO2: 98 %  Intake and Outputs:  I/O       04/12 0701 - 04/13 0700 04/13 0701 - 04/14 0700    I V  (mL/kg)  1799 9 (29 4)    IV Piggyback  100    Total Intake(mL/kg)  1899 9 (31)    Urine (mL/kg/hr)  670    Total Output   670    Net   +1229 9                Nutrition:        Diet Orders            Start     Ordered    04/13/18 1519  Diet NPO  Diet effective now     Question Answer Comment   Diet Type NPO    RD to adjust diet per protocol?  No        04/13/18 1524          Labs:   Pending      Imaging:  None today  EKG:  Sinus rhythm on cardiac monitor  Micro:  No results found for: Evon Mike, WOUNDCULT, SPUTUMCULTUR  Allergies: No Known Allergies  Medications:   Scheduled Meds:  Current Facility-Administered Medications:  chlorhexidine 15 mL Swish & Spit Q12H [facility] SAAD Monte    fentanyl citrate (PF) 50 mcg Intravenous Q2H PRN SAAD Monte    heparin (porcine) 5,000 Units Subcutaneous [facility] SAAD Monte    iohexol 100 mL Oral Once in imaging Oskar Carrera III, MD    multi-electrolyte 100 mL/hr Intravenous Continuous SAAD Monte Last Rate: 100 mL/hr (04/13/18 1621)   ondansetron 4 mg Intravenous Once PRN Radha Parnell MD    pantoprazole 40 mg Intravenous Q24H [facility] SAAD Monte    piperacillin-tazobactam 3 375 g Intravenous Q6H SAAD Monte Last Rate: 3 375 g (04/13/18 2235)   propofol 5-65 mcg/kg/min Intravenous Titrated SAAD Monte Last Rate: 70 mcg/kg/min (04/13/18 2201)     Continuous Infusions:  multi-electrolyte 100 mL/hr Last Rate: 100 mL/hr (04/13/18 1621)   propofol 5-65 mcg/kg/min Last Rate: 70 mcg/kg/min (04/13/18 2205)     PRN Meds:    fentanyl citrate (PF) 50 mcg Q2H PRN   iohexol 100 mL Once in imaging   ondansetron 4 mg Once PRN     VTE Pharmacologic Prophylaxis: Heparin  VTE Mechanical Prophylaxis: sequential compression device  Invasive lines and devices: Invasive Devices     Central Venous Catheter Line            CVC Central Lines 04/13/18 Triple less than 1 day          Peripheral Intravenous Line            Peripheral IV 04/13/18 Right Hand less than 1 day          Arterial Line            Arterial Line 04/13/18 Right Radial less than 1 day          Drain            NG/OG/Enteral Tube Orogastric 18 Fr Right mouth less than 1 day    Urethral Catheter Temperature probe less than 1 day          Airway            ETT  Cuffed;Oral 8 mm less than 1 day                     Portions of the record may have been created with voice recognition software  Occasional wrong word or "sound a like" substitutions may have occurred due to the inherent limitations of voice recognition software  Read the chart carefully and recognize, using context, where substitutions have occurred      SAAD Gonzalez

## 2018-04-14 NOTE — CASE MANAGEMENT
Initial Clinical Review    Admission: Date/Time/Statement: 4/13/18 @ 1524     Orders Placed This Encounter   Procedures    Inpatient Admission     Standing Status:   Standing     Number of Occurrences:   1     Order Specific Question:   Admitting Physician     Answer:   Margo Guallpa [[id number]]     Order Specific Question:   Level of Care     Answer:   Critical Care [15]     Order Specific Question:   Estimated length of stay     Answer:   More than 2 Midnights     Order Specific Question:   Certification     Answer:   I certify that inpatient services are medically necessary for this patient for a duration of greater than two midnights  See H&P and MD Progress Notes for additional information about the patient's course of treatment  Chief Complaint: No chief complaint on file  History of Illness: History of Present Illness:  Angela Mitchell is a 78 y o  male who presents with a past medical history of alcohol abuse and worsening dementia over the past 1-2 years and prostate CA  He was recently admitted in March for evaluation of altered mental status and increasing weight loss  During this admission he had laboratory derangements; transaminitis and an elevated T bili  Imaging of the abdomen/pelvis obtained at that time showing choledocholithiasis  He was taken for on ERCP on 3/9 and was s/p sphincterotomy and stenting with multiple stones in the bile duct  He was admitted to the critical care service at that time for monitoring  He completed an antibiotic course and was to return in 6 weeks for repeat ERCP stone removal in 6 weeks time  He was discharged on 3/15  He has abstained from alcohol since discharge  He came in today for an elective ERCP  Post procedure, while moving to the OR table to the stretcher, the patient became cyanotic and proceeded to cardiac arrest   Compressions were started immediately  He was given epinephrine x 2 and atropine  ROSC was achieved quickly    His   He did have purposeful movement s/p arrest   ROBERTO was done in the OR, with no significant abnormalities  The patient was transported to CT scan and then to the ICU for further management and care  Vital Signs (abnormal):   04/13/18 1700  98 4 °F (36 9 °C)  90  14  114/74  89  98 %  --  --   04/13/18 1632   96 4 °F (35 8 °C)  100  14  117/75  --  96 %  --  --   04/13/18 1611  --  103   24  --  --  96 %  --  --   04/13/18 1610   97 2 °F (36 2 °C)  97  14  167/87  --  100 %  Other (comment)  --   O2 Device: vented at 04/13/18 1610   04/13/18 1303  97 8 °F (36 6 °C)  77  20  109/71  --  97 %  None (Room air)  --         Abnormal Labs/Diagnostic Test Results: PCXR-   1   Vague groundglass infiltrate left lower lobe   Correlate for early pneumonia  2   Malpositioned orogastric tube with tip at EG junction  CT chest -     There is retroperitoneal air surrounding the duodenum (series 3 image 38 )  There is also a small amount of air in the superior mesenteric vein (series 3 image 39 ) These findings indicate bowel injury, likely due to recent instrumentation of the   duodenum  Again seen are gallstones in the gallbladder   A biliary stent has been placed  There is no evidence of pulmonary embolism   There is mild dependent atelectasis in both lower lobes  ERCP       Past Medical/Surgical History:    Active Ambulatory Problems     Diagnosis Date Noted    Transaminitis 03/08/2018    Hypokalemia 03/08/2018    Fall 03/08/2018    Altered mental status 03/08/2018    Total bilirubin, elevated 03/08/2018    Weight loss counseling, encounter for 03/08/2018    Common bile duct (CBD) obstruction 03/08/2018    H/O prostate cancer 03/15/2018    Memory difficulty 03/30/2016    Alcoholism ([facility] [address] ) 04/06/2018    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 04/06/2018     Resolved Ambulatory Problems     Diagnosis Date Noted    No Resolved Ambulatory Problems     Past Medical History: Diagnosis Date    Basal cell carcinoma     Cancer ([facility] [address] )     Choledocholithiasis     Dementia     Malignant neoplasm of prostate (HCC)        Admitting Diagnosis: Common bile duct obstruction [K83 1]  Transaminitis [R74 0]    Age/Sex: 78 y o  male    Assessment/Plan: Assessment:   1  Cardiac arrest unclear etiology PE vs  ETT obstruction  2  Hypoxic respiratory failure  3  choledocholithiasis s/p sphincterotomy with stenting (3/9) and ERCP with stone removal (4/13)  4  History of alcohol abuse, abstinent for approximately 2 months  5  transaminitis   6  Small retroperitoneal likely secondary to instrumentation   7  Dependent atelectasis   8  History of dementia   9  History of prostate CA  10  Hematuria likely secondary to traumatic sharif catheter insertion    Plan:                Neuro:   -patient is purposefully moving all extremities post cardiac arrest  -will start on propofol gtt with fentanyl IVP to maintain RASS of -1  -[facility] ICU   -sleep hygiene   -patient has a baseline dementia  According the patient's wife, he is normally awake alert and oriented    However, he has difficulty with short term memory loss,  "story telling", and hallucinations              CV:   -continue to monitor hemodynamics  -arterial line and central line inserted in the OR  -not currently requiring pressor therapy  -ROBERTO done in OR showing no pro found wall motion abnormalities  -trend troponins   -12 lead EKG no ischemic changes, ST with RBBB              Lung:   -continue mechanical ventilation  -pulmonary toileting  -repeat abg  -CT PE study showing no pulmonary embolism to account for cardiac arrest, event was likely secondary to ETT obstruction              GI:   -NPO  -OGT inserted in OR for gastric decompression  -mild perforation noted on CT scan likely from instrumentation  -will start zosyn for empiric abdominal coverage  -protonix for GI prophylaxis               FEN:   -monitor electrolytes and replete as necessary  -continue maintenance IVF              :   -yuliana hematuria post sharif catheter insertion   -irrigate sharif catheter q 2 hours until clear, may require CBI and urology consultation if continues   -monitor urine output closely              ID:   -will start zosyn for abdominal coverage in light of perforation  -check culture data if patient spikes fever or WBC continues to rise              Heme:   -heparin for dvt prophylaxis, dose currently held in the setting of hematuria   -patient is not on systemic anticoagulation at home               Endo:  -monitor glucose via bmp               Msk/Skin:   -turn and reposition q 2 hours while in bed  -PT/OT when appropriate               Disposition:   -will admit to ICU s/p cardiac arrest    VTE Pharmacologic Prophylaxis: Sequential compression device (Venodyne)  and Heparin  VTE Mechanical Prophylaxis: sequential compression device     Invasive lines and devices:       Invasive Devices            Central Venous Catheter Line                     CVC Central Lines 04/13/18 Triple less than 1 day                   Peripheral Intravenous Line                     Peripheral IV 04/13/18 Right Hand less than 1 day                   Arterial Line                     Arterial Line 04/13/18 Right Radial less than 1 day                   Airway                     ETT  Cuffed;Oral 8 mm less than 1 day                  Code Status: Level 1 - Full Code  POA:    POLST:    Given critical illness, patient length of stay will require greater than two midnights          Admission Orders:  Scheduled Meds:   Current Facility-Administered Medications:  chlorhexidine 15 mL [facility] SAAD Wilson   ciprofloxacin 500 mg Oral Q12H SAAD Charles   folic acid 1 mg Oral Daily SAAD Wilson   guaiFENesin 600 mg Oral BID Bigg Francisco MD   heparin (porcine) 5,000 Units Subcutaneous [facility] SAAD Wilson   ibuprofen 400 mg Oral Q6H PRN Jackie Bedoya SAAD Reyes   iohexol 100 mL Oral Once in imaging Violet Ulloa III, MD   metroNIDAZOLE 500 mg Oral [facility] SAAD White   nicotine 7 mg Transdermal Daily SAAD Da Silva   pantoprazole 40 mg Intravenous Q24H [facility] SAAD White   tamsulosin 0 4 mg Oral Daily With SAAD Tilley     Continuous Infusions:    PRN Meds: ibuprofen    iohexol    GI consult  4/13  ASSESSMENT & PLAN:  Principal Problem:    Common bile duct obstruction   He is a 78year old male with choledocholithiasis status post ERCP with stone removal and stent placement and ampulla dilatation with code after the procedures likely secondary to lack of oxygenation  Currently he is intubated and he is sedated in the ICU  The patient has sustained a small retroperitoneal perforation likely secondary to the dilation of the ampulla sitting within the diverticulum in the 2nd portion of the duodenum    Prakash Macdonald is a stent in place and I would recommend no surgical intervention for this injury  It will seal on its own      The patient should be made NPO tonight for 24 hours and he should be placed on broad-spectrum antibiotics      Further management should be per the ICU team   The patient will need a follow-up ERCP in approximately 8 weeks to remove the stent an to be fully sure that all the stones are out which I suspect they are    Critical Care note  4/14   Assessment and Plan:   Principal Problem:    Common bile duct obstruction  Active Problems:    Alcoholism ([facility] [address] )    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    Cardiac arrest ([facility] [address] )    Acute hypoxemic respiratory failure ([facility] [address] )  Resolved Problems:    * No resolved hospital problems  *   Neuro:  Monitor for delirium  Serial neuro exams  Baseline dementia noted    CV:  Hemodynamically stable status post cardiac arrest   No pressors nor inotropic agents requied    Arrest likely related to respiratory insufficiency    Pulm: SBT in the morning, weaning sedation at 5:00 a m     Bronchodilators as needed  Possible extubation  VAP prophylaxis with chlorhexidine gluconate    GI:  PPI for GI prophylaxis  Possible small duodenal perforation s/p stenting, will check with GI regarding feeding  Will need a repeat ERCP with stent removal in 6-8 weeks per GI     :  Hematuria developed after Velazquez placement  Suspect intraurethral balloon inflation  Velazquez changed to 3 [address] capable of CBI  Urine returned clear yellow  Would leave Velazquez catheter in place until evaluated by Urology    F/E/N: Fluids for hydration  Replete electrolytes as needed  To address feedings in am with GI    ID: Zosyn for possible nonop perforation (suspected due to retroperitoneal air noted postoperatively)    Heme:  Hemoglobin and platelet count stable    Endo:  Q 6 hours blood sugars  Insulin per protocol if needed  Msk/Skin:  Turn q 2 hours  Meticulous skin care  PT OT as needed    Disposition:  Continue ICU level of care      Critical care  note 4/14                               Mobilization Plan:   oob to chair   Nutrition Plan: advance diet as tolerated  Discharge Plan:               Patient should be ready for discharge to home with services               Initial PT/OT/ST Recommendations: pending               Initial /Plan: discharge with home services              Specific Diagnosis Plan:   Sepsis: Source: abdominal , Antibiotics: ciprofloxacin/flagyl , Length:  Five days                          Need for Infectious Disease consult: no       Spoke with Dr Manjinder Benjamin at  regarding transfer  Urology consult  4/14  Assessment:  Gross hematuria-likely catheter related -resolved  Urinary retention  Prostate cancer status post prostate brachytherapy   Plan:  Agree with present therapy  Would resume TAMSULOSIN  Voiding trial once patient is ambulatory  His upper tracts are normal by CT scan    He should have outpatient urologic follow-up to assure that he is voiding well and to follow up on hematuria  Consider outpatient cystoscopy especially if microscopic hematuria persists on follow-up

## 2018-04-15 ENCOUNTER — APPOINTMENT (INPATIENT)
Dept: NON INVASIVE DIAGNOSTICS | Facility: HOSPITAL | Age: 78
DRG: 314 | End: 2018-04-15
Payer: MEDICARE

## 2018-04-15 ENCOUNTER — APPOINTMENT (INPATIENT)
Dept: RADIOLOGY | Facility: HOSPITAL | Age: 78
DRG: 314 | End: 2018-04-15
Payer: MEDICARE

## 2018-04-15 PROBLEM — R31.9 TRAUMATIC HEMATURIA: Status: ACTIVE | Noted: 2018-04-15

## 2018-04-15 PROBLEM — K63.1 PERFORATED INTESTINE (HCC): Status: ACTIVE | Noted: 2018-04-15

## 2018-04-15 PROCEDURE — C9113 INJ PANTOPRAZOLE SODIUM, VIA: HCPCS | Performed by: NURSE PRACTITIONER

## 2018-04-15 PROCEDURE — 99233 SBSQ HOSP IP/OBS HIGH 50: CPT | Performed by: INTERNAL MEDICINE

## 2018-04-15 PROCEDURE — 71046 X-RAY EXAM CHEST 2 VIEWS: CPT

## 2018-04-15 RX ORDER — LIDOCAINE 50 MG/G
1 PATCH TOPICAL DAILY
Status: DISCONTINUED | OUTPATIENT
Start: 2018-04-15 | End: 2018-04-16 | Stop reason: HOSPADM

## 2018-04-15 RX ORDER — LANOLIN ALCOHOL/MO/W.PET/CERES
3 CREAM (GRAM) TOPICAL
Status: DISCONTINUED | OUTPATIENT
Start: 2018-04-15 | End: 2018-04-16 | Stop reason: HOSPADM

## 2018-04-15 RX ADMIN — HEPARIN SODIUM 5000 UNITS: 5000 INJECTION, SOLUTION INTRAVENOUS; SUBCUTANEOUS at 14:08

## 2018-04-15 RX ADMIN — CIPROFLOXACIN 500 MG: 500 TABLET, FILM COATED ORAL at 09:43

## 2018-04-15 RX ADMIN — CIPROFLOXACIN 500 MG: 500 TABLET, FILM COATED ORAL at 22:00

## 2018-04-15 RX ADMIN — METRONIDAZOLE 500 MG: 500 TABLET ORAL at 22:28

## 2018-04-15 RX ADMIN — MELATONIN TAB 3 MG 3 MG: 3 TAB at 22:28

## 2018-04-15 RX ADMIN — METRONIDAZOLE 500 MG: 500 TABLET ORAL at 14:08

## 2018-04-15 RX ADMIN — CHLORHEXIDINE GLUCONATE 15 ML: 1.2 RINSE ORAL at 11:25

## 2018-04-15 RX ADMIN — TAMSULOSIN HYDROCHLORIDE 0.4 MG: 0.4 CAPSULE ORAL at 17:35

## 2018-04-15 RX ADMIN — GUAIFENESIN 600 MG: 600 TABLET, EXTENDED RELEASE ORAL at 10:00

## 2018-04-15 RX ADMIN — HEPARIN SODIUM 5000 UNITS: 5000 INJECTION, SOLUTION INTRAVENOUS; SUBCUTANEOUS at 22:28

## 2018-04-15 RX ADMIN — HEPARIN SODIUM 5000 UNITS: 5000 INJECTION, SOLUTION INTRAVENOUS; SUBCUTANEOUS at 07:00

## 2018-04-15 RX ADMIN — CHLORHEXIDINE GLUCONATE 15 ML: 1.2 RINSE ORAL at 22:00

## 2018-04-15 RX ADMIN — GUAIFENESIN 600 MG: 600 TABLET, EXTENDED RELEASE ORAL at 17:35

## 2018-04-15 RX ADMIN — LIDOCAINE 1 PATCH: 50 PATCH TOPICAL at 10:00

## 2018-04-15 RX ADMIN — NICOTINE 7 MG: 7 PATCH TRANSDERMAL at 10:00

## 2018-04-15 RX ADMIN — METRONIDAZOLE 500 MG: 500 TABLET ORAL at 07:00

## 2018-04-15 RX ADMIN — PANTOPRAZOLE SODIUM 40 MG: 40 INJECTION, POWDER, FOR SOLUTION INTRAVENOUS at 10:00

## 2018-04-15 RX ADMIN — IBUPROFEN 400 MG: 400 TABLET ORAL at 01:58

## 2018-04-15 RX ADMIN — FOLIC ACID 1 MG: 1 TABLET ORAL at 10:00

## 2018-04-15 NOTE — ASSESSMENT & PLAN NOTE
Successfully extubated  Noted cough yesterday added mucinex and respiratory protocol, with flutter valve  Xray from 4/14 showed Left lower lobe ground glass pattern  Patient on Cipr and Flagyl  Will reassess exam and coverage which may need to be broadened for HCAP with GN aspiration potential   Patient doing incentive spirometry  Lungs clear with diminished breath sounds    Will repeat a PA and lateral chest x-ray this evening distress sure that we do not need to broaden neck coverage out again

## 2018-04-15 NOTE — PROGRESS NOTES
Received signout from ICU staff  Reviewed chart  Patient seen and examined resting comfortably in chair  No family present    Patient with pleasant confusion  Reports no new pain but noted to have very lose cough  Will initiated mucous clearance protocol with respiratory and add mucinex

## 2018-04-15 NOTE — PROGRESS NOTES
Progress Note - Sergey Loomis 1940, 78 y o  male MRN: [id number]    Unit/Bed#: -01 Encounter: [id number]    Primary Care Provider: Justin Mix DO   Date and time admitted to hospital: 4/13/2018 12:02 PM        Cardiac arrest [facility])   Assessment & Plan    Patient  Had a witnessed PEA arrest post elective ERCP  ROSC rapid  Full workup identified no obvious cause, attributed to acute respiratory arrest on vent  Patient complaining of chest pain after CPR will add lidocaine patch  Motrin in place  Reviewed with GI okay with them to use NSAID  Monitor creatinine        Acute hypoxemic respiratory failure [facility])   Assessment & Plan    Successfully extubated  Noted cough yesterday added mucinex and respiratory protocol, with flutter valve  Xray from 4/14 showed Left lower lobe ground glass pattern  Patient on Cipr and Flagyl  Will reassess exam and coverage which may need to be broadened for HCAP with GN aspiration potential   Patient doing incentive spirometry  Lungs clear with diminished breath sounds  Will repeat a PA and lateral chest x-ray this evening distress sure that we do not need to broaden neck coverage out again        Perforated intestine [facility])   Assessment & Plan    Possible microperforation of duodenum now with stent felt to be able to seal itself off with time  Exam benign  Will check with GI on follow up needs        * Common bile duct obstruction   Assessment & Plan    S/p ERCP with stent, course complicated by possible perforation of duodenum with free air per report  Will check in with GI on f/u imaging or other needs  Exam benign  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   Assessment & Plan    Spouse desires to resume B12 complex  Will put in for the use of there on medications  She also requested resumption of melatonin to help him sleep  There is no contraindication to this    Spouse also requesting resumption of Ensure andelive patient has been losing weight and not having a great appetite  Traumatic hematuria   Assessment & Plan    Patient with history of Prostate Cancer, developed hematuria after sharif placement  H/H stable  Urology consult appreciated  Resume flomax, give voiding trial when up and around  Urology f/u as outpatient  Symptoms currently resolved  Sharif out some red tinged urine still present  Patient without evidence for retention  Will continue to monitor        Alcoholism [facility])   Assessment & Plan    Remote history  VTE Pharmacologic Prophylaxis:   Pharmacologic: Heparin  Mechanical: Mechanical VTE prophylaxis in place  Patient Centered Rounds: I have performed bedside rounds with nursing staff today  Discussions with Specialists or Other Care Team Provider:  Discussed with GI  Education and Discussions with Family / Patient:  Updated patient's spouse at bedside  Time Spent for Care: 30 minutes  More than 50% of total time spent on counseling and coordination of care as described above  Current Length of Stay: 2 day(s)  Current Patient Status: Inpatient   Certification Statement: The patient will continue to require additional inpatient hospital stay due to Re-evaluate pulmonary status, monitor urological status, evaluate ambulatory function    Discharge Plan:  Possible for a m  Code Status: Level 1 - Full Code    Subjective:   Patient cooperative in agreeable to stay at an additional night to continue to monitor key issues  His not eating quite well  Spouse discussed with me resuming melatonin for sleep, resuming his B12 complex, and adding Ensure to assure maintenance of weight  Patient reports no pain however his wife reports when he moves around that he is having discomfort  A lidocaine patch was added to the chest any does have access to NSAIDs  Observed eating no immediate cough  Patient did have a loose cough last eating    PA and lateral chest x-ray has been entered    Objective:   Vitals:   Temp (24hrs), Av 9 °F (37 2 °C), Min:98 5 °F (36 9 °C), Max:99 8 °F (37 7 °C)    HR:  [72-85] 85  Resp:  [18-20] 18  BP: ()/(50-70) 117/66  SpO2:  [96 %-98 %] 97 %  Body mass index is 22 66 kg/m²  Input and Output Summary (last 24 hours): Intake/Output Summary (Last 24 hours) at 04/15/18 1804  Last data filed at 04/15/18 1414   Gross per 24 hour   Intake              360 ml   Output              900 ml   Net             -540 ml       Physical Exam:     Physical Exam  General moderately nourished well-developed male in flat affect no acute distress normocephalic atraumatic pupils equal round reactive to light extraocular muscles intact mucous membranes are moist neck is supple there is no JVD no lymph nodes no carotid bruits chest is decreased but clear to auscultation is no rhonchi rales or wheezes  Cardiovascular regular rate rhythm positive S1 and S2 no S3-S4 murmur or gallop  Abdomen soft nontender nondistended with positive bowel sounds no hepatosplenomegaly no guarding or rebound  Neurologically awake alert oriented, to self difficult to attain with her time and space orientation is present as patient does confabulate  cranial nerves 2-12 appear to be intact  Additional Data:   Labs:    Results from last 7 days  Lab Units 18  0451   WBC Thousand/uL 12 15*   HEMOGLOBIN g/dL 13 7   HEMATOCRIT % 41 3   PLATELETS Thousands/uL 181   LYMPHO PCT % 5*   MONO PCT MAN % 2*   EOSINO PCT MANUAL % 0       Results from last 7 days  Lab Units 18  0451   SODIUM mmol/L 137   POTASSIUM mmol/L 4 1   CHLORIDE mmol/L 106   CO2 mmol/L 23   BUN mg/dL 16   CREATININE mg/dL 0 65   CALCIUM mg/dL 8 6   TOTAL PROTEIN g/dL 6 1*   BILIRUBIN TOTAL mg/dL 1 00   ALK PHOS U/L 94   ALT U/L 298*   AST U/L 132*   GLUCOSE RANDOM mg/dL 115       Results from last 7 days  Lab Units 18  1609   INR  1 07       * I Have Reviewed All Lab Data Listed Above    * Additional Pertinent Lab Tests Reviewed: No New Labs Available For Today    Imaging:    Imaging Reports Reviewed Today Include:  Awaiting chest x-ray, and echo    Cultures:   Blood Culture: No results found for: BLOODCX  Urine Culture: No results found for: URINECX  Sputum Culture: No components found for: SPUTUMCX  Wound Culture: No results found for: WOUNDCULT    Last 24 Hours Medication List:     Current Facility-Administered Medications:  chlorhexidine 15 mL [facility], CRNP   ciprofloxacin 500 mg Oral Q12H [address], CRNP   folic acid 1 mg Oral Daily [facility], CRNP   guaiFENesin 600 mg Oral BID Caprice Benavides MD   heparin (porcine) 5,000 Units Subcutaneous [facility], CRNP   ibuprofen 400 mg Oral Q6H PRN [facility], CRNP   iohexol 100 mL Oral Once in imaging Allen Win III, MD   lidocaine 1 patch Transdermal Daily Caprice Benavides MD   melatonin 3 mg Oral HS Caprice Benavides MD   metroNIDAZOLE 500 mg Oral [facility], CRNP   nicotine 7 mg Transdermal Daily SAAD Campos   pantoprazole 40 mg Intravenous Q24H [address] [facility], CRNIMO   tamsulosin 0 4 mg Oral Daily [address] SAAD Romero        Today, Patient Was Seen By: Caprice Benavides MD    ** Please Note: Dragon 360 Dictation voice to text software may have been used in the creation of this document   **

## 2018-04-15 NOTE — ASSESSMENT & PLAN NOTE
S/p ERCP with stent, course complicated by possible perforation of duodenum with free air per report  Will check in with GI on f/u imaging or other needs  Exam benign

## 2018-04-15 NOTE — ASSESSMENT & PLAN NOTE
Patient  Had a witnessed PEA arrest post elective ERCP  ROSC rapid  Full workup identified no obvious cause, attributed to acute respiratory arrest on vent  Patient complaining of chest pain after CPR will add lidocaine patch  Motrin in place  Reviewed with GI okay with them to use NSAID    Monitor creatinine

## 2018-04-15 NOTE — ASSESSMENT & PLAN NOTE
Possible microperforation of duodenum now with stent felt to be able to seal itself off with time  Exam benign   Will check with GI on follow up needs

## 2018-04-15 NOTE — ASSESSMENT & PLAN NOTE
Spouse desires to resume B12 complex  Will put in for the use of there on medications  She also requested resumption of melatonin to help him sleep  There is no contraindication to this  Spouse also requesting resumption of Ensure andelive patient has been losing weight and not having a great appetite

## 2018-04-15 NOTE — ASSESSMENT & PLAN NOTE
Patient with history of Prostate Cancer, developed hematuria after sharif placement  H/H stable  Urology consult appreciated  Resume flomax, give voiding trial when up and around  Urology f/u as outpatient  Symptoms currently resolved  Sharif out some red tinged urine still present  Patient without evidence for retention    Will continue to monitor

## 2018-04-16 ENCOUNTER — APPOINTMENT (INPATIENT)
Dept: NON INVASIVE DIAGNOSTICS | Facility: HOSPITAL | Age: 78
DRG: 314 | End: 2018-04-16
Payer: MEDICARE

## 2018-04-16 VITALS
BODY MASS INDEX: 21.15 KG/M2 | HEART RATE: 88 BPM | OXYGEN SATURATION: 96 % | SYSTOLIC BLOOD PRESSURE: 139 MMHG | DIASTOLIC BLOOD PRESSURE: 77 MMHG | RESPIRATION RATE: 18 BRPM | HEIGHT: 68 IN | TEMPERATURE: 98.1 F | WEIGHT: 139.55 LBS

## 2018-04-16 PROCEDURE — 93306 TTE W/DOPPLER COMPLETE: CPT

## 2018-04-16 PROCEDURE — 99223 1ST HOSP IP/OBS HIGH 75: CPT | Performed by: INTERNAL MEDICINE

## 2018-04-16 PROCEDURE — 93306 TTE W/DOPPLER COMPLETE: CPT | Performed by: INTERNAL MEDICINE

## 2018-04-16 PROCEDURE — C9113 INJ PANTOPRAZOLE SODIUM, VIA: HCPCS | Performed by: NURSE PRACTITIONER

## 2018-04-16 PROCEDURE — 99232 SBSQ HOSP IP/OBS MODERATE 35: CPT | Performed by: NURSE PRACTITIONER

## 2018-04-16 PROCEDURE — 99239 HOSP IP/OBS DSCHRG MGMT >30: CPT | Performed by: INTERNAL MEDICINE

## 2018-04-16 PROCEDURE — 97116 GAIT TRAINING THERAPY: CPT

## 2018-04-16 RX ORDER — LIDOCAINE 40 MG/G
CREAM TOPICAL AS NEEDED
Qty: 30 G | Refills: 0 | Status: SHIPPED | OUTPATIENT
Start: 2018-04-16 | End: 2018-05-14

## 2018-04-16 RX ORDER — METRONIDAZOLE 500 MG/1
500 TABLET ORAL EVERY 8 HOURS SCHEDULED
Qty: 33 TABLET | Refills: 0 | Status: SHIPPED | OUTPATIENT
Start: 2018-04-16 | End: 2018-04-27

## 2018-04-16 RX ORDER — LEVOFLOXACIN 500 MG/1
500 TABLET, FILM COATED ORAL EVERY 24 HOURS
Qty: 11 TABLET | Refills: 0 | Status: SHIPPED | OUTPATIENT
Start: 2018-04-16 | End: 2018-04-27

## 2018-04-16 RX ORDER — LEVOFLOXACIN 500 MG/1
500 TABLET, FILM COATED ORAL EVERY 24 HOURS
Status: DISCONTINUED | OUTPATIENT
Start: 2018-04-16 | End: 2018-04-16 | Stop reason: HOSPADM

## 2018-04-16 RX ORDER — SACCHAROMYCES BOULARDII 250 MG
250 CAPSULE ORAL 2 TIMES DAILY
Qty: 60 CAPSULE | Refills: 0 | Status: SHIPPED | OUTPATIENT
Start: 2018-04-16 | End: 2018-05-14

## 2018-04-16 RX ORDER — GUAIFENESIN 600 MG
600 TABLET, EXTENDED RELEASE 12 HR ORAL 2 TIMES DAILY
Qty: 60 TABLET | Refills: 0 | Status: SHIPPED | OUTPATIENT
Start: 2018-04-16 | End: 2018-05-14

## 2018-04-16 RX ADMIN — PANTOPRAZOLE SODIUM 40 MG: 40 INJECTION, POWDER, FOR SOLUTION INTRAVENOUS at 09:42

## 2018-04-16 RX ADMIN — METRONIDAZOLE 500 MG: 500 TABLET ORAL at 05:58

## 2018-04-16 RX ADMIN — CHLORHEXIDINE GLUCONATE 15 ML: 1.2 RINSE ORAL at 09:42

## 2018-04-16 RX ADMIN — FOLIC ACID 1 MG: 1 TABLET ORAL at 09:42

## 2018-04-16 RX ADMIN — HEPARIN SODIUM 5000 UNITS: 5000 INJECTION, SOLUTION INTRAVENOUS; SUBCUTANEOUS at 05:57

## 2018-04-16 RX ADMIN — METRONIDAZOLE 500 MG: 500 TABLET ORAL at 13:59

## 2018-04-16 RX ADMIN — FOLIC ACID-PYRIDOXINE-CYANOCOBALAMIN TAB 2.5-25-2 MG 1 TABLET: 2.5-25-2 TAB at 09:42

## 2018-04-16 RX ADMIN — GUAIFENESIN 600 MG: 600 TABLET, EXTENDED RELEASE ORAL at 09:42

## 2018-04-16 RX ADMIN — IBUPROFEN 400 MG: 400 TABLET ORAL at 05:58

## 2018-04-16 RX ADMIN — CIPROFLOXACIN 500 MG: 500 TABLET, FILM COATED ORAL at 09:42

## 2018-04-16 NOTE — PLAN OF CARE
Problem: DISCHARGE PLANNING - CARE MANAGEMENT  Goal: Discharge to post-acute care or home with appropriate resources  INTERVENTIONS:  - Conduct assessment to determine patient/family and health care team treatment goals, and need for post-acute services based on payer coverage, community resources, and patient preferences, and barriers to discharge  - Address psychosocial, clinical, and financial barriers to discharge as identified in assessment in conjunction with the patient/family and health care team  - Arrange appropriate level of post-acute services according to patients   needs and preference and payer coverage in collaboration with the physician and health care team  - Communicate with and update the patient/family, physician, and health care team regarding progress on the discharge plan  - Arrange appropriate transportation to post-acute venues  Outcome: Progressing  Patient to discharge home with [facility], possible referral to outpatient care coordinator if patient agreeable

## 2018-04-16 NOTE — PHYSICAL THERAPY NOTE
Physical Therapy Treatment Note       04/16/18 1101   Pain Assessment   Pain Assessment No/denies pain   Pain Score No Pain   Restrictions/Precautions   Weight Bearing Precautions Per Order No   Braces or Orthoses (none per pt)   Other Precautions Impulsive;Cognitive; Fall Risk   General   Chart Reviewed Yes   Response to Previous Treatment Patient with no complaints from previous session  Family/Caregiver Present Yes  (wife- providing motivation to pt)   Cognition   Overall Cognitive Status Impaired   Arousal/Participation Alert; Cooperative  (pleasant)   Attention Attends with cues to redirect   Orientation Level Oriented to person;Oriented to place;Oriented to time   Memory Decreased recall of recent events   Following Commands Follows one step commands without difficulty   Comments pt agreeable to PT session   Subjective   Subjective "I am going home today" "I want to go home"   Bed Mobility   Supine to Sit 4  Minimal assistance   Additional items Assist x 1; Increased time required;Verbal cues   Sit to Supine 5  Supervision   Additional items Assist x 1;Verbal cues   Transfers   Sit to Stand 5  Supervision   Additional items Increased time required;Verbal cues; Impulsive;Assist x 1   Stand to Sit 5  Supervision   Additional items Increased time required;Verbal cues; Impulsive;Assist x 1   Ambulation/Elevation   Gait pattern Inconsistent lavon  (mild lateral sway; no overt LOB)   Gait Assistance 5  Supervision   Additional items Assist x 1;Verbal cues  (for pacing, impulsivity; vc for obstacle negotiation)   Assistive Device None   Distance 150' x3   Stair Management Assistance Not tested  (pt declining)   Balance   Static Sitting Good   Dynamic Sitting Good   Static Standing Good   Dynamic Standing Fair +   Ambulatory Fair +   Endurance Deficit   Endurance Deficit Yes   Activity Tolerance   Activity Tolerance Patient tolerated treatment well   Nurse Made Aware yes, MARTI Carroll verbalized pt appropriate to see, made aware of session outcome/recs   Assessment   Prognosis Good   Problem List Decreased strength;Decreased endurance; Impaired balance;Decreased mobility; Decreased cognition; Impaired judgement;Decreased safety awareness   Assessment Pt seen for PT treatment session this date with interventions consisting of gait training w/ emphasis on improving pt's ability to ambulate level surfaces x 150' x3 with SBA provided by therapist with no AD  Pt agreeable to PT treatment session upon arrival, pt found supine in bed w/ HOB elevated, in no apparent distress, A&O x 3 and responsive  In comparison to previous session, pt with improvements in ambulatory balance and tolerance without use of AD, improved gait quality and velocity without overt LOB observed  vc required for pacing and obstacle negotiation; pt remains impulsive with activity, vc required for safety  Post session: pt returned BTB (pt refusing to sit OOB in recliner), all needs in reach and RN notified of session findings/recommendations  Continue to recommend Home PT with wife/family support vs OP PT at time of d/c in order to maximize pt's functional independence and safety w/ mobility  Pt continues to be functioning below baseline level, and remains limited 2* factors listed above and including at risk for falls given remote h/o falls PTA  PT will continue to see pt while here in order to address the deficits listed above and provide interventions consistent w/ POC in effort to achieve STGs  Barriers to Discharge Inaccessible home environment   Goals   Patient Goals to return home   STG Expiration Date 04/24/18   Short Term Goal #1 STGs remain appropriate   Treatment Day 1   Plan   Treatment/Interventions Functional transfer training;LE strengthening/ROM; Elevations; Therapeutic exercise; Endurance training;Patient/family training;Equipment eval/education; Bed mobility;Gait training;Cognitive reorientation;Spoke to nursing;Family   Progress Progressing toward goals PT Frequency 5x/wk   Recommendation   Recommendation Home PT; Home with family support  (vs OPPT; anticipated pending progress)   Equipment Recommended (TBD, none anticipated)   PT - OK to Discharge No   Additional Comments Pt to clear stairs prior to safe d/c disposition home         Stevan Weber, PT, DPT    Time of PT treatment session: 9885-1426

## 2018-04-16 NOTE — DISCHARGE INSTRUCTIONS
Probiotic (By mouth)   May increase the number of healthy bacteria in your stomach and intestines  Brand Name(s): Abatinex, Acidophilus Probiotic Blend, Adult Probiotic, Align, BD Lactinex, Bacid, Bacid Probiotic, Bifidonate, BioGaia, BioGaia Gastrus, Culturelle, Culturelle Advanced Immune Defense, Culturelle Digestive Health Probiotic, Culturelle Health & Wellness Probiotic, Culturelle Kids Chewables Probiotic   There may be other brand names for this medicine  When This Medicine Should Not Be Used: This medicine is not right for everyone  Do not use it if you had an allergic reaction to a probiotic, such as acidophilus, bifidobacterium, lactobacillus, saccharomyces, or streptococcus thermophilus  How to Use This Medicine:   Capsule, Delayed Release Capsule, Liquid, Powder, Tablet, Stick, Spray, Chewable Tablet, Coated Tablet, Wafer  · Your doctor will tell you how much medicine to use  Do not use more than directed  · Follow the instructions on the medicine label if you are using this medicine without a prescription  · Tablet or delayed-release capsule: Swallow whole  Do not chew, crush, or break  · Powder:  Be careful to not breathe in the powder, because it may bother your lungs  Do not get the powder on your skin  If you mix the powder in food or liquid, do this right before you take the medicine  Do not mix it and then save it for later  · Chewable tablet or wafer:  Chew it completely before you swallow  · Measure the oral liquid medicine with a marked measuring spoon, oral syringe, or medicine cup  · Missed dose: Take a dose as soon as you remember  If it is almost time for your next dose, wait until then and take a regular dose  Do not take extra medicine to make up for a missed dose  · Some brands must be stored in the refrigerator, and some other brands must be stored at room temperature  Follow the directions on the label   Ask your pharmacist if you are not sure how to store your medicine  Drugs and Foods to Avoid:      Ask your doctor or pharmacist before using any other medicine, including over-the-counter medicines, vitamins, and herbal products  Warnings While Using This Medicine:   · Different brands of this medicine will have different warnings, because the specific ingredients will be different  Read the label on your medicine carefully  Ask your doctor or pharmacist if you have questions  · Tell your doctor if you are pregnant or breastfeeding, or if you are allergic to milk, lactose, yeast, or soy  · Ask your doctor before you use this medicine if you have a central line (port or catheter), or if you have a fever  · Keep all medicine out of the reach of children  Never share your medicine with anyone  Possible Side Effects While Using This Medicine:   Call your doctor right away if you notice any of these side effects:  · Allergic reaction: Itching or hives, swelling in your face or hands, swelling or tingling in your mouth or throat, chest tightness, trouble breathing  If you notice these less serious side effects, talk with your doctor:   · Mild diarrhea, constipation, nausea, or vomiting  · Mild gas or cramps  If you notice other side effects that you think are caused by this medicine, tell your doctor  Call your doctor for medical advice about side effects  You may report side effects to FDA at 9-446-FDA-9172  © 2017 [address] Information is for End User's use only and may not be sold, redistributed or otherwise used for commercial purposes  The above information is an  only  It is not intended as medical advice for individual conditions or treatments  Talk to your doctor, nurse or pharmacist before following any medical regimen to see if it is safe and effective for you  Nicotine (Absorbed through the skin)   Nicotine (ANGIE-oh-teen)  Helps you quit smoking     Brand Name(s): [employer]el OGK-5 Nicotine Transdermal System, [employer] Nicotine Transdermal System, Leader Nicotine Transdermal System, Nicoderm CQ, Nicoderm CQ Clear, Nicotrol, [facility] Nicotine, [employer]mark Nicotine Transdermal System   There may be other brand names for this medicine  When This Medicine Should Not Be Used: This medicine is not right for everyone  Do not use it if you had an allergic reaction to nicotine  How to Use This Medicine:   Patch  · Follow the instructions on the medicine label if you are using this medicine without a prescription  · Read and follow the patient instructions that come with this medicine  Talk to your doctor or pharmacist if you have any questions  · Wash your hands with soap and water before and after applying a patch  · Leave the patch in its sealed wrapper until you are ready to put it on  Tear the wrapper open carefully  NEVER CUT the wrapper or the patch with scissors  Do not use any patch that has been cut by accident  · NicoDerm® CQ:   ¨ This is a 3-step program  If you smoke more than 10 cigarettes per day, start with step 1 followed by step 2 and step 3  If you smoke 10 or fewer cigarettes per day, start with step 2 followed by step 3  ¨ Begin using the patch on the morning of your quit day, even if you are not able to stop smoking immediately  ¨ Apply the patch at about the same time every day to skin that is clean, dry, and free of hair  ¨ The patient instructions will show the body areas where you can wear the patch  When putting on each new patch, choose a different place within these areas  Do not put the new patch on the same place you wore the last one  Be sure to remove the old patch before applying a new one  ¨ Do not put the patch over irritated skin  Do not use creams or lotions, including sunscreen, on the skin where you apply the patch, because it may not stick well  ¨ Apply a new patch if one falls off    ¨ If you have vivid dreams or sleep problems, remove the patch at bedtime and apply a new one in the morning  · Keep using this medicine for the full treatment time  If you feel you need to use this medicine for a longer period of time, talk to your doctor  · Store the patches at room temperature in a closed container, away from heat, moisture, and direct light  · Fold the used patch in half with the sticky sides together  Throw any used patch away so that children or pets cannot get to it  You will also need to throw away old patches after the expiration date has passed  Drugs and Foods to Avoid:      Ask your doctor or pharmacist before using any other medicine, including over-the-counter medicines, vitamins, and herbal products  Warnings While Using This Medicine:   · Pregnant or breastfeeding women should only use this medicine as directed by a doctor  Smoking can seriously harm your unborn child  Try to stop smoking without using medicine  Although this medicine is believed to be safer than smoking, the risks of its use during pregnancy are not fully known  · Tell your doctor if you have heart disease, heart rhythm problems, high blood pressure, or you had a recent heart attack  Tell your doctor if you have an allergy to adhesive tape  · This medicine may cause the following problems:  ¨ High blood pressure  ¨ Increase in heart rate  · The opaque NicoDerm® CQ patch may cause skin burns if you have a procedure called a magnetic resonance imaging (MRI) scan  You must remove the patch before an MRI  · Keep all medicine out of the reach of children  Never share your medicine with anyone    Possible Side Effects While Using This Medicine:   Call your doctor right away if you notice any of these side effects:  · Allergic reaction: Itching or hives, swelling in your face or hands, swelling or tingling in your mouth or throat, chest tightness, trouble breathing  · Dizziness, headache, upset stomach, drooling, vomiting, diarrhea, cold sweats, blurred vision, trouble hearing, confusion, fainting, or weakness  · Fast, slow, pounding, or uneven heartbeat  If you notice these less serious side effects, talk with your doctor:   · Mild skin redness, itching, burning, or tingling where you wear the patch  · Vivid dreams or trouble sleeping  If you notice other side effects that you think are caused by this medicine, tell your doctor  Call your doctor for medical advice about side effects  You may report side effects to FDA at 0-185-WJZ-2009  © 2017 [address] Information is for End User's use only and may not be sold, redistributed or otherwise used for commercial purposes  The above information is an  only  It is not intended as medical advice for individual conditions or treatments  Talk to your doctor, nurse or pharmacist before following any medical regimen to see if it is safe and effective for you  Metronidazole (By mouth)   Metronidazole (met-sai-SHARLENE-da-zole)  Treats bacterial infections  Brand Name(s): Flagyl, Flagyl 375   There may be other brand names for this medicine  When This Medicine Should Not Be Used: This medicine is not right for everyone  Do not use if you had an allergic reaction to metronidazole or similar medicines  Do not use this medicine to treat trichomoniasis if you are in the first 3 months of pregnancy  How to Use This Medicine:   Capsule, Tablet, Long Acting Tablet  · Take this medicine as directed, and take it at the same time each day  · Capsule or Tablet: Take with food or milk to avoid stomach upset  · Extended-release tablet: Take it on an empty stomach, 1 hour before or 2 hours after a meal   · Swallow the extended-release tablet whole  Do not crush, break, or chew it  · Take all of the medicine in your prescription to clear up your infection, even if you feel better after the first few doses  · Missed dose: Take a dose as soon as you remember  If it is almost time for your next dose, wait until then and take a regular dose   Do not take extra medicine to make up for a missed dose  · Store the medicine in a closed container at room temperature, away from heat, moisture, and direct light  Drugs and Foods to Avoid:   Ask your doctor or pharmacist before using any other medicine, including over-the-counter medicines, vitamins, and herbal products  · Do not use this medicine if you have taken disulfiram within the last 2 weeks  Do not drink alcohol or use medicine that contains alcohol or propylene glycol while using this medicine and for at least 3 days after you have finished metronidazole treatment  · Some foods and medicines can affect how metronidazole works  Tell your doctor if you are using busulfan, cimetidine, lithium, phenobarbital, phenytoin, or warfarin or another blood thinner  Warnings While Using This Medicine:   · Tell your doctor if you are pregnant or breastfeeding, or if you have kidney disease, liver disease, blood or bone marrow problems, oral thrush, yeast infection, or a history of seizures  · This medicine may cause the following problems:  ¨ Brain or nervous system problems, including seizures, meningitis, or vision problems  · Trichomoniasis treatment:  Your doctor may want to also treat your sexual partner, even if he or she has no symptoms  Also, you may want to use a condom during sexual intercourse  These measures will help keep you from getting the infection back again from your partner  If you have any questions, ask your doctor  · Call your doctor if your symptoms do not improve or if they get worse  · Your doctor will do lab tests at regular visits to check on the effects of this medicine  Keep all appointments  · Tell any doctor or dentist who treats you that you are using this medicine  This medicine may affect certain medical test results  · Keep all medicine out of the reach of children  Never share your medicine with anyone    Possible Side Effects While Using This Medicine:   Call your doctor right away if you notice any of these side effects:  · Allergic reaction: Itching or hives, swelling in your face or hands, swelling or tingling in your mouth or throat, chest tightness, trouble breathing  · Confusion, drowsiness, fever, headache, loss of appetite, nausea or vomiting, stiff neck or back  · Dizziness, problems with muscle control, clumsiness, shakiness, trouble talking  · Fever, chills, cough, sore throat, and body aches  · Numbness, tingling, or burning pain in your hands, arms, legs, or feet  · Seizures  If you notice these less serious side effects, talk with your doctor:   · Mild nausea  · Unusual or unpleasant taste in your mouth  If you notice other side effects that you think are caused by this medicine, tell your doctor  Call your doctor for medical advice about side effects  You may report side effects to FDA at 3-451-FDA-0817  © 2017 [address] Information is for End User's use only and may not be sold, redistributed or otherwise used for commercial purposes  The above information is an  only  It is not intended as medical advice for individual conditions or treatments  Talk to your doctor, nurse or pharmacist before following any medical regimen to see if it is safe and effective for you  Lidocaine Patch (On the skin)   Lidocaine (CAV-lpj-vgjy)  Treats nerve pain that is caused by herpes zoster or shingles  Brand Name(s): DermacinRx PHN Acosta, DermacinRx ZRM Acosta, Dermazyl, Lidocaine Novaplus, Lidocare, [place], Xryliderm   There may be other brand names for this medicine  When This Medicine Should Not Be Used: This medicine is not right for everyone  Do not use it if you had an allergic reaction to lidocaine or similar medicines  How to Use This Medicine:   Patch  · Your doctor will tell you how many patches to use, where to apply them, and how often to apply them  Do not use more patches or apply them more often than your doctor tells you to    · This medicine should be used only on the skin  Do not get it in your eyes, nose, or mouth  If it does get on these areas, rinse it off with water or saline right away  · Keep the patch in its envelope until you are ready to put it on  You may cut the patch into a smaller size with scissors before you take off the patch liner  · Wash your hands with soap and water before and after applying a patch  · Apply the patch to clean, dry skin  Do not put the patch over burns, cuts, or irritated skin  · The patch will not stick if it gets wet  Do not wear it when you take a bath or shower, or when you go swimming  · Do not wear the patch for longer than 12 hours in any 24-hour period  · Store the patches at room temperature in a closed container, away from heat, moisture, and direct light  · Fold the used patch in half with the sticky sides together  Throw any used patch away so that children or pets cannot get to it  You will also need to throw away old patches after the expiration date has passed  Drugs and Foods to Avoid:   Ask your doctor or pharmacist before using any other medicine, including over-the-counter medicines, vitamins, and herbal products  · Some foods and medicines can affect how lidocaine works  Tell your doctor if you are using the followin [facility] for heart rhythm problems, such as mexiletine  ¨ Other topical medicine  Warnings While Using This Medicine:   · Tell your doctor if you are pregnant or breastfeeding, or if you have liver disease  Tell your doctor if you are allergic to any other medicine  · Do not use a heating pad, electric blanket, or other heat source over the patch     · Keep all medicine out of the reach of children  Never share your medicine with anyone    Possible Side Effects While Using This Medicine:   Call your doctor right away if you notice any of these side effects:  · Allergic reaction: Itching or hives, swelling in your face or hands, swelling or tingling in your mouth or throat, chest tightness, trouble breathing  · Redness, itching, burning, swelling, or blisters where the patch is applied  If you notice other side effects that you think are caused by this medicine, tell your doctor  Call your doctor for medical advice about side effects  You may report side effects to FDA at 4-665-FDA-5727  © 2017 [address] Information is for End User's use only and may not be sold, redistributed or otherwise used for commercial purposes  The above information is an  only  It is not intended as medical advice for individual conditions or treatments  Talk to your doctor, nurse or pharmacist before following any medical regimen to see if it is safe and effective for you  Levofloxacin (By mouth)   Levofloxacin (hwm-cxv-FZNX-a-sin)  Treats infections  This medicine is a quinolone antibiotic  Brand Name(s): Levaquin   There may be other brand names for this medicine  When This Medicine Should Not Be Used: This medicine is not right for everyone  Do not use it if you had an allergic reaction to levofloxacin or to similar medicines  How to Use This Medicine:   Liquid, Tablet  · Your doctor will tell you how much medicine to use  Do not use more than directed  Take your medicine at the same time each day  · Tablet: Take it with or without food  · Liquid: Take it 1 hour before or 2 hours after you eat  Measure the oral liquid medicine with a marked measuring spoon, oral syringe, or medicine cup  · Take all of the medicine in your prescription to clear up your infection, even if you feel better after the first few doses  · Drink extra fluids so you will urinate more often and help prevent kidney problems  · This medicine should come with a Medication Guide  Ask your pharmacist for a copy if you do not have one  · Missed dose: Take a dose as soon as you remember  If it is almost time for your next dose, wait until then and take a regular dose   Do not take extra medicine to make up for a missed dose  · Store the medicine in a closed container at room temperature, away from heat, moisture, and direct light  Drugs and Foods to Avoid:   Ask your doctor or pharmacist before using any other medicine, including over-the-counter medicines, vitamins, and herbal products  · Some foods and medicines can affect how levofloxacin works  Tell your doctor if you are using any of the following:  ¨ Theophylline  ¨ Blood thinner (including warfarin)  ¨ Diabetes medicine  ¨ Medicine for heart rhythm problems (including amiodarone, procainamide, quinidine, sotalol)  ¨ NSAID pain or arthritis medicine (including aspirin, celecoxib, diclofenac, ibuprofen, naproxen)  ¨ Steroid medicine (including hydrocortisone, methylprednisolone, prednisone)  · Take levofloxacin at least 2 hours before or 2 hours after you take antacids that contain magnesium or aluminum, zinc, or iron supplements, sucralfate, or didanosine  Warnings While Using This Medicine:   · Tell your doctor if you are pregnant or breastfeeding, or if you have kidney disease, liver disease, diabetes, heart disease, myasthenia gravis, or a history of heart rhythm problems (such as QT prolongation) or seizures  Tell your doctor if you have ever had tendon or joint problems, including rheumatoid arthritis, or if you have received a transplant  · This medicine may cause the following problems:  ¨ Tendinitis and tendon rupture (may happen after treatment ends)  ¨ Liver damage  ¨ Nerve damage in the arms or legs  ¨ Heart rhythm changes  ¨ Changes in blood sugar levels  · This medicine may make you feel dizzy or lightheaded  Do not drive or do anything else that could be dangerous until you know how this medicine affects you  · This medicine can cause diarrhea  Call your doctor if the diarrhea becomes severe, does not stop, or is bloody  Do not take any medicine to stop diarrhea until you have talked to your doctor   Diarrhea can occur 2 months or more after you stop taking this medicine  · Tell any doctor or dentist who treats you that you are using this medicine  This medicine may affect certain medical test results  · This medicine may make your skin more sensitive to sunlight  Wear sunscreen  Do not use sunlamps or tanning beds  · Call your doctor if your symptoms do not improve or if they get worse  · Keep all medicine out of the reach of children  Never share your medicine with anyone  Possible Side Effects While Using This Medicine:   Call your doctor right away if you notice any of these side effects:  · Allergic reaction: Itching or hives, swelling in your face or hands, swelling or tingling in your mouth or throat, chest tightness, trouble breathing  · Blistering, peeling, red skin rash  · Change in how much or how often you urinate  · Dark urine or pale stools, nausea, vomiting, loss of appetite, stomach pain, yellow skin or eyes  · Diarrhea that may contain blood  · Fainting, dizziness, or lightheadedness  · Fast, slow, or uneven heartbeat, chest pain  · Numbness, tingling, or burning pain in your hands, arms, legs, or feet  · Pain, stiffness, swelling, or bruises around your ankle, leg, shoulder, or other joint  · Seizures, severe headache, unusual thoughts or behaviors, trouble sleeping, feeling anxious, confused, or depressed, seeing, hearing, or feeling things that are not there  · Unusual bleeding, bruising, or weakness  If you notice these less serious side effects, talk with your doctor:   · Mild headache or nausea  If you notice other side effects that you think are caused by this medicine, tell your doctor  Call your doctor for medical advice about side effects  You may report side effects to FDA at 6-056-FDA-4845  © 2017 [address] Information is for End User's use only and may not be sold, redistributed or otherwise used for commercial purposes    The above information is an  only  It is not intended as medical advice for individual conditions or treatments  Talk to your doctor, nurse or pharmacist before following any medical regimen to see if it is safe and effective for you  Guaifenesin (By mouth)   Guaifenesin (tefg-LLF-z-sin)  Thins mucus so you can clear it from your head, throat, and lungs  Brand Name(s): Allfen, Chest Congestion Relief, Children's Mucinex, Cough, Diabetic Siltussin GUNN-Na, Diabetic Tussin, Equate Mucus ER, [facility] Children's Mucus Relief, [facility] Mucus ER, [facility] Mucus Relief, [facility] Tab Tussin, [address] [facility], [address] [facility] Tussin Chest Congestion, [facility] Children's Mucus Relief   There may be other brand names for this medicine  When This Medicine Should Not Be Used: You should not use this medicine if you have ever had an allergic reaction to guaifenesin  Do not give any over-the-counter (OTC) cough and cold medicine to a baby or child under 3years old  Using these medicines in very young children might cause serious or possibly life-threatening side effects  How to Use This Medicine:   Capsule, Tablet, Long Acting Capsule, Long Acting Tablet, Liquid  · Your doctor will tell you how much medicine to use  Do not use more than directed  · Follow the instructions on the medicine label if you are using this medicine without a prescription  · Measure the oral liquid medicine with a marked measuring spoon, oral syringe, or medicine cup  · Do not break, chew, or crush the tablet or capsule  Swallow it whole with a full glass of water  If a dose is missed:   · You must use this medicine on a fixed schedule  Call your doctor or pharmacist if you miss a dose  How to Store and Dispose of This Medicine:   · Store the medicine in a closed container at room temperature, away from heat, moisture, and direct light  Do not freeze the oral liquid    · Ask your pharmacist, doctor, or health caregiver about the best way to dispose of any outdated medicine or medicine no longer needed  · Keep all medicine out of the reach of children  Never share your medicine with anyone  Drugs and Foods to Avoid:      Ask your doctor or pharmacist before using any other medicine, including over-the-counter medicines, vitamins, and herbal products  Warnings While Using This Medicine:   · If you are pregnant or breast feeding, ask your doctor or pharmacist before using this medicine  · If your cough is caused by asthma, emphysema, bronchitis, or smoking, talk to your doctor before using this medicine  · This medicine may contain alcohol  Possible Side Effects While Using This Medicine:   Call your doctor right away if you notice any of these side effects:  · Cough that lasts longer than 7 days  · Cough with fever, skin rash, or ongoing headache  If you notice these less serious side effects, talk with your doctor:   · Drowsiness  · Nausea, vomiting, diarrhea, stomach pain  If you notice other side effects that you think are caused by this medicine, tell your doctor  Call your doctor for medical advice about side effects  You may report side effects to FDA at 8-260-FDA-6122  © 2017 [facility] [place] Information is for End User's use only and may not be sold, redistributed or otherwise used for commercial purposes  The above information is an  only  It is not intended as medical advice for individual conditions or treatments  Talk to your doctor, nurse or pharmacist before following any medical regimen to see if it is safe and effective for you  Cigarette Smoking and Your Health   WHAT YOU NEED TO KNOW:   What are the risks to my health if I smoke tobacco?  Nicotine and other chemicals found in tobacco damage every cell in your body  Even if you are a light smoker, you have an increased risk for cancer, heart disease, and lung disease   If you are pregnant or have diabetes, smoking increases your risk for complications  What are the benefits to my health if I stop smoking? · You decrease respiratory symptoms such as coughing, wheezing, and shortness of breath  · You reduce your risk for cancers of the lung, mouth, throat, kidney, bladder, pancreas, stomach, and cervix  If you already have cancer, you increase the benefits of chemotherapy  You also reduce your risk for cancer returning or a second cancer from developing  · You reduce your risk for heart disease, blood clots, heart attack, and stroke  · You reduce your risk for lung infections, and diseases such as pneumonia, asthma, chronic bronchitis, and emphysema  · Your circulation improves  More oxygen can be delivered to your body  If you have diabetes, you lower your risk for complications, such as kidney, artery, and eye diseases  You also lower your risk for nerve damage  Nerve damage can lead to amputations, poor vision, and blindness  · You improve your body's ability to heal and to fight infections  What are the health benefits to others if I stop smoking? Tobacco is harmful to nonsmokers who breathe in your secondhand smoke  The following are ways the health of others around you may improve when you stop smoking:  · You lower the risks for lung cancer and heart disease in nonsmoking adults  · If you are pregnant, you lower the risk for miscarriage, early delivery, low birth weight, and stillbirth  You also lower your baby's risk for SIDS, obesity, developmental delay, and neurobehavioral problems, such as ADHD  · If you have children, you lower their risk for ear infections, colds, pneumonia, bronchitis, and asthma  Where can I find more information and support to stop smoking? · [employer]  Phone: 6- 847 - 120-5610  Web Address: www [employer]  CARE AGREEMENT:   You have the right to help plan your care   Learn about your health condition and how it may be treated  Discuss treatment options with your caregivers to decide what care you want to receive  You always have the right to refuse treatment  The above information is an  only  It is not intended as medical advice for individual conditions or treatments  Talk to your doctor, nurse or pharmacist before following any medical regimen to see if it is safe and effective for you  © 2017 [address] [employer] Information is for End User's use only and may not be sold, redistributed or otherwise used for commercial purposes  All illustrations and images included in CareNotes® are the copyrighted property of [employer]  or Ryder Quiroz  How to Stop Smoking   WHAT YOU NEED TO KNOW:   Why should I stop smoking? You will improve your health and the health of others around you if you stop smoking  Your risk for heart and lung disease, cancer, stroke, heart attack, and vision problems will also decrease  You can benefit from quitting no matter how long you have smoked  How can I prepare to stop smoking? Nicotine is a highly addictive drug found in cigarettes  Withdrawal symptoms can happen when you stop smoking and make it hard to quit  These include anxiety, depression, irritability, trouble sleeping, and increased appetite  You increase your chances of success if you prepare to quit  · Set a quit date  [place] a date that is within the next 2 weeks  Do not pick a day that you think may be stressful or busy  Write down the day or [place] it on your calender  · Tell friends and family that you plan to quit  Explain that you may have withdrawal symptoms when you try to quit  Ask them to support you  They may be able to encourage you and help reduce your stress to make it easier for you to quit  · Make a list of your reasons for quitting  Put the list somewhere you will see it every day, such as your refrigerator  You can look at the list when you have a craving  · Remove all tobacco and nicotine products from your home, car, and workplace  Also, remove anything else that will tempt you to smoke, such as lighters, matches, or ashtrays  Clean your car, home, and places at work that smell like smoke  The smell of smoke can trigger a craving  · Identify triggers that make you want to smoke  This may include activities, feelings, or people  Also write down 1 way you can deal with each of your triggers  For example, if you want to smoke as soon as you wake up, plan another activity during this time, such as exercise  · Make a plan for how you will quit  Learn about the tools that can help you quit, such as medicine, counseling, or nicotine replacement therapy  Choose at least 2 options to help you quit  What are some tools to help me stop smoking? · Counseling  from a trained healthcare provider can provide you with support and skills to quit smoking  The provider will also teach you to manage your withdrawal symptoms and cravings  You may receive counseling from one counselor, in group therapy, or through phone therapy called a quit line  · Nicotine replacement therapy (NRT)  such as nicotine patches, gum, or lozenges may help reduce your nicotine cravings  You may get these without a doctor's order  Do not use e-cigarettes or smokeless tobacco in place of cigarettes or to help you quit  They still contain nicotine  · Prescription medicines  such as nasal sprays or nicotine inhalers may help reduce your withdrawal symptoms  Other medicines may also be used to reduce your urge to smoke  Ask your healthcare provider about these medicines  You may need to start certain medicines 2 weeks before your quit date for them to work well  · Hypnosis  is a practice that helps guide you through thoughts and feelings  Hypnosis may help decrease your cravings and make you more willing to quit       · Acupuncture therapy  uses very thin needles to balance energy channels in the body  This is thought to help decrease cravings and symptoms of nicotine withdrawal      · Support groups  let you talk to others who are trying to quit or have already quit  It may be helpful to speak with others about how they quit  How can I manage my cravings? · Avoid situations, people, and places that tempt you to smoke  Go to nonsmoking places, such as libraries or restaurants  Understand what tempts you and try to avoid these things  · Keep your hands busy  Hold things such as a stress ball or pen  · Put candy or toothpicks in your mouth  Keep lollipops, sugarless gum, or toothpicks with you at all times  · Do not have alcohol or caffeine  These drinks may tempt you to smoke  Drink healthy liquids such as water or juice instead  · Reward yourself when you resist your cravings  Rewards will motivate you and help you stay positive  · Do an activity that distracts you from your craving  Examples include going for a walk, exercising, or cleaning  What should I know about weight gain after I quit? You may gain a few pounds after you quit smoking  It is healthier for you to gain a few pounds than to continue to smoke  The following can help you prevent weight gain:  · Eat healthy foods  These include fruits, vegetables, whole-grain breads, low-fat dairy products, beans, lean meats, and fish  Eat healthy snacks, such as low-fat yogurt, if you get hungry between meals  · Drink water before, during, and between meals  This will make your stomach feel full and help prevent you from overeating  Ask your healthcare provider how much liquid to drink each day and which liquids are best for you  · Exercise  Take a walk or do some kind of exercise every day  Ask your healthcare provider what exercise is right for you  This may help reduce your cravings and reduce stress  Where can I find support and more information? · [employer]oke[employer]  gov  Phone: 8- 717 - 804-5507  Web Address: www smokefree  gov  CARE AGREEMENT:   You have the right to help plan your care  Learn about your health condition and how it may be treated  Discuss treatment options with your caregivers to decide what care you want to receive  You always have the right to refuse treatment  The above information is an  only  It is not intended as medical advice for individual conditions or treatments  Talk to your doctor, nurse or pharmacist before following any medical regimen to see if it is safe and effective for you  © 2017 [address] [employer]  Information is for End User's use only and may not be sold, redistributed or otherwise used for commercial purposes  All illustrations and images included in CareNotes® are the copyrighted property of [employer]  or Ryder Quiroz  How to Stop Smoking   WHAT YOU NEED TO KNOW:   You will improve your health and the health of others around you if you stop smoking  Your risk for heart and lung disease, cancer, stroke, heart attack, and vision problems will also decrease  You can benefit from quitting no matter how long you have smoked  DISCHARGE INSTRUCTIONS:   Prepare to stop smoking:  Nicotine is a highly addictive drug found in cigarettes  Withdrawal symptoms can happen when you stop smoking and make it hard to quit  These include anxiety, depression, irritability, trouble sleeping, and increased appetite  You increase your chances of success if you prepare to quit  · Set a quit date  [place] a date that is within the next 2 weeks  Do not pick a day that you think may be stressful or busy  Write down the day or [place] it on your calender  · Tell friends and family that you plan to quit  Explain that you may have withdrawal symptoms when you try to quit  Ask them to support you  They may be able to encourage you and help reduce your stress to make it easier for you to quit  · Make a list of your reasons for quitting    Put the list somewhere you will see it every day, such as your refrigerator  You can look at the list when you have a craving  · Remove all tobacco and nicotine products from your home, car, and workplace  Also, remove anything else that will tempt you to smoke, such as lighters, matches, or ashtrays  Clean your car, home, and places at work that smell like smoke  The smell of smoke can trigger a craving  · Identify triggers that make you want to smoke  This may include activities, feelings, or people  Also write down 1 way you can deal with each of your triggers  For example, if you want to smoke as soon as you wake up, plan another activity during this time, such as exercise  · Make a plan for how you will quit  Learn about the tools that can help you quit, such as medicine, counseling, or nicotine replacement therapy  Choose at least 2 options to help you quit  Tools to help you stop smoking:   · Counseling  from a trained healthcare provider can provide you with support and skills to quit smoking  The provider will also teach you to manage your withdrawal symptoms and cravings  You may receive counseling from one counselor, in group therapy, or through phone therapy called a quit line  · Nicotine replacement therapy (NRT)  such as nicotine patches, gum, or lozenges may help reduce your nicotine cravings  You may get these without a doctor's order  Do not use e-cigarettes or smokeless tobacco in place of cigarettes or to help you quit  They still contain nicotine  · Prescription medicines  such as nasal sprays or nicotine inhalers may help reduce your withdrawal symptoms  Other medicines may also be used to reduce your urge to smoke  Ask your healthcare provider about these medicines  You may need to start certain medicines 2 weeks before your quit date for them to work well  · Hypnosis  is a practice that helps guide you through thoughts and feelings   Hypnosis may help decrease your cravings and make you more willing to quit  · Acupuncture therapy  uses very thin needles to balance energy channels in the body  This is thought to help decrease cravings and symptoms of nicotine withdrawal      · Support groups  let you talk to others who are trying to quit or have already quit  It may be helpful to speak with others about how they quit  Manage your cravings:   · Avoid situations, people, and places that tempt you to smoke  Go to nonsmoking places, such as libraries or restaurants  Understand what tempts you and try to avoid these things  · Keep your hands busy  Hold things such as a stress ball or pen  · Put candy or toothpicks in your mouth  Keep lollipops, sugarless gum, or toothpicks with you at all times  · Do not have alcohol or caffeine  These drinks may tempt you to smoke  Drink healthy liquids such as water or juice instead  · Reward yourself when you resist your cravings  Rewards will motivate you and help you stay positive  · Do an activity that distracts you from your craving  Examples include going for a walk, exercising, or cleaning  Prevent weight gain after you quit:  You may gain a few pounds after you quit smoking  It is healthier for you to gain a few pounds than to continue to smoke  The following can help you prevent weight gain:  · Eat healthy foods  These include fruits, vegetables, whole-grain breads, low-fat dairy products, beans, lean meats, and fish  Eat healthy snacks, such as low-fat yogurt, if you get hungry between meals  · Drink water before, during, and between meals  This will make your stomach feel full and help prevent you from overeating  Ask your healthcare provider how much liquid to drink each day and which liquids are best for you  · Exercise  Take a walk or do some kind of exercise every day  Ask your healthcare provider what exercise is right for you  This may help reduce your cravings and reduce stress    For support and more information:   · [employer]oke[employer]  gov  Phone: 7- 932 - 965-0773  Web Address: www smokefree  gov  © 2017 [address] Information is for End User's use only and may not be sold, redistributed or otherwise used for commercial purposes  All illustrations and images included in CareNotes® are the copyrighted property of [employer]  or Ryder Quiroz  The above information is an  only  It is not intended as medical advice for individual conditions or treatments  Talk to your doctor, nurse or pharmacist before following any medical regimen to see if it is safe and effective for you

## 2018-04-16 NOTE — CONSULTS
Consultation - Infectious Disease   Collin Madrigal 78 y o  male MRN: [id number]  Unit/Bed#: -01 Encounter: [id number]      IMPRESSION & RECOMMENDATIONS:   Impression/Recommendations: This is a 78 y o  male, with recent choledocholithiasis, status post placement of CBD stent via ERCP, had an elective CBD stone extraction by ERCP on 04/13  Afterwards, patient suffered cardiac arrest, possibly secondary to primary respiratory arrest   He was successfully resuscitated  He is now on antibiotic for possible biliary sepsis versus pneumonia  Patient is clinically and systemically well  1   Possible bibasilar pneumonia  In the setting of cardiac arrest, this would most likely be aspiration pneumonia  At present, patient has minimal respiratory symptoms  However, he has been on antibiotics for the last 3 days  At this point, it would be best to have him complete a 7 day course of antibiotic for presumptive aspiration pneumonia  I would change ciprofloxacin to Levaquin for better respiratory pathogen coverage  We can continue Flagyl  Change antibiotic to Levaquin/Flagyl  Monitor respiratory symptoms  2   Choledocholithiasis, status post ERCP with CBD stone extraction  No clinical signs of cholangitis  However, per GI note, there is possible retroperitoneal duodenal perforation  Abdominal exam is benign  It would be best to continue the current antibiotic regimen for 14 days total   Levaquin/Flagyl as in above  Treat x 14 days total, another 11 days  Monitor for development of abdominal pain  3   Status post cardiac arrest, possibly secondary to primary respiratory arrest   Patient is status post successful resuscitation  He is clinically well  4   Post ERCP hypoxic respiratory failure, status post intubation  This has resolved  Patient's respiratory status is stable now  Prior and current hospitalization records reviewed in detail    Discussed with patient and his wife in detail regarding above plan  Discussed with Dr Manjinder Benjamin from [facility] service  Okay for discharge from ID viewpoint  Thank you for this consultation  We will follow along with you  HISTORY OF PRESENT ILLNESS:  Reason for Consult:  Pneumonia  HPI: Carolyn Lewis is a 78 y o  male, with a few stable medical problems, was admitted here in early March with mental status changes  He was found to have choledocholithiasis  He had ERCP done with placement of CBD stent  This was uneventful  Patient was readmitted on 04/13 for an elective ERCP and CBD stone extraction  Procedure was uneventful  However, afterwards, patient developed cardiac arrest, requiring admission to ICU  He was successfully resuscitated  His cardiac arrest was felt to be secondary to a primary respiratory arrest from clogged ET tube  In any event, patient was initially started on Zosyn for possible intra-abdominal/biliary sepsis  This was subsequently changed to ciprofloxacin/Flagyl  Patient did well and was transferred to the floor by the next day  CXR showed possible bibasilar pneumonia  For all these reasons, we are asked to evaluate the patient  At present, patient feels well  He has no abdominal pain  He has no dyspnea or cough  Patient states that he feels back to his normal self  Temperature stays down  Prior to admission on 04/13, patient states that he was feeling well  REVIEW OF SYSTEMS:  A complete 12 point system-based review of systems is otherwise negative  PAST MEDICAL HISTORY:  Past Medical History:   Diagnosis Date    Basal cell carcinoma     Cancer ([facility] [address] )     prostate, skin     Choledocholithiasis     Dementia     Malignant neoplasm of prostate [facility])      Past Surgical History:   Procedure Laterality Date    ERCP N/A 3/9/2018    Procedure: ENDOSCOPIC RETROGRADE CHOLANGIOPANCREATOGRAPHY (ERCP);   Surgeon: Lang Gates MD;  Location: [facility];  Service: Gastroenterology    ERCP  04/13/2018 stenting    [place] ERCP REMOVE FOREIGN BODY/STENT BILIARY/PANC DUCT N/A 2018    Procedure: ENDOSCOPIC RETROGRADE CHOLANGIOPANCREATOGRAPHY (ERCP); Surgeon: Lang Gates MD;  Location: [facility];  Service: Gastroenterology    PROSTATE SURGERY       Problem list reviewed  FAMILY HISTORY:  Non-contributory    SOCIAL HISTORY:  History   Alcohol Use No     Comment: former     History   Drug Use No     History   Smoking Status    Current Every Day Smoker    Packs/day: 0 25   Smokeless Tobacco    Never Used       ALLERGIES:  No Known Allergies    MEDICATIONS:  All current active medications have been reviewed  Patient is currently on ciprofloxacin/Flagyl  PHYSICAL EXAM:  Vitals:  HR:  [73-88] 88  Resp:  [18-20] 18  BP: (109-139)/(66-77) 139/77  SpO2:  [96 %-98 %] 96 %  Temp (24hrs), Av 5 °F (36 9 °C), Min:97 8 °F (36 6 °C), Max:98 9 °F (37 2 °C)  Current: Temperature: 98 1 °F (36 7 °C)     Physical Exam:  General:  Well-nourished, well-developed, in no acute distress  Awake, alert and oriented x 3  Eyes:  Conjunctive clear with no hemorrhages or effusions  Oropharynx:  No ulcers, no lesions, pharynx benign, no tonsillitis  Neck:  Supple, no lymphadenopathy, no mass, nontender  Lungs:  Expansion symmetric, no rales, no wheezing, no accessory muscle use  Cardiac:  Regular rate and rhythm, normal S1, normal S2, no murmurs  Abdomen:  Soft, nondistended, non-tender, no HSM  Extremities:  No edema, no erythema, nontender  No ulcers  Skin:  No rashes, no ulcers  Neurological:  Moves all four extremities spontaneously, sensation grossly intact    LABS, IMAGING, & OTHER STUDIES:  Lab Results:  I have personally reviewed pertinent labs      Results from last 7 days  Lab Units 18  0451 18  1609 18  1444   SODIUM mmol/L 137 136  --    POTASSIUM mmol/L 4 1 3 8  --    CHLORIDE mmol/L 106 104  --    CO2 mmol/L 23 23  --    ANION GAP mmol/L 8 9  --    BUN mg/dL 16 19  --    CREATININE mg/dL 0  65 0 65  --    EGFR ml/min/1 73sq m 95 95  --    GLUCOSE RANDOM mg/dL 115 173*  --    GLUCOSE, ISTAT mg/dl  --   --  239*   CALCIUM mg/dL 8 6 8 7  --    AST U/L 132* 350*  --    ALT U/L 298* 395*  --    ALK PHOS U/L 94 116  --    TOTAL PROTEIN g/dL 6 1* 6 4  --    BILIRUBIN TOTAL mg/dL 1 00 1 20*  --        Results from last 7 days  Lab Units 04/14/18  0451 04/13/18  2240 04/13/18  1609   WBC Thousand/uL 12 15*  --  17 07*   HEMOGLOBIN g/dL 13 7 14 1 14 5   PLATELETS Thousands/uL 181  --  200           Imaging Studies:   I have personally reviewed pertinent imaging study reports and images in PACS  4/15 CXR reviewed personally  Bibasilar atelectasis versus infiltrates  EKG, Pathology, and Other Studies:   I have personally reviewed pertinent reports

## 2018-04-16 NOTE — PLAN OF CARE
Problem: PHYSICAL THERAPY ADULT  Goal: Performs mobility at highest level of function for planned discharge setting  See evaluation for individualized goals  Treatment/Interventions: Functional transfer training, LE strengthening/ROM, Elevations, Therapeutic exercise, Endurance training, Patient/family training, Equipment eval/education, Bed mobility, Gait training, Cognitive reorientation, Spoke to nursing, Spoke to advanced practitioner, Family  Equipment Recommended:  (TBD)       See flowsheet documentation for full assessment, interventions and recommendations  Outcome: Progressing  Prognosis: Good  Problem List: Decreased strength, Decreased endurance, Impaired balance, Decreased mobility, Decreased cognition, Impaired judgement, Decreased safety awareness  Assessment: Pt seen for PT treatment session this date with interventions consisting of gait training w/ emphasis on improving pt's ability to ambulate level surfaces x 150' x3 with SBA provided by therapist with no AD  Pt agreeable to PT treatment session upon arrival, pt found supine in bed w/ HOB elevated, in no apparent distress, A&O x 3 and responsive  In comparison to previous session, pt with improvements in ambulatory balance and tolerance without use of AD, improved gait quality and velocity without overt LOB observed  vc required for pacing and obstacle negotiation; pt remains impulsive with activity, vc required for safety  Post session: pt returned BTB (pt refusing to sit OOB in recliner), all needs in reach and RN notified of session findings/recommendations  Continue to recommend Home PT with wife/family support vs OP PT at time of d/c in order to maximize pt's functional independence and safety w/ mobility  Pt continues to be functioning below baseline level, and remains limited 2* factors listed above and including at risk for falls given remote h/o falls PTA   PT will continue to see pt while here in order to address the deficits listed above and provide interventions consistent w/ POC in effort to achieve STGs  Barriers to Discharge: Inaccessible home environment     Recommendation: Home PT, Home with family support (vs OPPT; anticipated pending progress)     PT - OK to Discharge: No    See flowsheet documentation for full assessment

## 2018-04-16 NOTE — PLAN OF CARE
DISCHARGE PLANNING     Discharge to home or other facility with appropriate resources Progressing        INFECTION - ADULT     Absence or prevention of progression during hospitalization Progressing        Knowledge Deficit     Patient/family/caregiver demonstrates understanding of disease process, treatment plan, medications, and discharge instructions Progressing        Nutrition/Hydration-ADULT     Nutrient/Hydration intake appropriate for improving, restoring or maintaining nutritional needs Progressing        PAIN - ADULT     Verbalizes/displays adequate comfort level or baseline comfort level Progressing        Potential for Falls     Patient will remain free of falls Progressing        Prexisting or High Potential for Compromised Skin Integrity     Skin integrity is maintained or improved Progressing        SAFETY ADULT     Maintain or return to baseline ADL function Progressing     Maintain or return mobility status to optimal level Progressing

## 2018-04-16 NOTE — PROGRESS NOTES
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 known to Dr Radhames Moran  Urologic consultation was requested for urinary retention and gross hematuria  Of note, patient was admitted after elective ERCP for which patient sustained PEA arrest   Patient is been medically stabilized  Today, patient is afebrile and offers no  complaints  Velazquez catheter is since removed  Patient is on alpha blockade which is helping patient to void spontaneously  In quantity sufficient amounts  Plan:  Continue Flomax  Monitor voiding pattern  However, no further  intervention is indicated during this hospital stay  Patient should follow up with primary urologist at [facility]  Subjective:   Denies flank, testicular or groin pain, or any irritative obstructive urinary symptoms  Objective:     Vitals: Blood pressure 139/77, pulse 88, temperature 98 1 °F (36 7 °C), temperature source Oral, resp  rate 18, height 5' 8" (1 727 m), weight 63 3 kg (139 lb 8 8 oz), SpO2 96 %  ,Body mass index is 21 22 kg/m²        Intake/Output Summary (Last 24 hours) at 04/16/18 1681  Last data filed at 04/15/18 1700   Gross per 24 hour   Intake              600 ml   Output              900 ml   Net             -300 ml       Physical Exam: General appearance: alert and oriented, in no acute distress  Head: Normocephalic, without obvious abnormality, atraumatic  Neck: no adenopathy, no carotid bruit, no JVD, supple, symmetrical, trachea midline and thyroid not enlarged, symmetric, no tenderness/mass/nodules  Lungs: clear to auscultation bilaterally  Heart: regular rate and rhythm, S1, S2 normal, no murmur, click, rub or gallop  Abdomen: soft, non-tender; bowel sounds normal; no masses,  no organomegaly  Extremities: extremities normal, warm and well-perfused; no cyanosis, clubbing, or edema  Pulses: 2+ and symmetric  Neurologic: Grossly normal     Invasive Devices     Peripheral Intravenous Line            Peripheral IV 04/14/18 Left Antecubital 1 day              Lab Results   Component Value Date    WBC 12 15 (H) 04/14/2018    HGB 13 7 04/14/2018    HCT 41 3 04/14/2018    MCV 96 04/14/2018     04/14/2018     Lab Results   Component Value Date    GLUCOSE 115 04/14/2018    CALCIUM 8 6 04/14/2018     04/14/2018    K 4 1 04/14/2018    CO2 23 04/14/2018     04/14/2018    BUN 16 04/14/2018    CREATININE 0 65 04/14/2018       Lab, Imaging and other studies: I have personally reviewed pertinent reports

## 2018-04-17 ENCOUNTER — TRANSITIONAL CARE MANAGEMENT (OUTPATIENT)
Dept: INTERNAL MEDICINE CLINIC | Facility: CLINIC | Age: 78
End: 2018-04-17

## 2018-04-17 NOTE — ASSESSMENT & PLAN NOTE
Patient with history of Prostate Cancer, developed hematuria after sharif placement  H/H stable  Urology consult appreciated  Resume flomax, give voiding trial when up and around  Urology f/u as outpatient  Symptoms currently resolved  Sharif out some red tinged urine still present  Patient without evidence for retention  Will continue to monitor    Patient doing well without evidence for recurrent hematuria will follow up with his primary urological physician as an outpatient

## 2018-04-17 NOTE — ASSESSMENT & PLAN NOTE
Successfully extubated  Noted cough yesterday added mucinex and respiratory protocol, with flutter valve  Xray from 4/14 showed Left lower lobe ground glass pattern  Patient on Cipr and Flagyl  Will reassess exam and coverage which may need to be broadened for HCAP with GN aspiration potential   Patient doing incentive spirometry  Lungs clear with diminished breath sounds  Repeat chest x-ray still shows by basilar opacification  I reviewed this with ID  Will switch antibiotics to Levaquin and Flagyl to complete a full 14 days worth of antibiotic  Patient will follow up with his primary care physician next several days patient    Patient has been told not to smoke during the course of treatment for his pneumonia

## 2018-04-17 NOTE — ASSESSMENT & PLAN NOTE
S/p ERCP with stent, course complicated by possible perforation of duodenum with free air per report  Will check in with GI on f/u imaging or other needs  Exam benign  Patient will need GI follow-up in the next several weeks retrieval of stent    He will complete 14 days Levaquin and Flagyl

## 2018-04-17 NOTE — ASSESSMENT & PLAN NOTE
Possible microperforation of duodenum now with stent felt to be able to seal itself off with time  Exam benign  No evidence for complications    Patient will complete Levaquin and Flagyl times 14 days

## 2018-04-17 NOTE — DISCHARGE SUMMARY
Discharge- Theresa Ponce 1940, 78 y o  male MRN: [id number]    Unit/Bed#: -01 Encounter: [id number]    Primary Care Provider: Jose Luis Watts DO   Date and time admitted to hospital: 4/13/2018 12:02 PM        Cardiac arrest [facility])   Assessment & Plan    Patient  Had a witnessed PEA arrest post elective ERCP  ROSC rapid  Full workup identified no obvious cause, attributed to acute respiratory arrest on vent  Patient complaining of chest pain after CPR will add lidocaine patch  Motrin in place  Reviewed with GI okay with them to use NSAID  Monitor creatinine        Acute hypoxemic respiratory failure [facility])   Assessment & Plan    Successfully extubated  Noted cough yesterday added mucinex and respiratory protocol, with flutter valve  Xray from 4/14 showed Left lower lobe ground glass pattern  Patient on Cipr and Flagyl  Will reassess exam and coverage which may need to be broadened for HCAP with GN aspiration potential   Patient doing incentive spirometry  Lungs clear with diminished breath sounds  Repeat chest x-ray still shows by basilar opacification  I reviewed this with ID  Will switch antibiotics to Levaquin and Flagyl to complete a full 14 days worth of antibiotic  Patient will follow up with his primary care physician next several days patient  Patient has been told not to smoke during the course of treatment for his pneumonia        Perforated intestine [facility])   Assessment & Plan    Possible microperforation of duodenum now with stent felt to be able to seal itself off with time  Exam benign  No evidence for complications  Patient will complete Levaquin and Flagyl times 14 days        * Common bile duct obstruction   Assessment & Plan    S/p ERCP with stent, course complicated by possible perforation of duodenum with free air per report  Will check in with GI on f/u imaging or other needs  Exam benign  Patient will need GI follow-up in the next several weeks retrieval of stent    He will complete 14 days Levaquin and Flagyl        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   Assessment & Plan    Spouse desires to resume B12 complex  Will put in for the use of there on medications  She also requested resumption of melatonin to help him sleep  There is no contraindication to this  Spouse also requesting resumption of Ensure andelive patient has been losing weight and not having a great appetite  Traumatic hematuria   Assessment & Plan    Patient with history of Prostate Cancer, developed hematuria after sharif placement  H/H stable  Urology consult appreciated  Resume flomax, give voiding trial when up and around  Urology f/u as outpatient  Symptoms currently resolved  Sharif out some red tinged urine still present  Patient without evidence for retention  Will continue to monitor  Patient doing well without evidence for recurrent hematuria will follow up with his primary urological physician as an outpatient        Alcoholism [facility])   Assessment & Plan    Remote history  Discharging Physician / Practitioner: Nithin Altamirano MD  PCP: Leslye Sánchez DO  Admission Date:   Admission Orders     Ordered        04/13/18 1524  Inpatient Admission  Once             Discharge Date: 04/17/18    Resolved Problems  Date Reviewed: 4/15/2018    None          Consultations During Hospital Stay:  · Infectious Disease  · Urology  · GI      Procedures Performed:     · ERCP:  Stent was removed in the duct was cannulated  Dilatation with balloon multiple pigmented stones were removed stent was placed in good position  · CTA abdomen and pelvis:  Retroperitoneal air surrounding duodenum small amount of air in the superior mesenteric vein consistent with about injury likely due to recent instrumentation of duodenum  Gallstones in the gallbladder  Biliary stent placed  No evidence for pulmonary emboli    Dependent atelectasis in both lungs  · They ground-glass in infiltrates left lower lobe  Correlating for early pneumonia  Malpositioned orogastric tube with tip in the EG junction  · Chest x-rays:  Small opacities at the bilateral lung base may be due to pneumonia in appropriate clinical setting    Significant Findings / Test Results:     · Creatinine at discharge 0 65  · Troponin 0 15  · Hemoglobin 11 9  · Lactic acidosis peak 2 3 resolved  · TSH 1 939 free T4 0 86  · Hepatitis-C negative nonreactive    Incidental Findings:   · None    Test Results Pending at Discharge (will require follow up): · None     Outpatient Tests Requested:  · None    Complications:  Cardiac arrest PEA possibly secondary to hypoxemia, duodenal perforation secondary instrumentation    Reason for Admission:     Hospital Course:     Sidney Frank is a 78 y o  male patient who originally presented to the hospital on 4/13/2018 for elective ERCP Patient had impacted stones  He underwent the procedure was transferring to the [facility] when he developed a PA arrest   Patient was coded and ROSC was resist turned quickly  He underwent ROBERTO, and CTA to rule out emboli  Proposed source was relative hypoxemia with no evidence for trauma or PE  Patient was also noted to have air in the mesenteric vein felt to be secondary to cannulation and stent placement  Patient spent time in the ICU is extubated successfully without further complications  Patient was evaluated for persistent basilar infiltrates  It is felt that this potentially could represent pneumonia antibiotics were adjusted to cover healthcare associated pneumonia  Patient was discharged on Levaquin and Flagyl to cover abdominal sources as well as pulmonary sources  Patient will follow up as an outpatient with his primary care physician and cardiologist     Please see above list of diagnoses and related plan for additional information       Condition at Discharge: good     Discharge Day Visit / Exam:     Subjective:  Patient anxious to get home artery dressed being very demanding of his wife  Vitals: Blood Pressure: 139/77 (04/16/18 0800)  Pulse: 88 (04/16/18 0800)  Temperature: 98 1 °F (36 7 °C) (04/16/18 0800)  Temp Source: Oral (04/16/18 0800)  Respirations: 18 (04/16/18 0800)  Height: 5' 8" (172 7 cm) (04/13/18 1303)  Weight - Scale: 63 3 kg (139 lb 8 8 oz) (04/16/18 0600)  SpO2: 96 % (04/16/18 0800)  Exam:   Physical Exam  Generally poorly nourished male no acute distress normocephalic atraumatic pupils equal round and reactive to light affect is flat  Mucous membranes moist no oral lesions  Chest is clear to auscultation no rhonchi rales or wheezes  Cardiovascular regular rate rhythm positive S1 and S2 no S3 for murmur or gallop  Abdomen soft nontender nondistended with positive bowel sounds no hepatosplenomegaly no guarding or rebound neurologically patient is awake alert oriented but has memory deficits consistent with his dementia   Discussion with Family:  Updated patient's daughter and spouse at bedside    Discharge instructions/Information to patient and family:   See after visit summary for information provided to patient and family  Provisions for Follow-Up Care:  See after visit summary for information related to follow-up care and any pertinent home health orders  Disposition:     Home    For Discharges to [address] [facility]:   · Not Applicable to this Patient - Not Applicable to this Patient    Planned Readmission:  None     Discharge Statement:  I spent 40 minutes discharging the patient  This time was spent on the day of discharge  I had direct contact with the patient on the day of discharge  Greater than 50% of the total time was spent examining patient, answering all patient questions, arranging and discussing plan of care with patient as well as directly providing post-discharge instructions  Additional time then spent on discharge activities      Discharge Medications:  See after visit summary for reconciled discharge medications provided to patient and family        ** Please Note: This note has been constructed using a voice recognition system **          The

## 2018-04-18 ENCOUNTER — OFFICE VISIT (OUTPATIENT)
Dept: INTERNAL MEDICINE CLINIC | Facility: CLINIC | Age: 78
End: 2018-04-18
Payer: MEDICARE

## 2018-04-18 VITALS
WEIGHT: 140 LBS | BODY MASS INDEX: 21.22 KG/M2 | HEIGHT: 68 IN | OXYGEN SATURATION: 99 % | HEART RATE: 83 BPM | SYSTOLIC BLOOD PRESSURE: 124 MMHG | DIASTOLIC BLOOD PRESSURE: 70 MMHG

## 2018-04-18 DIAGNOSIS — I46.9 CARDIAC ARREST (HCC): Primary | ICD-10-CM

## 2018-04-18 DIAGNOSIS — J18.9 PNEUMONIA DUE TO INFECTIOUS ORGANISM, UNSPECIFIED LATERALITY, UNSPECIFIED PART OF LUNG: ICD-10-CM

## 2018-04-18 DIAGNOSIS — R41.3 MEMORY DIFFICULTY: ICD-10-CM

## 2018-04-18 PROCEDURE — 99496 TRANSJ CARE MGMT HIGH F2F 7D: CPT | Performed by: INTERNAL MEDICINE

## 2018-04-18 NOTE — PHYSICIAN ADVISOR
This patient is a 78 y o  y/o male who is admitted to the hospital for cardiac arrest s/p ERCP  The patient presented to the ED on  at  and was admitted to the hospital on 4/13/2018 1202  History of Present Illness includes: Sarina Barahona is a 78 y o  male who presents with a past medical history of alcohol abuse and worsening dementia over the past 1-2 years and prostate CA  He was recently admitted in March for evaluation of altered mental status and increasing weight loss  During this admission he had laboratory derangements; transaminitis and an elevated T bili  Imaging of the abdomen/pelvis obtained at that time showing choledocholithiasis  He was taken for on ERCP on 3/9 and was s/p sphincterotomy and stenting with multiple stones in the bile duct  He was admitted to the critical care service at that time for monitoring  He completed an antibiotic course and was to return in 6 weeks for repeat ERCP stone removal in 6 weeks time  He was discharged on 3/15  He has abstained from alcohol since discharge  He came in today for an elective ERCP  Post procedure, while moving to the OR table to the stretcher, the patient became cyanotic and proceeded to cardiac arrest   Compressions were started immediately  He was given epinephrine x 2 and atropine  ROSC was achieved quickly  His   He did have purposeful movement s/p arrest   ROBERTO was done in the OR, with no significant abnormalities  The patient was transported to CT scan and then to the ICU for further management and care         Vital signs in the ER are as follows ED Triage Vitals   Temperature Pulse Respirations Blood Pressure SpO2   04/13/18 1303 04/13/18 1303 04/13/18 1303 04/13/18 1303 04/13/18 1303   97 8 °F (36 6 °C) 77 20 109/71 97 %      Temp Source Heart Rate Source Patient Position - Orthostatic VS BP Location FiO2 (%)   04/13/18 1303 04/13/18 1303 04/14/18 1501 04/14/18 1501 --   Temporal Monitor Lying Right arm       Pain Score 04/14/18 0706       No Pain           The patient is admitted as INPATIENT  and has remained hospitalized for 3 day(s)  Last vital signs were Blood pressure 139/77, pulse 88, temperature 98 1 °F (36 7 °C), temperature source Oral, resp  rate 18, height 5' 8" (1 727 m), weight 63 3 kg (139 lb 8 8 oz), SpO2 96 %  Treatment includes: tele, cardiac enzymes, ECHO, CT abd/pelvis, ICU with vent management, urology and ID consult  Results include:       CT A&P, retroperitoneal free air is identified near proximal duodenum - discussed w/ GI and suspect 2/2 dilation of ampulla which is now stented  Anticipate self sealing and deferring on surgical eval at this time  ECHO: no RWMA, Nl EF on echo    0  Lab Value Date/Time   TROPONINI 0 14 (H) 04/14/2018 0918   TROPONINI 0 15 (H) 04/13/2018 1649   TROPONINI <0 02 03/08/2018 0831           Results from last 7 days  Lab Units 04/13/18  1609   LACTIC ACID mmol/L 0 9         Results from last 7 days  Lab Units 04/14/18  0451 04/13/18  2240 04/13/18  1609   WBC Thousand/uL 12 15*  --  17 07*   HEMOGLOBIN g/dL 13 7 14 1 14 5   HEMATOCRIT % 41 3 41 8 42 9   PLATELETS Thousands/uL 181  --  200         Results from last 7 days  Lab Units 04/14/18  0451 04/13/18  1609   SODIUM mmol/L 137 136   POTASSIUM mmol/L 4 1 3 8   CHLORIDE mmol/L 106 104   CO2 mmol/L 23 23   BUN mg/dL 16 19   CREATININE mg/dL 0 65 0 65   GLUCOSE RANDOM mg/dL 115 173*   CALCIUM mg/dL 8 6 8 7       The patient is appropriate for  Inpatient Admission  The rationale is as follows: The patient is a 79 y/o male who presented for an outpatient ERCP  Post procedure he developed cyanosis and cardiac arrest   CPR initiated and ROSC obtained  He was admitted and required tele, cardiac enzymes, ECHO, CT abd/pelvis, ICU with vent management, urology and ID consult  There is documentation by the admitting physician that the patient would require greater than two midnight stay based on medical necessity  Hospitalization is necessary to prevent further deterioration of his clinical condition  After review of the medical chart, labs, imaging, and notes - I agree that the patient meets criteria for INPATIENT ADMISSION  The patient had a hospitalization within the last 30 days - after reviewing the chart from that admission it was determined that the two admissions were for the different conditions  Previous admission was for choledocholithiasis  Current admission is for cardiac arrest post ERCP  These two admissions are unrelated

## 2018-04-19 ENCOUNTER — TELEPHONE (OUTPATIENT)
Dept: GASTROENTEROLOGY | Facility: CLINIC | Age: 78
End: 2018-04-19

## 2018-04-19 DIAGNOSIS — K80.50 CHOLEDOCHOLITHIASIS: Primary | ICD-10-CM

## 2018-04-19 NOTE — PROGRESS NOTES
Assessment/Plan:  In the hospital he had an ERCP and gallstones were removed  A stent was replaced  There was a small perforation  He then had a cardiopulmonary arrest after leaving the operating room  He recovered from that and was discharged  He seems stable at the present time  The plan at the moment is stiff 1  Finishes antibiotics 2  Follow up with Neurology as soon as possible for his change in mental status that occurred over the last few months  He then needs to see Cardiology as soon as possible because of the event following his procedure  I will see him back here in 1 week after he repeat his chest x-ray  As always should he need to see me before that he will come in  No problem-specific Assessment & Plan notes found for this encounter  Diagnoses and all orders for this visit:    Cardiac arrest ([facility] [address] )  -     XR chest pa & lateral; Future  -     Ambulatory referral to Cardiology; Future         Subjective: He went to the hospital   He had a procedure regarding his gallbladder with ERCP  He then apparently had a call or cardiopulmonary arrest   He was in [facility]  He recovered and was discharged  Exact reason he says was not established  He did develop pneumonia  He is still on antibiotics  His current issues at the moment then are pneumonia from which she is covering  He does not have any fever  He has memory issues  This was what brought him in  He had been drinking a lot over the last 6 months but that stopped several months ago  He is not coughing very much at the present time  Patient ID: Pablo Schulz is a 78 y o  male  HPI    Review of Systems   Constitutional: Positive for fatigue  Negative for activity change, appetite change, chills, diaphoresis, fever and unexpected weight change  He has not had any fever     HENT: Negative for congestion, ear pain, hearing loss, mouth sores, nosebleeds, postnasal drip, sinus pain, sinus pressure, sore throat and trouble swallowing  Eyes: Negative for pain, discharge and visual disturbance  Respiratory: Positive for cough  Negative for apnea, chest tightness, shortness of breath and wheezing  His cough is minimal   Cardiovascular: Negative for chest pain, palpitations and leg swelling  Gastrointestinal: Negative for abdominal pain, anal bleeding, blood in stool, constipation, diarrhea, nausea and vomiting  Endocrine: Negative for polydipsia and polyphagia  Genitourinary: Negative for decreased urine volume, dysuria, flank pain, frequency, hematuria and urgency  Musculoskeletal: Negative for arthralgias, back pain, gait problem, joint swelling and myalgias  Skin: Negative for rash and wound  Allergic/Immunologic: Negative for environmental allergies and food allergies  Neurological: Negative for dizziness, tremors, seizures, syncope, speech difficulty, light-headedness, numbness and headaches  He has poor short-term memory and some ataxia  Hematological: Negative for adenopathy  Does not bruise/bleed easily  Psychiatric/Behavioral: Negative for agitation, confusion, hallucinations, sleep disturbance and suicidal ideas  The patient is not nervous/anxious  Objective:     Physical Exam   Constitutional: He appears well-developed and well-nourished  No distress  He is awake and alert  He has a very bland affect  His voice is very quiet  His temperature is 98 3°  His heart rhythm is regular  Breath sounds are somewhat diminished but no wheezes or rales  He is not in any respiratory distress at the present time  His oxygen saturation is 99  HENT:   Head: Normocephalic  Right Ear: External ear normal    Left Ear: External ear normal    Nose: Nose normal    Mouth/Throat: Oropharynx is clear and moist  No oropharyngeal exudate  Eyes: Conjunctivae and EOM are normal  Pupils are equal, round, and reactive to light  Right eye exhibits no discharge   Left eye exhibits no discharge  Neck: Normal range of motion  Neck supple  No thyromegaly present  Cardiovascular: Normal rate, regular rhythm, normal heart sounds and intact distal pulses  Exam reveals no gallop and no friction rub  No murmur heard  Pulmonary/Chest: Effort normal and breath sounds normal  No respiratory distress  He has no wheezes  He has no rales  Breath sounds are somewhat diminished but without rales or wheezes   Abdominal: Soft  Bowel sounds are normal  He exhibits no distension and no mass  There is no tenderness  There is no rebound and no guarding  Musculoskeletal: Normal range of motion  He exhibits no edema, tenderness or deformity  Lymphadenopathy:     He has no cervical adenopathy  Neurological: He is alert  He has normal reflexes  No cranial nerve deficit  Coordination normal    Skin: Skin is warm and dry  No rash noted  No erythema  Psychiatric: He has a normal mood and affect  His behavior is normal  Judgment and thought content normal    He is a very bland affect  Nursing note and vitals reviewed  Vitals:    04/18/18 1032   BP: 124/70   BP Location: Left arm   Patient Position: Sitting   Pulse: 83   SpO2: 99%   Weight: 63 5 kg (140 lb)   Height: 5' 8" (1 727 m)       Transitional Care Management Review:  Comer Buerger is a 78 y o  male here for TCM follow up       During the TCM phone call patient stated:    Date and time hospital follow up call was made:  4/17/2018 11:40 AM  Hospital care reviewed:  Records reviewed  Patient was [facility]ital[facility] at:  [facility]  Date of admission:  4/13/18  Date of discharge:  4/16/18  Diagnosis:  common bile duct obstruction=ERCP  Current symptoms:  None  Scheduled for follow up?:  Yes  Referrals needed:  GI  Did you obtain your prescribed medications:  Yes  Do you need help managing your perscriptions or medications:  Yes  I have advised the patient to call PCP with any new or worsening symptoms (please type in name along with any credentials):  Annette Mercer LPN  Living Arrangements:  Spouse or Significiant other  Are you recieving outpatient services:   Yes  Are you recieving home care services:  Yes  Interperter language line required?:  No  Counseling:  Family Maxine Gonzalez DO

## 2018-04-19 NOTE — TELEPHONE ENCOUNTER
Spoke with patient  He is aware of plan  Wife prefers herself to be call over her   Since she arranges his care  She is also aware of plan  Referral sent to general surgery  GI surgical coordinator aware

## 2018-04-19 NOTE — TELEPHONE ENCOUNTER
Pls tell her I spoke to his doctors all weekend and Im happy he is doing well  He needs to have his GB removed with surgery    Then he should have the ERCP with stent removal

## 2018-04-20 NOTE — TELEPHONE ENCOUNTER
I spoke with the wife and patient has been scheduled for this Monday 04/23/18 @ 10:45AM with Dr Jakob Root  She asked for Dr Sincere Hernandez, but I explained that I have no openings for him until mid May  So she said it was ok to go with Dr Jakob Root   Thank you for the referral

## 2018-04-23 ENCOUNTER — OFFICE VISIT (OUTPATIENT)
Dept: SURGERY | Facility: CLINIC | Age: 78
End: 2018-04-23
Payer: MEDICARE

## 2018-04-23 VITALS
BODY MASS INDEX: 20.92 KG/M2 | TEMPERATURE: 98 F | HEIGHT: 68 IN | RESPIRATION RATE: 18 BRPM | SYSTOLIC BLOOD PRESSURE: 122 MMHG | DIASTOLIC BLOOD PRESSURE: 68 MMHG | HEART RATE: 82 BPM | WEIGHT: 138 LBS

## 2018-04-23 DIAGNOSIS — F10.20 ALCOHOLISM (HCC): ICD-10-CM

## 2018-04-23 DIAGNOSIS — J18.9 PNEUMONIA DUE TO INFECTIOUS ORGANISM, UNSPECIFIED LATERALITY, UNSPECIFIED PART OF LUNG: ICD-10-CM

## 2018-04-23 DIAGNOSIS — K83.1 COMMON BILE DUCT OBSTRUCTION: Primary | ICD-10-CM

## 2018-04-23 DIAGNOSIS — F02.81 DEMENTIA ASSOCIATED WITH OTHER UNDERLYING DISEASE WITH BEHAVIORAL DISTURBANCE (HCC): ICD-10-CM

## 2018-04-23 DIAGNOSIS — K83.1 COMMON BILE DUCT (CBD) OBSTRUCTION: ICD-10-CM

## 2018-04-23 DIAGNOSIS — I46.9 CARDIAC ARREST (HCC): ICD-10-CM

## 2018-04-23 DIAGNOSIS — R41.3 MEMORY DIFFICULTY: ICD-10-CM

## 2018-04-23 PROCEDURE — 99215 OFFICE O/P EST HI 40 MIN: CPT | Performed by: SURGERY

## 2018-04-23 NOTE — PROGRESS NOTES
GENERAL SURGERY HISTORY AND PHYSICAL     Theresa Ponce 78 y o  male MRN: [id number]  Unit/Bed#:  Encounter: [id number]      Assessment/Plan   1  Common bile duct obstruction     2  Common bile duct (CBD) obstruction     3  Pneumonia due to infectious organism, unspecified laterality, unspecified part of lung     4  Cardiac arrest ([facility] )     5  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     6  Alcoholism ([facility] )     7  Memory difficulty       Patient with choledocholithiasis and cholangitis status post ERCP with stent placement  GI recommending patient undergo lap libia prior to stent removal  Discussed with the patient and the family member that I am not certain operative intervention will resolve any of his abdominal issues or back pain  Patient has multiple reasons for neurologic issues as well and doubtful this will cause any meaningful impact on that  Do suggest patient undergo the procedure to prevent further episodes of common duct obstruction  Will have the patient evaluated by PCP, Cardiology, and Neurology later this week  Will await evaluation and clearance by all before considering further operative interventions  Patient does appear to be in good health this time I do not anticipate further testing from Cardiology or PCP  Will have the patient follow up in 2 weeks after evaluation  If no further testing or interventions planned by PCP or specialists will plan to discuss laparoscopic cholecystectomy at that time  Discussed with the patient any acute changes to seek immediate evaluation  Chief Complaint choledocholithiasis    HPI: Theresa Ponce is a 78y o  year old male known to me, evaluated during initial hospitalization early March  At that time patient was found to have choledocholithiasis and cholangitis  Underwent ERCP with Dr Mariaeelna Munoz with removal of many stones however persistent stones left behind requiring further ERCP    Patient recovered from this previous episode  Underwent repeat ERCP on Friday April 13th  Further stones were removed clearing the duct as well as having stent placed  Postprocedure early patient had complication with a code being called patient underwent aggressive resuscitation  Has since recovered well  There was no evidence of MI patient underwent no further cardiac or pulmonary interventions  Was some concern for development of pneumonia currently on oral course of antibiotics  At this time patient denies any current complaints  Denies any chest pain shortness of breath productive cough  Denies abdominal pain  Complains of occasional back pain in the morning ambulating well  Family member present states the patient is having poor oral intake denies nausea or vomiting but says he has poor appetite and minimal intake  Normal bowel function normal urination  Both patient and family member denies any episodes of recurrent jaundice no scleral icterus or skin changes  ROS:  12 Point ROS reviewed and negative except for:  Back pain, poor appetite    Historical Information   Past Medical History:   Diagnosis Date    Basal cell carcinoma     Cancer ([facility] [address] )     prostate, skin     Choledocholithiasis     Dementia     Malignant neoplasm of prostate [facility])      Past Surgical History:   Procedure Laterality Date    ERCP N/A 3/9/2018    Procedure: ENDOSCOPIC RETROGRADE CHOLANGIOPANCREATOGRAPHY (ERCP); Surgeon: Romy Glasgow MD;  Location: [facility];  Service: Gastroenterology    ERCP  04/13/2018    stenting    [place] ERCP REMOVE FOREIGN BODY/STENT BILIARY/PANC DUCT N/A 4/13/2018    Procedure: ENDOSCOPIC RETROGRADE CHOLANGIOPANCREATOGRAPHY (ERCP);   Surgeon: Romy Glasgow MD;  Location: [facility];  Service: Gastroenterology    PROSTATE SURGERY       Social History   History   Alcohol Use No     Comment: former     History   Drug Use No     History   Smoking Status    Current Every Day Smoker    Packs/day: 0 25   Smokeless Tobacco    Never Used     Family History: no pertinent family history  No Known Allergies  Meds/Allergies   all current active meds have been reviewed      Objective   Vitals: Blood pressure 122/68, pulse 82, temperature 98 °F (36 7 °C), temperature source Oral, resp  rate 18, height 5' 8" (1 727 m), weight 62 6 kg (138 lb)  ,Body mass index is 20 98 kg/m²  Physical Exam:    General appearance: Awake alert oriented no acute distress  HEENT: Sclera clear, anicterus  Neck: Trachea is midline  Lungs: No respiratory distress, clear to auscultation bilaterally  Heart[de-identified] RRR  Abdomen: soft, nondistended, nontender  Skin No visible jaundice  Neurologic:  Ambulating with no difficulty or support device    Psych: Affect appropriate    Lennox Hutch, MD  4/23/2018

## 2018-04-24 ENCOUNTER — TELEPHONE (OUTPATIENT)
Dept: INTERNAL MEDICINE CLINIC | Facility: CLINIC | Age: 78
End: 2018-04-24

## 2018-04-25 ENCOUNTER — OFFICE VISIT (OUTPATIENT)
Dept: NEUROLOGY | Facility: CLINIC | Age: 78
End: 2018-04-25
Payer: MEDICARE

## 2018-04-25 VITALS
HEIGHT: 68 IN | WEIGHT: 136 LBS | SYSTOLIC BLOOD PRESSURE: 102 MMHG | BODY MASS INDEX: 20.61 KG/M2 | DIASTOLIC BLOOD PRESSURE: 76 MMHG | HEART RATE: 84 BPM

## 2018-04-25 DIAGNOSIS — G40.909 SEIZURE DISORDER (HCC): Primary | ICD-10-CM

## 2018-04-25 DIAGNOSIS — F02.80 DEMENTIA ASSOCIATED WITH OTHER UNDERLYING DISEASE WITHOUT BEHAVIORAL DISTURBANCE (HCC): ICD-10-CM

## 2018-04-25 DIAGNOSIS — R09.89 RIGHT CAROTID BRUIT: ICD-10-CM

## 2018-04-25 PROCEDURE — 99215 OFFICE O/P EST HI 40 MIN: CPT | Performed by: PSYCHIATRY & NEUROLOGY

## 2018-04-25 RX ORDER — SERTRALINE HYDROCHLORIDE 25 MG/1
50 TABLET, FILM COATED ORAL DAILY
Qty: 30 TABLET | Refills: 2 | Status: SHIPPED | OUTPATIENT
Start: 2018-04-25 | End: 2018-06-03 | Stop reason: SDUPTHER

## 2018-04-25 RX ORDER — DONEPEZIL HYDROCHLORIDE 5 MG/1
5 TABLET, FILM COATED ORAL
Qty: 30 TABLET | Refills: 2 | Status: SHIPPED | OUTPATIENT
Start: 2018-04-25 | End: 2018-06-05 | Stop reason: SDUPTHER

## 2018-04-25 RX ORDER — LANOLIN ALCOHOL/MO/W.PET/CERES
100 CREAM (GRAM) TOPICAL DAILY
Qty: 30 TABLET | Refills: 2 | Status: SHIPPED | OUTPATIENT
Start: 2018-04-25 | End: 2018-08-05 | Stop reason: SDUPTHER

## 2018-04-25 NOTE — PROGRESS NOTES
Progress Note - Neurology   Dat Crenshaw 78 y o  male MRN: [id number]  Unit/Bed#:  Encounter: [id number]      Subjective:   Patient is here for a follow-up visit accompanied with his wife and since his last visit he has been hospitalized twice, and was evaluated by myself in the emergency room upon his initial admission noted to have delirium secondary to hepatic encephalopathy, subsequently detected to have gallstones with common bile duct stones requiring removal through ERCP and had another recurrent hospitalization also requiring ERCP and removal of bile duct stones during which time immediately after surgery he had a cardiac arrest which was brief in duration, the etiology of which was not detected yet, and patient recovered and was discharged successfully  Patient has gone back to smoking but does not consume any alcohol since discharge  As per his wife his cognitive dysfunction has worsened significantly, patient also appears confused at times, requires constant reminders, was very emotionally labile initially, has difficulty sleeping and generally wakes up at odd hours  He also has difficulty recognizing people we was familiar with  Patient was following up with my colleague, felt to have dementia and maintained on Cerefolin in the past     Patient will be requiring the cholecystectomy in the near future and is also here today on an urgent basis for a neurology evaluation and clearance prior to surgery  His last ammonia level was 1 month ago which was normal his liver enzymes continue to be elevated, and the patient denies any central neurological symptoms or any motor or sensory symptoms except for occasional gait imbalance  ROS:   Review of Systems   Constitutional: Positive for appetite change, fatigue and unexpected weight change  HENT: Negative  Eyes: Negative  Respiratory: Negative  Cardiovascular: Negative  Gastrointestinal: Negative  Endocrine: Negative      Genitourinary: Negative  Musculoskeletal: Negative  Skin: Negative  Allergic/Immunologic: Negative  Neurological: Positive for dizziness and syncope  Hematological: Negative  Psychiatric/Behavioral: Positive for confusion and sleep disturbance  The patient is nervous/anxious  Vitals: There were no vitals filed for this visit  ,Body mass index is 20 99 kg/m²  MEDS:      Current Outpatient Prescriptions:     folic acid (FOLVITE) 1 mg tablet, Take 1 tablet (1 mg total) by mouth daily, Disp: 30 tablet, Rfl: 0    guaiFENesin (MUCINEX) 600 mg 12 hr tablet, Take 1 tablet (600 mg total) by mouth 2 (two) times a day, Disp: 60 tablet, Rfl: 0    levofloxacin (LEVAQUIN) 500 mg tablet, Take 1 tablet (500 mg total) by mouth every 24 hours for 11 days, Disp: 11 tablet, Rfl: 0    lidocaine (LMX) 4 % cream, Apply topically as needed for mild pain, Disp: 30 g, Rfl: 0    melatonin 1 mg, Take 3 mg by mouth daily at bedtime Patient takes 1 1/2 mg tablets  , Disp: , Rfl:     Aoteqsyxy-Nbhyyoxkc-Oohatcqqbf (CEREFOLIN NAC PO), Take 1 tablet by mouth 2 (two) times a day, Disp: , Rfl:     metroNIDAZOLE (FLAGYL) 500 mg tablet, Take 1 tablet (500 mg total) by mouth every 8 (eight) hours for 11 days, Disp: 33 tablet, Rfl: 0    nicotine (NICODERM CQ) 7 mg/24hr TD 24 hr patch, Place 1 patch on the skin daily, Disp: 28 patch, Rfl: 0    saccharomyces boulardii (FLORASTOR) 250 mg capsule, Take 1 capsule (250 mg total) by mouth 2 (two) times a day, Disp: 60 capsule, Rfl: 0    tamsulosin (FLOMAX) 0 4 mg, Take 1 capsule (0 4 mg total) by mouth daily with dinner, Disp: 30 capsule, Rfl: 0  :    Physical Exam:  General appearance: alert, appears stated age and cooperative  Head: Normocephalic, without obvious abnormality, atraumatic    Neurologic:  Patient is alert awake oriented, high functions reveal significant cognitive impairment, with recent mini-mental status examination revealing a score of 10/30  speech is fluent   No evidence of any aphasia or dysarthria  Cranial nerve examination reveals visual fields are full to threat, pupils equal and reactive, extraocular movements intact patient has evidence of nystagmoid movements on right lateral gaze, fundi showed sharp disc margins, sensation in the V1 V2 V3 distribution is symmetric, no obvious facial asymmetry noted, tongue is midline and gag is adequate  Motor examination reveals normal tone and bulk, no evidence of any drift to the outstretched extremities, strength is 5/5 preserved bilaterally in both upper and lower extremities, deep tendon reflexes are prominent bilaterally, toes are downgoing  Sensory examination to pinprick light touch proprioception and vibration is preserved bilaterally, patient does not extinguish double simultaneous stimuli  Coordination no evidence of any finger-to-nose dysmetria  Gait is normal based with a positive Romberg sign  Patient also has a right carotid bruit  Lab Results: I have personally reviewed pertinent reports  Imaging Studies: I have personally reviewed pertinent reports  Assessment:  1  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 secondary to hepatic encephalopathy, anesthesia and possibly associated thiamine deficiency  2  Mood disorder  3  Right carotid bruit rule out stenosis  4  Confusional episodes possible seizure disorder  Plan: At this time the patient is advised to get an EEG and a carotid ultrasound done  Patient will stop Cerefolin at this time and will start him on thiamine 100 mg daily, along with folate 1 tablet daily and multivitamins  Patient is advised to return back to see me in 1 month and any abnormalities on the EEG and the carotid ultrasound will be addressed prior to his surgery  If normal patient will be cleared for cholecystectomy from the neurological standpoint  Patient was also started on Zoloft 25 mg daily to help with his mood and Aricept 5 mg daily      4/25/2018,1:57 PM    Dictation voice to text software has been used in the creation of this document  Please consider this in light of any contextual or grammatical errors

## 2018-04-26 ENCOUNTER — APPOINTMENT (OUTPATIENT)
Dept: RADIOLOGY | Facility: CLINIC | Age: 78
End: 2018-04-26
Payer: MEDICARE

## 2018-04-26 DIAGNOSIS — I46.9 CARDIAC ARREST (HCC): ICD-10-CM

## 2018-04-26 PROCEDURE — 71046 X-RAY EXAM CHEST 2 VIEWS: CPT

## 2018-04-27 ENCOUNTER — OFFICE VISIT (OUTPATIENT)
Dept: CARDIOLOGY CLINIC | Facility: CLINIC | Age: 78
End: 2018-04-27
Payer: MEDICARE

## 2018-04-27 ENCOUNTER — OFFICE VISIT (OUTPATIENT)
Dept: INTERNAL MEDICINE CLINIC | Facility: CLINIC | Age: 78
End: 2018-04-27
Payer: MEDICARE

## 2018-04-27 VITALS
HEART RATE: 93 BPM | HEIGHT: 68 IN | WEIGHT: 134 LBS | BODY MASS INDEX: 20.31 KG/M2 | OXYGEN SATURATION: 97 % | SYSTOLIC BLOOD PRESSURE: 114 MMHG | DIASTOLIC BLOOD PRESSURE: 66 MMHG

## 2018-04-27 VITALS
HEART RATE: 96 BPM | BODY MASS INDEX: 20.61 KG/M2 | WEIGHT: 136 LBS | DIASTOLIC BLOOD PRESSURE: 78 MMHG | OXYGEN SATURATION: 98 % | HEIGHT: 68 IN | SYSTOLIC BLOOD PRESSURE: 128 MMHG

## 2018-04-27 DIAGNOSIS — K83.1 COMMON BILE DUCT OBSTRUCTION: ICD-10-CM

## 2018-04-27 DIAGNOSIS — F10.10 ALCOHOL ABUSE: ICD-10-CM

## 2018-04-27 DIAGNOSIS — I46.9 CARDIAC ARREST (HCC): ICD-10-CM

## 2018-04-27 DIAGNOSIS — J18.9 PNEUMONIA DUE TO INFECTIOUS ORGANISM, UNSPECIFIED LATERALITY, UNSPECIFIED PART OF LUNG: ICD-10-CM

## 2018-04-27 DIAGNOSIS — Z01.810 PREOP CARDIOVASCULAR EXAM: ICD-10-CM

## 2018-04-27 DIAGNOSIS — K83.1 COMMON BILE DUCT (CBD) OBSTRUCTION: Primary | ICD-10-CM

## 2018-04-27 DIAGNOSIS — Z72.0 TOBACCO ABUSE: Primary | ICD-10-CM

## 2018-04-27 DIAGNOSIS — F02.81 DEMENTIA ASSOCIATED WITH OTHER UNDERLYING DISEASE WITH BEHAVIORAL DISTURBANCE (HCC): ICD-10-CM

## 2018-04-27 PROCEDURE — 99214 OFFICE O/P EST MOD 30 MIN: CPT | Performed by: INTERNAL MEDICINE

## 2018-04-27 PROCEDURE — 99204 OFFICE O/P NEW MOD 45 MIN: CPT | Performed by: INTERNAL MEDICINE

## 2018-04-27 NOTE — PROGRESS NOTES
CARDIOLOGY OFFICE VISIT  [facility]  [address], [address] [place], [place] [address]lesludivina Posey  Tel: [phone]      NAME: Jessenia Villeda  AGE: 78 y o  SEX: male  : 1940   MRN: [id number]      Chief Complaint:  Chief Complaint   Patient presents with   [facility]      hospital f/u - cardiac arrest         History of Present Illness:   Patient comes for an initial evaluation  During a recent surgery patient had a short episode of cardiac arrest  He has been sent for cardiac evaluation due to that  Patient denies complaint of chest pain / pressure, SOB, palpitations, lightheadedness, syncope, swelling feet, orthopnea, PND, claudication  history of extensive alcohol abuse leading to encephalopathy - Recently quit     history of tobacco abuse - ongoing     Patient denies prior history of CAD, HTN, HLP, CHF, DM, TIA, CVA, CKD     Recent EKG (18) -   Sinus rhythm, RBBB   2D echo (2018) -  Normal EF, mild AR, aortic root 4 cm      Past Medical History:  Past Medical History:   Diagnosis Date    Alzheimer disease     Anxiety     Basal cell carcinoma     Bile duct disease     Cancer ([address] )     prostate, skin     Choledocholithiasis     Dementia     Depression     Liver disease     Malignant neoplasm of prostate ([address] )     Prostate cancer ([address] )     Prostate cancer ([address] )     Skin cancer          Past Surgical History:  Past Surgical History:   Procedure Laterality Date    ERCP N/A 3/9/2018    Procedure: ENDOSCOPIC RETROGRADE CHOLANGIOPANCREATOGRAPHY (ERCP); Surgeon: Shalom Diane MD;  [facility];  Service: Gastroenterology    ERCP  2018    stenting    [place] ERCP REMOVE FOREIGN BODY/STENT BILIARY/PANC DUCT N/A 2018    Procedure: ENDOSCOPIC RETROGRADE CHOLANGIOPANCREATOGRAPHY (ERCP);   Surgeon: Shalom Diane MD;  [facility];  Service: Gastroenterology    PROSTATE SURGERY Family History:  Family History   Problem Relation Age of Onset   Reagan Wilburn Cancer Mother          Social History:  Social History     Social History    Marital status: /Civil Union     Spouse name: N/A    Number of children: N/A    Years of education: N/A     Occupational History    employed      Social History Main Topics    Smoking status: Current Every Day Smoker     Packs/day: 0 50     Types: Cigarettes    Smokeless tobacco: Never Used    Alcohol use No      Comment: former    Drug use: No    Sexual activity: Yes     Partners: Female     Other Topics Concern    Not on file     Social History Narrative    No narrative on file         Active Problems:  Patient Active Problem List   Diagnosis    Transaminitis    Hypokalemia    Fall    Altered mental status    Total bilirubin, elevated    Weight loss counseling, encounter for    Common bile duct (CBD) obstruction    H/O prostate cancer    Common bile duct obstruction    Memory difficulty    [facility]ism [facility]    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    Cardiac arrest ([facility]    Acute hypoxemic respiratory failure ([facility]    Perforated intestine ([facility]    Traumatic hematuria    Pneumonia due to infectious organism    Tobacco abuse    Alcohol abuse         The following portions of the patient's history were reviewed and updated as appropriate: past medical history, past surgical history, past family history,  past social history, current medications, allergies and problem list       Review of Systems:  Constitutional: Denies fever, chills  Eyes: Denies eye redness, eye discharge, double vision  ENT: Denies hearing loss, tinnitus, sneezing, nasal discharge   Respiratory: Denies cough, expectoration, hemoptysis  Cardiovascular: Denies chest pain, palpitations, orthopnea, PND  Gastrointestinal: Denies abdominal pain, diarrhea, bloody stools  Genito-Urinary: Denies dysuria, incontinence  Musculoskeletal: Denies back pain, joint pain  Neurologic: Denies headache, focal weakness, sensory changes  Endocrine: Denies polyuria, polydipsia, temperature intolerance  Allergy and Immunology: Denies hives, insect bite sensitivity  Hematological and Lymphatic: Denies bleeding problems, swollen glands   Psychological: Denies suicidal ideation, anxiety, panic  Dermatological: Denies pruritus, rash, skin lesion changes      Vitals:  Vitals:    04/27/18 1022   BP: 114/66   Pulse: 93   SpO2: 97%       Body mass index is 20 68 kg/m²  Weight (last 2 days)     Date/Time   Weight    04/27/18 1022  60 8 (134)                Physical Examination:  General: Patient is not in acute distress  Awake, alert, oriented in time, place and person  Responding to commands  Head: Normocephalic  Atraumatic  Eyes: Both pupils normal sized, round and reactive to light  Nonicteric  ENT: Normal external ear canals  Nares normal, no drainage  Lips and oral mucosa normal  Neck: Supple  JVP not raised  Trachea central  No thyromegaly  Lungs: Bilateral bronchovascular breath sounds with no crackles or rhonchi  Chest wall: No tenderness  Cardiovascular: RRR  S1 and S2 normal  No murmur, rub or gallop  Gastrointestinal: Abdomen soft, nontender  No guarding or rigidity  Liver and spleen not palpable  Bowel sounds present  Neurologic: Patient is awake, alert, oriented in time, place and person  Responding to command  Moving all extremities  Integumentary:  No skin rash  Lymphatic: No cervical lymphadenopathy  Back: Symmetric   No CVA tenderness  Extremities: No clubbing, cyanosis or edema      Laboratory Results:  CBC with diff:   Lab Results   Component Value Date    WBC 12 15 (H) 04/14/2018    RBC 4 30 04/14/2018    HGB 13 7 04/14/2018    HCT 41 3 04/14/2018    MCV 96 04/14/2018    MCH 31 9 04/14/2018    RDW 14 6 04/14/2018     04/14/2018       CMP:  Lab Results   Component Value Date    CREATININE 0 65 04/14/2018    BUN 16 2018     2018    K 4 1 2018     2018    CO2 23 2018    GLUCOSE 115 2018    GLUCOSE 239 (H) 2018    PROT 6 1 (L) 2018    ALKPHOS 94 2018     (H) 2018     (H) 2018    BILIDIR 1 41 (H) 2018       Lab Results   Component Value Date    HGBA1C 5 5 2018    MG 2 2 2018    PHOS 4 2 (H) 2018       Lab Results   Component Value Date    TROPONINI 0 14 (H) 2018    TROPONINI 0 15 (H) 2018    TROPONINI <0 02 2018       Lipid Profile:   No results found for: CHOL  No results found for: HDL  No results found for: LDLCALC  No results found for: TRIG    Cardiac testing:   Results for orders placed during the hospital encounter of 18   Echo complete with contrast if indicated    [place] [phone]    Transthoracic Echocardiogram  2D, M-mode, Doppler, and Color Doppler    Study date:  2018    Patient: Jose Luis Quinones  MR number: [id number]  Account number: [de-identified]  : 1940  Age: 78 years  Gender: Male  Status: Inpatient  Location: Bedside  Height: 68 in  Weight: 150 lb  BP: 113/ 70 mmHg    Indications: Cardiac arrest    Diagnoses: I46 9 - Cardiac arrest, cause unspecified    Sonographer:   Medical [place] , RDCS  Interpreting Physician:  Leeanna Figueroa MD  Referring Physician:  SAAD Bolton  Group:  [facility]    SUMMARY    LEFT VENTRICLE:  Ejection fraction was estimated to be 60 %  Although no diagnostic regional wall motion abnormality was identified, this possibility cannot be completely excluded on the basis of this study  AORTIC VALVE:  Can not exclude bicuspid aortic valve  There was mild regurgitation  The valve was not well visualized  TRICUSPID VALVE:  There was trace regurgitation  AORTA:  The root exhibited mild dilatation  4 0 cm      HISTORY: PRIOR HISTORY: Dementia, cardiac arrest, Risk factors: alcohol abuse and a history of current cigarette use (within the last month)  PROCEDURE: The procedure was performed at the bedside  This was a routine study  The transthoracic approach was used  The study included complete 2D imaging, M-mode, complete spectral Doppler, and color Doppler  The heart rate was 74 bpm,  at the start of the study  LEFT VENTRICLE: Size was normal  Ejection fraction was estimated to be 60 %  Although no diagnostic regional wall motion abnormality was identified, this possibility cannot be completely excluded on the basis of this study  DOPPLER: Left  ventricular diastolic function parameters were normal     RIGHT VENTRICLE: The size was normal  Systolic function was normal  Wall thickness was normal     LEFT ATRIUM: Size was normal     RIGHT ATRIUM: Size was normal     MITRAL VALVE: Valve structure was normal  There was normal leaflet separation  DOPPLER: The transmitral velocity was within the normal range  There was no evidence for stenosis  There was no regurgitation  AORTIC VALVE: The valve was not well visualized  DOPPLER: Can not exclude bicuspid aortic valve  There was mild regurgitation  TRICUSPID VALVE: The valve structure was normal  There was normal leaflet separation  DOPPLER: The transtricuspid velocity was within the normal range  There was no evidence for stenosis  There was trace regurgitation  PULMONIC VALVE: Leaflets exhibited normal thickness, no calcification, and normal cuspal separation  DOPPLER: The transpulmonic velocity was within the normal range  There was no regurgitation  PERICARDIUM: There was no pericardial effusion  The pericardium was normal in appearance  AORTA: The root exhibited mild dilatation  4 0 cm      SYSTEM MEASUREMENT TABLES    2D  %FS: 25 5 %  Ao Diam: 3 9 cm  EDV(Teich): 91 4 ml  EF(Teich): 50 5 %  ESV(Teich): 45 3 ml  IVSd: 0 9 cm  LA Area: 11 1 cm2  LA Diam: 2 5 cm  LVEDV MOD A4C: 129 9 ml  LVEF MOD A4C: 66 8 %  LVESV MOD A4C: 43 1 ml  LVIDd: 4 5 cm  LVIDs: 3 3 cm  LVLd A4C: 8 3 cm  LVLs A4C: 6 9 cm  LVPWd: 0 8 cm  RA Area: 18 1 cm2  RVIDd: 3 5 cm  SV MOD A4C: 86 8 ml  SV(Teich): 46 1 ml    CW  AR Dec Griggs: 1 7 m/s2  AR Dec Time: 1937 4 ms  AR PHT: 561 9 ms  AR Vmax: 3 4 m/s  AR maxP 5 mmHg  TR Vmax: 2 1 m/s  TR maxP 5 mmHg    MM  TAPSE: 1 7 cm    PW  E': 0 1 m/s  E/E': 7 2  LVOT Env  Ti: 312 1 ms  LVOT VTI: 17 3 cm  LVOT Vmax: 0 8 m/s  LVOT Vmean: 0 6 m/s  LVOT maxP 6 mmHg  LVOT meanP 4 mmHg  MV A Donny: 0 7 m/s  MV Dec Griggs: 2 4 m/s2  MV DecT: 249 5 ms  MV E Donny: 0 6 m/s  MV E/A Ratio: 0 9  MV PHT: 72 4 ms  MVA By PHT: 3 cm2    [address] Accredited Ech[facility]ography [facility]    Prepared and electronically signed by    Tarah Naqvi MD  Signed 2018 11:20:08           Medications:    Current Outpatient Prescriptions:     donepezil (ARICEPT) 5 mg tablet, Take 1 tablet (5 mg total) by mouth daily at bedtime, Disp: 30 tablet, Rfl: 2    folic acid (FOLVITE) 1 mg tablet, Take 1 tablet (1 mg total) by mouth daily, Disp: 30 tablet, Rfl: 0    guaiFENesin (MUCINEX) 600 mg 12 hr tablet, Take 1 tablet (600 mg total) by mouth 2 (two) times a day, Disp: 60 tablet, Rfl: 0    levofloxacin (LEVAQUIN) 500 mg tablet, Take 1 tablet (500 mg total) by mouth every 24 hours for 11 days, Disp: 11 tablet, Rfl: 0    lidocaine (LMX) 4 % cream, Apply topically as needed for mild pain, Disp: 30 g, Rfl: 0    melatonin 1 mg, Take 3 mg by mouth daily at bedtime Patient takes 1 1/2 mg tablets  , Disp: , Rfl:     saccharomyces boulardii (FLORASTOR) 250 mg capsule, Take 1 capsule (250 mg total) by mouth 2 (two) times a day, Disp: 60 capsule, Rfl: 0    sertraline (ZOLOFT) 25 mg tablet, Take 2 tablets (50 mg total) by mouth daily, Disp: 30 tablet, Rfl: 2    tamsulosin (FLOMAX) 0 4 mg, Take 1 capsule (0 4 mg total) by mouth daily with dinner, Disp: 30 capsule, Rfl: 0    thiamine 100 MG tablet, Take 1 tablet (100 mg total) by mouth daily, Disp: 30 tablet, Rfl: 2      Allergies:  No Known Allergies      Assessment and Plan:  1  Cardiac arrest ([address] )   EKG and echocardiogram reviewed with patient and spouse  Stress test ordered for evaluation    2  Tobacco abuse   strongly counseled to stop smoking    3  Alcohol abuse   counseled to continue to abstain from alcohol    4  Preop cardiovascular exam   stress test ordered  Further recommendation after the report is reviewed    Discussed concepts of atherosclerosis, including signs and symptoms of cardiac disease  Previous studies were reviewed  Safety measures were reviewed  Questions were entertained and answered  Patient was advised to report any problems requiring medical attention  Follow-up with PCP and appropriate specialist and lab work as discussed  Return for follow up visit as scheduled or earlier, if needed  Further recommendations will be forthcoming after the above testing is performed and results available  Thank you for allowing me to participate in the care and evaluation of your patient  Should you have any questions, please feel free to contact me  Griselda Redding MD  4/27/2018,10:50 AM       ADDENDUM 5/3/18 - Patient has no active cardiac conditions (such as unstable cardiac syndrome, decompensated heart failure, significant arrhythmias, severe valvular disease) and no clinical risk factors (such as CAD, compensated or prior heart failure, diabetes mellitus, renal insufficiency, CVA)  Patient can comfortably go up and down one flight of steps which is 4 METS  Patient is a moderate cardiac risk patient  No further cardiac workup is needed at this time  No cardiac contraindications for surgery

## 2018-04-27 NOTE — PROGRESS NOTES
Assessment/Plan:  Regarding cardiovascular status he seems stable  He is for stress test     He is a smoker and is advised in the strongest terms to discontinue his smoking  Neurological status remained stable though not good  He has very poor short-term memory  He is not having any abdominal pain  He has for carotid ultrasound  He is going to have blood work done in about a week  Assuming his stress test is normal there is no contraindication from a medical standpoint for his anticipated surgery  This was explained to both he and his wife  I will see him 1 week postoperatively  No problem-specific Assessment & Plan notes found for this encounter  Diagnoses and all orders for this visit:    Common bile duct (CBD) obstruction  -     CBC and differential; Future    Common bile duct obstruction  -     Comprehensive metabolic panel; Future  -     Protime-INR; Future    Pneumonia due to infectious organism, unspecified laterality, unspecified part of lung    Cardiac arrest ([facility] )    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  -     Ammonia; Future    Alcohol abuse          Subjective:  Problems are 1  Common bile duct obstruction 2  Status post cardiac arrest 3  Dementia 4  History of alcohol abuse 5  Status post pneumonia     Patient ID: Angeles Rivera is a 78 y o  male  HPI he comes in for follow-up  He is looking better  His weight is gone up about 2 pounds  He is eating fairly well  He recently saw Cardiology  He is scheduled for stress test     He saw Neurology and his medicines were changed  He has for carotid ultrasound  He denies any chest pain  Breathing stable  He denies any abdominal pain      The following portions of the patient's history were reviewed and updated as appropriate: allergies, current medications, past family history, past medical history, past social history, past surgical history and problem list     Review of Systems   Constitutional: Positive for appetite change and fatigue  Negative for activity change, chills, diaphoresis, fever and unexpected weight change  HENT: Negative for congestion, ear pain, hearing loss, mouth sores, nosebleeds, postnasal drip, sinus pain, sinus pressure, sore throat and trouble swallowing  Eyes: Negative for pain, discharge and visual disturbance  Respiratory: Negative for apnea, cough, chest tightness, shortness of breath and wheezing  Cardiovascular: Negative for chest pain, palpitations and leg swelling  Gastrointestinal: Negative for abdominal pain, anal bleeding, blood in stool, constipation, diarrhea, nausea and vomiting  Has common bile duct stone  Endocrine: Negative for polydipsia and polyphagia  Genitourinary: Negative for decreased urine volume, dysuria, flank pain, frequency, hematuria and urgency  Musculoskeletal: Negative for arthralgias, back pain, gait problem, joint swelling and myalgias  Skin: Negative for rash and wound  Allergic/Immunologic: Negative for environmental allergies and food allergies  Neurological: Negative for dizziness, tremors, seizures, syncope, speech difficulty, light-headedness, numbness and headaches  Has extremely poor short-term memory   Hematological: Negative for adenopathy  Does not bruise/bleed easily  Psychiatric/Behavioral: Positive for confusion  Negative for agitation, hallucinations, sleep disturbance and suicidal ideas  The patient is not nervous/anxious  Objective:     Physical Exam   Constitutional: He appears well-developed  No distress  He is very slender  His weight however is up 2 pounds  Vital signs are stable  HENT:   Head: Normocephalic  Right Ear: External ear normal    Left Ear: External ear normal    Nose: Nose normal    Mouth/Throat: Oropharynx is clear and moist  No oropharyngeal exudate     Eyes: Conjunctivae and EOM are normal  Pupils are equal, round, and reactive to light  Right eye exhibits no discharge  Left eye exhibits no discharge  Neck: Normal range of motion  Neck supple  No thyromegaly present  Cardiovascular: Normal rate, regular rhythm, normal heart sounds and intact distal pulses  Exam reveals no gallop and no friction rub  No murmur heard  Pulmonary/Chest: Effort normal and breath sounds normal  No respiratory distress  He has no wheezes  He has no rales  Abdominal: Soft  Bowel sounds are normal  He exhibits no distension and no mass  There is no tenderness  There is no rebound and no guarding  Musculoskeletal: Normal range of motion  He exhibits no edema, tenderness or deformity  Lymphadenopathy:     He has no cervical adenopathy  Neurological: He is alert  He has normal reflexes  No cranial nerve deficit  Coordination normal    Skin: Skin is warm and dry  No rash noted  No erythema  Psychiatric: He has a normal mood and affect  His behavior is normal  Judgment and thought content normal    Nursing note and vitals reviewed

## 2018-05-01 ENCOUNTER — HOSPITAL ENCOUNTER (OUTPATIENT)
Dept: NON INVASIVE DIAGNOSTICS | Facility: CLINIC | Age: 78
Discharge: HOME | End: 2018-05-01
Payer: MEDICARE

## 2018-05-01 DIAGNOSIS — Z72.0 TOBACCO ABUSE: ICD-10-CM

## 2018-05-01 DIAGNOSIS — I46.9 CARDIAC ARREST (HCC): ICD-10-CM

## 2018-05-01 PROCEDURE — 93017 CV STRESS TEST TRACING ONLY: CPT

## 2018-05-01 PROCEDURE — A9502 TC99M TETROFOSMIN: HCPCS

## 2018-05-01 PROCEDURE — 78452 HT MUSCLE IMAGE SPECT MULT: CPT

## 2018-05-01 RX ORDER — AMINOPHYLLINE DIHYDRATE 25 MG/ML
50 INJECTION, SOLUTION INTRAVENOUS ONCE
Status: CANCELLED | OUTPATIENT
Start: 2018-05-01 | End: 2018-05-01

## 2018-05-01 RX ADMIN — REGADENOSON 0.4 MG: 0.08 INJECTION, SOLUTION INTRAVENOUS at 10:01

## 2018-05-02 ENCOUNTER — HOSPITAL ENCOUNTER (OUTPATIENT)
Dept: ULTRASOUND IMAGING | Facility: HOSPITAL | Age: 78
Discharge: HOME | End: 2018-05-02
Attending: PSYCHIATRY & NEUROLOGY
Payer: MEDICARE

## 2018-05-02 ENCOUNTER — HOSPITAL ENCOUNTER (OUTPATIENT)
Dept: NEUROLOGY | Facility: HOSPITAL | Age: 78
Discharge: HOME | End: 2018-05-02
Attending: PSYCHIATRY & NEUROLOGY
Payer: MEDICARE

## 2018-05-02 DIAGNOSIS — G40.909 SEIZURE DISORDER (HCC): ICD-10-CM

## 2018-05-02 DIAGNOSIS — R09.89 RIGHT CAROTID BRUIT: ICD-10-CM

## 2018-05-02 LAB
ARRHY DURING EX: NORMAL
CHEST PAIN STATEMENT: NORMAL
MAX DIASTOLIC BP: 76 MMHG
MAX HEART RATE: 93 BPM
MAX PREDICTED HEART RATE: 145 BPM
MAX. SYSTOLIC BP: 126 MMHG
PROTOCOL NAME: NORMAL
REASON FOR TERMINATION: NORMAL
TARGET HR FORMULA: NORMAL
TEST INDICATION: NORMAL
TIME IN EXERCISE PHASE: NORMAL

## 2018-05-02 PROCEDURE — 95816 EEG AWAKE AND DROWSY: CPT

## 2018-05-02 PROCEDURE — 93880 EXTRACRANIAL BILAT STUDY: CPT

## 2018-05-02 PROCEDURE — 93880 EXTRACRANIAL BILAT STUDY: CPT | Performed by: SURGERY

## 2018-05-02 PROCEDURE — 95816 EEG AWAKE AND DROWSY: CPT | Performed by: PSYCHIATRY & NEUROLOGY

## 2018-05-03 ENCOUNTER — TELEPHONE (OUTPATIENT)
Dept: CARDIOLOGY CLINIC | Facility: CLINIC | Age: 78
End: 2018-05-03

## 2018-05-03 ENCOUNTER — TELEPHONE (OUTPATIENT)
Dept: NEUROLOGY | Facility: CLINIC | Age: 78
End: 2018-05-03

## 2018-05-03 NOTE — TELEPHONE ENCOUNTER
pt's wife called and states that pt had his eeg and carotid done yesterday  she is calling to see if pt is cleared for surgery      [phone]

## 2018-05-03 NOTE — TELEPHONE ENCOUNTER
Called patient's wife with test results  She would like a note put in chart that patient is OK for surgery

## 2018-05-03 NOTE — TELEPHONE ENCOUNTER
s/w patients spouse - patient is having Gallbladder removal by Dr Maurice Jalloh   No dates are scheduled they are waiting for clearance  Please review and advise, thanks

## 2018-05-07 ENCOUNTER — OFFICE VISIT (OUTPATIENT)
Dept: SURGERY | Facility: CLINIC | Age: 78
End: 2018-05-07
Payer: MEDICARE

## 2018-05-07 VITALS
SYSTOLIC BLOOD PRESSURE: 120 MMHG | WEIGHT: 130.06 LBS | BODY MASS INDEX: 19.71 KG/M2 | DIASTOLIC BLOOD PRESSURE: 68 MMHG | RESPIRATION RATE: 17 BRPM | TEMPERATURE: 98.9 F | HEART RATE: 78 BPM | HEIGHT: 68 IN

## 2018-05-07 DIAGNOSIS — K80.50 CHOLEDOCHOLITHIASIS: Primary | ICD-10-CM

## 2018-05-07 PROCEDURE — 99214 OFFICE O/P EST MOD 30 MIN: CPT | Performed by: SURGERY

## 2018-05-07 NOTE — PROGRESS NOTES
GENERAL SURGERY HISTORY AND PHYSICAL     Perla Harris 78 y o  male MRN: [id number]  Unit/Bed#:  Encounter: [id number]      Assessment/Plan   1  Choledocholithiasis  Case request operating room: CHOLECYSTECTOMY LAPAROSCOPIC POSSIBLE OPEN    Ambulatory referral to Family Practice    Ambulatory referral to Cardiology    Case request operating room: CHOLECYSTECTOMY LAPAROSCOPIC POSSIBLE OPEN     Patient with multiple ERCP for choledocholithiasis  No evidence of obstructive jaundice at this time  Recommendation by GI is for patient undergo lap choly prior to stent removal   Patient has been evaluated and cleared by appropriate services  I discussed the risks of the procedure with the patient including bleeding, infection, damage to critical structures potentially necessitating further procedures including but not limited to common bile duct injury and bowel injury  I also discussed with the patient procedure may fail to resolve abdominal pain  Their questions were answered to their satisfaction ready to proceed, we will schedule for laparoscopic cholecystectomy possible open  Will plan for overnight admission given previous postanesthesia event  Chief Complaint choledocholithiasis    HPI: Perla Harris is a 78y o  year old male known history of choledocholithiasis, multiple previous ERCPs  Planned for patient undergo laparoscopic cholecystectomy  Patient has been seen and evaluated by PCP, cardiology, neurology  All associated testing within normal limits no further interventions planned  Patient is cleared for surgery by all specialists  Patient denies any acute changes         ROS:  12 Point ROS reviewed and negative except for:  Poor appetite    Historical Information   Past Medical History:   Diagnosis Date    Alzheimer disease     Anxiety     Basal cell carcinoma     Bile duct disease     Cancer [facility]     prostate, skin     Choledocholithiasis     Dementia     Depression     Liver disease     [facility]     Prostate cancer [facility]     Prostate cancer [facility]     Skin cancer      Past Surgical History:   Procedure Laterality Date    ERCP N/A 3/9/2018    Procedure: ENDOSCOPIC RETROGRADE CHOLANGIOPANCREATOGRAPHY (ERCP); Surgeon: Boogie Pham MD;  [facility];  Service: Gastroenterology    ERCP  04/13/2018    stenting    AR ERCP REMOVE FOREIGN BODY/STENT BILIARY/PANC DUCT N/A 4/13/2018    Procedure: ENDOSCOPIC RETROGRADE CHOLANGIOPANCREATOGRAPHY (ERCP); Surgeon: Boogie Pham MD;  [facility];  Service: Gastroenterology    PROSTATE SURGERY       Social History   History   Alcohol Use No     Comment: former     History   Drug Use No     History   Smoking Status    Current Every Day Smoker    Packs/day: 0 50    Types: Cigarettes   Smokeless Tobacco    Never Used     Family History: no pertinent family history  No Known Allergies  Meds/Allergies   all current active meds have been reviewed      Objective   Vitals: Blood pressure 120/68, pulse 78, temperature 98 9 °F (37 2 °C), temperature source Oral, resp  rate 17, height 5' 7 5" (1 715 m), weight 59 kg (130 lb 1 oz)  ,Body mass index is 20 07 kg/m²    Physical Exam:    General appearance: Awake alert oriented no acute distress  HEENT: Sclera clear, anicterus, no discharge  Neck: Trachea is midline  Lungs: No respiratory distress, clear to auscultation bilaterally  Heart[de-identified] RRR  Abdomen: soft, nondistended, nontender  Skin:  No visible jaundice  Psych: Affect appropriate    Hillary Victoria MD  5/7/2018

## 2018-05-08 ENCOUNTER — APPOINTMENT (OUTPATIENT)
Dept: LAB | Facility: HOSPITAL | Age: 78
End: 2018-05-08
Attending: INTERNAL MEDICINE
Payer: MEDICARE

## 2018-05-08 DIAGNOSIS — K83.1 COMMON BILE DUCT OBSTRUCTION: ICD-10-CM

## 2018-05-08 DIAGNOSIS — F02.81 DEMENTIA ASSOCIATED WITH OTHER UNDERLYING DISEASE WITH BEHAVIORAL DISTURBANCE (HCC): ICD-10-CM

## 2018-05-08 DIAGNOSIS — K83.1 COMMON BILE DUCT (CBD) OBSTRUCTION: ICD-10-CM

## 2018-05-08 LAB
ALBUMIN SERPL BCP-MCNC: 3.7 G/DL (ref 3.5–5)
ALP SERPL-CCNC: 109 U/L (ref 46–116)
ALT SERPL W P-5'-P-CCNC: 39 U/L (ref 12–78)
AMMONIA PLAS-SCNC: <10 UMOL/L (ref 11–35)
ANION GAP SERPL CALCULATED.3IONS-SCNC: 6 MMOL/L (ref 4–13)
AST SERPL W P-5'-P-CCNC: 24 U/L (ref 5–45)
BASOPHILS # BLD AUTO: 0.04 THOUSANDS/ΜL (ref 0–0.1)
BASOPHILS NFR BLD AUTO: 1 % (ref 0–1)
BILIRUB SERPL-MCNC: 0.8 MG/DL (ref 0.2–1)
BUN SERPL-MCNC: 12 MG/DL (ref 5–25)
CALCIUM SERPL-MCNC: 9.9 MG/DL (ref 8.3–10.1)
CHLORIDE SERPL-SCNC: 101 MMOL/L (ref 100–108)
CO2 SERPL-SCNC: 29 MMOL/L (ref 21–32)
CREAT SERPL-MCNC: 1.01 MG/DL (ref 0.6–1.3)
EOSINOPHIL # BLD AUTO: 0.07 THOUSAND/ΜL (ref 0–0.61)
EOSINOPHIL NFR BLD AUTO: 2 % (ref 0–6)
ERYTHROCYTE [DISTWIDTH] IN BLOOD BY AUTOMATED COUNT: 13.2 % (ref 11.6–15.1)
GFR SERPL CREATININE-BSD FRML MDRD: 72 ML/MIN/1.73SQ M
GLUCOSE P FAST SERPL-MCNC: 111 MG/DL (ref 65–99)
HCT VFR BLD AUTO: 47.8 % (ref 36.5–49.3)
HGB BLD-MCNC: 16.2 G/DL (ref 12–17)
INR PPP: 0.92 (ref 0.86–1.16)
LYMPHOCYTES # BLD AUTO: 0.87 THOUSANDS/ΜL (ref 0.6–4.47)
LYMPHOCYTES NFR BLD AUTO: 20 % (ref 14–44)
MCH RBC QN AUTO: 32 PG (ref 26.8–34.3)
MCHC RBC AUTO-ENTMCNC: 33.9 G/DL (ref 31.4–37.4)
MCV RBC AUTO: 95 FL (ref 82–98)
MONOCYTES # BLD AUTO: 0.4 THOUSAND/ΜL (ref 0.17–1.22)
MONOCYTES NFR BLD AUTO: 9 % (ref 4–12)
NEUTROPHILS # BLD AUTO: 3.04 THOUSANDS/ΜL (ref 1.85–7.62)
NEUTS SEG NFR BLD AUTO: 68 % (ref 43–75)
NRBC BLD AUTO-RTO: 0 /100 WBCS
PLATELET # BLD AUTO: 241 THOUSANDS/UL (ref 149–390)
PMV BLD AUTO: 9.3 FL (ref 8.9–12.7)
POTASSIUM SERPL-SCNC: 4.1 MMOL/L (ref 3.5–5.3)
PROT SERPL-MCNC: 7.1 G/DL (ref 6.4–8.2)
PROTHROMBIN TIME: 12.5 SECONDS (ref 12.1–14.4)
RBC # BLD AUTO: 5.06 MILLION/UL (ref 3.88–5.62)
SODIUM SERPL-SCNC: 136 MMOL/L (ref 136–145)
WBC # BLD AUTO: 4.45 THOUSAND/UL (ref 4.31–10.16)

## 2018-05-08 PROCEDURE — 80053 COMPREHEN METABOLIC PANEL: CPT

## 2018-05-08 PROCEDURE — 36415 COLL VENOUS BLD VENIPUNCTURE: CPT

## 2018-05-08 PROCEDURE — 82140 ASSAY OF AMMONIA: CPT

## 2018-05-08 PROCEDURE — 85610 PROTHROMBIN TIME: CPT

## 2018-05-08 PROCEDURE — 85025 COMPLETE CBC W/AUTO DIFF WBC: CPT

## 2018-05-08 NOTE — PRE-PROCEDURE INSTRUCTIONS
Pre-Surgery Instructions:   Medication Instructions    donepezil (ARICEPT) 5 mg tablet Patient was instructed by Physician and understands   folic acid (FOLVITE) 1 mg tablet Patient was instructed by Physician and understands   guaiFENesin (MUCINEX) 600 mg 12 hr tablet Patient was instructed by Physician and understands   melatonin 1 mg Patient was instructed by Physician and understands   saccharomyces boulardii (FLORASTOR) 250 mg capsule Patient was instructed by Physician and understands   sertraline (ZOLOFT) 25 mg tablet Patient was instructed by Physician and understands   tamsulosin (FLOMAX) 0 4 mg Patient was instructed by Physician and understands   thiamine 100 MG tablet Patient was instructed by Physician and understands

## 2018-05-15 ENCOUNTER — ANESTHESIA EVENT (OUTPATIENT)
Dept: PERIOP | Facility: HOSPITAL | Age: 78
End: 2018-05-15
Payer: MEDICARE

## 2018-05-15 ENCOUNTER — HOSPITAL ENCOUNTER (OUTPATIENT)
Facility: HOSPITAL | Age: 78
Setting detail: OUTPATIENT SURGERY
Discharge: HOME | End: 2018-05-16
Attending: SURGERY | Admitting: SURGERY
Payer: MEDICARE

## 2018-05-15 ENCOUNTER — ANESTHESIA (OUTPATIENT)
Dept: PERIOP | Facility: HOSPITAL | Age: 78
End: 2018-05-15
Payer: MEDICARE

## 2018-05-15 DIAGNOSIS — R74.01 TRANSAMINITIS: Primary | ICD-10-CM

## 2018-05-15 DIAGNOSIS — K80.50 CHOLEDOCHOLITHIASIS: ICD-10-CM

## 2018-05-15 PROCEDURE — 88304 TISSUE EXAM BY PATHOLOGIST: CPT | Performed by: PATHOLOGY

## 2018-05-15 PROCEDURE — 47562 LAPAROSCOPIC CHOLECYSTECTOMY: CPT | Performed by: SURGERY

## 2018-05-15 PROCEDURE — 47562 LAPAROSCOPIC CHOLECYSTECTOMY: CPT | Performed by: PHYSICIAN ASSISTANT

## 2018-05-15 RX ORDER — ONDANSETRON 2 MG/ML
INJECTION INTRAMUSCULAR; INTRAVENOUS AS NEEDED
Status: DISCONTINUED | OUTPATIENT
Start: 2018-05-15 | End: 2018-05-15 | Stop reason: SURG

## 2018-05-15 RX ORDER — FENTANYL CITRATE/PF 50 MCG/ML
50 SYRINGE (ML) INJECTION
Status: DISCONTINUED | OUTPATIENT
Start: 2018-05-15 | End: 2018-05-15 | Stop reason: HOSPADM

## 2018-05-15 RX ORDER — SODIUM CHLORIDE, SODIUM LACTATE, POTASSIUM CHLORIDE, CALCIUM CHLORIDE 600; 310; 30; 20 MG/100ML; MG/100ML; MG/100ML; MG/100ML
INJECTION, SOLUTION INTRAVENOUS CONTINUOUS PRN
Status: DISCONTINUED | OUTPATIENT
Start: 2018-05-15 | End: 2018-05-15 | Stop reason: SURG

## 2018-05-15 RX ORDER — GLYCOPYRROLATE 0.2 MG/ML
INJECTION INTRAMUSCULAR; INTRAVENOUS AS NEEDED
Status: DISCONTINUED | OUTPATIENT
Start: 2018-05-15 | End: 2018-05-15 | Stop reason: SURG

## 2018-05-15 RX ORDER — DONEPEZIL HYDROCHLORIDE 5 MG/1
5 TABLET, FILM COATED ORAL
Status: DISCONTINUED | OUTPATIENT
Start: 2018-05-15 | End: 2018-05-16 | Stop reason: HOSPADM

## 2018-05-15 RX ORDER — BUPIVACAINE HYDROCHLORIDE 5 MG/ML
INJECTION, SOLUTION PERINEURAL AS NEEDED
Status: DISCONTINUED | OUTPATIENT
Start: 2018-05-15 | End: 2018-05-15 | Stop reason: HOSPADM

## 2018-05-15 RX ORDER — LANOLIN ALCOHOL/MO/W.PET/CERES
3 CREAM (GRAM) TOPICAL
Status: DISCONTINUED | OUTPATIENT
Start: 2018-05-15 | End: 2018-05-16 | Stop reason: HOSPADM

## 2018-05-15 RX ORDER — PROPOFOL 10 MG/ML
INJECTION, EMULSION INTRAVENOUS CONTINUOUS PRN
Status: DISCONTINUED | OUTPATIENT
Start: 2018-05-15 | End: 2018-05-15 | Stop reason: SURG

## 2018-05-15 RX ORDER — LABETALOL HYDROCHLORIDE 5 MG/ML
INJECTION, SOLUTION INTRAVENOUS AS NEEDED
Status: DISCONTINUED | OUTPATIENT
Start: 2018-05-15 | End: 2018-05-15 | Stop reason: SURG

## 2018-05-15 RX ORDER — ONDANSETRON 2 MG/ML
4 INJECTION INTRAMUSCULAR; INTRAVENOUS EVERY 6 HOURS PRN
Status: DISCONTINUED | OUTPATIENT
Start: 2018-05-15 | End: 2018-05-16 | Stop reason: HOSPADM

## 2018-05-15 RX ORDER — HYDROCODONE BITARTRATE AND ACETAMINOPHEN 5; 325 MG/1; MG/1
1 TABLET ORAL EVERY 4 HOURS PRN
Status: DISCONTINUED | OUTPATIENT
Start: 2018-05-15 | End: 2018-05-16 | Stop reason: HOSPADM

## 2018-05-15 RX ORDER — LIDOCAINE HYDROCHLORIDE 10 MG/ML
INJECTION, SOLUTION INFILTRATION; PERINEURAL AS NEEDED
Status: DISCONTINUED | OUTPATIENT
Start: 2018-05-15 | End: 2018-05-15 | Stop reason: SURG

## 2018-05-15 RX ORDER — DEXMEDETOMIDINE HYDROCHLORIDE 100 UG/ML
INJECTION, SOLUTION INTRAVENOUS AS NEEDED
Status: DISCONTINUED | OUTPATIENT
Start: 2018-05-15 | End: 2018-05-15 | Stop reason: SURG

## 2018-05-15 RX ORDER — FENTANYL CITRATE 50 UG/ML
INJECTION, SOLUTION INTRAMUSCULAR; INTRAVENOUS AS NEEDED
Status: DISCONTINUED | OUTPATIENT
Start: 2018-05-15 | End: 2018-05-15 | Stop reason: SURG

## 2018-05-15 RX ORDER — MORPHINE SULFATE 4 MG/ML
4 INJECTION, SOLUTION INTRAMUSCULAR; INTRAVENOUS EVERY 4 HOURS PRN
Status: DISCONTINUED | OUTPATIENT
Start: 2018-05-15 | End: 2018-05-16 | Stop reason: HOSPADM

## 2018-05-15 RX ORDER — ONDANSETRON 2 MG/ML
4 INJECTION INTRAMUSCULAR; INTRAVENOUS ONCE
Status: DISCONTINUED | OUTPATIENT
Start: 2018-05-15 | End: 2018-05-15 | Stop reason: HOSPADM

## 2018-05-15 RX ORDER — MAGNESIUM HYDROXIDE 1200 MG/15ML
LIQUID ORAL AS NEEDED
Status: DISCONTINUED | OUTPATIENT
Start: 2018-05-15 | End: 2018-05-15 | Stop reason: HOSPADM

## 2018-05-15 RX ORDER — THIAMINE MONONITRATE (VIT B1) 100 MG
100 TABLET ORAL DAILY
Status: DISCONTINUED | OUTPATIENT
Start: 2018-05-15 | End: 2018-05-16 | Stop reason: HOSPADM

## 2018-05-15 RX ORDER — HYDROCODONE BITARTRATE AND ACETAMINOPHEN 5; 325 MG/1; MG/1
2 TABLET ORAL EVERY 4 HOURS PRN
Status: DISCONTINUED | OUTPATIENT
Start: 2018-05-15 | End: 2018-05-16 | Stop reason: HOSPADM

## 2018-05-15 RX ORDER — SODIUM CHLORIDE 9 MG/ML
125 INJECTION, SOLUTION INTRAVENOUS CONTINUOUS
Status: DISCONTINUED | OUTPATIENT
Start: 2018-05-15 | End: 2018-05-16 | Stop reason: HOSPADM

## 2018-05-15 RX ORDER — ROCURONIUM BROMIDE 10 MG/ML
INJECTION, SOLUTION INTRAVENOUS AS NEEDED
Status: DISCONTINUED | OUTPATIENT
Start: 2018-05-15 | End: 2018-05-15 | Stop reason: SURG

## 2018-05-15 RX ORDER — TAMSULOSIN HYDROCHLORIDE 0.4 MG/1
0.4 CAPSULE ORAL
Status: DISCONTINUED | OUTPATIENT
Start: 2018-05-15 | End: 2018-05-16 | Stop reason: HOSPADM

## 2018-05-15 RX ORDER — DOCUSATE SODIUM 100 MG/1
100 CAPSULE, LIQUID FILLED ORAL 2 TIMES DAILY
Status: DISCONTINUED | OUTPATIENT
Start: 2018-05-15 | End: 2018-05-16 | Stop reason: HOSPADM

## 2018-05-15 RX ORDER — EPHEDRINE SULFATE 50 MG/ML
INJECTION, SOLUTION INTRAVENOUS AS NEEDED
Status: DISCONTINUED | OUTPATIENT
Start: 2018-05-15 | End: 2018-05-15 | Stop reason: SURG

## 2018-05-15 RX ORDER — PROPOFOL 10 MG/ML
INJECTION, EMULSION INTRAVENOUS AS NEEDED
Status: DISCONTINUED | OUTPATIENT
Start: 2018-05-15 | End: 2018-05-15 | Stop reason: SURG

## 2018-05-15 RX ORDER — FOLIC ACID 1 MG/1
1 TABLET ORAL DAILY
Status: DISCONTINUED | OUTPATIENT
Start: 2018-05-15 | End: 2018-05-16 | Stop reason: HOSPADM

## 2018-05-15 RX ADMIN — PROPOFOL 150 MG: 10 INJECTION, EMULSION INTRAVENOUS at 11:12

## 2018-05-15 RX ADMIN — LIDOCAINE HYDROCHLORIDE 100 MG: 10 INJECTION, SOLUTION INFILTRATION; PERINEURAL at 11:12

## 2018-05-15 RX ADMIN — DONEPEZIL HYDROCHLORIDE 5 MG: 5 TABLET ORAL at 21:08

## 2018-05-15 RX ADMIN — FOLIC ACID 1 MG: 1 TABLET ORAL at 15:46

## 2018-05-15 RX ADMIN — GLYCOPYRROLATE 0.4 MG: 0.2 INJECTION, SOLUTION INTRAMUSCULAR; INTRAVENOUS at 12:30

## 2018-05-15 RX ADMIN — EPHEDRINE SULFATE 10 MG: 50 INJECTION, SOLUTION INTRAMUSCULAR; INTRAVENOUS; SUBCUTANEOUS at 11:24

## 2018-05-15 RX ADMIN — ROCURONIUM BROMIDE 40 MG: 10 INJECTION INTRAVENOUS at 11:12

## 2018-05-15 RX ADMIN — SODIUM CHLORIDE 125 ML/HR: 0.9 INJECTION, SOLUTION INTRAVENOUS at 15:46

## 2018-05-15 RX ADMIN — SODIUM CHLORIDE, SODIUM LACTATE, POTASSIUM CHLORIDE, AND CALCIUM CHLORIDE: .6; .31; .03; .02 INJECTION, SOLUTION INTRAVENOUS at 11:07

## 2018-05-15 RX ADMIN — MORPHINE SULFATE 4 MG: 4 INJECTION INTRAVENOUS at 15:18

## 2018-05-15 RX ADMIN — DEXAMETHASONE SODIUM PHOSPHATE 10 MG: 10 INJECTION INTRAMUSCULAR; INTRAVENOUS at 11:12

## 2018-05-15 RX ADMIN — FENTANYL CITRATE 50 MCG: 50 INJECTION, SOLUTION INTRAMUSCULAR; INTRAVENOUS at 11:32

## 2018-05-15 RX ADMIN — TAMSULOSIN HYDROCHLORIDE 0.4 MG: 0.4 CAPSULE ORAL at 18:35

## 2018-05-15 RX ADMIN — EPHEDRINE SULFATE 10 MG: 50 INJECTION, SOLUTION INTRAMUSCULAR; INTRAVENOUS; SUBCUTANEOUS at 11:20

## 2018-05-15 RX ADMIN — FENTANYL CITRATE 50 MCG: 50 INJECTION INTRAMUSCULAR; INTRAVENOUS at 13:07

## 2018-05-15 RX ADMIN — FENTANYL CITRATE 50 MCG: 50 INJECTION INTRAMUSCULAR; INTRAVENOUS at 13:15

## 2018-05-15 RX ADMIN — DEXMEDETOMIDINE 12 MCG: 100 INJECTION, SOLUTION, CONCENTRATE INTRAVENOUS at 11:43

## 2018-05-15 RX ADMIN — CEFAZOLIN SODIUM 1000 MG: 1 SOLUTION INTRAVENOUS at 11:07

## 2018-05-15 RX ADMIN — NEOSTIGMINE METHYLSULFATE 3 MG: 1 INJECTION, SOLUTION INTRAMUSCULAR; INTRAVENOUS; SUBCUTANEOUS at 12:30

## 2018-05-15 RX ADMIN — Medication 0.2 MCG/KG/MIN: at 11:12

## 2018-05-15 RX ADMIN — SERTRALINE HYDROCHLORIDE 50 MG: 50 TABLET ORAL at 15:46

## 2018-05-15 RX ADMIN — DOCUSATE SODIUM 100 MG: 100 CAPSULE, LIQUID FILLED ORAL at 18:35

## 2018-05-15 RX ADMIN — LABETALOL 20 MG/4 ML (5 MG/ML) INTRAVENOUS SYRINGE 10 MG: at 11:40

## 2018-05-15 RX ADMIN — FENTANYL CITRATE 50 MCG: 50 INJECTION, SOLUTION INTRAMUSCULAR; INTRAVENOUS at 11:07

## 2018-05-15 RX ADMIN — MELATONIN TAB 3 MG 3 MG: 3 TAB at 21:08

## 2018-05-15 RX ADMIN — SODIUM CHLORIDE, SODIUM LACTATE, POTASSIUM CHLORIDE, AND CALCIUM CHLORIDE: .6; .31; .03; .02 INJECTION, SOLUTION INTRAVENOUS at 11:14

## 2018-05-15 RX ADMIN — ONDANSETRON 4 MG: 2 INJECTION INTRAMUSCULAR; INTRAVENOUS at 11:12

## 2018-05-15 RX ADMIN — Medication 100 MG: at 15:46

## 2018-05-15 RX ADMIN — PROPOFOL 110 MCG/KG/MIN: 10 INJECTION, EMULSION INTRAVENOUS at 11:12

## 2018-05-15 RX ADMIN — SODIUM CHLORIDE 125 ML/HR: 0.9 INJECTION, SOLUTION INTRAVENOUS at 23:21

## 2018-05-15 RX ADMIN — HYDROCODONE BITARTRATE AND ACETAMINOPHEN 2 TABLET: 5; 325 TABLET ORAL at 17:35

## 2018-05-15 NOTE — ANESTHESIA POSTPROCEDURE EVALUATION
Post-Op Assessment Note      CV Status:  Stable    Mental Status:  Alert and awake    Hydration Status:  Euvolemic    PONV Controlled:  Controlled    Airway Patency:  Patent    Post Op Vitals Reviewed: Yes          Staff: CRNA           BP   160/82   Temp   98 5   Pulse  78   Resp  16   SpO2   100%

## 2018-05-15 NOTE — OP NOTE
OPERATIVE REPORT  PATIENT NAME: Sidney Frank    :  1940  MRN: [id number]  Pt Location: [facility]OM 04    SURGERY DATE: 5/15/2018    Surgeon(s) and Role:     * Renay Acevedo MD - Primary     * Sandee Basilio PA-C - Assisting    Preop Diagnosis:  Choledocholithiasis [K80 50]    Post-Op Diagnosis Codes:     * Choledocholithiasis [K80 50]    Procedure(s) (LRB):  LAPAROSCOPIC CHOLECYSTECTOMY (N/A)    Specimen(s):  ID Type Source Tests Collected by Time Destination   1 : gallbladder Tissue Gallbladder TISSUE EXAM Renay Acevedo MD 5/15/2018 1213        Estimated Blood Loss:   Minimal    Drains:       Anesthesia Type:   General    Operative Indications:  Choledocholithiasis [K80 50]  CBD stones and cholangitis,required multiple ERCP and stent placements to clear the duct  Operative Findings:  Nondistended noninflamed gallbladder multiple stones present  No other acute pathology  Short cystic duct  Complications:   None    Procedure and Technique:  Patient was transferred to the operating table  Anesthesia was introduced with no complication  Patient was placed into the supine position  Surgical site was prepped and draped in a sterile fashion  Preop timeout was performed with all members of the surgical staff present, all agreed on the patient identifiers as well as the procedure to be performed  Local anesthetic was infused superior to the umbilicus  A transverse incision was made, blunt dissection was done down to the fascia  Fascia was grasped with two Kochers, and incision was made between with scissors  A hemostat was introduced into the opening, it was advanced with no difficulty showing successful entry into the peritoneal cavity  An 11 mm trocar was introduced and gas insufflation was started  Laparoscope was introduced through this trocar, area of entry was evaluated showing no damage to any underlying structures   3 additional 5 mm trocars were introduced 1 left upper quadrant to on the right side of the abdomen  With all trochars in place patient was placed in reverse Trendelenburg and tilted to the left  Evaluation of the right upper quadrant showed a gallbladder that was nondistended or acutely inflamed gallstones were present at a somewhat dilated short cystic duct    Beard's pouch was retracted and a combination of fine and blunt dissection was utilized to incise the overlying peritoneum on the gallbladder  Gallbladder is retracted out from the gallbladder fossa and dissection was continued at the area of Calot's triangle  A critical view was established showing the cystic artery and duct ending on the gallbladder with the cystic plate opened up in its entirety  Cystic duct was clipped 3 proximal and one distal  Cystic artery was clipped one proximal one distal  Each structure was sharply incised  Gallbladder was then removed from the gallbladder fossae utilizing electrocautery  Gallbladder placed in Endo Catch bag and removed through the supraumbilical incision  Gallbladder fossa was evaluated, all bleeding was controlled with electrocautery and the placement 10 mL of FloSeal  There was no evidence of a bile leak  Clips were in good position on the cystic duct and artery  Any accumulated blood clot was suctioned  Final evaluation of the abdominal cavity showed no damage to any critical structures, no uncontrolled hemorrhage, or any other acute pathology  Patient was leveled and 0 Vicryl on a suture passer was utilized to reapproximate the fascia at the umbilicus  Abdomen was completely desufflated and all trochars were removed  Further local anesthetic was infused into all incisions and they were all closed with a 4-0 Monocryl  Final dressing for all incisions was histacryl       I was present for the entire procedure, A qualified resident physician was not available and A physician assistant was required during the procedure for retraction tissue handling,dissection and suturing    Patient Disposition:  PACU     SIGNATURE: Veronica Granados MD  DATE: May 15, 2018  TIME: 12:34 PM

## 2018-05-15 NOTE — H&P (VIEW-ONLY)
GENERAL SURGERY HISTORY AND PHYSICAL     Keri Krabbe 78 y o  male MRN: [id number]  Unit/Bed#:  Encounter: [id number]      Assessment/Plan   1  Choledocholithiasis  Case request operating room: CHOLECYSTECTOMY LAPAROSCOPIC POSSIBLE OPEN    Ambulatory referral to Family Practice    Ambulatory referral to Cardiology    Case request operating room: CHOLECYSTECTOMY LAPAROSCOPIC POSSIBLE OPEN     Patient with multiple ERCP for choledocholithiasis  No evidence of obstructive jaundice at this time  Recommendation by GI is for patient undergo lap choly prior to stent removal   Patient has been evaluated and cleared by appropriate services  I discussed the risks of the procedure with the patient including bleeding, infection, damage to critical structures potentially necessitating further procedures including but not limited to common bile duct injury and bowel injury  I also discussed with the patient procedure may fail to resolve abdominal pain  Their questions were answered to their satisfaction ready to proceed, we will schedule for laparoscopic cholecystectomy possible open  Will plan for overnight admission given previous postanesthesia event  Chief Complaint choledocholithiasis    HPI: Keri Krabbe is a 78y o  year old male known history of choledocholithiasis, multiple previous ERCPs  Planned for patient undergo laparoscopic cholecystectomy  Patient has been seen and evaluated by PCP, cardiology, neurology  All associated testing within normal limits no further interventions planned  Patient is cleared for surgery by all specialists  Patient denies any acute changes         ROS:  12 Point ROS reviewed and negative except for:  Poor appetite    Historical Information   Past Medical History:   Diagnosis Date    Alzheimer disease     Anxiety     Basal cell carcinoma     Bile duct disease     Cancer [facility] [address] [facility]     prostate, skin     Choledocholithiasis     Dementia     Depression     Liver disease     Malignant [facility] of [facility] [address] [facility]     Prostate cancer [facility] [address] [facility]     Prostate cancer [facility] [address] [facility]     Skin cancer      Past Surgical History:   Procedure Laterality Date    ERCP N/A 3/9/2018    Procedure: ENDOSCOPIC RETROGRADE CHOLANGIOPANCREATOGRAPHY (ERCP); Surgeon: Lang Gates MD;  Location: [facility];  Service: Gastroenterology    ERCP  04/13/2018    stenting    VT ERCP REMOVE FOREIGN BODY/STENT BILIARY/PANC DUCT N/A 4/13/2018    Procedure: ENDOSCOPIC RETROGRADE CHOLANGIOPANCREATOGRAPHY (ERCP); Surgeon: Lang Gates MD;  Location: [facility];  Service: Gastroenterology    PROSTATE SURGERY       Social History   History   Alcohol Use No     Comment: former     History   Drug Use No     History   Smoking Status    Current Every Day Smoker    Packs/day: 0 50    Types: Cigarettes   Smokeless Tobacco    Never Used     Family History: no pertinent family history  No Known Allergies  Meds/Allergies   all current active meds have been reviewed      Objective   Vitals: Blood pressure 120/68, pulse 78, temperature 98 9 °F (37 2 °C), temperature source Oral, resp  rate 17, height 5' 7 5" (1 715 m), weight 59 kg (130 lb 1 oz)  ,Body mass index is 20 07 kg/m²    Physical Exam:    General appearance: Awake alert oriented no acute distress  HEENT: Sclera clear, anicterus, no discharge  Neck: Trachea is midline  Lungs: No respiratory distress, clear to auscultation bilaterally  Heart[de-identified] RRR  Abdomen: soft, nondistended, nontender  Skin:  No visible jaundice  Psych: Affect appropriate    Boni Raymond MD  5/7/2018

## 2018-05-15 NOTE — ANESTHESIA PREPROCEDURE EVALUATION
Review of Systems/Medical History    Chart reviewed  No history of anesthetic complications     Cardiovascular  EKG reviewed,   Comment: H/O cardiac arrest on 4/14/18 s/p ERCP in OR,  Pulmonary  Smoker cigarette smoker  ,   Comment: h/o pneumonia and acute hypoxic respiratory failure  RESOLVED     GI/Hepatic    Liver disease , alcohol related, GI malignancy,   Comment: Choledocholithiasis     Prostatic disorder, history of prostate cancer       Endo/Other    Obesity  morbid obesity   GYN       Hematology   Musculoskeletal       Neurology  Seizures ,     Psychology   Anxiety, Depression ,   Dementia  Comment: Memory loss   On aricept       Sinus tachycardia  Right bundle branch block  No previous ECGs available  Confirmed by Shiloh Sanchez MD, Patrick Nair ([id number]) on 4/16/2018 9:36:28 PM   Specimen Collected: 04/13/18 14:57     Rest/Stress Gated SPECT Myocardial Perfusion Imaging After Regadenoson     GATED SPECT:  The calculated left ventricular ejection fraction was 53 %  Left ventricular ejection fraction was within normal limits by visual estimate  There was no diagnostic evidence for left ventricular regional abnormality  SUMMARY:  -  Stress results: There was no chest pain during stress  -  ECG conclusions: The stress ECG was negative for ischemia and normal  There were no stress arrhythmias or conduction abnormalities  -  Perfusion imaging: There were no perfusion defects  A perfusion defect in the basal-mid inferior wall was not seen in the prone stress images indicating that it was likely a diaphragmatic attenuation artifact   -  Gated SPECT: The calculated left ventricular ejection fraction was 53 %  Left ventricular ejection fraction was within normal limits by visual estimate  There was no diagnostic evidence for left ventricular regional abnormality  IMPRESSIONS: Normal study after pharmacologic stress  Myocardial perfusion imaging was normal at rest and with stress   Left ventricular systolic function was normal      Prepared and signed by     Gabrielle Hobson MD  Signed 05/02/2018 19:49:38  Study Result   [address], [place] [address]  [phone]     Transthoracic Echocardiogram  2D, M-mode, Doppler, and Color Doppler     Study date:  16-Apr-2018  PIndications: Cardiac arrest    Diagnoses: I46 9 - Cardiac arrest, cause unspecified   SUMMARY     LEFT VENTRICLE:  Ejection fraction was estimated to be 60 %  Although no diagnostic regional wall motion abnormality was identified, this possibility cannot be completely excluded on the basis of this study  AORTIC VALVE:  Can not exclude bicuspid aortic valve  There was mild regurgitation  The valve was not well visualized  TRICUSPID VALVE:  There was trace regurgitation  AORTA:  The root exhibited mild dilatation  4 0 cm  HISTORY: PRIOR HISTORY: Dementia, cardiac arrest, Risk factors: alcohol abuse and a history of current cigarette use (within the last month)  Sinus tachycardia  Right bundle branch block  No previous ECGs available  Confirmed by Nahomy Luo MD, Jimenez Persaud ([id number]) on 4/16/2018 9:36:28 PM   Specimen Collected: 04/13/18 14:57         Physical Exam    Airway    Mallampati score: II  TM Distance: >3 FB  Neck ROM: full     Dental   No notable dental hx     Cardiovascular  Cardiovascular exam normal    Pulmonary  Pulmonary exam normal     Other Findings        Anesthesia Plan  ASA Score- 2     Anesthesia Type- general with ASA Monitors  Additional Monitors:   Airway Plan: ETT  Comment: TIVA with ETT d/t memory loss  Plan Factors- Patient instructed to abstain from smoking on day of procedure  Patient did not smoke on day of surgery  Induction- intravenous  Postoperative Plan- Plan for postoperative opioid use  Planned trial extubation    Informed Consent- Anesthetic plan and risks discussed with patient

## 2018-05-16 VITALS
HEIGHT: 68 IN | SYSTOLIC BLOOD PRESSURE: 117 MMHG | BODY MASS INDEX: 20.16 KG/M2 | DIASTOLIC BLOOD PRESSURE: 61 MMHG | OXYGEN SATURATION: 97 % | HEART RATE: 66 BPM | TEMPERATURE: 98.7 F | WEIGHT: 133 LBS | RESPIRATION RATE: 18 BRPM

## 2018-05-16 LAB
ALBUMIN SERPL BCP-MCNC: 2.5 G/DL (ref 3.5–5)
ALP SERPL-CCNC: 57 U/L (ref 46–116)
ALT SERPL W P-5'-P-CCNC: 37 U/L (ref 12–78)
ANION GAP SERPL CALCULATED.3IONS-SCNC: 10 MMOL/L (ref 4–13)
AST SERPL W P-5'-P-CCNC: 23 U/L (ref 5–45)
BASOPHILS # BLD AUTO: 0.01 THOUSANDS/ΜL (ref 0–0.1)
BASOPHILS NFR BLD AUTO: 0 % (ref 0–1)
BILIRUB SERPL-MCNC: 0.9 MG/DL (ref 0.2–1)
BUN SERPL-MCNC: 12 MG/DL (ref 5–25)
CALCIUM SERPL-MCNC: 8.4 MG/DL (ref 8.3–10.1)
CHLORIDE SERPL-SCNC: 105 MMOL/L (ref 100–108)
CO2 SERPL-SCNC: 22 MMOL/L (ref 21–32)
CREAT SERPL-MCNC: 0.7 MG/DL (ref 0.6–1.3)
EOSINOPHIL # BLD AUTO: 0.01 THOUSAND/ΜL (ref 0–0.61)
EOSINOPHIL NFR BLD AUTO: 0 % (ref 0–6)
ERYTHROCYTE [DISTWIDTH] IN BLOOD BY AUTOMATED COUNT: 13.3 % (ref 11.6–15.1)
GFR SERPL CREATININE-BSD FRML MDRD: 92 ML/MIN/1.73SQ M
GLUCOSE P FAST SERPL-MCNC: 102 MG/DL (ref 65–99)
GLUCOSE SERPL-MCNC: 102 MG/DL (ref 65–140)
HCT VFR BLD AUTO: 39.3 % (ref 36.5–49.3)
HGB BLD-MCNC: 13 G/DL (ref 12–17)
IMM GRANULOCYTES # BLD AUTO: 0.04 THOUSAND/UL (ref 0–0.2)
IMM GRANULOCYTES NFR BLD AUTO: 0 % (ref 0–2)
LYMPHOCYTES # BLD AUTO: 1 THOUSANDS/ΜL (ref 0.6–4.47)
LYMPHOCYTES NFR BLD AUTO: 10 % (ref 14–44)
MCH RBC QN AUTO: 31.4 PG (ref 26.8–34.3)
MCHC RBC AUTO-ENTMCNC: 33.1 G/DL (ref 31.4–37.4)
MCV RBC AUTO: 95 FL (ref 82–98)
MONOCYTES # BLD AUTO: 0.61 THOUSAND/ΜL (ref 0.17–1.22)
MONOCYTES NFR BLD AUTO: 6 % (ref 4–12)
NEUTROPHILS # BLD AUTO: 8.24 THOUSANDS/ΜL (ref 1.85–7.62)
NEUTS SEG NFR BLD AUTO: 84 % (ref 43–75)
NRBC BLD AUTO-RTO: 0 /100 WBCS
PLATELET # BLD AUTO: 144 THOUSANDS/UL (ref 149–390)
PMV BLD AUTO: 10.3 FL (ref 8.9–12.7)
POTASSIUM SERPL-SCNC: 4 MMOL/L (ref 3.5–5.3)
PROT SERPL-MCNC: 5.1 G/DL (ref 6.4–8.2)
RBC # BLD AUTO: 4.14 MILLION/UL (ref 3.88–5.62)
SODIUM SERPL-SCNC: 137 MMOL/L (ref 136–145)
WBC # BLD AUTO: 9.91 THOUSAND/UL (ref 4.31–10.16)

## 2018-05-16 PROCEDURE — 99024 POSTOP FOLLOW-UP VISIT: CPT | Performed by: PHYSICIAN ASSISTANT

## 2018-05-16 PROCEDURE — 80053 COMPREHEN METABOLIC PANEL: CPT | Performed by: SURGERY

## 2018-05-16 PROCEDURE — 85025 COMPLETE CBC W/AUTO DIFF WBC: CPT | Performed by: SURGERY

## 2018-05-16 PROCEDURE — 99024 POSTOP FOLLOW-UP VISIT: CPT | Performed by: SURGERY

## 2018-05-16 RX ORDER — NICOTINE 21 MG/24HR
14 PATCH, TRANSDERMAL 24 HOURS TRANSDERMAL DAILY
Status: DISCONTINUED | OUTPATIENT
Start: 2018-05-16 | End: 2018-05-16 | Stop reason: HOSPADM

## 2018-05-16 RX ORDER — HYDROCODONE BITARTRATE AND ACETAMINOPHEN 5; 325 MG/1; MG/1
1 TABLET ORAL EVERY 4 HOURS PRN
Qty: 18 TABLET | Refills: 0 | Status: SHIPPED | OUTPATIENT
Start: 2018-05-16 | End: 2018-05-19

## 2018-05-16 RX ADMIN — DOCUSATE SODIUM 100 MG: 100 CAPSULE, LIQUID FILLED ORAL at 09:41

## 2018-05-16 RX ADMIN — SODIUM CHLORIDE 125 ML/HR: 0.9 INJECTION, SOLUTION INTRAVENOUS at 07:14

## 2018-05-16 RX ADMIN — NICOTINE 14 MG: 14 PATCH, EXTENDED RELEASE TRANSDERMAL at 09:41

## 2018-05-16 RX ADMIN — HYDROCODONE BITARTRATE AND ACETAMINOPHEN 2 TABLET: 5; 325 TABLET ORAL at 07:13

## 2018-05-16 RX ADMIN — FOLIC ACID 1 MG: 1 TABLET ORAL at 09:41

## 2018-05-16 RX ADMIN — Medication 100 MG: at 09:41

## 2018-05-16 RX ADMIN — ENOXAPARIN SODIUM 40 MG: 40 INJECTION SUBCUTANEOUS at 09:41

## 2018-05-16 RX ADMIN — MORPHINE SULFATE 4 MG: 4 INJECTION INTRAVENOUS at 05:15

## 2018-05-16 RX ADMIN — SERTRALINE HYDROCHLORIDE 50 MG: 50 TABLET ORAL at 09:41

## 2018-05-16 NOTE — DISCHARGE SUMMARY
Discharge Summary - Supriya Lou 78 y o  male MRN: [id number]    Unit/Bed#: -01 Encounter: [id number]    Admission Date: 5/15/2018     Admitting Diagnosis: Choledocholithiasis [K80 50]    HPI: Patient with multiple ERCP for choledocholithiasis  No evidence of obstructive jaundice at this time  Recommendation by GI is for patient undergo lap choly prior to stent removal   Patient has been evaluated and cleared by appropriate services  I discussed the risks of the procedure with the patient including bleeding, infection, damage to critical structures potentially necessitating further procedures including but not limited to common bile duct injury and bowel injury  I also discussed with the patient procedure may fail to resolve abdominal pain  Their questions were answered to their satisfaction ready to proceed, we will schedule for laparoscopic cholecystectomy possible open  Will plan for overnight admission given previous postanesthesia event  Procedures Performed: Laparoscopic Cholecystectomy  Hospital Course: pt was admitted for elective Laparoscopic Cholecystectomy, which went without event  Post Op the pt remained stable, without hypoxic event and VSS, his pain was minimal and did not require IV pain meds, or IV Zofran  He was started on a clear liquid diet and advanced to soft surgical diet  He tolerated this well  He stated that he felt well and was ready to go home  Discharge instructions were given, he will follow up in the surgery office in 1 week  Significant Findings, Care, Treatment and Services Provided: none     Complications: none    Discharge Diagnosis: cholecystitis with cholelithiasis resolved    Condition at Discharge: good     Discharge instructions/Information to patient and family:   See after visit summary for information provided to patient and family        Provisions for Follow-Up Care:  See after visit summary for information related to follow-up care and any pertinent home health orders  Disposition: See After Visit Summary for discharge disposition information  Planned Readmission: No    Discharge Statement   I spent 30 minutes discharging the patient  This time was spent on the day of discharge  I had direct contact with the patient on the day of discharge  Additional documentation is required if more than 30 minutes were spent on discharge  Discharge Medications:  See after visit summary for reconciled discharge medications provided to patient and family        Blue Durbin PA-C

## 2018-05-16 NOTE — DISCHARGE INSTRUCTIONS
Call the Surgical office to make appointment for 2 weeks   Meds:  If you do not need strong pain medicine you may take Tylenol  Do not take acetaminophen (Tylenol) WITH  pain meds that contain acetaminophen  Take one or the other  Do not exceed more than 4000 mg of acetaminophen in 24 hours  or 3000 mg if you have liver disease  No driving until seen in the office  No driving while taking pain meds  No heavy lifting or strenuous exercise until you are cleared in the surgery office   You may shower and get incisions wet,rinse, and pat dry  If you have steri-strips you may take them off in 5 days   If you have adhesive wound closure, do not rub or pick off  Call the office if you have increased pain not relieved with pain medicine  Call the office if you have a fever,redness, the wound opens up, you have pus draining from your incision  Colace 100 mg daily to avoid constipation  Laparoscopic Cholecystectomy   WHAT YOU NEED TO KNOW:   Laparoscopic cholecystectomy is surgery to remove your gallbladder  DISCHARGE INSTRUCTIONS:   Medicines: You may need any of the following:  · Prescription pain medicine  helps decrease pain  Do not wait until the pain is severe before you take this medicine  · NSAIDs  decrease swelling and pain  This medicine can be bought with or without a doctor's order  This medicine can cause stomach bleeding or kidney problems in certain people  If you take blood thinner medicine, always ask your healthcare provider if NSAIDs are safe for you  Read the medicine label and follow the directions on it before using this medicine  · Take your medicine as directed  Contact your healthcare provider if you think your medicine is not helping or if you have side effects  Tell him or her if you are allergic to any medicine  Keep a list of the medicines, vitamins, and herbs you take  Include the amounts, and when and why you take them   Bring the list or the pill bottles to follow-up visits  Carry your medicine list with you in case of an emergency  Follow up with your healthcare provider 2 weeks after surgery, or as directed:  Write down your questions so you remember to ask them during your visits  Wound care:  Care for your surgical wounds as directed  Keep the wounds clean and dry  You may take a shower the day after your surgery  What to eat after surgery:  Eat low-fat foods for 4 to 6 weeks while your body learns to digest fat without a gallbladder  Slowly increase the amount of fat that you eat  Drink plenty of liquids  Ask how much liquid to drink and which liquids are best for you  When to return to work and other activities: You may return to work or other activities as soon as your pain is controlled and you feel comfortable  For many people, this is 5 to 7 days after surgery  Contact your healthcare provider if:   · You have a fever over 101°F (38°C) or chills  · You have pain or nausea that is not relieved by medicine  · You have redness and swelling around your incisions, or blood or pus is leaking from your incisions  · You are constipated or have diarrhea  · Your skin or eyes are yellow, or your bowel movements are pale  · You have questions or concerns about your surgery, condition, or care  Seek care immediately or call 911 if:   · You cannot stop vomiting  · Your bowel movements are black or bloody  · You have pain in your abdomen and it is swollen or hard  · Your arm or leg feels warm, tender, and painful  It may look swollen and red  · You feel lightheaded, short of breath, and have chest pain  · You cough up blood  © 2017 [facility] Information is for End User's use only and may not be sold, redistributed or otherwise used for commercial purposes  All illustrations and images included in CareNotes® are the copyrighted property of A D A M , Inc  or [employer]    The above information is an  only  It is not intended as medical advice for individual conditions or treatments  Talk to your doctor, nurse or pharmacist before following any medical regimen to see if it is safe and effective for you

## 2018-05-16 NOTE — NURSING NOTE
Pt discharged  Discharge instructions and prescriptions given  Pt voiced understanding  Pt taken off the unit via wheelchair by nursing assistant in no acute distress

## 2018-05-16 NOTE — POST OP PROGRESS NOTES
Progress Note - General Surgery   Inga Kyle 78 y o  male MRN: [id number]  Unit/Bed#: -01 Encounter: [id number]    Assessment/Plan  POD 1 Laparoscopic Cholecystectomy, pt feels well, tolerating diet without v/n/, denies pain, no IV pain meds required  No post op hypoxic events  VSS  Pt feels ready to go home    -discharge to home with instructions with wife    -f/u 1 week in surgery office       Chief Complaint: no pain, I don't need any pain meds  I ate without n/v  No SOB, CP, hypoxic events  Objective Directed Exam:  Sitting upright, no cyanosis, VSS, Lungs clear, RRR, Abd: soft nontender, minimal incisional tenderness, incisions c/d/I   NBS     Blood pressure 117/61, pulse 66, temperature 98 7 °F (37 1 °C), temperature source Oral, resp  rate 18, height 5' 8" (1 727 m), weight 60 3 kg (133 lb), SpO2 97 %  ,Body mass index is 20 22 kg/m²        Intake/Output Summary (Last 24 hours) at 05/16/18 9702  Last data filed at 05/15/18 1245   Gross per 24 hour   Intake              900 ml   Output                0 ml   Net              900 ml       Invasive Devices     Peripheral Intravenous Line            Peripheral IV 04/14/18 Left Antecubital 31 days    Peripheral IV 05/15/18 Right Forearm less than 1 day                Physical Exam:   General Appearance:    Alert and orientated x 3, cooperative, no distress   Lungs:     Clear to auscultation bilaterally, respirations unlabored    Heart:    Regular rate and rhythm   Abdomen:     As above     Wound/Dressing:  C/d/i,       Extremities:   Extremities normal,  no cyanosis or edema   Pulses:   2+ and symmetric all extremities, no calf tenderness   Skin:   Skin color, texture, turgor normal, no rashes or lesions   Neurologic:   CNII-XII intact, normal strength, sensation and reflexes     Throughout, affect appropriate                           Labs:   CBC with diff: Lab Results   Component Value Date    WBC 9 91 05/16/2018    HGB 13 0 05/16/2018    HCT 39 3 05/16/2018    MCV 95 05/16/2018     (L) 05/16/2018    MCH 31 4 05/16/2018    MCHC 33 1 05/16/2018    RDW 13 3 05/16/2018    MPV 10 3 05/16/2018    NRBC 0 05/16/2018   ,   BMP/CMP:  Lab Results   Component Value Date     05/16/2018    K 4 0 05/16/2018     05/16/2018    CO2 22 05/16/2018    ANIONGAP 10 05/16/2018    BUN 12 05/16/2018    CREATININE 0 70 05/16/2018    GLUCOSE 102 05/16/2018    GLUCOSE 239 (H) 04/13/2018    CALCIUM 8 4 05/16/2018    AST 23 05/16/2018    ALT 37 05/16/2018    ALKPHOS 57 05/16/2018    PROT 5 1 (L) 05/16/2018    BILITOT 0 90 05/16/2018    EGFR 92 05/16/2018   ,   Lipid Panel: No results found for: CHOL,   Coags:   Lab Results   Component Value Date    PTT 27 04/13/2018    INR 0 92 05/08/2018   ,     Blood Culture: No results found for: BLOODCX,   Urinalysis: Lab Results   Component Value Date    COLORU Giuliana 03/08/2018    CLARITYU Clear 03/08/2018    SPECGRAV 1 010 03/08/2018    PHUR 6 5 03/08/2018    LEUKOCYTESUR Negative 03/08/2018    NITRITE Positive (A) 03/08/2018    PROTEINUA Trace (A) 03/08/2018    GLUCOSEU Negative 03/08/2018    KETONESU Trace (A) 03/08/2018    BILIRUBINUR Large (A) 03/08/2018    BLOODU Negative 03/08/2018   ,   Urine Culture: No results found for: URINECX,   Wound Culure: No results found for: WOUNDCULT      Imaging: No results found        Gabi Ghotra PA-C  5/16/2018

## 2018-05-16 NOTE — PLAN OF CARE
Problem: Potential for Falls  Goal: Patient will remain free of falls  INTERVENTIONS:  - Assess patient frequently for physical needs  -  Identify cognitive and physical deficits and behaviors that affect risk of falls    -  [place] fall precautions as indicated by assessment   - Educate patient/family on patient safety including physical limitations  - Instruct patient to call for assistance with activity based on assessment  - Modify environment to reduce risk of injury  - Consider OT/PT consult to assist with strengthening/mobility   Outcome: Progressing      Problem: PAIN - ADULT  Goal: Verbalizes/displays adequate comfort level or baseline comfort level  Interventions:  - Encourage patient to monitor pain and request assistance  - Assess pain using appropriate pain scale  - Administer analgesics based on type and severity of pain and evaluate response  - Implement non-pharmacological measures as appropriate and evaluate response  - Consider cultural and social influences on pain and pain management  - Notify physician/advanced practitioner if interventions unsuccessful or patient reports new pain   Outcome: Progressing      Problem: INFECTION - ADULT  Goal: Absence or prevention of progression during hospitalization  INTERVENTIONS:  - Assess and monitor for signs and symptoms of infection  - Monitor lab/diagnostic results  - Monitor all insertion sites, i e  indwelling lines, tubes, and drains  - Monitor endotracheal (as able) and nasal secretions for changes in amount and color  - [place] appropriate cooling/warming therapies per order  - Administer medications as ordered  - Instruct and encourage patient and family to use good hand hygiene technique  - Identify and instruct in appropriate isolation precautions for identified infection/condition   Outcome: Progressing    Goal: Absence of fever/infection during neutropenic period  INTERVENTIONS:  - Monitor WBC  - Implement neutropenic guidelines   Outcome: Progressing      Problem: SAFETY ADULT  Goal: Maintain or return to baseline ADL function  INTERVENTIONS:  -  Assess patient's ability to carry out ADLs; assess patient's baseline for ADL function and identify physical deficits which impact ability to perform ADLs (bathing, care of mouth/teeth, toileting, grooming, dressing, etc )  - Assess/evaluate cause of self-care deficits   - Assess range of motion  - Assess patient's mobility; develop plan if impaired  - Assess patient's need for assistive devices and provide as appropriate  - Encourage maximum independence but intervene and supervise when necessary  ¯ Involve family in performance of ADLs  ¯ Assess for home care needs following discharge   ¯ Request OT consult to assist with ADL evaluation and planning for discharge  ¯ Provide patient education as appropriate   Outcome: Progressing    Goal: Maintain or return mobility status to optimal level  INTERVENTIONS:  - Assess patient's baseline mobility status (ambulation, transfers, stairs, etc )    - Identify cognitive and physical deficits and behaviors that affect mobility  - Identify mobility aids required to assist with transfers and/or ambulation (gait belt, sit-to-stand, lift, walker, cane, etc )  - [place] fall precautions as indicated by assessment  - Record patient progress and toleration of activity level on Mobility SBAR; progress patient to next Phase/Stage  - Instruct patient to call for assistance with activity based on assessment  - Request Rehabilitation consult to assist with strengthening/weightbearing, etc    Outcome: Progressing      Problem: DISCHARGE PLANNING  Goal: Discharge to home or other facility with appropriate resources  INTERVENTIONS:  - Identify barriers to discharge w/patient and caregiver  - Arrange for needed discharge resources and transportation as appropriate  - Identify discharge learning needs (meds, wound care, etc )  - Arrange for interpretive services to assist at discharge as needed  - Refer to Case Management Department for coordinating discharge planning if the patient needs post-hospital services based on physician/advanced practitioner order or complex needs related to functional status, cognitive ability, or social support system   Outcome: Progressing      Problem: Knowledge Deficit  Goal: Patient/family/caregiver demonstrates understanding of disease process, treatment plan, medications, and discharge instructions  Complete learning assessment and assess knowledge base    Interventions:  - Provide teaching at level of understanding  - Provide teaching via preferred learning methods   Outcome: Progressing

## 2018-05-23 DIAGNOSIS — Z85.46 H/O PROSTATE CANCER: ICD-10-CM

## 2018-05-23 PROBLEM — K80.50 CHOLELITHIASIS, COMMON BILE DUCT: Status: ACTIVE | Noted: 2018-05-23

## 2018-05-23 RX ORDER — TAMSULOSIN HYDROCHLORIDE 0.4 MG/1
0.4 CAPSULE ORAL
Qty: 90 CAPSULE | Refills: 3 | Status: SHIPPED | OUTPATIENT
Start: 2018-05-23 | End: 2019-03-01 | Stop reason: SDUPTHER

## 2018-05-23 RX ORDER — TAMSULOSIN HYDROCHLORIDE 0.4 MG/1
0.4 CAPSULE ORAL
Qty: 30 CAPSULE | Refills: 3 | Status: CANCELLED | OUTPATIENT
Start: 2018-05-23

## 2018-05-23 RX ORDER — FOLIC ACID 1 MG/1
1 TABLET ORAL DAILY
Qty: 90 TABLET | Refills: 3 | Status: SHIPPED | OUTPATIENT
Start: 2018-05-23 | End: 2018-06-05 | Stop reason: ALTCHOICE

## 2018-05-23 RX ORDER — FOLIC ACID 1 MG/1
1 TABLET ORAL DAILY
Qty: 30 TABLET | Refills: 3 | Status: CANCELLED | OUTPATIENT
Start: 2018-05-23

## 2018-05-23 NOTE — TELEPHONE ENCOUNTER
Pt needs refills on two medication sent to the [facility]  States Folic Acid for gallbladder   tamsulosin (Flomax)

## 2018-05-23 NOTE — TELEPHONE ENCOUNTER
We can give him folic acid if needed but otherwise the flomax is for the prostate and we don't prescribe this

## 2018-06-03 DIAGNOSIS — F02.80 DEMENTIA ASSOCIATED WITH OTHER UNDERLYING DISEASE WITHOUT BEHAVIORAL DISTURBANCE (HCC): ICD-10-CM

## 2018-06-04 ENCOUNTER — OFFICE VISIT (OUTPATIENT)
Dept: SURGERY | Facility: CLINIC | Age: 78
End: 2018-06-04

## 2018-06-04 VITALS
BODY MASS INDEX: 20.47 KG/M2 | RESPIRATION RATE: 16 BRPM | HEIGHT: 68 IN | TEMPERATURE: 98.3 F | WEIGHT: 135.04 LBS | HEART RATE: 80 BPM | DIASTOLIC BLOOD PRESSURE: 74 MMHG | SYSTOLIC BLOOD PRESSURE: 126 MMHG

## 2018-06-04 DIAGNOSIS — K83.1 COMMON BILE DUCT OBSTRUCTION: ICD-10-CM

## 2018-06-04 DIAGNOSIS — K80.50 CHOLEDOCHOLITHIASIS: Primary | ICD-10-CM

## 2018-06-04 DIAGNOSIS — K80.50 CHOLELITHIASIS, COMMON BILE DUCT: ICD-10-CM

## 2018-06-04 DIAGNOSIS — K83.1 COMMON BILE DUCT (CBD) OBSTRUCTION: ICD-10-CM

## 2018-06-04 PROCEDURE — 99024 POSTOP FOLLOW-UP VISIT: CPT | Performed by: SURGERY

## 2018-06-04 RX ORDER — SERTRALINE HYDROCHLORIDE 25 MG/1
50 TABLET, FILM COATED ORAL DAILY
Qty: 30 TABLET | Refills: 2 | Status: SHIPPED | OUTPATIENT
Start: 2018-06-04 | End: 2018-07-24 | Stop reason: SDUPTHER

## 2018-06-04 NOTE — PROGRESS NOTES
Post Operative Note    Subjective:  Patient is s/p laparoscopic cholecystectomy  Pain is well controlled, required no narcotics postoperatively    Tolerating diet, normal bowel function  Denies any fatty food intolerance  No incisional complaints  Exam:  AAO, NAD  Abd:  Soft, nondistended, minimally tender to palpation appropriately at the surgical sites  All incisions are clean, dry, and intact  There is no wilberto-incisional erythema, induration, or expressible drainage  Assessment/Plan:  Patient is status post laparoscopic cholecystectomy  Patient is recovering well  Patient has no further surgical issues at this time  May follow-up in the future for any future concerns

## 2018-06-05 ENCOUNTER — OFFICE VISIT (OUTPATIENT)
Dept: NEUROLOGY | Facility: CLINIC | Age: 78
End: 2018-06-05
Payer: MEDICARE

## 2018-06-05 VITALS
HEART RATE: 73 BPM | BODY MASS INDEX: 20.31 KG/M2 | SYSTOLIC BLOOD PRESSURE: 104 MMHG | WEIGHT: 134 LBS | DIASTOLIC BLOOD PRESSURE: 72 MMHG | HEIGHT: 68 IN

## 2018-06-05 DIAGNOSIS — F02.80 DEMENTIA ASSOCIATED WITH OTHER UNDERLYING DISEASE WITHOUT BEHAVIORAL DISTURBANCE (HCC): ICD-10-CM

## 2018-06-05 DIAGNOSIS — F02.81 DEMENTIA ASSOCIATED WITH OTHER UNDERLYING DISEASE WITH BEHAVIORAL DISTURBANCE (HCC): ICD-10-CM

## 2018-06-05 PROCEDURE — 99214 OFFICE O/P EST MOD 30 MIN: CPT | Performed by: PSYCHIATRY & NEUROLOGY

## 2018-06-05 RX ORDER — DONEPEZIL HYDROCHLORIDE 10 MG/1
10 TABLET, FILM COATED ORAL
Qty: 30 TABLET | Refills: 3 | Status: SHIPPED | OUTPATIENT
Start: 2018-06-05 | End: 2018-10-23 | Stop reason: SDUPTHER

## 2018-06-05 NOTE — PROGRESS NOTES
Progress Note - Neurology   Carolyn Lewis 78 y o  male MRN: [id number]  Unit/Bed#:  Encounter: [id number]      Subjective:   Patient is here for a follow-up visit with a history of cognitive dysfunction, and underwent recent cholecystectomy without any complications, and recovered from the same  He is to have another procedure this week for stent removal of his common bile duct and overall has been feeling better since he has been maintained on time in common multivitamins, Zoloft 25 mg daily, and also remains on Aricept 5 mg daily  Patient has been more active and his wife has noticed a significant improvement although his mood is not back to baseline  Carotid ultrasound and EEG were both unremarkable  ROS:   Review of Systems   Constitutional: Positive for appetite change, chills and fever  HENT: Negative  Eyes: Negative  Respiratory: Negative  Cardiovascular: Negative  Gastrointestinal: Negative  Endocrine: Negative  Genitourinary: Negative  Musculoskeletal: Negative  Skin: Negative  Allergic/Immunologic: Negative  Neurological: Negative  Hematological: Negative  Psychiatric/Behavioral: Positive for confusion  Vitals:   Vitals:    06/05/18 0921   BP: 104/72   Pulse: 73   ,Body mass index is 20 37 kg/m²      MEDS:      Current Outpatient Prescriptions:     donepezil (ARICEPT) 5 mg tablet, Take 1 tablet (5 mg total) by mouth daily at bedtime, Disp: 30 tablet, Rfl: 2    folic acid (FOLVITE) 1 mg tablet, Take 1 tablet (1 mg total) by mouth daily, Disp: 90 tablet, Rfl: 3    melatonin 1 mg, Take 3 mg by mouth daily at bedtime Patient takes 1 1/2 mg tablets  , Disp: , Rfl:     Multiple Vitamins-Minerals (CENTRUM SILVER ULTRA MENS PO), Take 1 tablet by mouth daily, Disp: , Rfl:     sertraline (ZOLOFT) 25 mg tablet, TAKE 2 TABLETS (50 MG TOTAL) BY MOUTH DAILY, Disp: 30 tablet, Rfl: 2    tamsulosin (FLOMAX) 0 4 mg, Take 1 capsule (0 4 mg total) by mouth daily with dinner, Disp: 90 capsule, Rfl: 3    thiamine 100 MG tablet, Take 1 tablet (100 mg total) by mouth daily, Disp: 30 tablet, Rfl: 2  :    Physical Exam:  General appearance: alert, appears stated age and cooperative  Head: Normocephalic, without obvious abnormality, atraumatic    Neurologic:  On examination the patient's [place] cognitive assessment score is 21/30 as compared to 10/30 during his previous visit 2 months ago  There is no evidence of any new focal deficits on cranial nerve, motor and sensory exam ,his gait is normal based  Lab Results: I have personally reviewed pertinent reports  Imaging Studies: I have personally reviewed pertinent reports  Assessment:  1  Dementia associated with other underlying disease  2  Mood disorder  Plan:  Patient is advised to increase the dose of Aricept 10 mg daily, continue Zoloft 25 mg daily, thiamine 100 mg daily, and will return back to see me in 3 months  Of a mental stimulating exercises were also discussed  6/5/2018,9:38 AM    Dictation voice to text software has been used in the creation of this document  Please consider this in light of any contextual or grammatical errors

## 2018-06-08 ENCOUNTER — HOSPITAL ENCOUNTER (OUTPATIENT)
Facility: HOSPITAL | Age: 78
Setting detail: OUTPATIENT SURGERY
Discharge: HOME | End: 2018-06-08
Attending: INTERNAL MEDICINE | Admitting: INTERNAL MEDICINE
Payer: MEDICARE

## 2018-06-08 ENCOUNTER — APPOINTMENT (OUTPATIENT)
Dept: RADIOLOGY | Facility: HOSPITAL | Age: 78
End: 2018-06-08
Payer: MEDICARE

## 2018-06-08 ENCOUNTER — ANESTHESIA EVENT (OUTPATIENT)
Dept: PERIOP | Facility: HOSPITAL | Age: 78
End: 2018-06-08
Payer: MEDICARE

## 2018-06-08 ENCOUNTER — ANESTHESIA (OUTPATIENT)
Dept: PERIOP | Facility: HOSPITAL | Age: 78
End: 2018-06-08
Payer: MEDICARE

## 2018-06-08 VITALS
HEIGHT: 68 IN | BODY MASS INDEX: 20.08 KG/M2 | WEIGHT: 132.5 LBS | RESPIRATION RATE: 12 BRPM | SYSTOLIC BLOOD PRESSURE: 138 MMHG | OXYGEN SATURATION: 97 % | DIASTOLIC BLOOD PRESSURE: 73 MMHG | HEART RATE: 61 BPM | TEMPERATURE: 97.8 F

## 2018-06-08 PROCEDURE — C1769 GUIDE WIRE: HCPCS | Performed by: INTERNAL MEDICINE

## 2018-06-08 PROCEDURE — 74328 X-RAY BILE DUCT ENDOSCOPY: CPT

## 2018-06-08 PROCEDURE — 43275 ERCP REMOVE FORGN BODY DUCT: CPT | Performed by: INTERNAL MEDICINE

## 2018-06-08 PROCEDURE — 43264 ERCP REMOVE DUCT CALCULI: CPT | Performed by: INTERNAL MEDICINE

## 2018-06-08 PROCEDURE — 43273 ENDOSCOPIC PANCREATOSCOPY: CPT | Performed by: INTERNAL MEDICINE

## 2018-06-08 RX ORDER — FENTANYL CITRATE 50 UG/ML
INJECTION, SOLUTION INTRAMUSCULAR; INTRAVENOUS AS NEEDED
Status: DISCONTINUED | OUTPATIENT
Start: 2018-06-08 | End: 2018-06-08 | Stop reason: SURG

## 2018-06-08 RX ORDER — PROPOFOL 10 MG/ML
INJECTION, EMULSION INTRAVENOUS CONTINUOUS PRN
Status: DISCONTINUED | OUTPATIENT
Start: 2018-06-08 | End: 2018-06-08 | Stop reason: SURG

## 2018-06-08 RX ORDER — LANOLIN ALCOHOL/MO/W.PET/CERES
400 CREAM (GRAM) TOPICAL DAILY
COMMUNITY
End: 2018-09-10 | Stop reason: ALTCHOICE

## 2018-06-08 RX ORDER — SODIUM CHLORIDE, SODIUM LACTATE, POTASSIUM CHLORIDE, CALCIUM CHLORIDE 600; 310; 30; 20 MG/100ML; MG/100ML; MG/100ML; MG/100ML
INJECTION, SOLUTION INTRAVENOUS CONTINUOUS PRN
Status: DISCONTINUED | OUTPATIENT
Start: 2018-06-08 | End: 2018-06-08 | Stop reason: SURG

## 2018-06-08 RX ORDER — ONDANSETRON 2 MG/ML
INJECTION INTRAMUSCULAR; INTRAVENOUS AS NEEDED
Status: DISCONTINUED | OUTPATIENT
Start: 2018-06-08 | End: 2018-06-08 | Stop reason: SURG

## 2018-06-08 RX ORDER — LIDOCAINE HYDROCHLORIDE 10 MG/ML
INJECTION, SOLUTION INFILTRATION; PERINEURAL AS NEEDED
Status: DISCONTINUED | OUTPATIENT
Start: 2018-06-08 | End: 2018-06-08 | Stop reason: SURG

## 2018-06-08 RX ORDER — ONDANSETRON 2 MG/ML
4 INJECTION INTRAMUSCULAR; INTRAVENOUS ONCE AS NEEDED
Status: DISCONTINUED | OUTPATIENT
Start: 2018-06-08 | End: 2018-06-08 | Stop reason: HOSPADM

## 2018-06-08 RX ORDER — PROPOFOL 10 MG/ML
INJECTION, EMULSION INTRAVENOUS AS NEEDED
Status: DISCONTINUED | OUTPATIENT
Start: 2018-06-08 | End: 2018-06-08 | Stop reason: SURG

## 2018-06-08 RX ORDER — ALBUTEROL SULFATE 2.5 MG/3ML
2.5 SOLUTION RESPIRATORY (INHALATION) ONCE AS NEEDED
Status: DISCONTINUED | OUTPATIENT
Start: 2018-06-08 | End: 2018-06-08 | Stop reason: HOSPADM

## 2018-06-08 RX ORDER — SUCCINYLCHOLINE CHLORIDE 20 MG/ML
INJECTION INTRAMUSCULAR; INTRAVENOUS AS NEEDED
Status: DISCONTINUED | OUTPATIENT
Start: 2018-06-08 | End: 2018-06-08 | Stop reason: SURG

## 2018-06-08 RX ADMIN — SODIUM CHLORIDE, SODIUM LACTATE, POTASSIUM CHLORIDE, AND CALCIUM CHLORIDE: .6; .31; .03; .02 INJECTION, SOLUTION INTRAVENOUS at 13:04

## 2018-06-08 RX ADMIN — PROPOFOL 150 MG: 10 INJECTION, EMULSION INTRAVENOUS at 13:43

## 2018-06-08 RX ADMIN — GLUCAGON HYDROCHLORIDE 0.5 MG: KIT at 14:03

## 2018-06-08 RX ADMIN — SUCCINYLCHOLINE CHLORIDE 100 MG: 20 INJECTION, SOLUTION INTRAMUSCULAR; INTRAVENOUS at 13:43

## 2018-06-08 RX ADMIN — FENTANYL CITRATE 50 MCG: 50 INJECTION, SOLUTION INTRAMUSCULAR; INTRAVENOUS at 13:59

## 2018-06-08 RX ADMIN — FENTANYL CITRATE 50 MCG: 50 INJECTION, SOLUTION INTRAMUSCULAR; INTRAVENOUS at 13:48

## 2018-06-08 RX ADMIN — PROPOFOL 100 MCG/KG/MIN: 10 INJECTION, EMULSION INTRAVENOUS at 13:46

## 2018-06-08 RX ADMIN — LIDOCAINE HYDROCHLORIDE 50 MG: 10 INJECTION, SOLUTION INFILTRATION; PERINEURAL at 13:43

## 2018-06-08 RX ADMIN — ONDANSETRON 4 MG: 2 INJECTION INTRAMUSCULAR; INTRAVENOUS at 14:17

## 2018-06-08 NOTE — ANESTHESIA POSTPROCEDURE EVALUATION
Post-Op Assessment Note      CV Status:  Stable    Mental Status:  Alert and awake    Hydration Status:  Stable    PONV Controlled:  None    Airway Patency:  Patent and adequate    Post Op Vitals Reviewed: Yes          Staff: CRNA, Anesthesiologist           /84 (06/08/18 1435)    Temp (!) 97 2 °F (36 2 °C) (06/08/18 1435)    Pulse 66 (06/08/18 1435)   Resp 12 (06/08/18 1435)    SpO2   99%

## 2018-06-08 NOTE — ANESTHESIA PREPROCEDURE EVALUATION
Review of Systems/Medical History    Chart reviewed  No history of anesthetic complications     Cardiovascular  EKG reviewed,   Comment: H/O cardiac arrest on 4/14/18 s/p ERCP in OR,  Pulmonary  Smoker cigarette smoker  ,   Comment: h/o pneumonia and acute hypoxic respiratory failure  RESOLVED     GI/Hepatic    Liver disease , alcohol related, GI malignancy,   Comment: Choledocholithiasis     Kidney stones, Prostatic disorder, history of prostate cancer       Endo/Other    Obesity  morbid obesity   GYN       Hematology   Musculoskeletal       Neurology  Seizures ,     Psychology   Anxiety, Depression ,   Dementia  Comment: Memory loss   On aricept       Sinus tachycardia  Right bundle branch block  No previous ECGs available  Confirmed by Ernie Stubbs MD, [place] ([id number]) on 4/16/2018 9:36:28 PM   Specimen Collected: 04/13/18 14:57     Rest/Stress Gated SPECT Myocardial Perfusion Imaging After Regadenoson     GATED SPECT:  The calculated left ventricular ejection fraction was 53 %  Left ventricular ejection fraction was within normal limits by visual estimate  There was no diagnostic evidence for left ventricular regional abnormality  SUMMARY:  -  Stress results: There was no chest pain during stress  -  ECG conclusions: The stress ECG was negative for ischemia and normal  There were no stress arrhythmias or conduction abnormalities  -  Perfusion imaging: There were no perfusion defects  A perfusion defect in the basal-mid inferior wall was not seen in the prone stress images indicating that it was likely a diaphragmatic attenuation artifact   -  Gated SPECT: The calculated left ventricular ejection fraction was 53 %  Left ventricular ejection fraction was within normal limits by visual estimate  There was no diagnostic evidence for left ventricular regional abnormality  IMPRESSIONS: Normal study after pharmacologic stress  Myocardial perfusion imaging was normal at rest and with stress   Left ventricular systolic function was normal      Prepared and signed by     Sweetie Otoole MD  Signed 05/02/2018 19:49:38  Study Result   [place], [place] [address]  [phone]     Transthoracic Echocardiogram  2D, M-mode, Doppler, and Color Doppler     Study date:  16-Apr-2018  PIndications: Cardiac arrest    Diagnoses: I46 9 - Cardiac arrest, cause unspecified   SUMMARY     LEFT VENTRICLE:  Ejection fraction was estimated to be 60 %  Although no diagnostic regional wall motion abnormality was identified, this possibility cannot be completely excluded on the basis of this study  AORTIC VALVE:  Can not exclude bicuspid aortic valve  There was mild regurgitation  The valve was not well visualized  TRICUSPID VALVE:  There was trace regurgitation  AORTA:  The root exhibited mild dilatation  4 0 cm  HISTORY: PRIOR HISTORY: Dementia, cardiac arrest, Risk factors: alcohol abuse and a history of current cigarette use (within the last month)  Sinus tachycardia  Right bundle branch block  No previous ECGs available  Confirmed by Adilia Florez MD, Babak Myrick ([id number]) on 4/16/2018 9:36:28 PM   Specimen Collected: 04/13/18 14:57         Physical Exam    Airway    Mallampati score: II  TM Distance: >3 FB  Neck ROM: full     Dental   No notable dental hx     Cardiovascular  Cardiovascular exam normal    Pulmonary  Pulmonary exam normal     Other Findings        Anesthesia Plan  ASA Score- 2     Anesthesia Type- general with ASA Monitors  Additional Monitors:   Airway Plan: ETT  Plan Factors- Patient instructed to abstain from smoking on day of procedure  Patient did not smoke on day of surgery  Induction- intravenous  Postoperative Plan- Plan for postoperative opioid use  Planned trial extubation    Informed Consent- Anesthetic plan and risks discussed with patient  I personally reviewed this patient with the CRNA   Discussed and agreed on the Anesthesia Plan with the DAYANNA Altamirano

## 2018-06-08 NOTE — OP NOTE
**** GI/ENDOSCOPY REPORT ****:     PATIENT NAME: Tai Nurse  - VISIT ID:  Patient ID: [id number]   YOB: 1940     INTRODUCTION:Endoscopic Retrograde Cholangiopancreatography - A 78 male   patient presents for outpatient Endoscopic Retrograde   Cholangiopancreatography at [address] [place] [address]  INDICATIONS: Stones/Removal of stent  CONSENT:  The benefits, risks, and alternatives to the procedure were   discussed and informed consent was obtained from the patient  PREPARATION: EKG, pulse, pulse oximetry, and blood pressure were monitored   throughout the procedure  ASA Classification: Class 3 - Patient has severe   systemic disturbance that may or may not be related to the disorder   requiring surgery  The patient was kept NPO for eight hours prior to the   procedure  MEDICATIONS: Anesthesia-check records Anesthesia administered by   anesthesiologist      PROCEDURE: The endoscope was passed through the mouth under direct   visualization and advanced to the duodenum  The scope was withdrawn and   the mucosa was carefully examined  FINDINGS:  A large periampullary diverticulum was noted and the ampulla   was in the diverticulum  The stent had migrated up the duct 1cm  The stent   was removed with a hexagonal basket  The duct was cannulated and noted to   be 1 5cm without fillinf defect  An occlusion cholabngiogram was normal   and the duct was swept with the 15mm balloons with removal of bile only  COMPLICATIONS: There were no complications  IMPRESSIONS: A large periampullary diverticulum was noted and the ampulla   was in the diverticulum  The stent had migrated up the duct 1cm  The stent   was removed with a hexagonal basket  The duct was cannulated and noted to   be 1 5cm without fillinf defect  An occlusion cholabngiogram was normal   and the duct was swept with the 15mm balloons with removal of bile only  RECOMMENDATIONS: Resume regular diet as tolerated  ESTIMATED BLOOD LOSS: None  PROCEDURE CODES: 72046 - ERCP with removal or change of stent 66314 - ERCP   with stone removal     ICD-9 Codes:     ICD-10 Codes:     PERFORMED BY: KASHMIR Hilario III  on 06/08/2018  Version 1, electronically signed by KASHMIR Hayes  on   06/08/2018 at 14:24

## 2018-06-08 NOTE — DISCHARGE INSTRUCTIONS
ERCP (Endoscopic Retrograde Cholangiopancreatography)   WHAT YOU NEED TO KNOW:   An ERCP (endoscopic retrograde cholangiopancreatography) is a procedure to examine the ducts of your pancreas or gallbladder  ERCP may also be used to open blocked ducts, or to diagnose problems with your pancreas or gallbladder  An endoscope (thin, flexible tube with a light) will be used for the procedure  DISCHARGE INSTRUCTIONS:   Seek care immediately if:   · You have sudden, severe abdominal pain  · You have problems swallowing    · Your heart beats faster than normal for you  · You have black, sticky bowel movements  · You feel weak, lightheaded, or faint  Contact your healthcare provider if:   · You have a fever and chills  · You have nausea or are vomiting  · Your abdomen is bloated or feels full and hard  · You have abdominal pain  · You lose your appetite, your skin feels itchy, and your skin turns yellow  · You have questions or concerns about your procedure  Self-care:   · Rest when you feel it is needed  You may be drowsy for up to 24 hours after your procedure  Return to your daily activities as directed  · Ask when you can eat regular foods  Healthy foods include fruits, vegetables, whole-grain breads, low-fat dairy products, beans, lean meats, and fish  · Relieve a sore throat  with ice chips, liquids, or lozenges as directed  · Do not take aspirin after your procedure unless your healthcare provider says it is okay  Aspirin may increase your risk for bleeding  Follow up with your healthcare provider as directed:  Write down your questions so you remember to ask them during your visits  © 2017 [facility] Information is for End User's use only and may not be sold, redistributed or otherwise used for commercial purposes  All illustrations and images included in CareNotes® are the copyrighted property of [email or web address]  or [employer]    The above information is an  only  It is not intended as medical advice for individual conditions or treatments  Talk to your doctor, nurse or pharmacist before following any medical regimen to see if it is safe and effective for you

## 2018-07-19 DIAGNOSIS — F02.80 DEMENTIA ASSOCIATED WITH OTHER UNDERLYING DISEASE WITHOUT BEHAVIORAL DISTURBANCE (HCC): ICD-10-CM

## 2018-07-19 RX ORDER — DONEPEZIL HYDROCHLORIDE 5 MG/1
5 TABLET, FILM COATED ORAL
Qty: 30 TABLET | Refills: 2 | Status: SHIPPED | OUTPATIENT
Start: 2018-07-19 | End: 2018-09-10 | Stop reason: ALTCHOICE

## 2018-07-24 DIAGNOSIS — F02.80 DEMENTIA ASSOCIATED WITH OTHER UNDERLYING DISEASE WITHOUT BEHAVIORAL DISTURBANCE (HCC): ICD-10-CM

## 2018-07-24 RX ORDER — SERTRALINE HYDROCHLORIDE 25 MG/1
TABLET, FILM COATED ORAL
Qty: 30 TABLET | Refills: 2 | Status: SHIPPED | OUTPATIENT
Start: 2018-07-24 | End: 2018-08-30 | Stop reason: SDUPTHER

## 2018-08-05 DIAGNOSIS — F02.80 DEMENTIA ASSOCIATED WITH OTHER UNDERLYING DISEASE WITHOUT BEHAVIORAL DISTURBANCE (HCC): ICD-10-CM

## 2018-08-06 RX ORDER — DEXTRIN 3 G/3.8 G
100 POWDER (GRAM) ORAL DAILY
Qty: 30 TABLET | Refills: 2 | Status: SHIPPED | OUTPATIENT
Start: 2018-08-06 | End: 2019-06-19

## 2018-08-30 DIAGNOSIS — F02.80 DEMENTIA ASSOCIATED WITH OTHER UNDERLYING DISEASE WITHOUT BEHAVIORAL DISTURBANCE (HCC): ICD-10-CM

## 2018-08-30 NOTE — TELEPHONE ENCOUNTER
pt's wife called and states that pt takes 2 25mg tabs of zoloft  last script was only written for qty of 30   she is requesting script be changed to 50mg 1 tab daily    Script entered for authorization

## 2018-09-05 ENCOUNTER — TELEPHONE (OUTPATIENT)
Dept: NEUROLOGY | Facility: CLINIC | Age: 78
End: 2018-09-05

## 2018-09-05 NOTE — TELEPHONE ENCOUNTER
Counld not leave message to let patient know about the appointment on 09/10/18  Unfortunately IVA Lujan is not going to be in the office on 09/10/18  Dr Katherin Gaming will see seing the patient instead

## 2018-09-10 ENCOUNTER — OFFICE VISIT (OUTPATIENT)
Dept: NEUROLOGY | Facility: CLINIC | Age: 78
End: 2018-09-10
Payer: MEDICARE

## 2018-09-10 VITALS
SYSTOLIC BLOOD PRESSURE: 142 MMHG | HEART RATE: 60 BPM | WEIGHT: 150 LBS | BODY MASS INDEX: 23.54 KG/M2 | DIASTOLIC BLOOD PRESSURE: 80 MMHG | HEIGHT: 67 IN

## 2018-09-10 DIAGNOSIS — F39 MOOD DISORDER (HCC): ICD-10-CM

## 2018-09-10 DIAGNOSIS — F02.81 DEMENTIA ASSOCIATED WITH OTHER UNDERLYING DISEASE WITH BEHAVIORAL DISTURBANCE (HCC): Primary | ICD-10-CM

## 2018-09-10 PROCEDURE — 99214 OFFICE O/P EST MOD 30 MIN: CPT | Performed by: PSYCHIATRY & NEUROLOGY

## 2018-09-10 NOTE — PROGRESS NOTES
Cj Finch is a 70 y o  male  Chief Complaint   Patient presents with    Dementia       Assessment:  1  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    2  Mood disorder ([address] )        Plan:    Discussion:  Patient was advised to continue with Aricept 10 milligrams a day, use also on Zoloft 25 milligrams daily and thiamine 100 milligrams daily, patient has not been drinking alcohol as per his wife, I have advised him to go for a detailed neuropsych testing to evaluate for his memory difficulty, his [place] is improved from last time and is 24/30, depending on the test will decide if this is all secondary to his depression versus true cognitive impairment and then decide regarding Namenda, I have also advised him to discuss with family physician and see a psychiatrist for his depression, to see if patient would benefit from psychotherapy, patient denies any suicidal or homicidal thoughts, he was advised to call after the above test to discuss the results, currently patient is not driving and the wife is taking care of him, they are going to discuss with Dr Tylor Munoz  about these issues after his neuropsych testing and to see him back in 3 months and to follow up with his other physicians  Subjective:    HPI   Patient is here in follow-up her history of cognitive dysfunction, patient sees Dr Tylor Munoz my associate, since he is not here in the office today I am seeing him, since his last visit according to the patient's wife his memory seems to be stable but he is having mood issues, he does not want to do anything other than watching TV, he denies any suicidal or homicidal thoughts, he has not been drinking, his appetite is good, his memory seems to be doing reasonably good, no other neurological complaints      Vitals:    09/10/18 1224   BP: 142/80   BP Location: Left arm   Patient Position: Sitting   Cuff Size: Adult   Pulse: 60   Weight: 68 kg (150 lb)   Height: 5' 7" (1 702 m) Current Medications    Current Outpatient Prescriptions:     CVS B-1 100 MG tablet, TAKE 1 TABLET (100 MG TOTAL) BY MOUTH DAILY, Disp: 30 tablet, Rfl: 2    donepezil (ARICEPT) 10 mg tablet, Take 1 tablet (10 mg total) by mouth daily at bedtime (Patient taking differently: Take 10 mg by mouth every morning  ), Disp: 30 tablet, Rfl: 3    Melatonin 3 MG CAPS, Take 6 mg by mouth daily at bedtime Patient takes 1 1/2 mg tablets  , Disp: , Rfl:     Multiple Vitamins-Minerals (CENTRUM SILVER ULTRA MENS PO), Take 1 tablet by mouth daily, Disp: , Rfl:     sertraline (ZOLOFT) 50 mg tablet, Take 1 tablet (50 mg total) by mouth daily, Disp: 30 tablet, Rfl: 2    tamsulosin (FLOMAX) 0 4 mg, Take 1 capsule (0 4 mg total) by mouth daily with dinner, Disp: 90 capsule, Rfl: 3      Allergies  Patient has no known allergies  Past Medical History  Past Medical History:   Diagnosis Date    Alzheimer disease     Anxiety     Basal cell carcinoma     Bile duct disease     Cancer [facility]     prostate, skin     Cardiac arrest [facility]     Choledocholithiasis     Dementia     Depression     Kidney stone     Liver disease     Malignant neoplasm of prostate ([facility] )     Mood disorder (HCC)     Prostate cancer [facility] )     Right carotid bruit     Seizure disorder ([facility] )     Skin cancer          Past Surgical History:  Past Surgical History:   Procedure Laterality Date    CHOLECYSTECTOMY      ERCP N/A 3/9/2018    Procedure: ENDOSCOPIC RETROGRADE CHOLANGIOPANCREATOGRAPHY (ERCP); Surgeon: Antoinette Mcmillan MD;  [facility];  Service: Gastroenterology    ERCP  04/13/2018    stenting    ERCP N/A 6/8/2018    Procedure: ENDOSCOPIC RETROGRADE CHOLANGIOPANCREATOGRAPHY (ERCP); Surgeon: Antoinette Mcmillan MD;  [facility];  Service: Gastroenterology    PA ERCP REMOVE FOREIGN BODY/STENT BILIARY/PANC DUCT N/A 4/13/2018    Procedure: ENDOSCOPIC RETROGRADE CHOLANGIOPANCREATOGRAPHY (ERCP);   Surgeon: Capo Ward Sirena Betts MD;  [facility];  Service: Gastroenterology    FL LAP,CHOLECYSTECTOMY N/A 5/15/2018    Procedure: Johnie Cockayne;  Surgeon: Lennox Hutch, MD;  [facility]: [facility];  Service: General    PROSTATE SURGERY      TONSILLECTOMY           Family History:  Family History   Problem Relation Age of Onset    Cancer Mother     Dementia Mother     Alzheimer's disease Mother     Sudden death Father        Social History:   reports that he has been smoking Cigarettes  He has been smoking about 0 50 packs per day  He has never used smokeless tobacco  He reports that he does not drink alcohol or use drugs  I have reviewed the past medical history, surgical history, social and family history, current medications, allergies vitals, review of systems, and updated this information as appropriate today  Objective:    Physical Exam    Neurological Exam    GENERAL:  Cooperative in no acute distress  Well-developed and well-nourished    HEAD and NECK   Head is atraumatic normocephalic with no lesions or masses  Neck is supple with full range of motion    CARDIOVASCULAR  Carotid Arteries-no carotid bruits  NEUROLOGIC:  Mental Status-the patient is awake alert and oriented without aphasia or apraxia, [place] is 24/30  Cranial Nerves: Visual fields are full to confrontation  Extraocular movements are full without nystagmus  Pupils are 2-1/2 mm and reactive  Face is symmetrical to light touch  Movements of facial expression move symmetrically  Hearing is normal to finger rub bilaterally  Soft palate lifts symmetrically  Shoulder shrug is symmetrical  Tongue is midline without atrophy  Motor: No drift is noted on arm extension  Strength is full in the upper and lower extremities with normal bulk and tone  Coordination: Finger to nose testing is performed accurately  Gait reveals a normal base with symmetrical arm swing  ROS:  Review of Systems   Constitutional: Negative    Negative for appetite change and fever  HENT: Negative  Negative for hearing loss, tinnitus, trouble swallowing and voice change  Eyes: Negative  Negative for photophobia and pain  Respiratory: Negative  Negative for shortness of breath  Cardiovascular: Negative  Negative for palpitations  Gastrointestinal: Negative  Negative for nausea and vomiting  Endocrine: Negative  Negative for cold intolerance and heat intolerance  Genitourinary: Negative  Negative for dysuria, frequency and urgency  Musculoskeletal: Negative  Negative for back pain, gait problem, myalgias and neck pain  Skin: Negative  Negative for rash  Neurological: Negative  Negative for dizziness, tremors, seizures, syncope, facial asymmetry, speech difficulty, weakness, light-headedness, numbness and headaches  Hematological: Negative  Does not bruise/bleed easily  Psychiatric/Behavioral: Positive for confusion  Negative for decreased concentration, hallucinations and sleep disturbance

## 2018-10-23 DIAGNOSIS — F02.80 DEMENTIA ASSOCIATED WITH OTHER UNDERLYING DISEASE WITHOUT BEHAVIORAL DISTURBANCE (HCC): ICD-10-CM

## 2018-10-23 RX ORDER — DONEPEZIL HYDROCHLORIDE 10 MG/1
10 TABLET, FILM COATED ORAL
Qty: 30 TABLET | Refills: 3 | Status: SHIPPED | OUTPATIENT
Start: 2018-10-23 | End: 2018-10-31 | Stop reason: SDUPTHER

## 2018-10-31 DIAGNOSIS — F02.80 DEMENTIA ASSOCIATED WITH OTHER UNDERLYING DISEASE WITHOUT BEHAVIORAL DISTURBANCE (HCC): ICD-10-CM

## 2018-10-31 RX ORDER — DONEPEZIL HYDROCHLORIDE 10 MG/1
10 TABLET, FILM COATED ORAL
Qty: 30 TABLET | Refills: 2 | Status: SHIPPED | OUTPATIENT
Start: 2018-10-31 | End: 2019-01-04 | Stop reason: SDUPTHER

## 2018-11-15 ENCOUNTER — TELEPHONE (OUTPATIENT)
Dept: INTERNAL MEDICINE CLINIC | Facility: CLINIC | Age: 78
End: 2018-11-15

## 2018-12-06 ENCOUNTER — TELEPHONE (OUTPATIENT)
Dept: INTERNAL MEDICINE CLINIC | Facility: CLINIC | Age: 78
End: 2018-12-06
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
## 2018-12-10 NOTE — TELEPHONE ENCOUNTER
I talked to her the other day  I think she made an appointment for me to see him    Please double check that

## 2018-12-11 ENCOUNTER — OFFICE VISIT (OUTPATIENT)
Dept: INTERNAL MEDICINE CLINIC | Facility: CLINIC | Age: 78
End: 2018-12-11
Payer: MEDICARE

## 2018-12-11 ENCOUNTER — APPOINTMENT (OUTPATIENT)
Dept: LAB | Facility: CLINIC | Age: 78
End: 2018-12-11
Payer: MEDICARE

## 2018-12-11 VITALS
DIASTOLIC BLOOD PRESSURE: 90 MMHG | WEIGHT: 156 LBS | SYSTOLIC BLOOD PRESSURE: 136 MMHG | HEART RATE: 78 BPM | BODY MASS INDEX: 24.48 KG/M2 | RESPIRATION RATE: 16 BRPM | HEIGHT: 67 IN

## 2018-12-11 DIAGNOSIS — G40.909 SEIZURE DISORDER (HCC): ICD-10-CM

## 2018-12-11 DIAGNOSIS — Z23 ENCOUNTER FOR IMMUNIZATION: ICD-10-CM

## 2018-12-11 DIAGNOSIS — Z00.00 HEALTH CARE MAINTENANCE: ICD-10-CM

## 2018-12-11 DIAGNOSIS — R41.3 MEMORY DIFFICULTY: ICD-10-CM

## 2018-12-11 DIAGNOSIS — Z72.0 TOBACCO ABUSE: ICD-10-CM

## 2018-12-11 DIAGNOSIS — R41.82 ALTERED MENTAL STATUS, UNSPECIFIED ALTERED MENTAL STATUS TYPE: ICD-10-CM

## 2018-12-11 DIAGNOSIS — F02.81 DEMENTIA ASSOCIATED WITH OTHER UNDERLYING DISEASE WITH BEHAVIORAL DISTURBANCE (HCC): ICD-10-CM

## 2018-12-11 DIAGNOSIS — F02.81 DEMENTIA ASSOCIATED WITH OTHER UNDERLYING DISEASE WITH BEHAVIORAL DISTURBANCE (HCC): Primary | ICD-10-CM

## 2018-12-11 DIAGNOSIS — F10.20 ALCOHOLISM (HCC): ICD-10-CM

## 2018-12-11 LAB
ALBUMIN SERPL BCP-MCNC: 4.1 G/DL (ref 3.5–5)
ALP SERPL-CCNC: 76 U/L (ref 46–116)
ALT SERPL W P-5'-P-CCNC: 20 U/L (ref 12–78)
ANION GAP SERPL CALCULATED.3IONS-SCNC: 9 MMOL/L (ref 4–13)
AST SERPL W P-5'-P-CCNC: 16 U/L (ref 5–45)
BASOPHILS # BLD AUTO: 0.06 THOUSANDS/ΜL (ref 0–0.1)
BASOPHILS NFR BLD AUTO: 1 % (ref 0–1)
BILIRUB SERPL-MCNC: 0.7 MG/DL (ref 0.2–1)
BUN SERPL-MCNC: 14 MG/DL (ref 5–25)
CALCIUM SERPL-MCNC: 9.1 MG/DL (ref 8.3–10.1)
CHLORIDE SERPL-SCNC: 105 MMOL/L (ref 100–108)
CHOLEST SERPL-MCNC: 225 MG/DL (ref 50–200)
CO2 SERPL-SCNC: 26 MMOL/L (ref 21–32)
CREAT SERPL-MCNC: 0.99 MG/DL (ref 0.6–1.3)
EOSINOPHIL # BLD AUTO: 0.07 THOUSAND/ΜL (ref 0–0.61)
EOSINOPHIL NFR BLD AUTO: 1 % (ref 0–6)
ERYTHROCYTE [DISTWIDTH] IN BLOOD BY AUTOMATED COUNT: 15.3 % (ref 11.6–15.1)
GFR SERPL CREATININE-BSD FRML MDRD: 74 ML/MIN/1.73SQ M
GLUCOSE SERPL-MCNC: 94 MG/DL (ref 65–140)
HCT VFR BLD AUTO: 49 % (ref 36.5–49.3)
HDLC SERPL-MCNC: 62 MG/DL (ref 40–60)
HGB BLD-MCNC: 16.1 G/DL (ref 12–17)
IMM GRANULOCYTES # BLD AUTO: 0.01 THOUSAND/UL (ref 0–0.2)
IMM GRANULOCYTES NFR BLD AUTO: 0 % (ref 0–2)
LDLC SERPL CALC-MCNC: 139 MG/DL (ref 0–100)
LYMPHOCYTES # BLD AUTO: 1.26 THOUSANDS/ΜL (ref 0.6–4.47)
LYMPHOCYTES NFR BLD AUTO: 17 % (ref 14–44)
MCH RBC QN AUTO: 30.3 PG (ref 26.8–34.3)
MCHC RBC AUTO-ENTMCNC: 32.9 G/DL (ref 31.4–37.4)
MCV RBC AUTO: 92 FL (ref 82–98)
MONOCYTES # BLD AUTO: 0.5 THOUSAND/ΜL (ref 0.17–1.22)
MONOCYTES NFR BLD AUTO: 7 % (ref 4–12)
NEUTROPHILS # BLD AUTO: 5.44 THOUSANDS/ΜL (ref 1.85–7.62)
NEUTS SEG NFR BLD AUTO: 74 % (ref 43–75)
NONHDLC SERPL-MCNC: 163 MG/DL
NRBC BLD AUTO-RTO: 0 /100 WBCS
PLATELET # BLD AUTO: 240 THOUSANDS/UL (ref 149–390)
PMV BLD AUTO: 9.7 FL (ref 8.9–12.7)
POTASSIUM SERPL-SCNC: 4.1 MMOL/L (ref 3.5–5.3)
PROT SERPL-MCNC: 7.3 G/DL (ref 6.4–8.2)
RBC # BLD AUTO: 5.32 MILLION/UL (ref 3.88–5.62)
SODIUM SERPL-SCNC: 140 MMOL/L (ref 136–145)
T4 FREE SERPL-MCNC: 0.85 NG/DL (ref 0.76–1.46)
TRIGL SERPL-MCNC: 122 MG/DL
TSH SERPL DL<=0.05 MIU/L-ACNC: 1.22 UIU/ML (ref 0.36–3.74)
WBC # BLD AUTO: 7.34 THOUSAND/UL (ref 4.31–10.16)

## 2018-12-11 PROCEDURE — 99215 OFFICE O/P EST HI 40 MIN: CPT | Performed by: INTERNAL MEDICINE

## 2018-12-11 PROCEDURE — G0008 ADMIN INFLUENZA VIRUS VAC: HCPCS | Performed by: INTERNAL MEDICINE

## 2018-12-11 PROCEDURE — 80061 LIPID PANEL: CPT

## 2018-12-11 PROCEDURE — G0009 ADMIN PNEUMOCOCCAL VACCINE: HCPCS | Performed by: INTERNAL MEDICINE

## 2018-12-11 PROCEDURE — 85025 COMPLETE CBC W/AUTO DIFF WBC: CPT

## 2018-12-11 PROCEDURE — 90662 IIV NO PRSV INCREASED AG IM: CPT | Performed by: INTERNAL MEDICINE

## 2018-12-11 PROCEDURE — 90670 PCV13 VACCINE IM: CPT | Performed by: INTERNAL MEDICINE

## 2018-12-11 PROCEDURE — 84439 ASSAY OF FREE THYROXINE: CPT

## 2018-12-11 PROCEDURE — 36415 COLL VENOUS BLD VENIPUNCTURE: CPT

## 2018-12-11 PROCEDURE — 80053 COMPREHEN METABOLIC PANEL: CPT

## 2018-12-11 PROCEDURE — 84443 ASSAY THYROID STIM HORMONE: CPT

## 2018-12-11 NOTE — PROGRESS NOTES
Assessment/Plan:  Family is concerned about his gout were course  I have asked him to see Neurology sooner than previously anticipated  He is on Aricept at the present time  He is also going to call his urologist for follow-up appointment  I will check his labs which have been done in about 6 months  Will check thyroid functions  Will see him back here in about 6 or 8 weeks to see how he is doing  Before if necessary  No results found for this or any previous visit (from the past 1008 hour(s))  1  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  CBC and differential    Comprehensive metabolic panel    Lipid panel    T4, free    TSH, 3rd generation   2  Seizure disorder ([facility] [address] )     3  Altered mental status, unspecified altered mental status type     4  Memory difficulty     5  Alcoholism ([facility] )     6  Tobacco abuse     7  Health care maintenance  PNEUMOCOCCAL CONJUGATE VACCINE 13-VALENT GREATER THAN 6 MONTHS   8  Encounter for immunization  PREFERRED: influenza vaccine, [id number], high-dose, PF 0 5 mL, for patients 65 yr+ (FLUZONE HIGH-DOSE)       Orders Placed This Encounter   Procedures    PNEUMOCOCCAL CONJUGATE VACCINE 13-VALENT GREATER THAN 6 MONTHS    PREFERRED: influenza vaccine, [id number], high-dose, PF 0 5 mL, for patients 65 yr+ (FLUZONE HIGH-DOSE)    CBC and differential    Comprehensive metabolic panel    Lipid panel    T4, free    TSH, 3rd generation         Subjective:  Long visit for this patient  I have not seen him since last April  He had surgery after I saw him for gallbladder disease  Very jaime postoperative course  Has underlying dementia  Becoming more of a management problem  He does live at home with a caregiver  Problems include marked dementia  He has a history of depression and prostate problems  Situation is getting worse at home with behavioral disturbances  Saw his neurologist about 3 months ago       Patient ID: Seamus Duffy is a 70 y o  male  HPI he denies problems at the present time says he is feeling well  The following portions of the patient's history were reviewed and updated as appropriate:   He has a past medical history of Alzheimer disease; Anxiety; Basal cell carcinoma; Bile duct disease; Cancer ([facility] [address] ); [facility] ); Choledocholithiasis; Dementia; Depression; Kidney stone; Liver disease; Malignant neoplasm of prostate ([facility] [address] ); Mood disorder ([facility] ); Prostate cancer ([facility] ); Right carotid bruit; Seizure disorder ([facility] ); and Skin cancer  ,   does not have any pertinent problems on file  ,   has a past surgical history that includes Prostate surgery; ERCP (N/A, 3/9/2018); ERCP (04/13/2018); pr ercp remove foreign body/stent biliary/panc duct (N/A, 4/13/2018); Tonsillectomy; pr lap,cholecystectomy (N/A, 5/15/2018); Cholecystectomy; and ERCP (N/A, 6/8/2018)  ,  family history includes Alzheimer's disease in his mother; Cancer in his mother; Dementia in his mother; Sudden death in his father  ,   reports that he has been smoking Cigarettes  He has a 60 00 pack-year smoking history  He has never used smokeless tobacco  He reports that he does not drink alcohol or use drugs  ,  has No Known Allergies       Current Outpatient Prescriptions:     CVS B-1 100 MG tablet, TAKE 1 TABLET (100 MG TOTAL) BY MOUTH DAILY, Disp: 30 tablet, Rfl: 2    donepezil (ARICEPT) 10 mg tablet, Take 1 tablet (10 mg total) by mouth daily at bedtime, Disp: 30 tablet, Rfl: 2    Melatonin 3 MG CAPS, Take 6 mg by mouth daily at bedtime Patient takes 1 1/2 mg tablets  , Disp: , Rfl:     Multiple Vitamins-Minerals (CENTRUM SILVER ULTRA MENS PO), Take 1 tablet by mouth daily, Disp: , Rfl:     sertraline (ZOLOFT) 50 mg tablet, Take 1 tablet (50 mg total) by mouth daily, Disp: 30 tablet, Rfl: 2    tamsulosin (FLOMAX) 0 4 mg, Take 1 capsule (0 4 mg total) by mouth daily with dinner, Disp: 90 capsule, Rfl: 3    Review of Systems   Constitutional: Negative for activity change, appetite change, chills, diaphoresis, fatigue, fever and unexpected weight change  HENT: Negative for congestion, ear pain, hearing loss, mouth sores, nosebleeds, postnasal drip, sinus pain, sinus pressure, sore throat and trouble swallowing  Eyes: Negative for pain, discharge and visual disturbance  Respiratory: Negative for apnea, cough, chest tightness, shortness of breath and wheezing  He has been smoking for about 10 years  Smokes a pack a day  Cardiovascular: Negative for chest pain, palpitations and leg swelling  Gastrointestinal: Negative for abdominal pain, anal bleeding, blood in stool, constipation, diarrhea, nausea and vomiting  Endocrine: Negative for polydipsia and polyphagia  Genitourinary: Negative for decreased urine volume, dysuria, flank pain, frequency, hematuria and urgency  Musculoskeletal: Negative for arthralgias, back pain, gait problem, joint swelling and myalgias  Skin: Negative for rash and wound  Allergic/Immunologic: Negative for environmental allergies and food allergies  Neurological: Negative for dizziness, tremors, seizures, syncope, speech difficulty, light-headedness, numbness and headaches  Has dementia  Some behavioral disturbance  Has depression and is on sertraline  Hematological: Negative for adenopathy  Does not bruise/bleed easily  Psychiatric/Behavioral: Positive for agitation  Negative for confusion, hallucinations, sleep disturbance and suicidal ideas  The patient is not nervous/anxious  Objective:  /90 (BP Location: Left arm, Patient Position: Sitting, Cuff Size: Standard)   Pulse 78   Resp 16   Ht 5' 7" (1 702 m)   Wt 70 8 kg (156 lb)   BMI 24 43 kg/m²      Physical Exam   Constitutional: He appears well-developed and well-nourished  No distress  HENT:   Head: Normocephalic     Right Ear: External ear normal    Left Ear: External ear normal    Nose: Nose normal  Mouth/Throat: Oropharynx is clear and moist  No oropharyngeal exudate  Eyes: Pupils are equal, round, and reactive to light  Conjunctivae and EOM are normal  Right eye exhibits no discharge  Left eye exhibits no discharge  Neck: Normal range of motion  Neck supple  No thyromegaly present  Cardiovascular: Normal rate, regular rhythm, normal heart sounds and intact distal pulses  Exam reveals no gallop and no friction rub  No murmur heard  Pulmonary/Chest: Effort normal and breath sounds normal  No respiratory distress  He has no wheezes  He has no rales  Abdominal: Soft  Bowel sounds are normal  He exhibits no distension and no mass  There is no tenderness  There is no rebound and no guarding  Musculoskeletal: Normal range of motion  He exhibits no edema, tenderness or deformity  Lymphadenopathy:     He has no cervical adenopathy  Neurological: He is alert  He has normal reflexes  No cranial nerve deficit  Coordination normal    Gait is actually normal   No focal neurological deficit  He knows me  Knows the month and the year  Skin: Skin is warm and dry  No rash noted  No erythema  Psychiatric: He has a normal mood and affect  His behavior is normal  Judgment and thought content normal    Nursing note and vitals reviewed

## 2018-12-14 ENCOUNTER — TELEPHONE (OUTPATIENT)
Dept: INTERNAL MEDICINE CLINIC | Facility: CLINIC | Age: 78
End: 2018-12-14

## 2018-12-14 NOTE — TELEPHONE ENCOUNTER
Patients daughter Megan Matute called, patient had blood work done on 12/11 and she was calling for the results    Also, Dr Boy Zuñiga said that he would call Dr Tiff Sanchez and get him an earlier appt and she was curious if that was done      Contact # [phone]

## 2018-12-14 NOTE — TELEPHONE ENCOUNTER
The only thing was his cholesterol was a little high  He should be on a low-cholesterol diet  Wayne Figueroa is working on trying to get him in sooner    To see the neurologist

## 2018-12-14 NOTE — TELEPHONE ENCOUNTER
I lm for patient to let her know I am working on earlier appointment and that  is not in today but returning Monday

## 2018-12-17 ENCOUNTER — TELEPHONE (OUTPATIENT)
Dept: NEUROLOGY | Facility: CLINIC | Age: 78
End: 2018-12-17

## 2018-12-17 NOTE — TELEPHONE ENCOUNTER
LMOM for patient to call back to schedule a sooner appt if interested  Have an appointment available  for Tuesday 12/18/18 @ 3:30   Please schedule if patient is interested

## 2018-12-17 NOTE — TELEPHONE ENCOUNTER
Received an email from Diana Varela from [facility]  Dr Therese Meier would like patient seen sooner than 1/28/19  I LMOM for patient to see if he can come in today as Dr Jeanette Bonilla has an opening at 330 in the afternoon  I will keep the appointment on hold until 11am  Then we will try to fill it with another patient

## 2019-01-04 ENCOUNTER — OFFICE VISIT (OUTPATIENT)
Dept: NEUROLOGY | Facility: CLINIC | Age: 79
End: 2019-01-04
Payer: MEDICARE

## 2019-01-04 VITALS
BODY MASS INDEX: 23.23 KG/M2 | SYSTOLIC BLOOD PRESSURE: 148 MMHG | HEIGHT: 67 IN | HEART RATE: 68 BPM | WEIGHT: 148 LBS | DIASTOLIC BLOOD PRESSURE: 80 MMHG

## 2019-01-04 DIAGNOSIS — F02.80 DEMENTIA ASSOCIATED WITH OTHER UNDERLYING DISEASE WITHOUT BEHAVIORAL DISTURBANCE (HCC): Primary | ICD-10-CM

## 2019-01-04 PROCEDURE — 99214 OFFICE O/P EST MOD 30 MIN: CPT | Performed by: PSYCHIATRY & NEUROLOGY

## 2019-01-04 RX ORDER — DONEPEZIL HYDROCHLORIDE 10 MG/1
10 TABLET, FILM COATED ORAL
Qty: 30 TABLET | Refills: 0 | Status: SHIPPED | OUTPATIENT
Start: 2019-01-04 | End: 2019-03-04 | Stop reason: SDUPTHER

## 2019-01-04 RX ORDER — DONEPEZIL HYDROCHLORIDE 10 MG/1
10 TABLET, FILM COATED ORAL
Qty: 30 TABLET | Refills: 6 | Status: SHIPPED | OUTPATIENT
Start: 2019-01-04 | End: 2019-01-04 | Stop reason: SDUPTHER

## 2019-01-04 NOTE — PROGRESS NOTES
Progress Note - Neurology   Wolfgang Cerda 70 y o  male MRN: [id number]  Unit/Bed#:  Encounter: [id number]      Subjective:   Patient is here for a follow-up visit accompanied with his family members and since his last visit 6 months ago patient has remained on Aricept, 10 mg daily multi vitamins including thiamine 100 mg daily a, overall has been doing well  From the cognitive standpoint he has been more functional his mood is improved, and has discontinued Zoloft  Patient denies any central neurological symptoms or any motor or sensory symptoms in the upper or lower extremities  He continues to smoke but has refrained from any alcohol use  ROS:   Review of Systems   Constitutional: Negative  Negative for appetite change and fever  HENT: Negative  Negative for hearing loss, tinnitus, trouble swallowing and voice change  Eyes: Negative  Negative for photophobia and pain  Respiratory: Negative  Negative for shortness of breath  Cardiovascular: Negative  Negative for palpitations  Gastrointestinal: Negative  Negative for nausea and vomiting  Endocrine: Negative  Negative for cold intolerance and heat intolerance  Genitourinary: Negative  Negative for dysuria, frequency and urgency  Musculoskeletal: Negative  Negative for myalgias and neck pain  Skin: Negative  Negative for rash  Neurological: Negative  Negative for dizziness, tremors, seizures, syncope, facial asymmetry, speech difficulty, weakness, light-headedness, numbness and headaches  Hematological: Negative  Does not bruise/bleed easily  Psychiatric/Behavioral: Positive for confusion  Negative for hallucinations and sleep disturbance  Vitals:   Vitals:    01/04/19 1512   BP: 148/80   Pulse: 68   ,Body mass index is 23 18 kg/m²      MEDS:      Current Outpatient Prescriptions:     CVS B-1 100 MG tablet, TAKE 1 TABLET (100 MG TOTAL) BY MOUTH DAILY, Disp: 30 tablet, Rfl: 2    donepezil (ARICEPT) 10 mg tablet, Take 1 tablet (10 mg total) by mouth daily at bedtime, Disp: 30 tablet, Rfl: 2    Melatonin 3 MG CAPS, Take 6 mg by mouth daily at bedtime Patient takes 1 1/2 mg tablets  , Disp: , Rfl:     Multiple Vitamins-Minerals (CENTRUM SILVER ULTRA MENS PO), Take 1 tablet by mouth daily, Disp: , Rfl:     tamsulosin (FLOMAX) 0 4 mg, Take 1 capsule (0 4 mg total) by mouth daily with dinner, Disp: 90 capsule, Rfl: 3  :    Physical Exam:  General appearance: alert, appears stated age and cooperative  Head: Normocephalic, without obvious abnormality, atraumatic    Neurologic:  [place] cognitive assessment score is 23/30, with no evidence of any focal neurological deficits on cranial nerve, motor and sensory exam   No evidence of any dysmetria was noted, his gait is normal based, [place] is negative  Lab Results: I have personally reviewed pertinent reports  Imaging Studies: I have personally reviewed pertinent reports  Assessment:  1  Dementia secondary to underlying disease without behavioral disturbance  Plan:  Patient is advised to continue present medications, mental stimulating exercises were discussed, and patient will return back to see me in 6 months  Patient's daughter also had several questions which were all answered to their satisfaction  1/4/2019,3:15 PM    Dictation voice to text software has been used in the creation of this document  Please consider this in light of any contextual or grammatical errors

## 2019-01-25 DIAGNOSIS — F02.80 DEMENTIA ASSOCIATED WITH OTHER UNDERLYING DISEASE WITHOUT BEHAVIORAL DISTURBANCE (HCC): ICD-10-CM

## 2019-02-08 DIAGNOSIS — Z85.46 H/O PROSTATE CANCER: ICD-10-CM

## 2019-02-08 RX ORDER — TAMSULOSIN HYDROCHLORIDE 0.4 MG/1
CAPSULE ORAL
Qty: 90 CAPSULE | OUTPATIENT
Start: 2019-02-08

## 2019-02-28 DIAGNOSIS — Z85.46 H/O PROSTATE CANCER: ICD-10-CM

## 2019-02-28 RX ORDER — TAMSULOSIN HYDROCHLORIDE 0.4 MG/1
0.4 CAPSULE ORAL
Qty: 90 CAPSULE | Refills: 3 | OUTPATIENT
Start: 2019-02-28

## 2019-02-28 NOTE — TELEPHONE ENCOUNTER
NEEDS  TAMSULOSIN 0 4MG QD #90  [facility]   [phone]

## 2019-03-01 DIAGNOSIS — Z85.46 H/O PROSTATE CANCER: ICD-10-CM

## 2019-03-01 RX ORDER — TAMSULOSIN HYDROCHLORIDE 0.4 MG/1
0.4 CAPSULE ORAL
Qty: 90 CAPSULE | Refills: 3 | Status: SHIPPED | OUTPATIENT
Start: 2019-03-01 | End: 2019-04-22 | Stop reason: SDUPTHER

## 2019-03-01 NOTE — TELEPHONE ENCOUNTER
[facility] CALLED BACK  PATIENT NEEDS THIS MEDICATION AND DR Odessa Strong DID PRESCRIBE THIS ACCORDING TO THE MEDICATION HISTORY    TAMSULOSIN 0 4MG QD #90  [facility]   [phone]

## 2019-03-04 DIAGNOSIS — F02.80 DEMENTIA ASSOCIATED WITH OTHER UNDERLYING DISEASE WITHOUT BEHAVIORAL DISTURBANCE (HCC): ICD-10-CM

## 2019-03-04 RX ORDER — DONEPEZIL HYDROCHLORIDE 10 MG/1
TABLET, FILM COATED ORAL
Qty: 30 TABLET | Refills: 6 | Status: SHIPPED | OUTPATIENT
Start: 2019-03-04 | End: 2019-08-19 | Stop reason: SDUPTHER

## 2019-03-13 ENCOUNTER — OFFICE VISIT (OUTPATIENT)
Dept: INTERNAL MEDICINE CLINIC | Facility: CLINIC | Age: 79
End: 2019-03-13
Payer: MEDICARE

## 2019-03-13 VITALS
BODY MASS INDEX: 24.04 KG/M2 | HEART RATE: 73 BPM | DIASTOLIC BLOOD PRESSURE: 86 MMHG | WEIGHT: 153.2 LBS | OXYGEN SATURATION: 95 % | HEIGHT: 67 IN | SYSTOLIC BLOOD PRESSURE: 140 MMHG

## 2019-03-13 DIAGNOSIS — Z85.46 H/O PROSTATE CANCER: ICD-10-CM

## 2019-03-13 DIAGNOSIS — F39 MOOD DISORDER (HCC): ICD-10-CM

## 2019-03-13 DIAGNOSIS — R41.3 MEMORY DIFFICULTY: ICD-10-CM

## 2019-03-13 DIAGNOSIS — F10.10 ALCOHOL ABUSE: ICD-10-CM

## 2019-03-13 DIAGNOSIS — C61 MALIGNANT NEOPLASM OF PROSTATE (HCC): ICD-10-CM

## 2019-03-13 DIAGNOSIS — E78.5 BORDERLINE HYPERLIPIDEMIA: ICD-10-CM

## 2019-03-13 DIAGNOSIS — F02.80 DEMENTIA ASSOCIATED WITH OTHER UNDERLYING DISEASE WITHOUT BEHAVIORAL DISTURBANCE (HCC): Primary | ICD-10-CM

## 2019-03-13 DIAGNOSIS — R91.8 PULMONARY NODULES: ICD-10-CM

## 2019-03-13 PROCEDURE — 99214 OFFICE O/P EST MOD 30 MIN: CPT | Performed by: INTERNAL MEDICINE

## 2019-03-13 NOTE — PROGRESS NOTES
Assessment/Plan:  Regarding memory issues they seem to be the same  His mood is somewhat questionable  He seems to get frustrated but is on the medications Zoloft  He has a history of prostate cancer which is stable  He is not drinking any alcohol at all  Tobacco abuse remains problematic  He has a history of pulmonary nodules a year ago  They were 3 mm  He is advised in the strongest terms to discontinue his cigarette use and to repeat the CT scan  This was explained to the patient's family  He has an elevation of his LDL cholesterol which is 139  His HDL is our 62  He has no history of vascular disease or symptoms  He is advised to be on a low-cholesterol diet  He will get a CT scan repeated  He will repeat his labs in 3 months at which time I will see him  35 minutes spent with this patient and family    No results found for this or any previous visit (from the past 1008 hour(s))  1  Dementia associated with 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     2  Mood disorder ([facility] )     3  Malignant neoplasm of prostate ([facility] )     4  H/O prostate cancer     5  Memory difficulty     6  Alcohol abuse     7  Pulmonary nodules  CBC and differential    CT chest wo contrast   8  Borderline hyperlipidemia  Comprehensive metabolic panel    Lipid panel       Orders Placed This Encounter   Procedures    CT chest wo contrast    CBC and differential    Comprehensive metabolic panel    Lipid panel         Subjective:  He says he is feeling fairly well  He has multiple problems including history of EtOH memory difficulties  He is apparently  from his wife and his daughters are looking after him  1 lives in the area and the other lives in [place]  Both are very interested in his care  Recent blood work showed an elevation of his LDL cholesterol  He has no vascular symptoms or history  Patient ID: Steffi Trevino is a 70 y o  male      HPI    The following portions of the patient's history were reviewed and updated as appropriate:   He has a past medical history of Alzheimer disease, Anxiety, Basal cell carcinoma, Bile duct disease, Cancer ([facility] ), Cardiac arrest ([facility] ), Choledocholithiasis, Dementia, Depression, Kidney stone, Liver disease, Malignant neoplasm of prostate ([facility] ), Mood disorder ([facility] ), Prostate cancer ([facility] ), Right carotid bruit, Seizure disorder ([facility] ), and Skin cancer  ,  does not have any pertinent problems on file  ,   has a past surgical history that includes Prostate surgery; ERCP (N/A, 3/9/2018); ERCP (04/13/2018); pr ercp remove foreign body/stent biliary/panc duct (N/A, 4/13/2018); Tonsillectomy; pr lap,cholecystectomy (N/A, 5/15/2018); Cholecystectomy; and ERCP (N/A, 6/8/2018)  ,  family history includes Alzheimer's disease in his mother; Cancer in his mother; Dementia in his mother; Sudden death in his father  ,   reports that he has been smoking cigarettes  He has a 60 00 pack-year smoking history  He has never used smokeless tobacco  He reports that he does not drink alcohol or use drugs  ,  has No Known Allergies       Current Outpatient Medications:     donepezil (ARICEPT) 10 mg tablet, TAKE ONE TABLET BY MOUTH DAILY AT BEDTIME, Disp: 30 tablet, Rfl: 6    Melatonin 3 MG CAPS, Take 6 mg by mouth daily at bedtime Patient takes 1 1/2 mg tablets  , Disp: , Rfl:     Multiple Vitamins-Minerals (CENTRUM SILVER ULTRA MENS PO), Take 1 tablet by mouth daily, Disp: , Rfl:     sertraline (ZOLOFT) 50 mg tablet, Take 1 tablet (50 mg total) by mouth daily, Disp: 30 tablet, Rfl: 3    tamsulosin (FLOMAX) 0 4 mg, Take 1 capsule (0 4 mg total) by mouth daily with dinner, Disp: 90 capsule, Rfl: 3    [employer] B-1 100 MG tablet, TAKE 1 TABLET (100 MG TOTAL) BY MOUTH DAILY (Patient not taking: Reported on 3/13/2019), Disp: 30 tablet, Rfl: 2    Review of Systems   Constitutional: Negative for activity change, appetite change, chills, diaphoresis, fatigue, fever and unexpected weight change  HENT: Negative for congestion, ear pain, hearing loss, mouth sores, nosebleeds, postnasal drip, sinus pressure, sinus pain, sore throat and trouble swallowing  Eyes: Negative for pain, discharge and visual disturbance  Respiratory: Negative for apnea, cough, chest tightness, shortness of breath and wheezing  Cardiovascular: Negative for chest pain, palpitations and leg swelling  Gastrointestinal: Negative for abdominal pain, anal bleeding, blood in stool, constipation, diarrhea, nausea and vomiting  Endocrine: Negative for polydipsia and polyphagia  Genitourinary: Negative for decreased urine volume, dysuria, flank pain, frequency, hematuria and urgency  Musculoskeletal: Negative for arthralgias, back pain, gait problem, joint swelling and myalgias  Skin: Negative for rash and wound  Allergic/Immunologic: Negative for environmental allergies and food allergies  Neurological: Negative for dizziness, tremors, seizures, syncope, speech difficulty, light-headedness, numbness and headaches  He feels his memory is about the same  His daughters feel the same   Hematological: Negative for adenopathy  Does not bruise/bleed easily  Psychiatric/Behavioral: Negative for agitation, confusion, hallucinations, sleep disturbance and suicidal ideas  The patient is not nervous/anxious  Objective:  /86   Pulse 73   Ht 5' 7" (1 702 m)   Wt 69 5 kg (153 lb 3 2 oz)   SpO2 95%   BMI 23 99 kg/m²      Physical Exam   Constitutional: He appears well-developed and well-nourished  No distress  HENT:   Head: Normocephalic  Right Ear: External ear normal    Left Ear: External ear normal    Nose: Nose normal    Mouth/Throat: Oropharynx is clear and moist  No oropharyngeal exudate  Eyes: Pupils are equal, round, and reactive to light  Conjunctivae and EOM are normal  Right eye exhibits no discharge  Left eye exhibits no discharge  Neck: Normal range of motion  Neck supple   No thyromegaly present  Cardiovascular: Normal rate, regular rhythm, normal heart sounds and intact distal pulses  Exam reveals no gallop and no friction rub  No murmur heard  Pulmonary/Chest: Effort normal and breath sounds normal  No respiratory distress  He has no wheezes  He has no rales  Abdominal: Soft  Bowel sounds are normal  He exhibits no distension and no mass  There is no tenderness  There is no rebound and no guarding  Musculoskeletal: Normal range of motion  He exhibits no edema, tenderness or deformity  Lymphadenopathy:     He has no cervical adenopathy  Neurological: He is alert  He has normal reflexes  He displays normal reflexes  No cranial nerve deficit  Coordination normal    Skin: Skin is warm and dry  No rash noted  No erythema  Psychiatric: His behavior is normal  Judgment and thought content normal    He does seem to get a bit frustrated with questions  Nursing note and vitals reviewed

## 2019-03-19 DIAGNOSIS — F02.80 DEMENTIA ASSOCIATED WITH OTHER UNDERLYING DISEASE WITHOUT BEHAVIORAL DISTURBANCE (HCC): ICD-10-CM

## 2019-03-22 ENCOUNTER — HOSPITAL ENCOUNTER (OUTPATIENT)
Dept: CT IMAGING | Facility: HOSPITAL | Age: 79
Discharge: HOME | End: 2019-03-22
Attending: INTERNAL MEDICINE
Payer: MEDICARE

## 2019-03-22 DIAGNOSIS — R91.8 PULMONARY NODULES: ICD-10-CM

## 2019-03-22 PROCEDURE — 71250 CT THORAX DX C-: CPT

## 2019-04-01 ENCOUNTER — TELEPHONE (OUTPATIENT)
Dept: INTERNAL MEDICINE CLINIC | Facility: CLINIC | Age: 79
End: 2019-04-01

## 2019-04-22 DIAGNOSIS — Z85.46 H/O PROSTATE CANCER: ICD-10-CM

## 2019-04-22 RX ORDER — TAMSULOSIN HYDROCHLORIDE 0.4 MG/1
CAPSULE ORAL
Qty: 90 CAPSULE | Refills: 3 | Status: SHIPPED | OUTPATIENT
Start: 2019-04-22

## 2019-05-10 ENCOUNTER — TELEPHONE (OUTPATIENT)
Dept: NEUROLOGY | Facility: CLINIC | Age: 79
End: 2019-05-10

## 2019-06-10 ENCOUNTER — TELEPHONE (OUTPATIENT)
Dept: NEUROLOGY | Facility: CLINIC | Age: 79
End: 2019-06-10

## 2019-06-12 ENCOUNTER — APPOINTMENT (OUTPATIENT)
Dept: LAB | Facility: HOSPITAL | Age: 79
End: 2019-06-12
Attending: INTERNAL MEDICINE
Payer: MEDICARE

## 2019-06-12 DIAGNOSIS — E78.5 BORDERLINE HYPERLIPIDEMIA: ICD-10-CM

## 2019-06-12 DIAGNOSIS — R91.8 PULMONARY NODULES: ICD-10-CM

## 2019-06-12 LAB
ALBUMIN SERPL BCP-MCNC: 3.8 G/DL (ref 3.5–5)
ALP SERPL-CCNC: 116 U/L (ref 46–116)
ALT SERPL W P-5'-P-CCNC: 23 U/L (ref 12–78)
ANION GAP SERPL CALCULATED.3IONS-SCNC: 8 MMOL/L (ref 4–13)
AST SERPL W P-5'-P-CCNC: 18 U/L (ref 5–45)
BASOPHILS # BLD AUTO: 0.06 THOUSANDS/ΜL (ref 0–0.1)
BASOPHILS NFR BLD AUTO: 1 % (ref 0–1)
BILIRUB SERPL-MCNC: 0.6 MG/DL (ref 0.2–1)
BUN SERPL-MCNC: 19 MG/DL (ref 5–25)
CALCIUM SERPL-MCNC: 9.4 MG/DL (ref 8.3–10.1)
CHLORIDE SERPL-SCNC: 102 MMOL/L (ref 100–108)
CHOLEST SERPL-MCNC: 250 MG/DL (ref 50–200)
CO2 SERPL-SCNC: 28 MMOL/L (ref 21–32)
CREAT SERPL-MCNC: 1.01 MG/DL (ref 0.6–1.3)
EOSINOPHIL # BLD AUTO: 0.19 THOUSAND/ΜL (ref 0–0.61)
EOSINOPHIL NFR BLD AUTO: 3 % (ref 0–6)
ERYTHROCYTE [DISTWIDTH] IN BLOOD BY AUTOMATED COUNT: 14.1 % (ref 11.6–15.1)
GFR SERPL CREATININE-BSD FRML MDRD: 72 ML/MIN/1.73SQ M
GLUCOSE P FAST SERPL-MCNC: 105 MG/DL (ref 65–99)
HCT VFR BLD AUTO: 49.1 % (ref 36.5–49.3)
HDLC SERPL-MCNC: 57 MG/DL (ref 40–60)
HGB BLD-MCNC: 16.1 G/DL (ref 12–17)
IMM GRANULOCYTES # BLD AUTO: 0.03 THOUSAND/UL (ref 0–0.2)
IMM GRANULOCYTES NFR BLD AUTO: 1 % (ref 0–2)
LDLC SERPL CALC-MCNC: 181 MG/DL (ref 0–100)
LYMPHOCYTES # BLD AUTO: 1.59 THOUSANDS/ΜL (ref 0.6–4.47)
LYMPHOCYTES NFR BLD AUTO: 27 % (ref 14–44)
MCH RBC QN AUTO: 29.7 PG (ref 26.8–34.3)
MCHC RBC AUTO-ENTMCNC: 32.8 G/DL (ref 31.4–37.4)
MCV RBC AUTO: 90 FL (ref 82–98)
MONOCYTES # BLD AUTO: 0.59 THOUSAND/ΜL (ref 0.17–1.22)
MONOCYTES NFR BLD AUTO: 10 % (ref 4–12)
NEUTROPHILS # BLD AUTO: 3.55 THOUSANDS/ΜL (ref 1.85–7.62)
NEUTS SEG NFR BLD AUTO: 58 % (ref 43–75)
NONHDLC SERPL-MCNC: 193 MG/DL
NRBC BLD AUTO-RTO: 0 /100 WBCS
PLATELET # BLD AUTO: 260 THOUSANDS/UL (ref 149–390)
PMV BLD AUTO: 9.4 FL (ref 8.9–12.7)
POTASSIUM SERPL-SCNC: 4.2 MMOL/L (ref 3.5–5.3)
PROT SERPL-MCNC: 7.4 G/DL (ref 6.4–8.2)
RBC # BLD AUTO: 5.43 MILLION/UL (ref 3.88–5.62)
SODIUM SERPL-SCNC: 138 MMOL/L (ref 136–145)
TRIGL SERPL-MCNC: 61 MG/DL
WBC # BLD AUTO: 6.01 THOUSAND/UL (ref 4.31–10.16)

## 2019-06-12 PROCEDURE — 80061 LIPID PANEL: CPT

## 2019-06-12 PROCEDURE — 36415 COLL VENOUS BLD VENIPUNCTURE: CPT

## 2019-06-12 PROCEDURE — 85025 COMPLETE CBC W/AUTO DIFF WBC: CPT

## 2019-06-12 PROCEDURE — 80053 COMPREHEN METABOLIC PANEL: CPT

## 2019-06-19 ENCOUNTER — OFFICE VISIT (OUTPATIENT)
Dept: INTERNAL MEDICINE CLINIC | Facility: CLINIC | Age: 79
End: 2019-06-19
Payer: MEDICARE

## 2019-06-19 VITALS
BODY MASS INDEX: 26.12 KG/M2 | HEIGHT: 67 IN | WEIGHT: 166.4 LBS | OXYGEN SATURATION: 95 % | DIASTOLIC BLOOD PRESSURE: 80 MMHG | SYSTOLIC BLOOD PRESSURE: 152 MMHG | HEART RATE: 68 BPM

## 2019-06-19 DIAGNOSIS — I46.9 CARDIAC ARREST (HCC): ICD-10-CM

## 2019-06-19 DIAGNOSIS — C61 MALIGNANT NEOPLASM OF PROSTATE (HCC): Primary | ICD-10-CM

## 2019-06-19 DIAGNOSIS — F10.20 ALCOHOLISM (HCC): ICD-10-CM

## 2019-06-19 DIAGNOSIS — F02.80 DEMENTIA ASSOCIATED WITH OTHER UNDERLYING DISEASE WITHOUT BEHAVIORAL DISTURBANCE (HCC): ICD-10-CM

## 2019-06-19 DIAGNOSIS — E78.5 BORDERLINE HYPERLIPIDEMIA: ICD-10-CM

## 2019-06-19 DIAGNOSIS — Z85.46 H/O PROSTATE CANCER: ICD-10-CM

## 2019-06-19 DIAGNOSIS — F10.10 ALCOHOL ABUSE: ICD-10-CM

## 2019-06-19 DIAGNOSIS — L98.9 SKIN LESIONS: ICD-10-CM

## 2019-06-19 PROBLEM — R73.9 HYPERGLYCEMIA: Status: ACTIVE | Noted: 2019-06-19

## 2019-06-19 PROCEDURE — 99215 OFFICE O/P EST HI 40 MIN: CPT | Performed by: INTERNAL MEDICINE

## 2019-06-19 RX ORDER — ROSUVASTATIN CALCIUM 10 MG/1
10 TABLET, COATED ORAL DAILY
Qty: 90 TABLET | Refills: 3 | Status: SHIPPED | OUTPATIENT
Start: 2019-06-19 | End: 2020-01-23

## 2019-06-29 DIAGNOSIS — F02.80 DEMENTIA ASSOCIATED WITH OTHER UNDERLYING DISEASE WITHOUT BEHAVIORAL DISTURBANCE (HCC): ICD-10-CM

## 2019-07-05 ENCOUNTER — TELEPHONE (OUTPATIENT)
Dept: INTERNAL MEDICINE CLINIC | Facility: CLINIC | Age: 79
End: 2019-07-05

## 2019-07-05 NOTE — TELEPHONE ENCOUNTER
LMOM for patient to call back and schedule wellness/ 4 month follow up  Never checked out at last visit  Due in October

## 2019-07-30 ENCOUNTER — OFFICE VISIT (OUTPATIENT)
Dept: DERMATOLOGY | Facility: CLINIC | Age: 79
End: 2019-07-30
Payer: MEDICARE

## 2019-07-30 DIAGNOSIS — L98.9 UNKNOWN SKIN LESION: Primary | ICD-10-CM

## 2019-07-30 DIAGNOSIS — L82.1 SEBORRHEIC KERATOSIS: ICD-10-CM

## 2019-07-30 DIAGNOSIS — Z13.89 SCREENING FOR SKIN CONDITION: ICD-10-CM

## 2019-07-30 PROCEDURE — 11102 TANGNTL BX SKIN SINGLE LES: CPT | Performed by: DERMATOLOGY

## 2019-07-30 PROCEDURE — 11103 TANGNTL BX SKIN EA SEP/ADDL: CPT | Performed by: DERMATOLOGY

## 2019-07-30 PROCEDURE — 99213 OFFICE O/P EST LOW 20 MIN: CPT | Performed by: DERMATOLOGY

## 2019-07-30 PROCEDURE — 88305 TISSUE EXAM BY PATHOLOGIST: CPT | Performed by: PATHOLOGY

## 2019-07-30 NOTE — PATIENT INSTRUCTIONS
Skin lesion possible basal cell carcinomas will require excision on the right temple the abdomen will be amenable to curettage  Seborrheic keratosis patient reassured these are normal growths we acquire with age no treatment needed  History of skin cancer in no recurrence nothing else atypical sunblock recommended follow-up in 6 months  Screening for dermatologic disorders nothing else of concern noted on complete exam follow-up in 6 months  Wound care instructions given to patient

## 2019-07-30 NOTE — PROGRESS NOTES
[facility] DERMATOLOGY  [address] [place] [id number]  [phone]  [phone]     MRN: [id number] : 1940  Encounter: [id number]  Patient Information: Alexia Matt  Chief complaint:  Skin cancer check    History of present illness:  61-year-old male who had not seen for 2 years presents for concerns regarding nonhealing lesions  Patient with history of numerous skin cancer previously  No specific concerns except the 1 lesion on right temple  Past Medical History:   Diagnosis Date    Alzheimer disease     Anxiety     Basal cell carcinoma     Bile duct disease     [facility]     prostate, skin     Cardiac arrest ([facility] )     Choledocholithiasis     Dementia     Depression     Kidney stone     Liver disease     Mal[facility]     Mood disorder (HCC)     [facility]     Right carotid bruit     Seizure disorder ([facility] )     Skin cancer      Past Surgical History:   Procedure Laterality Date    CHOLECYSTECTOMY      ERCP N/A 3/9/2018    Procedure: ENDOSCOPIC RETROGRADE CHOLANGIOPANCREATOGRAPHY (ERCP); Surgeon: Cyndi Bourgeois MD;  [facility];  Service: Gastroenterology    ERCP  2018    stenting    ERCP N/A 2018    Procedure: ENDOSCOPIC RETROGRADE CHOLANGIOPANCREATOGRAPHY (ERCP); Surgeon: Cyndi Bourgeois MD;  [facility];  Service: Gastroenterology    CO ERCP REMOVE FOREIGN BODY/STENT BILIARY/PANC DUCT N/A 2018    Procedure: ENDOSCOPIC RETROGRADE CHOLANGIOPANCREATOGRAPHY (ERCP);   Surgeon: Cyndi Bourgeois MD;  [facility];  Service: Gastroenterology    CO LAP,CHOLECYSTECTOMY N/A 5/15/2018    Procedure: Yraa Lob;  Surgeon: Miguel Nunes MD;  [facility]: [facility];  Service: General    PROSTATE SURGERY      TONSILLECTOMY       Social History   Social History     Substance and Sexual Activity   Alcohol Use No    Comment: former Social History     Substance and Sexual Activity   Drug Use No     Social History     Tobacco Use   Smoking Status Former Smoker    Packs/day: 1 00    Years: 60 00    Pack years: 60 00    Types: Cigarettes    Last attempt to quit: 3/19/2019    Years since quittin 3   Smokeless Tobacco Never Used   Tobacco Comment    daily     Family History   Problem Relation Age of Onset    Cancer Mother     Dementia Mother     Alzheimer's disease Mother     Sudden death Father      Meds/Allergies   No Known Allergies    Meds:  Prior to Admission medications    Medication Sig Start Date End Date Taking?  Authorizing Provider   donepezil (ARICEPT) 10 mg tablet TAKE ONE TABLET BY MOUTH DAILY AT BEDTIME 3/4/19  Yes Israel Miller MD   Melatonin 3 MG CAPS Take 6 mg by mouth daily at bedtime Patient takes 1 1/2 mg tablets     Yes Historical Provider, MD   Multiple Vitamins-Minerals (CENTRUM SILVER ULTRA MENS PO) Take 1 tablet by mouth daily   Yes Historical Provider, MD   rosuvastatin (CRESTOR) 10 MG tablet Take 1 tablet (10 mg total) by mouth daily 19  Yes Jose Luis Watts DO   sertraline (ZOLOFT) 50 mg tablet TAKE ONE TABLET BY MOUTH DAILY 19  Yes Wyatt Keenan MD   tamsulosin (FLOMAX) 0 4 mg TAKE ONE TABLET DAILY WITH DINNER 19  Yes Jose Luis Watts DO       Subjective:     Review of Systems:    General: negative for - chills, fatigue, fever,  weight gain or weight loss  Psychological: negative for - anxiety, behavioral disorder, concentration difficulties, decreased libido, depression, irritability, memory difficulties, mood swings, sleep disturbances or suicidal ideation  ENT: negative for - hearing difficulties , nasal congestion, nasal discharge, oral lesions, sinus pain, sneezing, sore throat  Allergy and Immunology: negative for - hives, insect bite sensitivity,  Hematological and Lymphatic: negative for - bleeding problems, blood clots,bruising, swollen lymph nodes  Endocrine: negative for - hair pattern changes, hot flashes, malaise/lethargy, mood swings, palpitations, polydipsia/polyuria, skin changes, temperature intolerance or unexpected weight change  Respiratory: negative for - cough, hemoptysis, orthopnea, shortness of breath, or wheezing  Cardiovascular: negative for - chest pain, dyspnea on exertion, edema,  Gastrointestinal: negative for - abdominal pain, nausea/vomiting  Genito-Urinary: negative for - dysuria, incontinence, irregular/heavy menses or urinary frequency/urgency  Musculoskeletal: negative for - gait disturbance, joint pain, joint stiffness, joint swelling, muscle pain, muscular weakness  Dermatological:  As in HPI  Neurological: negative for confusion, dizziness, headaches, impaired coordination/balance, memory loss, numbness/tingling, seizures, speech problems, tremors or weakness       Objective: There were no vitals taken for this visit  Physical Exam:    General Appearance:    Alert, cooperative, no distress   Head:    Normocephalic, without obvious abnormality, atraumatic           Skin:   A full skin exam was performed including scalp, head scalp, eyes, ears, nose, lips, neck, chest, axilla, abdomen, back, buttocks, bilateral upper extremities, bilateral lower extremities, hands, feet, fingers, toes, fingernails, and toenails 13 mm pearly macule with a pearly depressed area noted on the right temple 4 mm pearly macule noted on right abdomen normal keratotic papules greasy stuck on appearance previous sites of skin cancer well healed without recurrence nothing else remarkable noted exam        Shave Biopsy Procedure Note    Pre-operative Diagnosis:  Basal cell carcinoma    Plan:  1  Instructed to keep the wound dry and covered for 24 and clean thereafter  2  Warning signs of infection were reviewed  3  Recommended that the patient use OTC acetaminophen as needed for pain     4  Return  Pending results of biopsy(ies)    Locations:Right temple right abdomen    Indications:  Nonhealing lesion    Anesthesia: Lidocaine 1% with epinephrine without added sodium bicarbonate    Procedure Details     Patient informed of the risks (including bleeding and infection) and benefits of the   procedure and Verbal informed consent obtained  The lesion and surrounding area were given a sterile prep using alcohol and draped in the usual sterile fashion  A Blue blade razor was used to obtain a specimen  Hemostasis achieved with aluminum chloride  Petrolatum and a sterile dressing applied  The specimen was sent for pathologic examination  The patient tolerated the procedure(s) well  Complications:  none  Assessment:     1  Unknown skin lesion     2  Seborrheic keratosis     3  Screening for skin condition           Plan:   Skin lesion possible basal cell carcinomas will require excision on the right temple the abdomen will be amenable to curettage  Seborrheic keratosis patient reassured these are normal growths we acquire with age no treatment needed  History of skin cancer in no recurrence nothing else atypical sunblock recommended follow-up in 6 months  Screening for dermatologic disorders nothing else of concern noted on complete exam follow-up in 6 months    Cheko Hill MD  7/30/2019,3:14 PM    Portions of the record may have been created with voice recognition software   Occasional wrong word or "sound a like" substitutions may have occurred due to the inherent limitations of voice recognition software   Read the chart carefully and recognize, using context, where substitutions have occurred

## 2019-08-19 ENCOUNTER — OFFICE VISIT (OUTPATIENT)
Dept: NEUROLOGY | Facility: CLINIC | Age: 79
End: 2019-08-19
Payer: MEDICARE

## 2019-08-19 VITALS
WEIGHT: 171 LBS | BODY MASS INDEX: 26.84 KG/M2 | DIASTOLIC BLOOD PRESSURE: 80 MMHG | SYSTOLIC BLOOD PRESSURE: 122 MMHG | HEIGHT: 67 IN | HEART RATE: 70 BPM

## 2019-08-19 DIAGNOSIS — F02.80 DEMENTIA ASSOCIATED WITH OTHER UNDERLYING DISEASE WITHOUT BEHAVIORAL DISTURBANCE (HCC): Primary | ICD-10-CM

## 2019-08-19 DIAGNOSIS — F02.80 DEMENTIA ASSOCIATED WITH OTHER UNDERLYING DISEASE WITHOUT BEHAVIORAL DISTURBANCE (HCC): ICD-10-CM

## 2019-08-19 PROCEDURE — 99214 OFFICE O/P EST MOD 30 MIN: CPT | Performed by: PSYCHIATRY & NEUROLOGY

## 2019-08-19 RX ORDER — THIAMINE MONONITRATE (VIT B1) 100 MG
100 TABLET ORAL DAILY
Qty: 30 TABLET | Refills: 6 | Status: SHIPPED | OUTPATIENT
Start: 2019-08-19 | End: 2020-01-23

## 2019-08-19 RX ORDER — DONEPEZIL HYDROCHLORIDE 10 MG/1
TABLET, FILM COATED ORAL
Qty: 30 TABLET | Refills: 6 | Status: SHIPPED | OUTPATIENT
Start: 2019-08-19 | End: 2019-08-19 | Stop reason: SDUPTHER

## 2019-08-19 RX ORDER — THIAMINE MONONITRATE (VIT B1) 100 MG
100 TABLET ORAL DAILY
COMMUNITY
End: 2019-08-19 | Stop reason: SDUPTHER

## 2019-08-19 RX ORDER — DONEPEZIL HYDROCHLORIDE 10 MG/1
10 TABLET, FILM COATED ORAL
Qty: 30 TABLET | Refills: 6 | Status: SHIPPED | OUTPATIENT
Start: 2019-08-19 | End: 2020-03-13

## 2019-08-19 NOTE — PROGRESS NOTES
Progress Note - Neurology   Sonia Sharma 70 y o  male MRN: [id number]  Unit/Bed#:  Encounter: [id number]      Subjective:   Patient is here for a follow-up visit accompanied with his daughter who is his main caregiver with a history of dementia, and was last seen by me 6 months ago  Patient lives alone and has been compliant with his medications generally secured for him by his daughter every week in a pillbox, and also provides for his nutritional needs  Since his last visit he denies any new neurological symptoms, has quit smoking and has regained some weight  Patient continues to have difficulty with short-term memory, difficulty with time lines and at times recognizing people including people he was very familiar with in the past   He remains on Aricept, Zoloft, thiamine, and multivitamins  Patient denies any new neurological symptoms  ROS:   Review of Systems   Constitutional: Negative  Negative for appetite change and fever  HENT: Negative  Negative for hearing loss, tinnitus, trouble swallowing and voice change  Eyes: Negative  Negative for photophobia and pain  Respiratory: Negative  Negative for shortness of breath  Cardiovascular: Negative  Negative for palpitations  Gastrointestinal: Negative  Negative for nausea and vomiting  Endocrine: Negative  Negative for cold intolerance and heat intolerance  Genitourinary: Negative  Negative for dysuria, frequency and urgency  Musculoskeletal: Negative  Negative for myalgias and neck pain  Skin: Negative  Negative for rash  Neurological: Negative  Negative for dizziness, tremors, seizures, syncope, facial asymmetry, speech difficulty, weakness, light-headedness, numbness and headaches  Hematological: Negative  Does not bruise/bleed easily  Psychiatric/Behavioral: Positive for confusion  Negative for hallucinations and sleep disturbance         Vitals:   Vitals:    08/19/19 1420   BP: 122/80   BP Location: Left arm Patient Position: Sitting   Cuff Size: Adult   Pulse: 70   Weight: 77 6 kg (171 lb)   Height: 5' 7" (1 702 m)   ,Body mass index is 26 78 kg/m²  MEDS:      Current Outpatient Medications:     donepezil (ARICEPT) 10 mg tablet, TAKE ONE TABLET BY MOUTH DAILY AT BEDTIME, Disp: 30 tablet, Rfl: 6    Melatonin 3 MG CAPS, Take 3 mg by mouth daily at bedtime Patient takes 1 1/2 mg tablets , Disp: , Rfl:     Multiple Vitamins-Minerals (CENTRUM SILVER ULTRA MENS PO), Take 1 tablet by mouth daily, Disp: , Rfl:     sertraline (ZOLOFT) 50 mg tablet, TAKE ONE TABLET BY MOUTH DAILY, Disp: 30 tablet, Rfl: 3    tamsulosin (FLOMAX) 0 4 mg, TAKE ONE TABLET DAILY WITH DINNER, Disp: 90 capsule, Rfl: 3    thiamine (VITAMIN B1) 100 mg tablet, Take 100 mg by mouth daily, Disp: , Rfl:     rosuvastatin (CRESTOR) 10 MG tablet, Take 1 tablet (10 mg total) by mouth daily (Patient not taking: Reported on 8/19/2019), Disp: 90 tablet, Rfl: 3  :    Physical Exam:  General appearance: alert, appears stated age and cooperative  Head: Normocephalic, without obvious abnormality, atraumatic    Corbin cognitive assessment score was 22/30 as compared to 23/30, 6 months ago  Cranial nerve exam, motor and sensory exam, reveals no evidence of any focal deficits, no evidence of any dysmetria was noted, patient could obey simple commands and his gait is normal based  No bruits were appreciable in the neck  Lab Results: I have personally reviewed pertinent reports  Imaging Studies: I have personally reviewed pertinent reports  Assessment:  1  Dementia associated with 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  Plan:  Patient will continue with present medications since he has been relatively stable on the medications, mental stimulating exercises were discussed at length, patient has quit smoking and alcohol use, does not drive at this time and is not fit for any gainful employment or major intellectual decisions  Mario Mcclure will return back to see me in 6 months  8/19/2019,2:33 PM    Dictation voice to text software has been used in the creation of this document  Please consider this in light of any contextual or grammatical errors

## 2019-08-27 ENCOUNTER — TELEPHONE (OUTPATIENT)
Dept: NEUROLOGY | Facility: CLINIC | Age: 79
End: 2019-08-27

## 2019-08-27 NOTE — TELEPHONE ENCOUNTER
Patient's power of  is coming in to  the patient's last office note report  Power of  needs to fill out a release  The office note is in a envelope by check in when the patient's power of  comes in and fills out release

## 2019-08-27 NOTE — TELEPHONE ENCOUNTER
Power of Attorneys stopped by the office to sign a Release of Records to get copy of notes    Release scanned in chart

## 2019-09-18 ENCOUNTER — PROCEDURE VISIT (OUTPATIENT)
Dept: DERMATOLOGY | Facility: CLINIC | Age: 79
End: 2019-09-18
Payer: MEDICARE

## 2019-09-18 DIAGNOSIS — C44.519 BASAL CELL CARCINOMA (BCC) OF ABDOMEN: ICD-10-CM

## 2019-09-18 DIAGNOSIS — C44.319 BASAL CELL CARCINOMA (BCC) OF RIGHT TEMPLE REGION: Primary | ICD-10-CM

## 2019-09-18 PROCEDURE — 12053 INTMD RPR FACE/MM 5.1-7.5 CM: CPT | Performed by: DERMATOLOGY

## 2019-09-18 PROCEDURE — 11643 EXC F/E/E/N/L MAL+MRG 2.1-3: CPT | Performed by: DERMATOLOGY

## 2019-09-18 PROCEDURE — 88305 TISSUE EXAM BY PATHOLOGIST: CPT | Performed by: PATHOLOGY

## 2019-09-18 PROCEDURE — 17260 DSTRJ MAL LES T/A/L 0.5 CM/<: CPT | Performed by: DERMATOLOGY

## 2019-09-18 NOTE — PROGRESS NOTES
[facility] DERMATOLOGY  [address] [place] [address]  [phone]  [phone]     MRN: [id number] : 1940  Encounter: [id number]  Patient Information: Marc Morales    Subjective:     61-year-old male presents for planned excision of previously biopsied basal cell carcinoma right as well as removal of the other basal cell carcinoma right abdomen     Objective: There were no vitals taken for this visit  Physical Exam:    General Appearance:    Alert, cooperative, no distress   Skin:  Previous sites of biopsy noted    Procedure name: Excision with intermediate layered closure        Location:  Right temple    Diagnosis: Basal cell carcinoma    Size of lesion: 14 mm    Margins: 4 mm    Size + Margins: 22 mm    I explained the diagnosis to the patient and we recommend an excision of the lesion for diagnosis and/or treatment  Potential complications include, but are not limited to: scarring, bleeding, infection, incomplete removal, recurrence, and nerve damage  The risks, benefits, and alternatives were discussed with the patient in detail  The location of the tumor was identified, circled, and confirmed by the patient  The correct patient, site, and procedure were confirmed  Anesthesia: 1% xylocaine with 1:100,000 epinephrine 8 cc  The patient was brought into the room, prepped and draped sterilely in the usual manner and anesthesia was administered by local infiltration  A fusiform shape was drawn around the lesion, and the margins were incised to the level of the subcutaneous fat with a number 15cc Bard-Shlomo blade  The tissue was removed with sharp and blunt dissection  The lateral margins of the resulting defect were undermined with sharp and blunt dissection  Hemostasis was achieved with electrocautery    The deeper layers of the defect, including subcutaneous fat and fascia, had to be approximated to reduce tension on the suture line   Layered wound closure was performed  The wound was cleaned with saline, dried off, petroleum applied, and the wound was covered with a pressure dressing  The patient was given detailed verbal and written instructions on postoperative care  Suture for adipose/fascial/dermal layer closure: 5-0  vicryl 6 sutures interrupted    Suture used to approximate epidermis: 5-0  nylon and 6-0  nylon 12 sutures interrupted    Final wound length: 5 5 cm    Follow-up in: 7 days  Patient tolerated procedure well without complications  Procedure: Curettage & Electrodessication a basal cell carcinoma  The reasons for the procedure were explained to the patient  The benefits and risks of the procedure were explained to the patient, including bleeding, infection, incomplete removal, prolonged anesthesia (weeks to months) and rarely nerve damage  The patient is aware that a scar will result from the procedure  The consent for the procedure was obtained verbally and in writing  Lesion Site:  Right abdomen Curetted Area Size (mm): 4    Using a sterile curette, the appropriate area was curetted  The area was curetted and electrodesiccated x3  Final defect size: 5mm    The wound was left to heal by secondary intention  The wound was cleansed then covered with a dressing  Wound care instructions were verbally given and in writing  I performed the entire procedure  Patient tolerated procedure well  Assessment:     1  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  Authorizing Provider   donepezil (ARICEPT) 10 mg tablet Take 1 tablet (10 mg total) by mouth daily at bedtime 8/19/19   Safia Duran MD   Melatonin 3 MG CAPS Take 3 mg by mouth daily at bedtime Patient takes 1 1/2 mg tablets     Historical Provider, MD Multiple Vitamins-Minerals (CENTRUM SILVER ULTRA MENS PO) Take 1 tablet by mouth daily    Historical Provider, MD   rosuvastatin (CRESTOR) 10 MG tablet Take 1 tablet (10 mg total) by mouth daily  Patient not taking: Reported on 8/19/2019 6/19/19   Benita Dunaway DO   sertraline (ZOLOFT) 50 mg tablet Take 1 tablet (50 mg total) by mouth daily 8/19/19   Ree Mary MD   tamsulosin (FLOMAX) 0 4 mg TAKE ONE TABLET DAILY WITH DINNER 4/22/19   Benita Dunaway DO   thiamine (VITAMIN B1) 100 mg tablet Take 1 tablet (100 mg total) by mouth daily 8/19/19   Ree Mary MD     No Known Allergies    Leonor Ortiz MD  5/19/2173,3:25 PM    Portions of the record may have been created with voice recognition software   Occasional wrong word or "sound a like" substitutions may have occurred due to the inherent limitations of voice recognition software   Read the chart carefully and recognize, using context, where substitutions have occurred

## 2019-09-25 ENCOUNTER — CLINICAL SUPPORT (OUTPATIENT)
Dept: DERMATOLOGY | Facility: CLINIC | Age: 79
End: 2019-09-25

## 2019-09-25 DIAGNOSIS — C44.319 BASAL CELL CARCINOMA (BCC) OF RIGHT TEMPLE REGION: Primary | ICD-10-CM

## 2019-09-25 DIAGNOSIS — C44.519 BASAL CELL CARCINOMA (BCC) OF ABDOMEN: ICD-10-CM

## 2019-09-25 PROCEDURE — 99024 POSTOP FOLLOW-UP VISIT: CPT | Performed by: DERMATOLOGY

## 2019-09-25 NOTE — PROGRESS NOTES
[facility] DERMATOLOGY  [address] [place] [address]  [place] [address]  [phone]  [phone]     MRN: [id number] : 1940  Encounter: [id number]  Patient Information: Marcelino Hawkins    Subjective:     82-year-old male presents for previously excised basal cell carcinoma right temple as well as curetted basal cell on the abdomen no problems noted     Objective: There were no vitals taken for this visit  Physical Exam:    General Appearance:    Alert, cooperative, no distress   Skin:   Previous site of excision healing well previous site of curettage on the abdomen healing well as well  Procedure:  Suture removal  Site of excision:  Right temple  Wound was cleansed with saline  Wound is intact  Sutures removed  Steri-Strips applied  Biopsy pending     Assessment:     1  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  Authorizing Provider   donepezil (ARICEPT) 10 mg tablet Take 1 tablet (10 mg total) by mouth daily at bedtime 19   Sangeetha Clark MD   Melatonin 3 MG CAPS Take 3 mg by mouth daily at bedtime Patient takes 1 1/2 mg tablets     Historical Provider, MD   Multiple Vitamins-Minerals (CENTRUM SILVER ULTRA MENS PO) Take 1 tablet by mouth daily    Historical Provider, MD   rosuvastatin (CRESTOR) 10 MG tablet Take 1 tablet (10 mg total) by mouth daily  Patient not taking: Reported on 2019   Vanessa Neely DO   sertraline (ZOLOFT) 50 mg tablet Take 1 tablet (50 mg total) by mouth daily 19   Sangeetha Clark MD   tamsulosin (FLOMAX) 0 4 mg TAKE ONE TABLET DAILY WITH DINNER 19   Vanesas Neely DO   thiamine (VITAMIN B1) 100 mg tablet Take 1 tablet (100 mg total) by mouth daily 19   Sangeetha Clark MD     No Known Allergies    Tay Shafer MD  9/25/2019,9:43 AM    Portions of the record may have been created with voice recognition software   Occasional wrong word or "sound a like" substitutions may have occurred due to the inherent limitations of voice recognition software   Read the chart carefully and recognize, using context, where substitutions have occurred

## 2019-09-25 NOTE — PATIENT INSTRUCTIONS
Wound care instructions given to patient  Patient will be called when biopsy results are available  STERI-STRIPS (BUTTERFLY) POST SURGERY        Steri-Strips have been placed over your incision site to give added support  Please continue to bathe the area as usual     Eventually the Steri-Strips will begin to peel off on the ends  Snip the raised ends off with scissors and let the rest fall off on their own  If you have any questions, please call our office   [phone]

## 2019-09-27 ENCOUNTER — TELEPHONE (OUTPATIENT)
Dept: DERMATOLOGY | Facility: CLINIC | Age: 79
End: 2019-09-27

## 2019-09-27 NOTE — TELEPHONE ENCOUNTER
----- Message from Teresa Evans MD sent at 9/27/2019  7:48 AM EDT -----  Call patient if not coming in shortly for suture removal to let him know that the margins were clear

## 2019-10-18 DIAGNOSIS — Z85.46 H/O PROSTATE CANCER: ICD-10-CM

## 2019-10-18 RX ORDER — TAMSULOSIN HYDROCHLORIDE 0.4 MG/1
CAPSULE ORAL
Qty: 90 CAPSULE | Refills: 3 | Status: SHIPPED | OUTPATIENT
Start: 2019-10-18 | End: 2020-01-23

## 2019-11-26 DIAGNOSIS — F02.80 DEMENTIA ASSOCIATED WITH OTHER UNDERLYING DISEASE WITHOUT BEHAVIORAL DISTURBANCE (HCC): ICD-10-CM

## 2020-01-23 ENCOUNTER — APPOINTMENT (EMERGENCY)
Dept: RADIOLOGY | Facility: HOSPITAL | Age: 80
End: 2020-01-23
Payer: MEDICARE

## 2020-01-23 ENCOUNTER — HOSPITAL ENCOUNTER (EMERGENCY)
Facility: HOSPITAL | Age: 80
Discharge: HOME | End: 2020-01-23
Attending: EMERGENCY MEDICINE
Payer: MEDICARE

## 2020-01-23 VITALS
BODY MASS INDEX: 26.25 KG/M2 | SYSTOLIC BLOOD PRESSURE: 140 MMHG | HEART RATE: 70 BPM | HEIGHT: 69 IN | OXYGEN SATURATION: 98 % | DIASTOLIC BLOOD PRESSURE: 65 MMHG | RESPIRATION RATE: 22 BRPM | WEIGHT: 177.25 LBS | TEMPERATURE: 98.2 F

## 2020-01-23 DIAGNOSIS — N28.9 RENAL INSUFFICIENCY: ICD-10-CM

## 2020-01-23 DIAGNOSIS — E86.0 DEHYDRATION: Primary | ICD-10-CM

## 2020-01-23 DIAGNOSIS — N39.0 UTI (URINARY TRACT INFECTION): ICD-10-CM

## 2020-01-23 LAB
ALBUMIN SERPL BCP-MCNC: 3.9 G/DL (ref 3.5–5)
ALP SERPL-CCNC: 110 U/L (ref 46–116)
ALT SERPL W P-5'-P-CCNC: 26 U/L (ref 12–78)
ANION GAP SERPL CALCULATED.3IONS-SCNC: 13 MMOL/L (ref 4–13)
AST SERPL W P-5'-P-CCNC: 28 U/L (ref 5–45)
ATRIAL RATE: 62 BPM
ATRIAL RATE: 73 BPM
BACTERIA UR QL AUTO: ABNORMAL /HPF
BASOPHILS # BLD AUTO: 0.06 THOUSANDS/ΜL (ref 0–0.1)
BASOPHILS NFR BLD AUTO: 1 % (ref 0–1)
BILIRUB SERPL-MCNC: 0.4 MG/DL (ref 0.2–1)
BILIRUB UR QL STRIP: NEGATIVE
BUN SERPL-MCNC: 37 MG/DL (ref 5–25)
CALCIUM SERPL-MCNC: 9.1 MG/DL (ref 8.3–10.1)
CHLORIDE SERPL-SCNC: 109 MMOL/L (ref 100–108)
CLARITY UR: ABNORMAL
CO2 SERPL-SCNC: 21 MMOL/L (ref 21–32)
COLOR UR: YELLOW
CREAT SERPL-MCNC: 1.46 MG/DL (ref 0.6–1.3)
EOSINOPHIL # BLD AUTO: 0.09 THOUSAND/ΜL (ref 0–0.61)
EOSINOPHIL NFR BLD AUTO: 1 % (ref 0–6)
ERYTHROCYTE [DISTWIDTH] IN BLOOD BY AUTOMATED COUNT: 18 % (ref 11.6–15.1)
GFR SERPL CREATININE-BSD FRML MDRD: 46 ML/MIN/1.73SQ M
GLUCOSE SERPL-MCNC: 91 MG/DL (ref 65–140)
GLUCOSE UR STRIP-MCNC: NEGATIVE MG/DL
HCT VFR BLD AUTO: 45.6 % (ref 36.5–49.3)
HGB BLD-MCNC: 14.6 G/DL (ref 12–17)
HGB UR QL STRIP.AUTO: ABNORMAL
HYALINE CASTS #/AREA URNS LPF: ABNORMAL /LPF
IMM GRANULOCYTES # BLD AUTO: 0.03 THOUSAND/UL (ref 0–0.2)
IMM GRANULOCYTES NFR BLD AUTO: 0 % (ref 0–2)
KETONES UR STRIP-MCNC: ABNORMAL MG/DL
LEUKOCYTE ESTERASE UR QL STRIP: ABNORMAL
LYMPHOCYTES # BLD AUTO: 1.14 THOUSANDS/ΜL (ref 0.6–4.47)
LYMPHOCYTES NFR BLD AUTO: 13 % (ref 14–44)
MCH RBC QN AUTO: 27.9 PG (ref 26.8–34.3)
MCHC RBC AUTO-ENTMCNC: 32 G/DL (ref 31.4–37.4)
MCV RBC AUTO: 87 FL (ref 82–98)
MONOCYTES # BLD AUTO: 0.74 THOUSAND/ΜL (ref 0.17–1.22)
MONOCYTES NFR BLD AUTO: 9 % (ref 4–12)
MUCOUS THREADS UR QL AUTO: ABNORMAL
NEUTROPHILS # BLD AUTO: 6.54 THOUSANDS/ΜL (ref 1.85–7.62)
NEUTS SEG NFR BLD AUTO: 76 % (ref 43–75)
NITRITE UR QL STRIP: NEGATIVE
NON-SQ EPI CELLS URNS QL MICRO: ABNORMAL /HPF
NRBC BLD AUTO-RTO: 0 /100 WBCS
P AXIS: 10 DEGREES
P AXIS: 48 DEGREES
PH UR STRIP.AUTO: 5.5 [PH]
PLATELET # BLD AUTO: 234 THOUSANDS/UL (ref 149–390)
PMV BLD AUTO: 10.1 FL (ref 8.9–12.7)
POTASSIUM SERPL-SCNC: 3.6 MMOL/L (ref 3.5–5.3)
PR INTERVAL: 124 MS
PR INTERVAL: 138 MS
PROT SERPL-MCNC: 7.9 G/DL (ref 6.4–8.2)
PROT UR STRIP-MCNC: NEGATIVE MG/DL
QRS AXIS: 75 DEGREES
QRS AXIS: 78 DEGREES
QRSD INTERVAL: 80 MS
QRSD INTERVAL: 80 MS
QT INTERVAL: 372 MS
QT INTERVAL: 408 MS
QTC INTERVAL: 409 MS
QTC INTERVAL: 414 MS
RBC # BLD AUTO: 5.24 MILLION/UL (ref 3.88–5.62)
RBC #/AREA URNS AUTO: ABNORMAL /HPF
SODIUM SERPL-SCNC: 143 MMOL/L (ref 136–145)
SP GR UR STRIP.AUTO: 1.02 (ref 1–1.03)
T WAVE AXIS: 61 DEGREES
T WAVE AXIS: 78 DEGREES
TROPONIN I SERPL-MCNC: <0.02 NG/ML
UROBILINOGEN UR QL STRIP.AUTO: 0.2 E.U./DL
VENTRICULAR RATE: 62 BPM
VENTRICULAR RATE: 73 BPM
WBC # BLD AUTO: 8.6 THOUSAND/UL (ref 4.31–10.16)
WBC #/AREA URNS AUTO: ABNORMAL /HPF

## 2020-01-23 PROCEDURE — 71045 X-RAY EXAM CHEST 1 VIEW: CPT

## 2020-01-23 PROCEDURE — 80053 COMPREHEN METABOLIC PANEL: CPT | Performed by: EMERGENCY MEDICINE

## 2020-01-23 PROCEDURE — 93010 ELECTROCARDIOGRAM REPORT: CPT | Performed by: INTERNAL MEDICINE

## 2020-01-23 PROCEDURE — 93005 ELECTROCARDIOGRAM TRACING: CPT

## 2020-01-23 PROCEDURE — 99284 EMERGENCY DEPT VISIT MOD MDM: CPT | Performed by: EMERGENCY MEDICINE

## 2020-01-23 PROCEDURE — 99285 EMERGENCY DEPT VISIT HI MDM: CPT

## 2020-01-23 PROCEDURE — 36415 COLL VENOUS BLD VENIPUNCTURE: CPT | Performed by: EMERGENCY MEDICINE

## 2020-01-23 PROCEDURE — 87086 URINE CULTURE/COLONY COUNT: CPT | Performed by: EMERGENCY MEDICINE

## 2020-01-23 PROCEDURE — 96360 HYDRATION IV INFUSION INIT: CPT

## 2020-01-23 PROCEDURE — 81001 URINALYSIS AUTO W/SCOPE: CPT | Performed by: EMERGENCY MEDICINE

## 2020-01-23 PROCEDURE — 85025 COMPLETE CBC W/AUTO DIFF WBC: CPT | Performed by: EMERGENCY MEDICINE

## 2020-01-23 PROCEDURE — 84484 ASSAY OF TROPONIN QUANT: CPT | Performed by: EMERGENCY MEDICINE

## 2020-01-23 PROCEDURE — 96361 HYDRATE IV INFUSION ADD-ON: CPT

## 2020-01-23 RX ORDER — CEPHALEXIN 250 MG/1
500 CAPSULE ORAL EVERY 6 HOURS SCHEDULED
Qty: 56 CAPSULE | Refills: 0 | Status: SHIPPED | OUTPATIENT
Start: 2020-01-23 | End: 2020-01-30

## 2020-01-23 RX ORDER — GUAIFENESIN 600 MG
1200 TABLET, EXTENDED RELEASE 12 HR ORAL 2 TIMES DAILY PRN
COMMUNITY
End: 2021-08-24

## 2020-01-23 RX ORDER — ASPIRIN 81 MG/1
81 TABLET, CHEWABLE ORAL DAILY PRN
COMMUNITY
End: 2021-08-11 | Stop reason: ALTCHOICE

## 2020-01-23 RX ORDER — CEPHALEXIN 250 MG/1
500 CAPSULE ORAL ONCE
Status: COMPLETED | OUTPATIENT
Start: 2020-01-23 | End: 2020-01-23

## 2020-01-23 RX ADMIN — SODIUM CHLORIDE 500 ML: 0.9 INJECTION, SOLUTION INTRAVENOUS at 20:49

## 2020-01-23 RX ADMIN — SODIUM CHLORIDE 1000 ML: 0.9 INJECTION, SOLUTION INTRAVENOUS at 22:07

## 2020-01-23 RX ADMIN — CEPHALEXIN 500 MG: 250 CAPSULE ORAL at 23:30

## 2020-01-24 NOTE — ED PROVIDER NOTES
Pt Name: Renetta Beauchamp  MRN: [id number]  [place] 1940  Age/Sex: 70 y o  male  Date of evaluation: 1/23/2020  PCP: Cheri Godoy DO    CHIEF COMPLAINT    Chief Complaint   Patient presents with    Altered Mental Status     Per patients daughters patient last seen acting normal yesterday  Daughters reports patient has slurred speech and out of breath while seated  HPI    70 y o  male presenting with some shortness of breath as well as an episode confusion  Per patient's daughter, patient was normal yesterday this morning he seemed to be slightly confused slurring his speech slightly short of breath  He is able to walk but seemed less shortness putting usual   Patient appears to be back to his normal baseline for daughters  Patient states that he has had a mild cough but denies any other symptoms, states that he felt tired earlier and still feels fatigued but otherwise is normal self  Denies any fever, loss conscious, trauma, chest pain, numbness, weakness, changes in speech or vision or coordination  HPI      Past Medical and Surgical History    Past Medical History:   Diagnosis Date    Alzheimer disease ([facility]     Anxiety     Basal cell carcinoma     Bile duct disease     Cancer ([facility]     prostate, skin     Cardiac arrest ([facility]     Choledocholithiasis     Dementia ([facility]     Depression     Kidney stone     Liver disease     Malignant neoplasm of prostate ([facility]     Mood disorder ([facility]     Prostate cancer ([facility]     Right carotid bruit     Seizure disorder ([facility]     Skin cancer        Past Surgical History:   Procedure Laterality Date    CHOLECYSTECTOMY      ERCP N/A 3/9/2018    Procedure: ENDOSCOPIC RETROGRADE CHOLANGIOPANCREATOGRAPHY (ERCP); Surgeon: Gerhardt Kitten, MD;  Location: [facility] OR;  Service: Gastroenterology    ERCP  04/13/2018    stenting    ERCP N/A 6/8/2018    Procedure: ENDOSCOPIC RETROGRADE CHOLANGIOPANCREATOGRAPHY (ERCP);   Surgeon: Сергей Esquivel III, MD;  Location: [facility];  Service: Gastroenterology    RI ERCP REMOVE FOREIGN BODY/STENT BILIARY/PANC DUCT N/A 2018    Procedure: ENDOSCOPIC RETROGRADE CHOLANGIOPANCREATOGRAPHY (ERCP); Surgeon: Deloris Bess MD;  Location: [facility];  Service: Gastroenterology    RI LAP,CHOLECYSTECTOMY N/A 5/15/2018    Procedure: [place];  Surgeon: Sanford Marie MD;  Location: [facility];  Service: General    PROSTATE SURGERY      TONSILLECTOMY         Family History   Problem Relation Age of Onset    Cancer Mother     Dementia Mother     Alzheimer's disease Mother    Patti Hernandez Sudden death Father        Social History     Tobacco Use    Smoking status: Former Smoker     Packs/day: 1 00     Years: 60 00     Pack years: 60 00     Types: Cigarettes     Last attempt to quit: 3/19/2019     Years since quittin 8    Smokeless tobacco: Never Used    Tobacco comment: daily   Substance Use Topics    Alcohol use: No     Comment: former    Drug use: No           Allergies    No Known Allergies    Home Medications    Prior to Admission medications    Medication Sig Start Date End Date Taking?  Authorizing Provider   aspirin 81 mg chewable tablet Chew 81 mg daily as needed for mild pain   Yes Historical Provider, MD   guaiFENesin (MUCINEX) 600 mg 12 hr tablet Take 1,200 mg by mouth 2 (two) times a day as needed for cough   Yes Historical Provider, MD   donepezil (ARICEPT) 10 mg tablet Take 1 tablet (10 mg total) by mouth daily at bedtime 19   Maria D Delacruz MD   Melatonin 3 MG CAPS Take 3 mg by mouth daily at bedtime Patient takes 1 1/2 mg tablets     Historical Provider, MD   Multiple Vitamins-Minerals (CENTRUM SILVER ULTRA MENS PO) Take 1 tablet by mouth daily    Historical Provider, MD   sertraline (ZOLOFT) 50 mg tablet Take 1 tablet (50 mg total) by mouth daily 19   Maria D Delacruz MD   tamsulosin (FLOMAX) 0 4 mg TAKE ONE TABLET DAILY WITH DINNER 19   Deuce Kapoor DO rosuvastatin (CRESTOR) 10 MG tablet Take 1 tablet (10 mg total) by mouth daily  Patient not taking: Reported on 8/19/2019 6/19/19 1/23/20  Klaudia Biswas DO   sertraline (ZOLOFT) 50 mg tablet TAKE ONE TABLET BY MOUTH DAILY 11/26/19 1/23/20  Haylee Leon MD   tamsulosin (FLOMAX) 0 4 mg TAKE 1 CAPSULE BY MOUTH DAILY WITH DINNER 10/18/19 1/23/20  Klaudia Biswas DO   thiamine (VITAMIN B1) 100 mg tablet Take 1 tablet (100 mg total) by mouth daily 8/19/19 1/23/20  Haylee Leon MD           Review of Systems    Review of Systems   Constitutional: Negative for appetite change, chills and diaphoresis  HENT: Negative for drooling, facial swelling, trouble swallowing and voice change  Respiratory: Positive for cough and shortness of breath  Negative for apnea and wheezing  Cardiovascular: Negative for chest pain and leg swelling  Gastrointestinal: Negative for abdominal distention, abdominal pain, diarrhea, nausea and vomiting  Genitourinary: Negative for dysuria and urgency  Musculoskeletal: Negative for arthralgias, back pain, gait problem and neck pain  Skin: Negative for color change, rash and wound  Neurological: Negative for seizures, speech difficulty, weakness and headaches  Psychiatric/Behavioral: Negative for agitation, behavioral problems and dysphoric mood  The patient is not nervous/anxious  All other systems reviewed and negative  Physical Exam      ED Triage Vitals [01/23/20 2004]   Temperature Pulse Respirations Blood Pressure SpO2   98 6 °F (37 °C) 72 18 132/64 97 %      Temp Source Heart Rate Source Patient Position - Orthostatic VS BP Location FiO2 (%)   Oral Monitor Lying Left arm --      Pain Score       No Pain               Physical Exam   Constitutional: He is oriented to person, place, and time  He appears well-developed and well-nourished  HENT:   Head: Normocephalic and atraumatic     Nose: Nose normal    Oropharynx clear and dry   Eyes: Pupils are equal, round, and reactive to light  Conjunctivae and EOM are normal    Neck: Normal range of motion  Neck supple  No tracheal deviation present  Cardiovascular: Normal rate, regular rhythm, normal heart sounds and intact distal pulses  No murmur heard  Pulmonary/Chest: Effort normal and breath sounds normal  No stridor  No respiratory distress  He has no wheezes  He has no rales  Slight crackles at right lung base   Abdominal: Soft  He exhibits no distension  There is no tenderness  There is no rebound and no guarding  Musculoskeletal: Normal range of motion  He exhibits no edema or deformity  Neurological: He is alert and oriented to person, place, and time  He displays normal reflexes  No cranial nerve deficit or sensory deficit  He exhibits normal muscle tone  Coordination normal    Cranial nerves 2-12 intact, 5/5 strength in all extremities normal speech and coordination, no pronator drift   Skin: Skin is warm and dry  No rash noted  Psychiatric: He has a normal mood and affect  His behavior is normal  Judgment and thought content normal    Nursing note and vitals reviewed  Diagnostic Results  EKG Interpretation    Rate:  62  BPM  Rhythm:  Normal Sinus Rhythm   Axis:  Normal   Intervals: Normal, no blocks, QTc  414 ms  Q waves:  No pathologic Q waves   T waves:  Normal   ST segments:  No significant elevations or depressions     Impression:  Normal sinus rhythm without evidence of acute ischemia or significant arrhythmia      EKG for comparison:  EKG dated 13 April 2018 showed right bundle-branch block not present on the study    EKG interpreted by me         Labs:    Results Reviewed     Procedure Component Value Units Date/Time    Urine Microscopic [[id number]]  (Abnormal) Collected:  01/23/20 2113    Lab Status:  Final result Specimen:  Urine, Clean Catch Updated:  01/23/20 2143     RBC, UA 4-10 /hpf      WBC, UA 10-20 /hpf      Epithelial Cells Occasional /hpf      Bacteria, UA Occasional /hpf      Hyaline Casts, UA 0-1 /lpf      MUCUS THREADS Occasional    Urine culture [[id number]] Collected:  01/23/20 2113    Lab Status:   In process Specimen:  Urine, Clean Catch Updated:  01/23/20 2142    UA w Reflex to Microscopic w Reflex to Culture [[id number]]  (Abnormal) Collected:  01/23/20 2113    Lab Status:  Final result Specimen:  Urine, Clean Catch Updated:  01/23/20 2121     Color, UA Yellow     Clarity, UA Slightly Cloudy     Specific Gravity, UA 1 025     pH, UA 5 5     Leukocytes, UA Trace     Nitrite, UA Negative     Protein, UA Negative mg/dl      Glucose, UA Negative mg/dl      Ketones, UA 15 (1+) mg/dl      Urobilinogen, UA 0 2 E U /dl      Bilirubin, UA Negative     Blood, UA Moderate    Troponin I [[id number]]  (Normal) Collected:  01/23/20 2048    Lab Status:  Final result Specimen:  Blood from Arm, Right Updated:  01/23/20 2117     Troponin I <0 02 ng/mL     Comprehensive metabolic panel [[id number]]  (Abnormal) Collected:  01/23/20 2048    Lab Status:  Final result Specimen:  Blood from Arm, Right Updated:  01/23/20 2115     Sodium 143 mmol/L      Potassium 3 6 mmol/L      Chloride 109 mmol/L      CO2 21 mmol/L      ANION GAP 13 mmol/L      BUN 37 mg/dL      Creatinine 1 46 mg/dL      Glucose 91 mg/dL      Calcium 9 1 mg/dL      AST 28 U/L      ALT 26 U/L      Alkaline Phosphatase 110 U/L      Total Protein 7 9 g/dL      Albumin 3 9 g/dL      Total Bilirubin 0 40 mg/dL      eGFR 46 ml/min/1 73sq m     Narrative:       [place] guidelines for Chronic Kidney Disease (CKD):     Stage 1 with normal or high GFR (GFR > 90 mL/min/1 73 square meters)    Stage 2 Mild CKD (GFR = 60-89 mL/min/1 73 square meters)    Stage 3A Moderate CKD (GFR = 45-59 mL/min/1 73 square meters)    Stage 3B Moderate CKD (GFR = 30-44 mL/min/1 73 square meters)    Stage 4 Severe CKD (GFR = 15-29 mL/min/1 73 square meters)    Stage 5 End Stage CKD (GFR <15 mL/min/1 73 square meters)  Note: GFR calculation is accurate only with a steady state creatinine    CBC and differential [[id number]]  (Abnormal) Collected:  01/23/20 2048    Lab Status:  Final result Specimen:  Blood from Arm, Right Updated:  01/23/20 2056     WBC 8 60 Thousand/uL      RBC 5 24 Million/uL      Hemoglobin 14 6 g/dL      Hematocrit 45 6 %      MCV 87 fL      MCH 27 9 pg      MCHC 32 0 g/dL      RDW 18 0 %      MPV 10 1 fL      Platelets 095 Thousands/uL      nRBC 0 /100 WBCs      Neutrophils Relative 76 %      Immat GRANS % 0 %      Lymphocytes Relative 13 %      Monocytes Relative 9 %      Eosinophils Relative 1 %      Basophils Relative 1 %      Neutrophils Absolute 6 54 Thousands/µL      Immature Grans Absolute 0 03 Thousand/uL      Lymphocytes Absolute 1 14 Thousands/µL      Monocytes Absolute 0 74 Thousand/µL      Eosinophils Absolute 0 09 Thousand/µL      Basophils Absolute 0 06 Thousands/µL           All labs reviewed and utilized in the medical decision making process    Radiology:    XR chest 1 view portable   ED Interpretation   No acute disease          All radiology studies independently viewed by me and interpreted by the radiologist     Procedure    Procedures        ED Course of Care and Re-Assessments      After IV fluids, patient states he feels much better, daughters, and he seems more like his normal self, and he states he is able to walk without difficulty and feels much more steady  On reexamination and repeat auscultation of lungs, crackles no longer appreciated at right lung base  Medications   sodium chloride 0 9 % bolus 500 mL (0 mL Intravenous Stopped 1/23/20 2149)   sodium chloride 0 9 % bolus 1,000 mL (0 mL Intravenous Stopped 1/23/20 2340)   cephalexin (KEFLEX) capsule 500 mg (500 mg Oral Given 1/23/20 2330)           FINAL IMPRESSION    Final diagnoses:   Dehydration   UTI (urinary tract infection)   Renal insufficiency         DISPOSITION/PLAN    Presentation as above    Nonfocal neurologic exam, exam otherwise reassuring  Labs concerning for dehydration possible UTI, as well as renal insufficiency with prerenal pattern likely secondary to dehydration  Rehydrated with resolution of symptoms, started on Keflex for UTI  Chest x-ray clear, patient informed ambulatory pulse oximetry without difficulty or assistance, oxygen saturation stayed greater than 95% the entire time  Started on Keflex, counseled regarding proper hydration, discharged strict return precautions, follow up primary care doctor for repeat creatinine and rechecking of symptoms  Hemodynamically stable and comfortable at time of discharge  Time reflects when diagnosis was documented in both MDM as applicable and the Disposition within this note     Time User Action Codes Description Comment    1/23/2020 11:24 PM Larissa Mock T Add [E86 0] Dehydration     1/23/2020 11:24 PM Larissa Mock T Add [N39 0] UTI (urinary tract infection)     1/23/2020 11:25 PM Francisco Javier Frank Add [N28 9] Renal insufficiency       ED Disposition     ED Disposition Condition Date/Time Comment    Discharge Stable Thu Jan 23, 2020 11:24 PM Levy Carter discharge to home/self care              Follow-up Information     Follow up With Specialties Details Why Contact Info Additional Information    Vangie Rice DO Internal Medicine Call in 1 day To discuss this visit and schedule a follow up visit next week to recheck your symptoms [address] Emergency Department Emergency Medicine Go to  If symptoms worsen [address] [facility] [address] [phone] [place] [facility], [address] [place], [place], [address]            PATIENT REFERRED TO:    Vangie Rice DO  [address]  [phone]    Call in 1 day  To discuss this visit and schedule a follow up visit next week to recheck your symptoms    [facility]  [address] [facility] [place] [address] [phone]  Go to   If symptoms worsen      DISCHARGE MEDICATIONS:    Discharge Medication List as of 1/23/2020 11:27 PM      START taking these medications    Details   cephalexin (KEFLEX) 250 mg capsule Take 2 capsules (500 mg total) by mouth every 6 (six) hours for 7 days, Starting Thu 1/23/2020, Until Thu 1/30/2020, Print         CONTINUE these medications which have NOT CHANGED    Details   aspirin 81 mg chewable tablet Chew 81 mg daily as needed for mild pain, Historical Med      guaiFENesin (MUCINEX) 600 mg 12 hr tablet Take 1,200 mg by mouth 2 (two) times a day as needed for cough, Historical Med      donepezil (ARICEPT) 10 mg tablet Take 1 tablet (10 mg total) by mouth daily at bedtime, Starting Mon 8/19/2019, Normal      Melatonin 3 MG CAPS Take 3 mg by mouth daily at bedtime Patient takes 1 1/2 mg tablets , Historical Med      Multiple Vitamins-Minerals (CENTRUM SILVER ULTRA MENS PO) Take 1 tablet by mouth daily, Historical Med      sertraline (ZOLOFT) 50 mg tablet Take 1 tablet (50 mg total) by mouth daily, Starting Mon 8/19/2019, Normal      tamsulosin (FLOMAX) 0 4 mg TAKE ONE TABLET DAILY WITH DINNER, Normal             No discharge procedures on file           MD Marisela Jj MD  01/24/20 0010

## 2020-01-24 NOTE — ED NOTES
Pt ambulated around unit by ED tech, pt denies any shortness of breath/ dizziness/ difficulty breathing  Pt SpO2 remained above 95% during walk, and HR was at 81 bpm  Provider made aware           Radha Mckeon RN  01/23/20 3160

## 2020-01-24 NOTE — ED NOTES
Patient reports no falls  Daughter reports he may have fell yesterday as patient reports he broke his glasses and could not recall how  Per daughters patient takes aspirin regularly        Kim Cheatham RN  01/23/20 2000

## 2020-01-25 LAB — BACTERIA UR CULT: NORMAL

## 2020-01-31 ENCOUNTER — OFFICE VISIT (OUTPATIENT)
Dept: INTERNAL MEDICINE CLINIC | Facility: CLINIC | Age: 80
End: 2020-01-31
Payer: MEDICARE

## 2020-01-31 VITALS
SYSTOLIC BLOOD PRESSURE: 150 MMHG | HEART RATE: 60 BPM | WEIGHT: 185.2 LBS | HEIGHT: 69 IN | BODY MASS INDEX: 27.43 KG/M2 | DIASTOLIC BLOOD PRESSURE: 78 MMHG | OXYGEN SATURATION: 96 %

## 2020-01-31 DIAGNOSIS — N39.0 URINARY TRACT INFECTION WITHOUT HEMATURIA, SITE UNSPECIFIED: ICD-10-CM

## 2020-01-31 DIAGNOSIS — E78.2 MIXED HYPERLIPIDEMIA: ICD-10-CM

## 2020-01-31 DIAGNOSIS — R73.9 HYPERGLYCEMIA: ICD-10-CM

## 2020-01-31 DIAGNOSIS — Z72.0 TOBACCO ABUSE: ICD-10-CM

## 2020-01-31 DIAGNOSIS — F02.80 DEMENTIA ASSOCIATED WITH OTHER UNDERLYING DISEASE WITHOUT BEHAVIORAL DISTURBANCE (HCC): Primary | ICD-10-CM

## 2020-01-31 DIAGNOSIS — F10.20 ALCOHOLISM (HCC): ICD-10-CM

## 2020-01-31 DIAGNOSIS — I46.9 CARDIAC ARREST (HCC): ICD-10-CM

## 2020-01-31 DIAGNOSIS — F39 MOOD DISORDER (HCC): ICD-10-CM

## 2020-01-31 DIAGNOSIS — C61 MALIGNANT NEOPLASM OF PROSTATE (HCC): ICD-10-CM

## 2020-01-31 PROCEDURE — 99214 OFFICE O/P EST MOD 30 MIN: CPT | Performed by: INTERNAL MEDICINE

## 2020-01-31 RX ORDER — ROSUVASTATIN CALCIUM 5 MG/1
5 TABLET, COATED ORAL DAILY
Qty: 90 TABLET | Refills: 3 | Status: SHIPPED | OUTPATIENT
Start: 2020-01-31 | End: 2020-05-19

## 2020-01-31 NOTE — PROGRESS NOTES
Assessment/Plan:  He is not drinking or smoking  He says he is feeling well  He has no cardiac complaints       He was recently seen in the emergency room  He did have urinary tract infection and had some confusion  He is finishing up antibiotics and has returned to normal   I am going to repeat his labs in 4 5 weeks  Recheck him at that time  His creatinine did go up to 1 46  It was felt that his confusion at that time was secondary to urinary tract infection  1  Dementia associated with 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 ([facility] )     2  Hyperglycemia     3  Mixed hyperlipidemia  Comprehensive metabolic panel    Lipid panel    rosuvastatin (CRESTOR) 5 mg tablet   4  Tobacco abuse     5  Alcoholism ([facility] )     6  Mood disorder ([facility] )     7  Cardiac arrest ([facility] )     8  Malignant neoplasm of prostate ([facility] )     9  Urinary tract infection without hematuria, site unspecified  Urine culture       Orders Placed This Encounter   Procedures    Urine culture    Comprehensive metabolic panel    Lipid panel            Subjective:  He comes in for follow-up  No his most to see him a couple of months ago but he had to cancel  Gabby Middleton He was seen in the emergency room recently with a urinary tract infection  He had some confusion at that time  This is totally cleared  Patient ID: Ulises Torres is a 70 y o  male  HPI he comes in with his daughter  He is actually doing quite well  He has not had any alcohol  He rarely smokes a cigarette  He seems to be getting along fairly well  He looks much better than he did a year ago          The following portions of the patient's history were reviewed and updated as appropriate:   He has a past medical history of Alzheimer disease ([facility] ), Anxiety, Basal cell carcinoma, Bile duct disease, Cancer ([facility] ), Cardiac arrest ([facility] ), Choledocholithiasis, Dementia ([facility] ), Depression, Kidney stone, Liver disease, Malignant neoplasm of prostate ([facility] ), Mood disorder [facility]), Prostate cancer [facility]), Right carotid bruit, Seizure disorder ([facility] ), and Skin cancer  ,  does not have any pertinent problems on file  ,   has a past surgical history that includes Prostate surgery; ERCP (N/A, 3/9/2018); ERCP (04/13/2018); pr ercp remove foreign body/stent biliary/panc duct (N/A, 4/13/2018); Tonsillectomy; pr lap,cholecystectomy (N/A, 5/15/2018); Cholecystectomy; and ERCP (N/A, 6/8/2018)  ,  family history includes Alzheimer's disease in his mother; Cancer in his mother; Dementia in his mother; Sudden death in his father  ,   reports that he quit smoking about 10 months ago  His smoking use included cigarettes  He has a 60 00 pack-year smoking history  He has never used smokeless tobacco  He reports that he does not drink alcohol or use drugs  ,  has No Known Allergies       Current Outpatient Medications:     aspirin 81 mg chewable tablet, Chew 81 mg daily as needed for mild pain, Disp: , Rfl:     donepezil (ARICEPT) 10 mg tablet, Take 1 tablet (10 mg total) by mouth daily at bedtime, Disp: 30 tablet, Rfl: 6    guaiFENesin (MUCINEX) 600 mg 12 hr tablet, Take 1,200 mg by mouth 2 (two) times a day as needed for cough, Disp: , Rfl:     Melatonin 3 MG CAPS, Take 3 mg by mouth daily at bedtime Patient takes 1 1/2 mg tablets , Disp: , Rfl:     Multiple Vitamins-Minerals (CENTRUM SILVER ULTRA MENS PO), Take 1 tablet by mouth daily, Disp: , Rfl:     sertraline (ZOLOFT) 50 mg tablet, Take 1 tablet (50 mg total) by mouth daily, Disp: 30 tablet, Rfl: 6    tamsulosin (FLOMAX) 0 4 mg, TAKE ONE TABLET DAILY WITH DINNER, Disp: 90 capsule, Rfl: 3    rosuvastatin (CRESTOR) 5 mg tablet, Take 1 tablet (5 mg total) by mouth daily, Disp: 90 tablet, Rfl: 3    Review of Systems   Constitutional: Negative for activity change, appetite change, chills, diaphoresis, fatigue, fever and unexpected weight change     HENT: Negative for congestion, ear pain, hearing loss, mouth sores, nosebleeds, postnasal drip, sinus pressure, sinus pain, sore throat and trouble swallowing  Eyes: Negative for pain, discharge and visual disturbance  Respiratory: Negative for apnea, cough, chest tightness, shortness of breath and wheezing  Cardiovascular: Negative for chest pain, palpitations and leg swelling  No chest pain or shortness of breath  Gastrointestinal: Negative for abdominal pain, anal bleeding, blood in stool, constipation, diarrhea, nausea and vomiting  Endocrine: Negative for polydipsia and polyphagia  Genitourinary: Negative for decreased urine volume, dysuria, flank pain, frequency, hematuria and urgency  Musculoskeletal: Negative for arthralgias, back pain, gait problem, joint swelling and myalgias  Skin: Negative for rash and wound  Allergic/Immunologic: Negative for environmental allergies and food allergies  Neurological: Negative for dizziness, tremors, seizures, syncope, speech difficulty, light-headedness, numbness and headaches  Hematological: Negative for adenopathy  Does not bruise/bleed easily  Psychiatric/Behavioral: Negative for agitation, confusion, hallucinations, sleep disturbance and suicidal ideas  The patient is not nervous/anxious  Objective:  /78 (BP Location: Right arm, Patient Position: Sitting, Cuff Size: Standard)   Pulse 60   Ht 5' 9" (1 753 m)   Wt 84 kg (185 lb 3 2 oz)   SpO2 96%   BMI 27 35 kg/m²      Physical Exam   Constitutional: He appears well-developed and well-nourished  No distress  HENT:   Head: Normocephalic  Right Ear: External ear normal    Left Ear: External ear normal    Nose: Nose normal    Mouth/Throat: Oropharynx is clear and moist  No oropharyngeal exudate  Eyes: Pupils are equal, round, and reactive to light  Conjunctivae and EOM are normal  Right eye exhibits no discharge  Left eye exhibits no discharge  Neck: Normal range of motion  Neck supple  No thyromegaly present     Cardiovascular: Normal rate, regular rhythm, normal heart sounds and intact distal pulses  Exam reveals no gallop and no friction rub  No murmur heard  Pulmonary/Chest: Effort normal and breath sounds normal  No respiratory distress  He has no wheezes  He has no rales  Abdominal: Soft  Bowel sounds are normal  He exhibits no distension and no mass  There is no tenderness  There is no rebound and no guarding  Musculoskeletal: Normal range of motion  He exhibits no edema, tenderness or deformity  Lymphadenopathy:     He has no cervical adenopathy  Neurological: He is alert  He has normal reflexes  He displays normal reflexes  No cranial nerve deficit  Coordination normal    He is awake alert  He knows me  Speech is fluent  No focal neurological deficits  Skin: Skin is warm and dry  No rash noted  No erythema  Psychiatric: He has a normal mood and affect  His behavior is normal  Judgment and thought content normal    Nursing note and vitals reviewed          Recent Results (from the past 1008 hour(s))   ECG 12 lead    Collection Time: 01/23/20  8:01 PM   Result Value Ref Range    Ventricular Rate 73 BPM    Atrial Rate 73 BPM    IA Interval 138 ms    QRSD Interval 80 ms    QT Interval 372 ms    QTC Interval 409 ms    P [place] 48 degrees    QRS Axis 75 degrees    T Wave Axis 61 degrees   CBC and differential    Collection Time: 01/23/20  8:48 PM   Result Value Ref Range    WBC 8 60 4 31 - 10 16 Thousand/uL    RBC 5 24 3 88 - 5 62 Million/uL    Hemoglobin 14 6 12 0 - 17 0 g/dL    Hematocrit 45 6 36 5 - 49 3 %    MCV 87 82 - 98 fL    MCH 27 9 26 8 - 34 3 pg    MCHC 32 0 31 4 - 37 4 g/dL    RDW 18 0 (H) 11 6 - 15 1 %    MPV 10 1 8 9 - 12 7 fL    Platelets 541 244 - 775 Thousands/uL    nRBC 0 /100 WBCs    Neutrophils Relative 76 (H) 43 - 75 %    Immat GRANS % 0 0 - 2 %    Lymphocytes Relative 13 (L) 14 - 44 %    Monocytes Relative 9 4 - 12 %    Eosinophils Relative 1 0 - 6 %    Basophils Relative 1 0 - 1 %    Neutrophils Absolute 6 54 1 85 - 7 62 Thousands/µL    Immature Grans Absolute 0 03 0 00 - 0 20 Thousand/uL    Lymphocytes Absolute 1 14 0 60 - 4 47 Thousands/µL    Monocytes Absolute 0 74 0 17 - 1 22 Thousand/µL    Eosinophils Absolute 0 09 0 00 - 0 61 Thousand/µL    Basophils Absolute 0 06 0 00 - 0 10 Thousands/µL   Comprehensive metabolic panel    Collection Time: 01/23/20  8:48 PM   Result Value Ref Range    Sodium 143 136 - 145 mmol/L    Potassium 3 6 3 5 - 5 3 mmol/L    Chloride 109 (H) 100 - 108 mmol/L    CO2 21 21 - 32 mmol/L    ANION GAP 13 4 - 13 mmol/L    BUN 37 (H) 5 - 25 mg/dL    Creatinine 1 46 (H) 0 60 - 1 30 mg/dL    Glucose 91 65 - 140 mg/dL    Calcium 9 1 8 3 - 10 1 mg/dL    AST 28 5 - 45 U/L    ALT 26 12 - 78 U/L    Alkaline Phosphatase 110 46 - 116 U/L    Total Protein 7 9 6 4 - 8 2 g/dL    Albumin 3 9 3 5 - 5 0 g/dL    Total Bilirubin 0 40 0 20 - 1 00 mg/dL    eGFR 46 ml/min/1 73sq m   Troponin I    Collection Time: 01/23/20  8:48 PM   Result Value Ref Range    Troponin I <0 02 <=0 04 ng/mL   ECG 12 lead    Collection Time: 01/23/20  9:10 PM   Result Value Ref Range    Ventricular Rate 62 BPM    Atrial Rate 62 BPM    CA Interval 124 ms    QRSD Interval 80 ms    QT Interval 408 ms    QTC Interval 414 ms    P [place] 10 degrees    QRS Axis 78 degrees    T Wave Axis 78 degrees   UA w Reflex to Microscopic w Reflex to Culture    Collection Time: 01/23/20  9:13 PM   Result Value Ref Range    Color, UA Yellow     Clarity, UA Slightly Cloudy     Specific [place], UA 1 025 1 003 - 1 030    pH, UA 5 5 4 5, 5 0, 5 5, 6 0, 6 5, 7 0, 7 5, 8 0    Leukocytes, UA Trace (A) Negative    Nitrite, UA Negative Negative    Protein, UA Negative Negative mg/dl    Glucose, UA Negative Negative mg/dl    Ketones, UA 15 (1+) (A) Negative mg/dl    Urobilinogen, UA 0 2 0 2, 1 0 E U /dl E U /dl    Bilirubin, UA Negative Negative    Blood, UA Moderate (A) Negative   Urine Microscopic    Collection Time: 01/23/20  9:13 PM   Result Value Ref Range    RBC, UA 4-10 (A) None Seen, 0-5 /hpf    WBC, UA 10-20 (A) None Seen, 0-5, 5-55, 5-65 /hpf    Epithelial Cells Occasional None Seen, Occasional /hpf    Bacteria, UA Occasional None Seen, Occasional /hpf    Hyaline Casts, UA 0-1 (A) (none) /lpf    MUCUS THREADS Occasional (A) None Seen   Urine culture    Collection Time: 01/23/20  9:13 PM   Result Value Ref Range    Urine Culture No Growth <1000 cfu/mL

## 2020-02-07 ENCOUNTER — APPOINTMENT (OUTPATIENT)
Dept: LAB | Facility: CLINIC | Age: 80
End: 2020-02-07
Payer: MEDICARE

## 2020-02-07 DIAGNOSIS — N39.0 URINARY TRACT INFECTION WITHOUT HEMATURIA, SITE UNSPECIFIED: ICD-10-CM

## 2020-02-07 PROCEDURE — 87086 URINE CULTURE/COLONY COUNT: CPT

## 2020-02-08 LAB — BACTERIA UR CULT: NORMAL

## 2020-03-04 ENCOUNTER — TELEPHONE (OUTPATIENT)
Dept: INTERNAL MEDICINE CLINIC | Facility: CLINIC | Age: 80
End: 2020-03-04

## 2020-03-04 DIAGNOSIS — Z85.46 H/O PROSTATE CANCER: ICD-10-CM

## 2020-03-04 RX ORDER — TAMSULOSIN HYDROCHLORIDE 0.4 MG/1
CAPSULE ORAL
Qty: 90 CAPSULE | Refills: 3 | Status: CANCELLED | OUTPATIENT
Start: 2020-03-04

## 2020-03-04 NOTE — TELEPHONE ENCOUNTER
He has a history of prostate cancer  Dr Julia Allison was taking care of him  ask him if he has seen him lately and if not why did he stop    He should be re-evaluated for his prostate cancer

## 2020-03-04 NOTE — TELEPHONE ENCOUNTER
Pt needs a new rx for generic flomax tamsulosin one tab daily w dinner  Sent to [address] [facility] in [place]    Please advise

## 2020-03-04 NOTE — TELEPHONE ENCOUNTER
Cancelled pended order, they were going to call Dr Leodan Perez, they only called you since your name was on the script they received last

## 2020-03-13 ENCOUNTER — OFFICE VISIT (OUTPATIENT)
Dept: INTERNAL MEDICINE CLINIC | Facility: CLINIC | Age: 80
End: 2020-03-13
Payer: MEDICARE

## 2020-03-13 ENCOUNTER — APPOINTMENT (OUTPATIENT)
Dept: LAB | Facility: CLINIC | Age: 80
End: 2020-03-13
Payer: MEDICARE

## 2020-03-13 ENCOUNTER — TELEPHONE (OUTPATIENT)
Dept: INTERNAL MEDICINE CLINIC | Facility: CLINIC | Age: 80
End: 2020-03-13

## 2020-03-13 VITALS
HEART RATE: 57 BPM | BODY MASS INDEX: 26.33 KG/M2 | OXYGEN SATURATION: 97 % | HEIGHT: 69 IN | WEIGHT: 177.8 LBS | SYSTOLIC BLOOD PRESSURE: 134 MMHG | DIASTOLIC BLOOD PRESSURE: 78 MMHG

## 2020-03-13 DIAGNOSIS — Z12.11 SCREENING FOR COLON CANCER: ICD-10-CM

## 2020-03-13 DIAGNOSIS — E78.5 BORDERLINE HYPERLIPIDEMIA: ICD-10-CM

## 2020-03-13 DIAGNOSIS — F10.10 ALCOHOL ABUSE: ICD-10-CM

## 2020-03-13 DIAGNOSIS — Z23 ENCOUNTER FOR IMMUNIZATION: ICD-10-CM

## 2020-03-13 DIAGNOSIS — E78.5 BORDERLINE HYPERLIPIDEMIA: Primary | ICD-10-CM

## 2020-03-13 DIAGNOSIS — E78.2 MIXED HYPERLIPIDEMIA: ICD-10-CM

## 2020-03-13 DIAGNOSIS — R73.9 HYPERGLYCEMIA: ICD-10-CM

## 2020-03-13 DIAGNOSIS — Z23 IMMUNIZATION DUE: ICD-10-CM

## 2020-03-13 DIAGNOSIS — F02.80 DEMENTIA ASSOCIATED WITH OTHER UNDERLYING DISEASE WITHOUT BEHAVIORAL DISTURBANCE (HCC): ICD-10-CM

## 2020-03-13 DIAGNOSIS — Z85.46 H/O PROSTATE CANCER: ICD-10-CM

## 2020-03-13 LAB
ALBUMIN SERPL BCP-MCNC: 3.5 G/DL (ref 3.5–5)
ALP SERPL-CCNC: 101 U/L (ref 46–116)
ALT SERPL W P-5'-P-CCNC: 17 U/L (ref 12–78)
ANION GAP SERPL CALCULATED.3IONS-SCNC: 5 MMOL/L (ref 4–13)
AST SERPL W P-5'-P-CCNC: 14 U/L (ref 5–45)
BASOPHILS # BLD AUTO: 0.05 THOUSANDS/ΜL (ref 0–0.1)
BASOPHILS NFR BLD AUTO: 1 % (ref 0–1)
BILIRUB SERPL-MCNC: 0.51 MG/DL (ref 0.2–1)
BUN SERPL-MCNC: 19 MG/DL (ref 5–25)
CALCIUM SERPL-MCNC: 9.1 MG/DL (ref 8.3–10.1)
CHLORIDE SERPL-SCNC: 107 MMOL/L (ref 100–108)
CHOLEST SERPL-MCNC: 126 MG/DL (ref 50–200)
CO2 SERPL-SCNC: 28 MMOL/L (ref 21–32)
CREAT SERPL-MCNC: 1.04 MG/DL (ref 0.6–1.3)
EOSINOPHIL # BLD AUTO: 0.06 THOUSAND/ΜL (ref 0–0.61)
EOSINOPHIL NFR BLD AUTO: 1 % (ref 0–6)
ERYTHROCYTE [DISTWIDTH] IN BLOOD BY AUTOMATED COUNT: 16.4 % (ref 11.6–15.1)
EST. AVERAGE GLUCOSE BLD GHB EST-MCNC: 97 MG/DL
GFR SERPL CREATININE-BSD FRML MDRD: 69 ML/MIN/1.73SQ M
GLUCOSE P FAST SERPL-MCNC: 96 MG/DL (ref 65–99)
HBA1C MFR BLD: 5 %
HCT VFR BLD AUTO: 43.7 % (ref 36.5–49.3)
HDLC SERPL-MCNC: 43 MG/DL
HGB BLD-MCNC: 13.7 G/DL (ref 12–17)
IMM GRANULOCYTES # BLD AUTO: 0.02 THOUSAND/UL (ref 0–0.2)
IMM GRANULOCYTES NFR BLD AUTO: 0 % (ref 0–2)
LDLC SERPL CALC-MCNC: 71 MG/DL (ref 0–100)
LYMPHOCYTES # BLD AUTO: 1.11 THOUSANDS/ΜL (ref 0.6–4.47)
LYMPHOCYTES NFR BLD AUTO: 24 % (ref 14–44)
MCH RBC QN AUTO: 28.2 PG (ref 26.8–34.3)
MCHC RBC AUTO-ENTMCNC: 31.4 G/DL (ref 31.4–37.4)
MCV RBC AUTO: 90 FL (ref 82–98)
MONOCYTES # BLD AUTO: 0.42 THOUSAND/ΜL (ref 0.17–1.22)
MONOCYTES NFR BLD AUTO: 9 % (ref 4–12)
NEUTROPHILS # BLD AUTO: 2.91 THOUSANDS/ΜL (ref 1.85–7.62)
NEUTS SEG NFR BLD AUTO: 65 % (ref 43–75)
NONHDLC SERPL-MCNC: 83 MG/DL
NRBC BLD AUTO-RTO: 0 /100 WBCS
PLATELET # BLD AUTO: 201 THOUSANDS/UL (ref 149–390)
PMV BLD AUTO: 10.4 FL (ref 8.9–12.7)
POTASSIUM SERPL-SCNC: 3.8 MMOL/L (ref 3.5–5.3)
PROT SERPL-MCNC: 6.8 G/DL (ref 6.4–8.2)
RBC # BLD AUTO: 4.85 MILLION/UL (ref 3.88–5.62)
SODIUM SERPL-SCNC: 140 MMOL/L (ref 136–145)
TRIGL SERPL-MCNC: 62 MG/DL
WBC # BLD AUTO: 4.57 THOUSAND/UL (ref 4.31–10.16)

## 2020-03-13 PROCEDURE — 3008F BODY MASS INDEX DOCD: CPT | Performed by: INTERNAL MEDICINE

## 2020-03-13 PROCEDURE — G0009 ADMIN PNEUMOCOCCAL VACCINE: HCPCS | Performed by: INTERNAL MEDICINE

## 2020-03-13 PROCEDURE — 85025 COMPLETE CBC W/AUTO DIFF WBC: CPT

## 2020-03-13 PROCEDURE — G0008 ADMIN INFLUENZA VIRUS VAC: HCPCS | Performed by: INTERNAL MEDICINE

## 2020-03-13 PROCEDURE — 90732 PPSV23 VACC 2 YRS+ SUBQ/IM: CPT | Performed by: INTERNAL MEDICINE

## 2020-03-13 PROCEDURE — 1036F TOBACCO NON-USER: CPT | Performed by: INTERNAL MEDICINE

## 2020-03-13 PROCEDURE — 4040F PNEUMOC VAC/ADMIN/RCVD: CPT | Performed by: INTERNAL MEDICINE

## 2020-03-13 PROCEDURE — 90662 IIV NO PRSV INCREASED AG IM: CPT | Performed by: INTERNAL MEDICINE

## 2020-03-13 PROCEDURE — 80061 LIPID PANEL: CPT

## 2020-03-13 PROCEDURE — 99214 OFFICE O/P EST MOD 30 MIN: CPT | Performed by: INTERNAL MEDICINE

## 2020-03-13 PROCEDURE — 80053 COMPREHEN METABOLIC PANEL: CPT

## 2020-03-13 PROCEDURE — 36415 COLL VENOUS BLD VENIPUNCTURE: CPT

## 2020-03-13 PROCEDURE — 1160F RVW MEDS BY RX/DR IN RCRD: CPT | Performed by: INTERNAL MEDICINE

## 2020-03-13 PROCEDURE — 83036 HEMOGLOBIN GLYCOSYLATED A1C: CPT

## 2020-03-13 RX ORDER — DONEPEZIL HYDROCHLORIDE 10 MG/1
TABLET, FILM COATED ORAL
Qty: 30 TABLET | Refills: 6 | Status: SHIPPED | OUTPATIENT
Start: 2020-03-13 | End: 2020-10-21

## 2020-03-13 NOTE — PROGRESS NOTES
Assessment/Plan:  Regarding his cholesterol he will continue medication and check cholesterol today  Will give him a Pneumovax and flu vaccine today  He will follow-up with urology and I will do a cologard on him       Will see him back in 4 months  1  Borderline hyperlipidemia     2  Hyperglycemia  CBC and differential    Comprehensive metabolic panel    HEMOGLOBIN A1C W/ EAG ESTIMATION    Lipid panel   3  Mixed hyperlipidemia     4  H/O prostate cancer     5  Immunization due  PNEUMOCOCCAL POLYSACCHARIDE VACCINE 23-VALENT =>1YO SQ IM   6  Encounter for immunization  influenza vaccine, [id number], high-dose, PF 0 5 mL (FLUZONE HIGH-DOSE)       Orders Placed This Encounter   Procedures    PNEUMOCOCCAL POLYSACCHARIDE VACCINE 23-VALENT =>1YO SQ IM    influenza vaccine, [id number], high-dose, PF 0 5 mL (FLUZONE HIGH-DOSE)    CBC and differential    Comprehensive metabolic panel    HEMOGLOBIN A1C W/ EAG ESTIMATION    Lipid panel            Subjective:  He has been doing fairly well  He had the problems as listed above  Had a urinary tract infection with elevated creatinine  He did his blood work today  He was does supposed to do a previously  Otherwise doing fairly well no cardiopulmonary complaints  Daughters keep an eye on him quite closely  Has not had any falls is not drinking or smoking  Patient ID: Marcina Epley is a 70 y o  male  HPI    The following portions of the patient's history were reviewed and updated as appropriate:   He has a past medical history of Alzheimer disease ([address] ), Anxiety, Basal cell carcinoma, Bile duct disease, Cancer ([address] ), Cardiac arrest ([address] ), Choledocholithiasis, Dementia ([address] ), Depression, Kidney stone, Liver disease, Malignant neoplasm of prostate ([address] ), Mood disorder ([address] ), Prostate cancer ([address] ), Right carotid bruit, Seizure disorder ([address] ), and Skin cancer  ,  does not have any pertinent problems on file  ,   has a past surgical history that includes Prostate surgery; ERCP (N/A, 3/9/2018); ERCP (04/13/2018); pr ercp remove foreign body/stent biliary/panc duct (N/A, 4/13/2018); Tonsillectomy; pr lap,cholecystectomy (N/A, 5/15/2018); Cholecystectomy; and ERCP (N/A, 6/8/2018)  ,  family history includes Alzheimer's disease in his mother; Cancer in his mother; Dementia in his mother; Sudden death in his father  ,   reports that he quit smoking about a year ago  His smoking use included cigarettes  He has a 60 00 pack-year smoking history  He has never used smokeless tobacco  He reports that he does not drink alcohol or use drugs  ,  has No Known Allergies       Current Outpatient Medications:     aspirin 81 mg chewable tablet, Chew 81 mg daily as needed for mild pain, Disp: , Rfl:     guaiFENesin (MUCINEX) 600 mg 12 hr tablet, Take 1,200 mg by mouth 2 (two) times a day as needed for cough, Disp: , Rfl:     Melatonin 3 MG CAPS, Take 3 mg by mouth daily at bedtime Patient takes 1 1/2 mg tablets , Disp: , Rfl:     Multiple Vitamins-Minerals (CENTRUM SILVER ULTRA MENS PO), Take 1 tablet by mouth daily, Disp: , Rfl:     rosuvastatin (CRESTOR) 5 mg tablet, Take 1 tablet (5 mg total) by mouth daily, Disp: 90 tablet, Rfl: 3    sertraline (ZOLOFT) 50 mg tablet, Take 1 tablet (50 mg total) by mouth daily, Disp: 30 tablet, Rfl: 6    tamsulosin (FLOMAX) 0 4 mg, TAKE ONE TABLET DAILY WITH DINNER, Disp: 90 capsule, Rfl: 3    donepezil (ARICEPT) 10 mg tablet, TAKE ONE TABLET BY MOUTH DAILY AT BEDTIME, Disp: 30 tablet, Rfl: 6    Review of Systems   Constitutional: Negative for activity change, appetite change, chills, diaphoresis, fatigue, fever and unexpected weight change  HENT: Negative for congestion, ear pain, hearing loss, mouth sores, nosebleeds, postnasal drip, sinus pressure, sinus pain, sore throat and trouble swallowing  Eyes: Negative for pain, discharge and visual disturbance     Respiratory: Negative for apnea, cough, chest tightness, shortness of breath and wheezing  Cardiovascular: Negative for chest pain, palpitations and leg swelling  Gastrointestinal: Negative for abdominal pain, anal bleeding, blood in stool, constipation, diarrhea, nausea and vomiting  Endocrine: Negative for polydipsia and polyphagia  Genitourinary: Negative for decreased urine volume, dysuria, flank pain, frequency, hematuria and urgency  Positive history of prostate cancer followed by the Urology  Musculoskeletal: Negative for arthralgias, back pain, gait problem, joint swelling and myalgias  Skin: Negative for rash and wound  Allergic/Immunologic: Negative for environmental allergies and food allergies  Neurological: Negative for dizziness, tremors, seizures, syncope, speech difficulty, light-headedness, numbness and headaches  Has memory deficit which is stable  Hematological: Negative for adenopathy  Does not bruise/bleed easily  Psychiatric/Behavioral: Negative for agitation, confusion, hallucinations, sleep disturbance and suicidal ideas  The patient is not nervous/anxious  Objective:  /78 (BP Location: Left arm, Patient Position: Sitting, Cuff Size: Standard)   Pulse 57   Ht 5' 9" (1 753 m)   Wt 80 6 kg (177 lb 12 8 oz)   SpO2 97%   BMI 26 26 kg/m²      Physical Exam   Constitutional: He appears well-developed and well-nourished  No distress  HENT:   Head: Normocephalic  Right Ear: External ear normal    Left Ear: External ear normal    Nose: Nose normal    Mouth/Throat: Oropharynx is clear and moist  No oropharyngeal exudate  Eyes: Pupils are equal, round, and reactive to light  Conjunctivae and EOM are normal  Right eye exhibits no discharge  Left eye exhibits no discharge  Neck: Normal range of motion  Neck supple  No thyromegaly present  Cardiovascular: Normal rate, regular rhythm, normal heart sounds and intact distal pulses  Exam reveals no gallop and no friction rub  No murmur heard    Pulmonary/Chest: Effort normal and breath sounds normal  No respiratory distress  He has no wheezes  He has no rales  Abdominal: Soft  Bowel sounds are normal  He exhibits no distension and no mass  There is no tenderness  There is no rebound and no guarding  Musculoskeletal: Normal range of motion  He exhibits no edema, tenderness or deformity  Lymphadenopathy:     He has no cervical adenopathy  Neurological: He is alert  He has normal reflexes  He displays normal reflexes  No cranial nerve deficit  Coordination normal    Skin: Skin is warm and dry  No rash noted  No erythema  Psychiatric: He has a normal mood and affect  His behavior is normal  Judgment and thought content normal    Nursing note and vitals reviewed          Recent Results (from the past 1008 hour(s))   Urine culture    Collection Time: 02/07/20  9:06 AM   Result Value Ref Range    Urine Culture No Growth <1000 cfu/mL

## 2020-03-13 NOTE — TELEPHONE ENCOUNTER
----- Message from Lindsey Haney DO sent at 3/13/2020  4:08 PM EDT -----  Please call 1 of his daughters  His labs were all good

## 2020-03-31 ENCOUNTER — TELEPHONE (OUTPATIENT)
Dept: INTERNAL MEDICINE CLINIC | Facility: CLINIC | Age: 80
End: 2020-03-31

## 2020-04-06 DIAGNOSIS — R19.5 POSITIVE COLORECTAL CANCER SCREENING USING COLOGUARD TEST: Primary | ICD-10-CM

## 2020-04-29 ENCOUNTER — TELEPHONE (OUTPATIENT)
Dept: NEUROLOGY | Facility: CLINIC | Age: 80
End: 2020-04-29

## 2020-05-19 DIAGNOSIS — E78.2 MIXED HYPERLIPIDEMIA: ICD-10-CM

## 2020-05-19 RX ORDER — ROSUVASTATIN CALCIUM 5 MG/1
TABLET, COATED ORAL
Qty: 360 TABLET | Refills: 1 | Status: SHIPPED | OUTPATIENT
Start: 2020-05-19 | End: 2021-02-24 | Stop reason: SDUPTHER

## 2020-06-19 ENCOUNTER — CONSULT (OUTPATIENT)
Dept: GASTROENTEROLOGY | Facility: CLINIC | Age: 80
End: 2020-06-19
Payer: MEDICARE

## 2020-06-19 ENCOUNTER — PREP FOR PROCEDURE (OUTPATIENT)
Dept: GASTROENTEROLOGY | Facility: CLINIC | Age: 80
End: 2020-06-19

## 2020-06-19 VITALS
HEIGHT: 69 IN | DIASTOLIC BLOOD PRESSURE: 78 MMHG | BODY MASS INDEX: 25.89 KG/M2 | SYSTOLIC BLOOD PRESSURE: 128 MMHG | TEMPERATURE: 98.6 F | WEIGHT: 174.8 LBS | HEART RATE: 68 BPM

## 2020-06-19 DIAGNOSIS — Z20.822 ENCOUNTER FOR LABORATORY TESTING FOR COVID-19 VIRUS: ICD-10-CM

## 2020-06-19 DIAGNOSIS — R19.5 POSITIVE COLORECTAL CANCER SCREENING USING COLOGUARD TEST: ICD-10-CM

## 2020-06-19 DIAGNOSIS — R19.5 POSITIVE COLORECTAL CANCER SCREENING USING COLOGUARD TEST: Primary | ICD-10-CM

## 2020-06-19 PROCEDURE — 99203 OFFICE O/P NEW LOW 30 MIN: CPT | Performed by: INTERNAL MEDICINE

## 2020-07-03 DIAGNOSIS — Z20.822 ENCOUNTER FOR LABORATORY TESTING FOR COVID-19 VIRUS: ICD-10-CM

## 2020-07-03 PROCEDURE — U0003 INFECTIOUS AGENT DETECTION BY NUCLEIC ACID (DNA OR RNA); SEVERE ACUTE RESPIRATORY SYNDROME CORONAVIRUS 2 (SARS-COV-2) (CORONAVIRUS DISEASE [COVID-19]), AMPLIFIED PROBE TECHNIQUE, MAKING USE OF HIGH THROUGHPUT TECHNOLOGIES AS DESCRIBED BY CMS-2020-01-R: HCPCS

## 2020-07-07 ENCOUNTER — HOSPITAL ENCOUNTER (OUTPATIENT)
Dept: GASTROENTEROLOGY | Facility: HOSPITAL | Age: 80
Setting detail: OUTPATIENT SURGERY
Discharge: HOME | End: 2020-07-07
Attending: INTERNAL MEDICINE | Admitting: INTERNAL MEDICINE
Payer: MEDICARE

## 2020-07-07 ENCOUNTER — ANESTHESIA EVENT (OUTPATIENT)
Dept: GASTROENTEROLOGY | Facility: HOSPITAL | Age: 80
End: 2020-07-07

## 2020-07-07 ENCOUNTER — ANESTHESIA (OUTPATIENT)
Dept: GASTROENTEROLOGY | Facility: HOSPITAL | Age: 80
End: 2020-07-07

## 2020-07-07 VITALS
SYSTOLIC BLOOD PRESSURE: 145 MMHG | HEIGHT: 68 IN | HEART RATE: 55 BPM | BODY MASS INDEX: 25.69 KG/M2 | OXYGEN SATURATION: 99 % | TEMPERATURE: 98.1 F | RESPIRATION RATE: 14 BRPM | DIASTOLIC BLOOD PRESSURE: 86 MMHG | WEIGHT: 169.53 LBS

## 2020-07-07 DIAGNOSIS — R19.5 POSITIVE COLORECTAL CANCER SCREENING USING COLOGUARD TEST: ICD-10-CM

## 2020-07-07 PROCEDURE — 88341 IMHCHEM/IMCYTCHM EA ADD ANTB: CPT | Performed by: PATHOLOGY

## 2020-07-07 PROCEDURE — 88342 IMHCHEM/IMCYTCHM 1ST ANTB: CPT | Performed by: PATHOLOGY

## 2020-07-07 PROCEDURE — 88305 TISSUE EXAM BY PATHOLOGIST: CPT | Performed by: PATHOLOGY

## 2020-07-07 PROCEDURE — 45385 COLONOSCOPY W/LESION REMOVAL: CPT | Performed by: INTERNAL MEDICINE

## 2020-07-07 RX ORDER — SODIUM CHLORIDE, SODIUM LACTATE, POTASSIUM CHLORIDE, CALCIUM CHLORIDE 600; 310; 30; 20 MG/100ML; MG/100ML; MG/100ML; MG/100ML
125 INJECTION, SOLUTION INTRAVENOUS CONTINUOUS
Status: DISCONTINUED | OUTPATIENT
Start: 2020-07-07 | End: 2020-07-11 | Stop reason: HOSPADM

## 2020-07-07 RX ORDER — LIDOCAINE HYDROCHLORIDE 10 MG/ML
INJECTION, SOLUTION EPIDURAL; INFILTRATION; INTRACAUDAL; PERINEURAL AS NEEDED
Status: DISCONTINUED | OUTPATIENT
Start: 2020-07-07 | End: 2020-07-07 | Stop reason: SURG

## 2020-07-07 RX ORDER — LIDOCAINE HYDROCHLORIDE 10 MG/ML
0.5 INJECTION, SOLUTION EPIDURAL; INFILTRATION; INTRACAUDAL; PERINEURAL ONCE AS NEEDED
Status: DISCONTINUED | OUTPATIENT
Start: 2020-07-07 | End: 2020-07-11 | Stop reason: HOSPADM

## 2020-07-07 RX ORDER — PROPOFOL 10 MG/ML
INJECTION, EMULSION INTRAVENOUS AS NEEDED
Status: DISCONTINUED | OUTPATIENT
Start: 2020-07-07 | End: 2020-07-07 | Stop reason: SURG

## 2020-07-07 RX ADMIN — PROPOFOL 20 MG: 10 INJECTION, EMULSION INTRAVENOUS at 12:28

## 2020-07-07 RX ADMIN — PROPOFOL 20 MG: 10 INJECTION, EMULSION INTRAVENOUS at 13:02

## 2020-07-07 RX ADMIN — PROPOFOL 30 MG: 10 INJECTION, EMULSION INTRAVENOUS at 12:22

## 2020-07-07 RX ADMIN — SODIUM CHLORIDE, SODIUM LACTATE, POTASSIUM CHLORIDE, AND CALCIUM CHLORIDE 125 ML/HR: .6; .31; .03; .02 INJECTION, SOLUTION INTRAVENOUS at 11:36

## 2020-07-07 RX ADMIN — LIDOCAINE HYDROCHLORIDE 100 MG: 10 INJECTION, SOLUTION EPIDURAL; INFILTRATION; INTRACAUDAL; PERINEURAL at 12:15

## 2020-07-07 RX ADMIN — PROPOFOL 20 MG: 10 INJECTION, EMULSION INTRAVENOUS at 12:32

## 2020-07-07 RX ADMIN — PROPOFOL 30 MG: 10 INJECTION, EMULSION INTRAVENOUS at 12:57

## 2020-07-07 RX ADMIN — PROPOFOL 20 MG: 10 INJECTION, EMULSION INTRAVENOUS at 12:48

## 2020-07-07 RX ADMIN — PROPOFOL 40 MG: 10 INJECTION, EMULSION INTRAVENOUS at 12:52

## 2020-07-07 RX ADMIN — PROPOFOL 20 MG: 10 INJECTION, EMULSION INTRAVENOUS at 12:40

## 2020-07-07 RX ADMIN — PROPOFOL 20 MG: 10 INJECTION, EMULSION INTRAVENOUS at 12:25

## 2020-07-07 RX ADMIN — PROPOFOL 30 MG: 10 INJECTION, EMULSION INTRAVENOUS at 12:44

## 2020-07-07 RX ADMIN — PROPOFOL 20 MG: 10 INJECTION, EMULSION INTRAVENOUS at 12:37

## 2020-07-07 RX ADMIN — PROPOFOL 30 MG: 10 INJECTION, EMULSION INTRAVENOUS at 12:19

## 2020-07-07 RX ADMIN — PROPOFOL 100 MG: 10 INJECTION, EMULSION INTRAVENOUS at 12:15

## 2020-07-07 NOTE — DISCHARGE INSTRUCTIONS
Hemoccult Test   AMBULATORY CARE:   What you need to know about a Hemoccult test:  A Hemoccult test is used to check for blood in your bowel movement  It is also called a fecal blood test or occult blood test  Testing for blood in your bowel movement may be part of your yearly physical exam  An abnormal Hemoccult result may mean you need more tests to check for the following:  · Abnormal growths (polyps) or lesions in the intestine    · Bleeding in your intestines    · Iron deficiency anemia    · Colorectal cancer    · Hemorrhoids or a fissure (tear in the tissue)    · Inflammatory bowel disease or peptic ulcer disease  How to prepare for the test:  Your healthcare provider will talk to you about what to eat and what to avoid before your test  Some foods and medicines can make the results less accurate  The following can help keep your results accurate:  · Eat high-fiber foods, such as cereals or breads with bran, for 2 to 3 days before the test  High-fiber foods will make it easier for you to have a bowel movement  · Do not eat foods that will make it look like you have blood in your bowel movement  Do not eat red meat, raw broccoli, cauliflower, or turnips  · Do not take vitamin C supplements  for 7 days before the test  Vitamin C can prevent the test from showing blood in your bowel movement  · Do not take NSAIDs, such as aspirin or ibuprofen, for 7 days before the test  The medicines can make it look like you have blood in your bowel movement when you do not  How the Hemoccult test is done:   · If you are a woman, wait 3 days after your monthly period has stopped before you get a bowel movement sample  You may need up to 3 separate bowel movement samples for the test      · Use a plastic container, bag, or bedpan to collect a sample of your bowel movement  Keep your bowel movement separate from your urine  Do not put toilet paper in with the bowel movement   Use the wooden stick to apply 1 or more small smears of bowel movement on the card  Use a sample from a different part of the bowel movement for each smear  Return the card to your healthcare provider or a lab as directed  What you need to know about your test results: Your healthcare provider will discuss your test results with you  © 2017 [address] [employer] Information is for End User's use only and may not be sold, redistributed or otherwise used for commercial purposes  All illustrations and images included in CareNotes® are the copyrighted property of [employer]  or [employer]  The above information is an  only  It is not intended as medical advice for individual conditions or treatments  Talk to your doctor, nurse or pharmacist before following any medical regimen to see if it is safe and effective for you

## 2020-07-07 NOTE — ANESTHESIA PREPROCEDURE EVALUATION
Review of Systems/Medical History  Patient summary reviewed  Chart reviewed  No history of anesthetic complications     Cardiovascular  EKG reviewed, Hyperlipidemia,   Comment: H/O cardiac arrest on 4/14/18 following ERCP,  Pulmonary  Smoker cigarette smoker and ex-smoker  , No shortness of breath, No recent URI ,   Comment: Denies sleep apnea - no sleep study, snores at night, no witnessed apneas     GI/Hepatic    Liver disease , alcohol related, GI malignancy, Bowel prep  Comment: H/o etoh abuse, no longer drinking     Kidney stones, Prostatic disorder, history of prostate cancer       Endo/Other     GYN       Hematology   Musculoskeletal       Neurology  Seizures ,     Psychology   Anxiety, Depression ,     Comment: Memory loss   On aricept      Stress ECHO 5/1/2018:  SUMMARY:  -  Stress results: There was no chest pain during stress  -  ECG conclusions: The stress ECG was negative for ischemia and normal  There were no stress arrhythmias or conduction abnormalities  -  Perfusion imaging: There were no perfusion defects  A perfusion defect in the basal-mid inferior wall was not seen in the prone stress images indicating that it was likely a diaphragmatic attenuation artifact   -  Gated SPECT: The calculated left ventricular ejection fraction was 53 %  Left ventricular ejection fraction was within normal limits by visual estimate  There was no diagnostic evidence for left ventricular regional abnormality      IMPRESSIONS: Normal study after pharmacologic stress  Myocardial perfusion imaging was normal at rest and with stress  Left ventricular systolic function was normal     Physical Exam    Airway    Mallampati score: II  TM Distance: >3 FB  Neck ROM: full     Dental   No notable dental hx     Cardiovascular  Cardiovascular exam normal    Pulmonary  Pulmonary exam normal     Other Findings        Anesthesia Plan  ASA Score- 2     Anesthesia Type- IV sedation with anesthesia with ASA Monitors     Additional Monitors:   Airway Plan:         Plan Factors- Patient instructed to abstain from smoking on day of procedure  Patient did not smoke on day of surgery  Induction- intravenous  Postoperative Plan-     Informed Consent- Anesthetic plan and risks discussed with patient  I personally reviewed this patient with the CRNA  Discussed and agreed on the Anesthesia Plan with the CRNA  Jw Mohan

## 2020-07-07 NOTE — H&P
History and Physical - [facility]  Azam Mtz 70 y o  male MRN: [id number]      HPI: Azam Mtz is a 70y o  year old male who presents for evaluation of a positive colo guard test      REVIEW OF SYSTEMS: Per the HPI, and otherwise unremarkable  Historical Information   Past Medical History:   Diagnosis Date    Alzheimer disease ([facility] )     Anxiety     Basal cell carcinoma     Bile duct disease     Cancer ([place] [address] )     prostate, skin     Cardiac arrest ([place] [address] )     Choledocholithiasis     Dementia ([place] [address] )     Depression     Kidney stone     Liver disease     Malignant neoplasm of prostate ([facility] )     Mood disorder ([facility] )     Prostate cancer ([place] [address] )     Right carotid bruit     Seizure disorder ([place] [address] )     Skin cancer      Past Surgical History:   Procedure Laterality Date    CHOLECYSTECTOMY      ERCP N/A 3/9/2018    Procedure: ENDOSCOPIC RETROGRADE CHOLANGIOPANCREATOGRAPHY (ERCP); Surgeon: Elizabeth Johnson MD;  [facility];  Service: Gastroenterology    ERCP  04/13/2018    stenting    ERCP N/A 6/8/2018    Procedure: ENDOSCOPIC RETROGRADE CHOLANGIOPANCREATOGRAPHY (ERCP); Surgeon: Elizabeth Johnson MD;  [facility];  Service: Gastroenterology    MT ERCP REMOVE FOREIGN BODY/STENT BILIARY/PANC DUCT N/A 4/13/2018    Procedure: ENDOSCOPIC RETROGRADE CHOLANGIOPANCREATOGRAPHY (ERCP);   Surgeon: Elizabeth Johnson MD;  [facility];  Service: Gastroenterology    MT LAP,CHOLECYSTECTOMY N/A 5/15/2018    Procedure: Monda Wilder;  Surgeon: Dami Beasley MD;  [facility];  Service: General    PROSTATE SURGERY      TONSILLECTOMY       Social History   Social History     Substance and Sexual Activity   Alcohol Use No    Comment: former     Social History     Substance and Sexual Activity   Drug Use No     Social History     Tobacco Use   Smoking Status Former Smoker    Packs/day: 1 00    Years: 60 00    Pack years: 60 00    Types: Cigarettes    Last attempt to quit: 3/19/2019    Years since quittin 3   Smokeless Tobacco Never Used   Tobacco Comment    daily     Family History   Problem Relation Age of Onset    Cancer Mother     Dementia Mother     Alzheimer's disease Mother     Sudden death Father        Meds/Allergies       (Not in a hospital admission)    No Known Allergies    Objective     There were no vitals taken for this visit  PHYSICAL EXAM    Gen: NAD  CV: RRR  CHEST: Clear  ABD: soft, NT/ND  EXT: no edema      ASSESSMENT/PLAN:  This is a 70y o  year old male here for colonoscopy, and he is stable and optimized for his procedure

## 2020-07-08 LAB — SARS-COV-2 RNA SPEC QL NAA+PROBE: NOT DETECTED

## 2020-07-09 ENCOUNTER — APPOINTMENT (OUTPATIENT)
Dept: LAB | Facility: CLINIC | Age: 80
End: 2020-07-09
Payer: MEDICARE

## 2020-07-09 DIAGNOSIS — R73.9 HYPERGLYCEMIA: ICD-10-CM

## 2020-07-09 LAB
ALBUMIN SERPL BCP-MCNC: 3.8 G/DL (ref 3.5–5)
ALP SERPL-CCNC: 101 U/L (ref 46–116)
ALT SERPL W P-5'-P-CCNC: 21 U/L (ref 12–78)
ANION GAP SERPL CALCULATED.3IONS-SCNC: 4 MMOL/L (ref 4–13)
AST SERPL W P-5'-P-CCNC: 13 U/L (ref 5–45)
BASOPHILS # BLD AUTO: 0.02 THOUSANDS/ΜL (ref 0–0.1)
BASOPHILS NFR BLD AUTO: 0 % (ref 0–1)
BILIRUB SERPL-MCNC: 1.38 MG/DL (ref 0.2–1)
BUN SERPL-MCNC: 18 MG/DL (ref 5–25)
CALCIUM SERPL-MCNC: 9.5 MG/DL (ref 8.3–10.1)
CHLORIDE SERPL-SCNC: 109 MMOL/L (ref 100–108)
CHOLEST SERPL-MCNC: 140 MG/DL (ref 50–200)
CO2 SERPL-SCNC: 26 MMOL/L (ref 21–32)
CREAT SERPL-MCNC: 1.15 MG/DL (ref 0.6–1.3)
EOSINOPHIL # BLD AUTO: 0.05 THOUSAND/ΜL (ref 0–0.61)
EOSINOPHIL NFR BLD AUTO: 1 % (ref 0–6)
ERYTHROCYTE [DISTWIDTH] IN BLOOD BY AUTOMATED COUNT: 14.7 % (ref 11.6–15.1)
EST. AVERAGE GLUCOSE BLD GHB EST-MCNC: 111 MG/DL
GFR SERPL CREATININE-BSD FRML MDRD: 61 ML/MIN/1.73SQ M
GLUCOSE P FAST SERPL-MCNC: 95 MG/DL (ref 65–99)
HBA1C MFR BLD: 5.5 %
HCT VFR BLD AUTO: 48.1 % (ref 36.5–49.3)
HDLC SERPL-MCNC: 54 MG/DL
HGB BLD-MCNC: 15.2 G/DL (ref 12–17)
IMM GRANULOCYTES # BLD AUTO: 0.02 THOUSAND/UL (ref 0–0.2)
IMM GRANULOCYTES NFR BLD AUTO: 0 % (ref 0–2)
LDLC SERPL CALC-MCNC: 74 MG/DL (ref 0–100)
LYMPHOCYTES # BLD AUTO: 1.06 THOUSANDS/ΜL (ref 0.6–4.47)
LYMPHOCYTES NFR BLD AUTO: 19 % (ref 14–44)
MCH RBC QN AUTO: 28.4 PG (ref 26.8–34.3)
MCHC RBC AUTO-ENTMCNC: 31.6 G/DL (ref 31.4–37.4)
MCV RBC AUTO: 90 FL (ref 82–98)
MONOCYTES # BLD AUTO: 0.44 THOUSAND/ΜL (ref 0.17–1.22)
MONOCYTES NFR BLD AUTO: 8 % (ref 4–12)
NEUTROPHILS # BLD AUTO: 4.02 THOUSANDS/ΜL (ref 1.85–7.62)
NEUTS SEG NFR BLD AUTO: 72 % (ref 43–75)
NONHDLC SERPL-MCNC: 86 MG/DL
NRBC BLD AUTO-RTO: 0 /100 WBCS
PLATELET # BLD AUTO: 217 THOUSANDS/UL (ref 149–390)
PMV BLD AUTO: 10.2 FL (ref 8.9–12.7)
POTASSIUM SERPL-SCNC: 4.3 MMOL/L (ref 3.5–5.3)
PROT SERPL-MCNC: 7.3 G/DL (ref 6.4–8.2)
RBC # BLD AUTO: 5.35 MILLION/UL (ref 3.88–5.62)
SODIUM SERPL-SCNC: 139 MMOL/L (ref 136–145)
TRIGL SERPL-MCNC: 62 MG/DL
WBC # BLD AUTO: 5.61 THOUSAND/UL (ref 4.31–10.16)

## 2020-07-09 PROCEDURE — 85025 COMPLETE CBC W/AUTO DIFF WBC: CPT

## 2020-07-09 PROCEDURE — 80061 LIPID PANEL: CPT

## 2020-07-09 PROCEDURE — 83036 HEMOGLOBIN GLYCOSYLATED A1C: CPT

## 2020-07-09 PROCEDURE — 80053 COMPREHEN METABOLIC PANEL: CPT

## 2020-07-09 PROCEDURE — 36415 COLL VENOUS BLD VENIPUNCTURE: CPT

## 2020-07-13 ENCOUNTER — TELEPHONE (OUTPATIENT)
Dept: INTERNAL MEDICINE CLINIC | Facility: CLINIC | Age: 80
End: 2020-07-13

## 2020-07-14 ENCOUNTER — OFFICE VISIT (OUTPATIENT)
Dept: INTERNAL MEDICINE CLINIC | Facility: CLINIC | Age: 80
End: 2020-07-14
Payer: MEDICARE

## 2020-07-14 VITALS
TEMPERATURE: 99.1 F | SYSTOLIC BLOOD PRESSURE: 128 MMHG | HEART RATE: 63 BPM | BODY MASS INDEX: 26.09 KG/M2 | DIASTOLIC BLOOD PRESSURE: 70 MMHG | WEIGHT: 171.6 LBS | OXYGEN SATURATION: 97 %

## 2020-07-14 DIAGNOSIS — Z72.0 TOBACCO ABUSE: ICD-10-CM

## 2020-07-14 DIAGNOSIS — Z85.46 H/O PROSTATE CANCER: ICD-10-CM

## 2020-07-14 DIAGNOSIS — R17 ELEVATED BILIRUBIN: ICD-10-CM

## 2020-07-14 DIAGNOSIS — R73.9 HYPERGLYCEMIA: ICD-10-CM

## 2020-07-14 DIAGNOSIS — F10.10 ALCOHOL ABUSE: ICD-10-CM

## 2020-07-14 DIAGNOSIS — E78.2 MIXED HYPERLIPIDEMIA: Primary | ICD-10-CM

## 2020-07-14 PROCEDURE — 1170F FXNL STATUS ASSESSED: CPT | Performed by: INTERNAL MEDICINE

## 2020-07-14 PROCEDURE — 1160F RVW MEDS BY RX/DR IN RCRD: CPT | Performed by: INTERNAL MEDICINE

## 2020-07-14 PROCEDURE — 4040F PNEUMOC VAC/ADMIN/RCVD: CPT | Performed by: INTERNAL MEDICINE

## 2020-07-14 PROCEDURE — 1036F TOBACCO NON-USER: CPT | Performed by: INTERNAL MEDICINE

## 2020-07-14 PROCEDURE — 99214 OFFICE O/P EST MOD 30 MIN: CPT | Performed by: INTERNAL MEDICINE

## 2020-07-14 PROCEDURE — 1123F ACP DISCUSS/DSCN MKR DOCD: CPT | Performed by: INTERNAL MEDICINE

## 2020-07-14 PROCEDURE — 1125F AMNT PAIN NOTED PAIN PRSNT: CPT | Performed by: INTERNAL MEDICINE

## 2020-07-14 PROCEDURE — G0438 PPPS, INITIAL VISIT: HCPCS | Performed by: INTERNAL MEDICINE

## 2020-07-14 NOTE — PROGRESS NOTES
Assessment and Plan:Patient in for wellness visit  Questions reviewed     Problem List Items Addressed This Visit     None           Preventive health issues were discussed with patient, and age appropriate screening tests were ordered as noted in patient's After Visit Summary  Personalized health advice and appropriate referrals for health education or preventive services given if needed, as noted in patient's After Visit Summary       History of Present Illness:     Patient presents for Medicare Annual Wellness visit    Patient Care Team:  Meri Bernard DO as PCP - General (Internal Medicine)  Teresa Mares MD (Gastroenterology)  Dilia Bishop MD (Cardiology)  Demond Pathak MD (General Surgery)  Mehul Em MD (Urology)     Problem List:     Patient Active Problem List   Diagnosis    Transaminitis    Hypokalemia    Fall    Altered mental status    Total bilirubin, elevated    Weight loss counseling, encounter for    Common bile duct (CBD) obstruction    H/O prostate cancer    Common bile duct obstruction    Memory difficulty    Alcoholism ([address]    Dementia associated with 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 ([address]    Cardiac arrest ([address]    Acute hypoxemic respiratory failure ([address] )    Perforated intestine ([address]    Traumatic hematuria    Pneumonia due to infectious organism    Tobacco abuse    Alcohol abuse    Choledocholithiasis    Cholelithiasis, common bile duct    Mood disorder ([address]    Malignant neoplasm of prostate ([address]    Pulmonary nodules    Borderline hyperlipidemia    Hyperglycemia    Mixed hyperlipidemia      Past Medical and Surgical History:     Past Medical History:   Diagnosis Date    Alzheimer disease ([address] )     Anxiety     Basal cell carcinoma     Bile duct disease     Cancer ([address] )     prostate, skin     Cardiac arrest ([address]     Choledocholithiasis     Dementia ([address]     Depression     Kidney stone     Liver disease     [facility]     Mood disorder ([facility]     Prostate cancer ([facility]     Right carotid bruit     Seizure disorder ([facility]     Skin cancer      Past Surgical History:   Procedure Laterality Date    CHOLECYSTECTOMY      ERCP N/A 3/9/2018    Procedure: ENDOSCOPIC RETROGRADE CHOLANGIOPANCREATOGRAPHY (ERCP); Surgeon: Sammy Sousa MD;  [facility];  Service: Gastroenterology    ERCP  04/13/2018    stenting    ERCP N/A 6/8/2018    Procedure: ENDOSCOPIC RETROGRADE CHOLANGIOPANCREATOGRAPHY (ERCP); Surgeon: Sammy Sousa MD;  [facility];  Service: Gastroenterology    MI ERCP REMOVE FOREIGN BODY/STENT BILIARY/PANC DUCT N/A 4/13/2018    Procedure: ENDOSCOPIC RETROGRADE CHOLANGIOPANCREATOGRAPHY (ERCP);   Surgeon: Sammy Sousa MD;  [facility];  Service: Gastroenterology    MI LAP,CHOLECYSTECTOMY N/A 5/15/2018    Procedure: Truong Plaster;  Surgeon: Domo Saab MD;  [facility];  Service: General    PROSTATE SURGERY      TONSILLECTOMY        Family History:     Family History   Problem Relation Age of Onset    Cancer Mother     Dementia Mother     Alzheimer's disease Mother     Sudden death Father       Social History:     E-Cigarette/Vaping    E-Cigarette Use Never User      E-Cigarette/Vaping Substances    Nicotine No     THC No     CBD No     Flavoring No     Other No     Unknown No      Social History     Socioeconomic History    Marital status: /Civil Union     Spouse name: None    Number of children: None    Years of education: None    Highest education level: None   Occupational History    Occupation: employed   Social Needs    Financial resource strain: None    Food insecurity:     Worry: None     Inability: None    Transportation needs:     Medical: None     Non-medical: None   Tobacco Use    Smoking status: Former Smoker     Packs/day: 1 00     Years: 60 00     Pack years: 60 00 Types: Cigarettes     Last attempt to quit: 3/19/2019     Years since quittin 3    Smokeless tobacco: Never Used    Tobacco comment: daily   Substance and Sexual Activity    Alcohol use: No     Comment: former    Drug use: No    Sexual activity: Yes     Partners: Female   Lifestyle    Physical activity:     Days per week: 2 days     Minutes per session: 30 min    Stress: Only a little   Relationships    Social connections:     Talks on phone: None     Gets together: None     Attends Judaism service: None     Active member of club or organization: None     Attends meetings of clubs or organizations: None     Relationship status: None    Intimate partner violence:     Fear of current or ex partner: None     Emotionally abused: None     Physically abused: None     Forced sexual activity: None   Other Topics Concern    None   Social History Narrative    None      Medications and Allergies:     Current Outpatient Medications   Medication Sig Dispense Refill    aspirin 81 mg chewable tablet Chew 81 mg daily as needed for mild pain      donepezil (ARICEPT) 10 mg tablet TAKE ONE TABLET BY MOUTH DAILY AT BEDTIME 30 tablet 6    guaiFENesin (MUCINEX) 600 mg 12 hr tablet Take 1,200 mg by mouth 2 (two) times a day as needed for cough      Melatonin 3 MG CAPS Take 3 mg by mouth daily at bedtime Patient takes 1 1/2 mg tablets       Multiple Vitamins-Minerals (CENTRUM SILVER ULTRA MENS PO) Take 1 tablet by mouth daily      rosuvastatin (CRESTOR) 5 mg tablet TAKE ONE TABLET BY MOUTH DAILY 360 tablet 1    sertraline (ZOLOFT) 50 mg tablet Take 1 tablet (50 mg total) by mouth daily 30 tablet 6    tamsulosin (FLOMAX) 0 4 mg TAKE ONE TABLET DAILY WITH DINNER 90 capsule 3     No current facility-administered medications for this visit        No Known Allergies   Immunizations:     Immunization History   Administered Date(s) Administered    Influenza, high dose seasonal 0 5 mL 2018, 2020    Pneumococcal Conjugate 13-Valent 12/11/2018    Pneumococcal Polysaccharide PPV23 03/13/2020      Health Maintenance:         Topic Date Due    CRC Screening: Colonoscopy  07/07/2021    Hepatitis C Screening  Completed         Topic Date Due    DTaP,Tdap,and Td Vaccines (1 - Tdap) 07/31/1951    Influenza Vaccine  07/01/2020      Medicare Health Risk Assessment:     /70 (BP Location: Left arm, Patient Position: Sitting, Cuff Size: Standard)   Pulse 63   Temp 99 1 °F (37 3 °C) (Temporal) Comment: w/ tylenol  Wt 77 8 kg (171 lb 9 6 oz) Comment: w/ shoes denied off  SpO2 97%   BMI 26 09 kg/m²      Jaylen Maldonado is here for his Subsequent Wellness visit  Health Risk Assessment:   Patient rates overall health as very good  Patient feels that their physical health rating is slightly better  Eyesight was rated as same  Hearing was rated as same  Patient feels that their emotional and mental health rating is same  Pain experienced in the last 7 days has been none  Patient states that he has experienced no weight loss or gain in last 6 months  Depression Screening:   PHQ-2 Score: 0      Fall Risk Screening: In the past year, patient has experienced: no history of falling in past year      Home Safety:  Patient does not have trouble with stairs inside or outside of their home  Patient has working smoke alarms and has no working carbon monoxide detector  Home safety hazards include: none  Nutrition:   Current diet is Regular and Limited junk food  Medications:   Patient is not currently taking any over-the-counter supplements  Patient is not able to manage medications  Activities of Daily Living (ADLs)/Instrumental Activities of Daily Living (IADLs):   Walk and transfer into and out of bed and chair?: Yes  Dress and groom yourself?: Yes    Bathe or shower yourself?: Yes    Feed yourself?  Yes  Do your laundry/housekeeping?: Yes  Manage your money, pay your bills and track your expenses?: No  Make your own meals?: Yes    Do your own shopping?: Yes    Durable Medical Equipment Suppliers  none    Previous Hospitalizations:   Any hospitalizations or ED visits within the last 12 months?: Yes    How many hospitalizations have you had in the last year?: 1-2    Advance Care Planning:   Living will: Yes    Durable POA for healthcare:  Yes    Advanced directive: Yes    Five wishes given: Yes      PREVENTIVE SCREENINGS      Cardiovascular Screening:    General: Screening Not Indicated and History Lipid Disorder      Diabetes Screening:     General: Screening Current      Colorectal Cancer Screening:     General: Screening Current      Prostate Cancer Screening:    General: History Prostate Cancer and Screening Not Indicated      Abdominal Aortic Aneurysm (AAA) Screening:    Risk factors include: tobacco use        Hepatitis C Screening:    General: Screening Current      Ashley Allison DO

## 2020-07-16 ENCOUNTER — OFFICE VISIT (OUTPATIENT)
Dept: NEUROLOGY | Facility: CLINIC | Age: 80
End: 2020-07-16
Payer: MEDICARE

## 2020-07-16 VITALS
HEART RATE: 84 BPM | DIASTOLIC BLOOD PRESSURE: 80 MMHG | BODY MASS INDEX: 25.73 KG/M2 | WEIGHT: 169.2 LBS | TEMPERATURE: 97.9 F | SYSTOLIC BLOOD PRESSURE: 136 MMHG

## 2020-07-16 DIAGNOSIS — F02.80 DEMENTIA ASSOCIATED WITH OTHER UNDERLYING DISEASE WITHOUT BEHAVIORAL DISTURBANCE (HCC): Primary | ICD-10-CM

## 2020-07-16 PROCEDURE — 1036F TOBACCO NON-USER: CPT | Performed by: PSYCHIATRY & NEUROLOGY

## 2020-07-16 PROCEDURE — 99214 OFFICE O/P EST MOD 30 MIN: CPT | Performed by: PSYCHIATRY & NEUROLOGY

## 2020-07-16 PROCEDURE — 4040F PNEUMOC VAC/ADMIN/RCVD: CPT | Performed by: PSYCHIATRY & NEUROLOGY

## 2020-07-16 PROCEDURE — 1160F RVW MEDS BY RX/DR IN RCRD: CPT | Performed by: PSYCHIATRY & NEUROLOGY

## 2020-07-16 PROCEDURE — 1170F FXNL STATUS ASSESSED: CPT | Performed by: PSYCHIATRY & NEUROLOGY

## 2020-07-16 RX ORDER — ACETAMINOPHEN 500 MG
500 TABLET ORAL AS NEEDED
COMMUNITY

## 2020-07-16 NOTE — PROGRESS NOTES
Progress Note - Neurology   Prince Amador 70 y o  male MRN: [id number]  Unit/Bed#:  Encounter: [id number]      Subjective:   Patient is here for a follow-up visit for cognitive symptoms, short-term memory difficulty, mood disorder, and since his last visit he has been doing well  He was accompanied by his daughter who is his main caregiver who also believes he has been stable from the neurological standpoint  Patient remains on Aricept 10 mg daily, Zoloft 50 mg daily, thiamine 100 mg daily and multivitamins  He denies any new neurological symptoms  ROS:   Review of Systems   Constitutional: Negative  Negative for appetite change and fever  HENT: Negative  Negative for hearing loss, tinnitus, trouble swallowing and voice change  Eyes: Negative  Negative for photophobia and pain  Respiratory: Negative  Negative for shortness of breath  Cardiovascular: Negative  Negative for palpitations  Gastrointestinal: Negative  Negative for nausea and vomiting  Endocrine: Negative  Negative for cold intolerance  Genitourinary: Negative  Negative for dysuria, frequency and urgency  Musculoskeletal: Negative  Negative for myalgias and neck pain  Skin: Negative  Negative for rash  Neurological: Negative  Negative for dizziness, tremors, seizures, syncope, facial asymmetry, speech difficulty, weakness, light-headedness, numbness and headaches  Memory problems   Hematological: Negative  Does not bruise/bleed easily  Psychiatric/Behavioral: Negative  Negative for confusion, hallucinations and sleep disturbance  MA review of systems was reviewed by myself  Vitals:   Vitals:    07/16/20 1503   BP: 136/80   BP Location: Left arm   Patient Position: Sitting   Cuff Size: Standard   Pulse: 84   Temp: 97 9 °F (36 6 °C)   Weight: 76 7 kg (169 lb 3 2 oz)   ,Body mass index is 25 73 kg/m²      MEDS:      Current Outpatient Medications:     acetaminophen (TYLENOL) 500 mg tablet, Take 500 mg by mouth daily, Disp: , Rfl:     donepezil (ARICEPT) 10 mg tablet, TAKE ONE TABLET BY MOUTH DAILY AT BEDTIME, Disp: 30 tablet, Rfl: 6    guaiFENesin (MUCINEX) 600 mg 12 hr tablet, Take 1,200 mg by mouth 2 (two) times a day as needed for cough, Disp: , Rfl:     Melatonin 3 MG CAPS, Take 3 mg by mouth daily at bedtime Patient takes 1 1/2 mg tablets , Disp: , Rfl:     Multiple Vitamins-Minerals (CENTRUM SILVER ULTRA MENS PO), Take 1 tablet by mouth daily, Disp: , Rfl:     rosuvastatin (CRESTOR) 5 mg tablet, TAKE ONE TABLET BY MOUTH DAILY, Disp: 360 tablet, Rfl: 1    sertraline (ZOLOFT) 50 mg tablet, Take 1 tablet (50 mg total) by mouth daily, Disp: 30 tablet, Rfl: 6    tamsulosin (FLOMAX) 0 4 mg, TAKE ONE TABLET DAILY WITH DINNER, Disp: 90 capsule, Rfl: 3    aspirin 81 mg chewable tablet, Chew 81 mg daily as needed for mild pain, Disp: , Rfl:   :    Physical Exam:  General appearance: alert, appears stated age and cooperative  Head: Normocephalic, without obvious abnormality, atraumatic    [place] cognitive assessment score is 18/30 as compared to 22/30 at his previous visit over 6 months ago  Patient is otherwise alert awake oriented, speech is fluent, he could obey 3 step commands, cranial nerves 2-12 intact, and on motor and sensory exam there is no evidence of any drift of focal weakness, in the upper or lower extremities  No evidence of any dysmetria was noted  Gait is normal based  Lab Results: I have personally reviewed pertinent reports  Imaging Studies: I have personally reviewed pertinent reports  Assessment:  1  Dementia related with 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  Plan:  Patient is advised to continue present medications, mental stimulating exercises encouraged, and he will return back to see me in 6 months    The results of his [place] were discussed with the patient and his daughter however clinically is daughter believes that he has been relatively stable and hence will continue present treatment plan  7/16/2020,4:06 PM    Dictation voice to text software has been used in the creation of this document  Please consider this in light of any contextual or grammatical errors

## 2020-07-30 ENCOUNTER — TELEPHONE (OUTPATIENT)
Dept: DERMATOLOGY | Facility: CLINIC | Age: 80
End: 2020-07-30

## 2020-08-03 ENCOUNTER — OFFICE VISIT (OUTPATIENT)
Dept: DERMATOLOGY | Facility: CLINIC | Age: 80
End: 2020-08-03
Payer: MEDICARE

## 2020-08-03 VITALS — TEMPERATURE: 98.8 F

## 2020-08-03 DIAGNOSIS — L82.1 SEBORRHEIC KERATOSIS: ICD-10-CM

## 2020-08-03 DIAGNOSIS — L98.9 UNKNOWN SKIN LESION: Primary | ICD-10-CM

## 2020-08-03 DIAGNOSIS — Z85.828 HISTORY OF SKIN CANCER: ICD-10-CM

## 2020-08-03 DIAGNOSIS — Z13.89 SCREENING FOR SKIN CONDITION: ICD-10-CM

## 2020-08-03 PROCEDURE — 11103 TANGNTL BX SKIN EA SEP/ADDL: CPT | Performed by: DERMATOLOGY

## 2020-08-03 PROCEDURE — 1160F RVW MEDS BY RX/DR IN RCRD: CPT | Performed by: DERMATOLOGY

## 2020-08-03 PROCEDURE — 88305 TISSUE EXAM BY PATHOLOGIST: CPT | Performed by: STUDENT IN AN ORGANIZED HEALTH CARE EDUCATION/TRAINING PROGRAM

## 2020-08-03 PROCEDURE — 99214 OFFICE O/P EST MOD 30 MIN: CPT | Performed by: DERMATOLOGY

## 2020-08-03 PROCEDURE — 1170F FXNL STATUS ASSESSED: CPT | Performed by: DERMATOLOGY

## 2020-08-03 PROCEDURE — 4040F PNEUMOC VAC/ADMIN/RCVD: CPT | Performed by: DERMATOLOGY

## 2020-08-03 PROCEDURE — 11102 TANGNTL BX SKIN SINGLE LES: CPT | Performed by: DERMATOLOGY

## 2020-08-03 PROCEDURE — 1036F TOBACCO NON-USER: CPT | Performed by: DERMATOLOGY

## 2020-08-03 RX ORDER — FEXOFENADINE HYDROCHLORIDE 60 MG/1
60 TABLET, FILM COATED ORAL DAILY
COMMUNITY

## 2020-08-03 NOTE — PROGRESS NOTES
[facility] DERMATOLOGY  [address] [place] [id number]  [phone]  [phone]     MRN: [id number] : 1940  Encounter: [id number]  Patient Information: Mil Garrett  Chief complaint:  Skin lesions on back and overall checkup    History of present illness:  72-year-old male with history of multiple skin cancers presents for follow-up patient was seen by Dr Zelalem Nicolas recently and was advised to come in to check lesions on his back  Past Medical History:   Diagnosis Date    [facility]     Anxiety     Basal cell carcinoma     Bile duct disease     Cancer ([facility]     prostate, skin     Cardiac arrest ([facility]     Choledocholithiasis     Dementia ([facility]     Depression     Kidney stone     Liver disease     Malignant neoplasm of prostate [facility]     Mood disorder ([facility]     Prostate cancer ([facility]     Right carotid bruit     Seizure disorder ([facility]     Skin cancer      Past Surgical History:   Procedure Laterality Date    CHOLECYSTECTOMY      ERCP N/A 3/9/2018    Procedure: ENDOSCOPIC RETROGRADE CHOLANGIOPANCREATOGRAPHY (ERCP); Surgeon: Abigail Montoya MD;  [facility];  Service: Gastroenterology    ERCP  2018    stenting    ERCP N/A 2018    Procedure: ENDOSCOPIC RETROGRADE CHOLANGIOPANCREATOGRAPHY (ERCP); Surgeon: Abigail Montoya MD;  [facility];  Service: Gastroenterology    DC ERCP REMOVE FOREIGN BODY/STENT BILIARY/PANC DUCT N/A 2018    Procedure: ENDOSCOPIC RETROGRADE CHOLANGIOPANCREATOGRAPHY (ERCP);   Surgeon: Abigail Montoya MD;  [facility];  Service: Gastroenterology    DC LAP,CHOLECYSTECTOMY N/A 5/15/2018    Procedure: Eriberto Wolf;  Surgeon: Sydney Brizuela MD;  [facility];  Service: General    PROSTATE SURGERY      TONSILLECTOMY       Social History   Social History     Substance and Sexual Activity   Alcohol Use No    Comment: former Social History     Substance and Sexual Activity   Drug Use No     Social History     Tobacco Use   Smoking Status Former Smoker    Packs/day: 1 00    Years: 60 00    Pack years: 60 00    Types: Cigarettes    Last attempt to quit: 3/19/2019    Years since quittin 3   Smokeless Tobacco Never Used   Tobacco Comment    daily     Family History   Problem Relation Age of Onset    Cancer Mother     Dementia Mother     Alzheimer's disease Mother     Sudden death Father      Meds/Allergies   No Known Allergies    Meds:  Prior to Admission medications    Medication Sig Start Date End Date Taking?  Authorizing Provider   acetaminophen (TYLENOL) 500 mg tablet Take 500 mg by mouth daily   Yes Historical Provider, MD   donepezil (ARICEPT) 10 mg tablet TAKE ONE TABLET BY MOUTH DAILY AT BEDTIME 3/13/20  Yes Nayeli Barakat MD   fexofenadine (ALLEGRA) 60 MG tablet Take 60 mg by mouth daily   Yes Historical Provider, MD   guaiFENesin (MUCINEX) 600 mg 12 hr tablet Take 1,200 mg by mouth 2 (two) times a day as needed for cough   Yes Historical Provider, MD   Melatonin 3 MG CAPS Take 3 mg by mouth daily at bedtime Patient takes 1 1/2 mg tablets    Yes Historical Provider, MD   Multiple Vitamins-Minerals (CENTRUM SILVER ULTRA MENS PO) Take 1 tablet by mouth daily   Yes Historical Provider, MD   rosuvastatin (CRESTOR) 5 mg tablet TAKE ONE TABLET BY MOUTH DAILY 20  Yes SAAD Gibson   sertraline (ZOLOFT) 50 mg tablet Take 1 tablet (50 mg total) by mouth daily 19  Yes Cristiano Lvoe MD   tamsulosin (FLOMAX) 0 4 mg TAKE ONE TABLET DAILY WITH DINNER 19  Yes Gayatri Carbone DO   aspirin 81 mg chewable tablet Chew 81 mg daily as needed for mild pain    Historical Provider, MD       Subjective:     Review of Systems:    General: negative for - chills, fatigue, fever,  weight gain or weight loss  Psychological: negative for - anxiety, behavioral disorder, concentration difficulties, decreased libido, depression, irritability, memory difficulties, mood swings, sleep disturbances or suicidal ideation  ENT: negative for - hearing difficulties , nasal congestion, nasal discharge, oral lesions, sinus pain, sneezing, sore throat  Allergy and Immunology: negative for - hives, insect bite sensitivity,  Hematological and Lymphatic: negative for - bleeding problems, blood clots,bruising, swollen lymph nodes  Endocrine: negative for - hair pattern changes, hot flashes, malaise/lethargy, mood swings, palpitations, polydipsia/polyuria, skin changes, temperature intolerance or unexpected weight change  Respiratory: negative for - cough, hemoptysis, orthopnea, shortness of breath, or wheezing  Cardiovascular: negative for - chest pain, dyspnea on exertion, edema,  Gastrointestinal: negative for - abdominal pain, nausea/vomiting  Genito-Urinary: negative for - dysuria, incontinence, irregular/heavy menses or urinary frequency/urgency  Musculoskeletal: negative for - gait disturbance, joint pain, joint stiffness, joint swelling, muscle pain, muscular weakness  Dermatological:  As in HPI  Neurological: negative for confusion, dizziness, headaches, impaired coordination/balance, memory loss, numbness/tingling, seizures, speech problems, tremors or weakness       Objective:   Temp 98 8 °F (37 1 °C)     Physical Exam:    General Appearance:    Alert, cooperative, no distress   Head:    Normocephalic, without obvious abnormality, atraumatic           Skin:   A full skin exam was performed including scalp, head scalp, eyes, ears, nose, lips, neck, chest, axilla, abdomen, back, buttocks, bilateral upper extremities, bilateral lower extremities, hands, feet, fingers, toes, fingernails, and toenails pearly 3 mm papule noted right nares poorly defined inflamed keratotic 8 mm papule noted on chest normal keratotic papules with greasy stuck else remarkable noted on exam previous sites skin cancer well healed without recurrence            Shave Biopsy Procedure Note    Pre-operative Diagnosis:  Rule out basal cell carcinoma irritated keratosis rule out atypia    Plan:  1  Instructed to keep the wound dry and covered for 24 and clean thereafter  2  Warning signs of infection were reviewed  3  Recommended that the patient use OTC acetaminophen as needed for pain  4  Return  Pending results of biopsy(ies)    Locations:  Right nares and left chest    Indications:  Suspicious lesion    Anesthesia: Lidocaine 1% with epinephrine without added sodium bicarbonate    Procedure Details     Patient informed of the risks (including bleeding and infection) and benefits of the   procedure and Verbal informed consent obtained  The lesion and surrounding area were given a sterile prep using alcohol and draped in the usual sterile fashion  A Blue blade razor was used to obtain a specimen  Hemostasis achieved with aluminum chloride  Petrolatum and a sterile dressing applied  The specimen was sent for pathologic examination  The patient tolerated the procedure(s) well  Complications:  none  Assessment:     1  Unknown skin lesion     2  Seborrheic keratosis     3  Screening for skin condition     4   History of skin cancer           Plan:   Skin lesions lesion on nose is probably a basal cell carcinoma would probably be treated by Mohs area on the chest and a irritated keratosis if anything atypical would plan on complete excision  Seborrheic keratosis patient reassured these are normal growths we acquire with age no treatment needed  History of skin cancer in no recurrence nothing else atypical sunblock recommended follow-up in 6 months  Screening for dermatologic disorders nothing else of concern noted on complete exam follow-up in 6 months    Bebe Cole MD  8/3/2020,11:06 AM    Portions of the record may have been created with voice recognition software   Occasional wrong word or "sound a like" substitutions may have occurred due to the inherent limitations of voice recognition software   Read the chart carefully and recognize, using context, where substitutions have occurred

## 2020-08-03 NOTE — PATIENT INSTRUCTIONS
Skin lesions lesion on nose is probably a basal cell carcinoma would probably be treated by Mohs area on the chest and a irritated keratosis if anything atypical would plan on complete excision  Seborrheic keratosis patient reassured these are normal growths we acquire with age no treatment needed  History of skin cancer in no recurrence nothing else atypical sunblock recommended follow-up in 6 months  Screening for dermatologic disorders nothing else of concern noted on complete exam follow-up in 6 months

## 2020-08-10 DIAGNOSIS — C44.311 BASAL CELL CARCINOMA OF DORSUM OF NOSE: Primary | ICD-10-CM

## 2020-08-18 ENCOUNTER — PROCEDURE VISIT (OUTPATIENT)
Dept: DERMATOLOGY | Facility: CLINIC | Age: 80
End: 2020-08-18
Payer: MEDICARE

## 2020-08-18 VITALS
DIASTOLIC BLOOD PRESSURE: 74 MMHG | WEIGHT: 174.6 LBS | HEIGHT: 69 IN | BODY MASS INDEX: 25.86 KG/M2 | RESPIRATION RATE: 16 BRPM | TEMPERATURE: 97.4 F | HEART RATE: 70 BPM | SYSTOLIC BLOOD PRESSURE: 122 MMHG

## 2020-08-18 DIAGNOSIS — C44.311 BASAL CELL CARCINOMA OF DORSUM OF NOSE: ICD-10-CM

## 2020-08-18 PROCEDURE — 14060 TIS TRNFR E/N/E/L 10 SQ CM/<: CPT | Performed by: STUDENT IN AN ORGANIZED HEALTH CARE EDUCATION/TRAINING PROGRAM

## 2020-08-18 PROCEDURE — 17311 MOHS 1 STAGE H/N/HF/G: CPT | Performed by: STUDENT IN AN ORGANIZED HEALTH CARE EDUCATION/TRAINING PROGRAM

## 2020-08-18 NOTE — PROGRESS NOTES
MOHS Procedure Note    Patient: Prince Amador  : 1940  MRN: [id number]  Date: 2020    History of Present Illness: The patient is a 68 y o  male who presents with complaints of Basal Cell Carcinoma right naris    Past Medical History:   Diagnosis Date    Alzheimer disease ([facility] )     Anxiety     Basal cell carcinoma 2020    Right selma, Dr Kai Martinez Bile duct disease     Cancer [facility])     prostate, skin     Cardiac arrest ([facility] )     Choledocholithiasis     Dementia ([facility] )     Depression     Kidney stone     Liver disease     Malignant neoplasm of prostate ([facility] )     [facility]     Pro[facility]     Right carotid bruit     Seizure disorder ([place] [address] )     Skin cancer        Past Surgical History:   Procedure Laterality Date    CHOLECYSTECTOMY      ERCP N/A 3/9/2018    Procedure: ENDOSCOPIC RETROGRADE CHOLANGIOPANCREATOGRAPHY (ERCP); Surgeon: Sunny Morales MD;  [facility];  Service: Gastroenterology    ERCP  2018    stenting    ERCP N/A 2018    Procedure: ENDOSCOPIC RETROGRADE CHOLANGIOPANCREATOGRAPHY (ERCP); Surgeon: Sunny Morales MD;  [facility];  Service: Gastroenterology    MOHS SURGERY  2020    Right selma, Dr Juan BODY/STENT BILIARY/PANC DUCT N/A 2018    Procedure: ENDOSCOPIC RETROGRADE CHOLANGIOPANCREATOGRAPHY (ERCP);   Surgeon: Sunny Morales MD;  [facility];  Service: Gastroenterology    AL LAP,CHOLECYSTECTOMY N/A 5/15/2018    Procedure: LAPAROSCOPIC CHOLECYSTECTOMY;  Surgeon: Charla Chavez MD;  [facility];  Service: General    PROSTATE SURGERY      TONSILLECTOMY           Current Outpatient Medications:     acetaminophen (TYLENOL) 500 mg tablet, Take 500 mg by mouth daily, Disp: , Rfl:     aspirin 81 mg chewable tablet, Chew 81 mg daily as needed for mild pain, Disp: , Rfl:     donepezil (ARICEPT) 10 mg tablet, TAKE ONE TABLET BY MOUTH DAILY AT BEDTIME, Disp: 30 tablet, Rfl: 6    fexofenadine (ALLEGRA) 60 MG tablet, Take 60 mg by mouth daily, Disp: , Rfl:     guaiFENesin (MUCINEX) 600 mg 12 hr tablet, Take 1,200 mg by mouth 2 (two) times a day as needed for cough, Disp: , Rfl:     Melatonin 3 MG CAPS, Take 3 mg by mouth daily at bedtime Patient takes 1 1/2 mg tablets , Disp: , Rfl:     Multiple Vitamins-Minerals (CENTRUM SILVER ULTRA MENS PO), Take 1 tablet by mouth daily, Disp: , Rfl:     rosuvastatin (CRESTOR) 5 mg tablet, TAKE ONE TABLET BY MOUTH DAILY, Disp: 360 tablet, Rfl: 1    sertraline (ZOLOFT) 50 mg tablet, Take 1 tablet (50 mg total) by mouth daily, Disp: 30 tablet, Rfl: 6    tamsulosin (FLOMAX) 0 4 mg, TAKE ONE TABLET DAILY WITH DINNER, Disp: 90 capsule, Rfl: 3    No Known Allergies    Physical Exam:   Vitals:    08/18/20 0808   BP: 122/74   Pulse: 70   Resp: 16   Temp: (!) 97 4 °F (36 3 °C)     General: Awake, Alert, Oriented x 3, Mood and affect appropriate  Respiratory: Respirations even and unlabored  Cardiovascular: Peripheral pulses intact; no edema  Musculoskeletal Exam: N/A    Right nasal tip with 1 1cm x1 1xm pink scar    Assessment: Biopsy proven infiltrative BCC    Plan: MOHS    MOHS Procedure Timeout      Most Recent Value   Timeout:  0825   Patient Identity Verified:  Yes   Correct Site Verified:  Yes   Correct Procedure Verified:  Yes          MOHS Diagnosis/Indication/Location/ID      Most Recent Value   Pathology Type  Basal cell carcinoma   Anatomic Site  -- [Right Naris]   Indications for MOHS  tumor location   MOHS ID  [id number]          MOHS Site/Accession/Pre-Post      Most Recent Value   Original Site Identified (as submitted by referring clinician)  Photo   Biopsy Accession/Specimen # (as submitted by referring clincian)  [id number]   Pre-MOHS Size Length (cm)  1 1   Pre-MOHS Size Width (cm)  1 1   Anesthetic Used  1% Lidocaine with epinephrine          MOHS Tumor Stage 1 Information      Most Recent Value Tissue Sections (blocks)  2            Patient identified, procedure verified, site identified and verified  Time out completed  Surgical removal of the lesion discussed with the patient (risks and benefits, including possibility of scarring, infection, recurrence or potential for further treatment)  I have specifically identified the site with the patient  I have discussed the fact that the patient will have a scar after the procedure regardless of granulation or repair with sutures  I have discussed that the repair options can range from granulation in some cases to linear or curvilinear closures to larger flaps or grafts  There are sometimes flaps or grafts used that require multiples stages of surgery and will not be completed today, rather be completed over a series of appointments  I have discussed that occasionally due to location, size or depth of the lesion I may recommend consultation with and transfer of care for further removal or the reconstruction to another provider such as ophthalmology surgery, plastic surgery, ENT surgery, or surgical oncology  There are cases in which other testing such as imaging with MRI or CT scan or testing of lymph nodes is recommended because of the nature/depth/location of tumor seen during the removal  There is a risk of injury to nerves causing temporary or permanent numbness or the inability to move muscles full such as the inability to lift eyebrows  Questions answered and verbal and written consent was obtained  The tumor qualifies for Mohs based on AUC criteria  Dr Prasanna Martinez served as the surgeon and pathologist during the procedure  With the patient in the supine position and under adequate local anesthesia with 1% lidocaine with epinephrine 1:200,000, the defect was scrubbed with Hibiclens  Sterile drapes were placed from the sterile tray    Because of the location of the surgical defect, a nasalis sling flap (NSF) was judged to give the best possible cosmetic and functional result  The edges of the defect were carefully debrided removing any dead or coagulated tissue  A NSF was designed with the nasalis myocutaneous pedicle located laterally on the nose  The flap was incised to the level of perichondrium/periosteum medially and to the prenasalis plane laterally  The flap and adjacent tissue were then widely undermined in the subnasalis plane  Additional undermining was performed between the dermis and nasalis overlying the lateral pedicle to allow for further flap movement  The NSF was advanced into position through closure of the secondary defect followed by movement of the inferior flap margin to the distal borders of the defect in standard fashion using a layer of dermal and epidermal sutures  Horizontal mattress sutures were used along with simple running for epidermal sutures to assist with hemostasis  Estimated blood loss was less than 5 mL  The patient tolerated the procedure well  The wound was dressed with petrolatum, a non-stick pad, and a compression dressing  Wound care was discussed with the patient, and a wound care instruction sheet was given  Complications: none  Post-op medications: none  Patient condition after procedure: stable  Discharge plans: return for suture removal in 7 days     I urged the patient to call us if any problems or question should arise prior to that time      Freddy [address] Attestation    I,:   Chano Krishnamurthy RN am acting as a scribe while in the presence of the attending physician :        I,:   Jackson Hale MD personally performed the services described in this documentation    as scribed in my presence :

## 2020-08-18 NOTE — PATIENT INSTRUCTIONS
Mohs Microscopic Surgery After Care    What You Will Need to Do After the Procedure  1  Keep the area clean and dry the first day  Try NOT to remove the bandage for the first day  2  Gently clean the area with soap and water and apply Vaseline ointment (this is over the counter and not a prescription) to the excision site for up to 2 weeks  3  Apply a clean appropriately sized bandage to area  Gauze and paper tape are recommended for sensitive skin  4  Return for suture removal as instructed (generally 1 week for the face, 2 weeks for the body)  5  Take Acetaminophen (Tylenol) for discomfort, if no contraindications  Do NOT take Ibuprofen or aspirin unless specifically told to do so by your Dermatologist because these medications can make bleeding worse  6  Call our office immediately for signs of infection: fever, chills, increased redness, warmth, tenderness, discomfort/pain, or pus or foul smell coming from the wound  If bleeding is noticed, place a clean cloth over the area and apply firm pressure for thirty minutes  Check the wound ONLY after 30 minutes of direct pressure; do not cheat and sneak a peak, as that does not count  If bleeding persists after 30 minutes of legitimate direct pressure, then try one more round of direct pressure for an additional 10 minutes to the area  Should the bleeding become heavier or not stop after the second attempt, call [facility] directly at [phone] (SKIN) or, if after hours, go to your nearest Emergency Room or Urgent Care

## 2020-08-25 ENCOUNTER — OFFICE VISIT (OUTPATIENT)
Dept: DERMATOLOGY | Facility: CLINIC | Age: 80
End: 2020-08-25

## 2020-08-25 DIAGNOSIS — Z48.02 ENCOUNTER FOR REMOVAL OF SUTURES: Primary | ICD-10-CM

## 2020-08-25 PROCEDURE — 99024 POSTOP FOLLOW-UP VISIT: CPT | Performed by: STUDENT IN AN ORGANIZED HEALTH CARE EDUCATION/TRAINING PROGRAM

## 2020-08-25 NOTE — PROGRESS NOTES
Suture removal    Date/Time: 8/25/2020 10:35 AM  Performed by: Pauline Ardon RN  Authorized by: Sally Mukherjee MD     Patient location:  Clinic  Consent:     Consent obtained:  Verbal    Consent given by:  Patient    Risks discussed:  Bleeding, pain and wound separation    Alternatives discussed:  No treatment  Universal protocol:     Procedure explained and questions answered to patient or proxy's satisfaction: yes      Relevant documents present and verified: no      Test results available and properly labeled: no      Radiology Images displayed and confirmed  If images not available, report reviewed: no      Required blood products, implants, devices, and special equipment available: no      Site/side marked: no      Immediately prior to procedure, a time out was called: no      Patient identity confirmed:  Verbally with patient  Location:     Laterality:  Right    Location:  Head/neck    Head/neck location: Right naris  Procedure details:      Tools used:  Suture removal kit    Number of sutures removed:  10      Scribe Attestation    I,:   Pauline Ardno RN am acting as a scribe while in the presence of the attending physician :        I,:   Sally Mukherjee MD personally performed the services described in this documentation    as scribed in my presence :

## 2020-10-14 ENCOUNTER — OFFICE VISIT (OUTPATIENT)
Dept: FAMILY MEDICINE CLINIC | Facility: CLINIC | Age: 80
End: 2020-10-14
Payer: MEDICARE

## 2020-10-14 VITALS
TEMPERATURE: 97.7 F | BODY MASS INDEX: 26.75 KG/M2 | RESPIRATION RATE: 14 BRPM | WEIGHT: 180.6 LBS | OXYGEN SATURATION: 98 % | HEART RATE: 63 BPM | DIASTOLIC BLOOD PRESSURE: 78 MMHG | SYSTOLIC BLOOD PRESSURE: 128 MMHG | HEIGHT: 69 IN

## 2020-10-14 DIAGNOSIS — E78.2 MIXED HYPERLIPIDEMIA: ICD-10-CM

## 2020-10-14 DIAGNOSIS — R17 ELEVATED BILIRUBIN: ICD-10-CM

## 2020-10-14 DIAGNOSIS — Z23 NEED FOR VACCINATION: Primary | ICD-10-CM

## 2020-10-14 DIAGNOSIS — F02.80 DEMENTIA ASSOCIATED WITH OTHER UNDERLYING DISEASE WITHOUT BEHAVIORAL DISTURBANCE (HCC): ICD-10-CM

## 2020-10-14 PROBLEM — F10.10 ALCOHOL ABUSE: Status: RESOLVED | Noted: 2018-04-27 | Resolved: 2020-10-14

## 2020-10-14 PROCEDURE — 99203 OFFICE O/P NEW LOW 30 MIN: CPT | Performed by: FAMILY MEDICINE

## 2020-10-14 PROCEDURE — G0008 ADMIN INFLUENZA VIRUS VAC: HCPCS

## 2020-10-14 PROCEDURE — 90662 IIV NO PRSV INCREASED AG IM: CPT

## 2020-10-21 DIAGNOSIS — F02.80 DEMENTIA ASSOCIATED WITH OTHER UNDERLYING DISEASE WITHOUT BEHAVIORAL DISTURBANCE (HCC): ICD-10-CM

## 2020-10-21 RX ORDER — DONEPEZIL HYDROCHLORIDE 10 MG/1
TABLET, FILM COATED ORAL
Qty: 30 TABLET | Refills: 6 | Status: SHIPPED | OUTPATIENT
Start: 2020-10-21 | End: 2021-01-25 | Stop reason: SDUPTHER

## 2021-01-11 ENCOUNTER — OFFICE VISIT (OUTPATIENT)
Dept: DERMATOLOGY | Facility: CLINIC | Age: 81
End: 2021-01-11
Payer: COMMERCIAL

## 2021-01-11 VITALS — WEIGHT: 178 LBS | BODY MASS INDEX: 26.36 KG/M2 | HEIGHT: 69 IN | TEMPERATURE: 98.1 F

## 2021-01-11 DIAGNOSIS — Z85.828 HISTORY OF BASAL CELL CARCINOMA: Primary | ICD-10-CM

## 2021-01-11 PROCEDURE — 99212 OFFICE O/P EST SF 10 MIN: CPT | Performed by: DERMATOLOGY

## 2021-01-11 NOTE — PATIENT INSTRUCTIONS
HISTORY OF BASAL CELL CARCINOMA    Assessment and Plan:  Based on a thorough discussion of this condition and the management approach to it (including a comprehensive discussion of the known risks, side effects and potential benefits of treatment), the patient (family) agrees to implement the following specific plan:   No treatment necessary  Monitor for changes   When outside we recommend using a wide brim hat, sunglasses, long sleeve and pants, sunscreen with SPF 33+ with reapplication every 2 hours, or SPF specific clothing    Recommend a full skin check within the next 3 months    How can basal cell carcinoma be prevented? The most important way to prevent BCC is to avoid sunburn  This is especially important in childhood and early life  Fair skinned individuals and those with a personal or family history of BCC should protect their skin from sun exposure daily, year-round and lifelong   Stay indoors or under the shade in the middle of the day    Wear covering clothing    Apply high protection factor SPF50+ broad-spectrum sunscreens generously to exposed skin if outdoors    Avoid indoor tanning (sun beds, solaria)   Oral nicotinamide (vitamin B3) in a dose of 500 mg twice daily may reduce the number and severity of BCCs  What is the outlook for basal cell carcinoma? Most BCCs are cured by treatment  Cure is most likely if treatment is undertaken when the lesion is small  About 50% of people with BCC develop a second one within 3 years of the first  They are also at increased risk of other skin cancers, especially melanoma  Regular self-skin examinations and long-term annual skin checks by an experienced health professional are recommended

## 2021-01-11 NOTE — PROGRESS NOTES
[facility] Follow Up Note    Patient Name: Rashmi Baldwin  Encounter Date: 3 13 [id number]    Today's Chief Concerns:  Karis Guaman Concern #1:  [address] follow up    Concern #2:    Karis Guaman Concern #3:      Current Medications:    Current Outpatient Medications:     acetaminophen (TYLENOL) 500 mg tablet, Take 500 mg by mouth daily, Disp: , Rfl:     donepezil (ARICEPT) 10 mg tablet, TAKE ONE (1) TABLET BY MOUTH AT BEDTIME, Disp: 30 tablet, Rfl: 6    fexofenadine (ALLEGRA) 60 MG tablet, Take 60 mg by mouth daily, Disp: , Rfl:     Melatonin 3 MG CAPS, Take 3 mg by mouth daily at bedtime Patient takes 1 1/2 mg tablets , Disp: , Rfl:     Multiple Vitamins-Minerals (CENTRUM SILVER ULTRA MENS PO), Take 1 tablet by mouth daily, Disp: , Rfl:     rosuvastatin (CRESTOR) 5 mg tablet, TAKE ONE TABLET BY MOUTH DAILY, Disp: 360 tablet, Rfl: 1    sertraline (ZOLOFT) 50 mg tablet, Take 1 tablet (50 mg total) by mouth daily, Disp: 30 tablet, Rfl: 6    tamsulosin (FLOMAX) 0 4 mg, TAKE ONE TABLET DAILY WITH DINNER, Disp: 90 capsule, Rfl: 3    aspirin 81 mg chewable tablet, Chew 81 mg daily as needed for mild pain, Disp: , Rfl:     guaiFENesin (MUCINEX) 600 mg 12 hr tablet, Take 1,200 mg by mouth 2 (two) times a day as needed for cough, Disp: , Rfl:     CONSTITUTIONAL:   Vitals:    01/11/21 1552   Temp: 98 1 °F (36 7 °C)   TempSrc: Temporal   Weight: 80 7 kg (178 lb)   Height: 5' 9" (1 753 m)             Specific Alerts:    Have you been seen by a [facility]ologist in the last 3 years? YES    Are you pregnant or planning to become pregnant? N/A    Are you currently or planning to be nursing or breast feeding? N/A    No Known Allergies    May we call your Preferred Phone number to discuss your specific medical information? YES    May we leave a detailed message that includes your specific medical information? YES    Have you traveled outside of the [facility] in the past 3 months?  No    Do you currently have a pacemaker or defibrillator? No    Do you have any artificial heart valves, joints, plates, screws, rods, stents, pins, etc? No   - If Yes, were any placed within the last 2 years? Do you require any medications prior to a surgical procedure? No   - If Yes, for which procedure? - If Yes, what medications to you require? Are you taking any medications that cause you to bleed more easily ("blood thinners") No    Have you ever experienced a rapid heartbeat with epinephrine? No    Have you ever been treated with "gold" (gold sodium thiomalate) therapy? No    [facility] can help with wrinkles, "laugh lines," facial volume loss, "double chin," "love handles," age spots, and more  Are you interested in learning today about some of the skin enhancement procedures that we offer? (If Yes, please provide more detail) No    Review of Systems:  Have you recently had or currently have any of the following?     · Fever or chills: No  · Night Sweats: No  · Headaches: No  · Weight Gain: No  · Weight Loss: No  · Blurry Vision: No  · Nausea: No  · Vomiting: No  · Diarrhea: No  · Blood in Stool: No  · Abdominal Pain: No  · Itchy Skin: No  · Painful Joints: No  · Swollen Joints: No  · Muscle Pain: No  · Irregular Mole: No  · Sun Burn: No  · Dry Skin: No  · Skin Color Changes: No  · Scar or Keloid: No  · Cold Sores/Fever Blisters: No  · Bacterial Infections/MRSA: No  · Anxiety: No  · Depression: No  · Suicidal or Homicidal Thoughts: No      PSYCH: Normal mood and affect  EYES: Normal conjunctiva  ENT: Normal lips and oral mucosa  CARDIOVASCULAR: No edema  RESPIRATORY: Normal respirations  HEME/LYMPH/IMMUNO:  No regional lymphadenopathy except as noted below in ASSESSMENT AND PLAN BY DIAGNOSIS    FULL ORGAN SYSTEM SKIN EXAM (SKIN)    Face Normal except as noted below in Assessment           HISTORY OF BASAL CELL CARCINOMA    Physical Exam:   Anatomic Location Affected:  Right nares   Morphological Description of scar: Well healed scar   Suspected Recurrence: No   Pertinent Positives:   Pertinent Negatives: Additional History of Present Condition:  History of basal cell carcinoma s/p mohs with no sign of recurrence    Assessment and Plan:  Based on a thorough discussion of this condition and the management approach to it (including a comprehensive discussion of the known risks, side effects and potential benefits of treatment), the patient (family) agrees to implement the following specific plan:   No treatment necessary  Monitor for changes   When outside we recommend using a wide brim hat, sunglasses, long sleeve and pants, sunscreen with SPF 59+ with reapplication every 2 hours, or SPF specific clothing    Recommend a full skin check within the next 3 months    How can basal cell carcinoma be prevented? The most important way to prevent BCC is to avoid sunburn  This is especially important in childhood and early life  Fair skinned individuals and those with a personal or family history of BCC should protect their skin from sun exposure daily, year-round and lifelong   Stay indoors or under the shade in the middle of the day    Wear covering clothing    Apply high protection factor SPF50+ broad-spectrum sunscreens generously to exposed skin if outdoors    Avoid indoor tanning (sun beds, solaria)   Oral nicotinamide (vitamin B3) in a dose of 500 mg twice daily may reduce the number and severity of BCCs  What is the outlook for basal cell carcinoma? Most BCCs are cured by treatment  Cure is most likely if treatment is undertaken when the lesion is small  About 50% of people with BCC develop a second one within 3 years of the first  They are also at increased risk of other skin cancers, especially melanoma  Regular self-skin examinations and long-term annual skin checks by an experienced health professional are recommended      Scribe Attestation    I,:  Emilia Morejon am acting as a scribe while in the presence of the attending physician :       I,:  Serjio Molina MD personally performed the services described in this documentation    as scribed in my presence :

## 2021-01-25 ENCOUNTER — OFFICE VISIT (OUTPATIENT)
Dept: NEUROLOGY | Facility: CLINIC | Age: 81
End: 2021-01-25
Payer: COMMERCIAL

## 2021-01-25 VITALS
HEIGHT: 69 IN | DIASTOLIC BLOOD PRESSURE: 80 MMHG | SYSTOLIC BLOOD PRESSURE: 132 MMHG | BODY MASS INDEX: 26.16 KG/M2 | HEART RATE: 73 BPM | WEIGHT: 176.6 LBS

## 2021-01-25 DIAGNOSIS — F39 MOOD DISORDER (HCC): Primary | ICD-10-CM

## 2021-01-25 DIAGNOSIS — F02.80 DEMENTIA ASSOCIATED WITH OTHER UNDERLYING DISEASE WITHOUT BEHAVIORAL DISTURBANCE (HCC): ICD-10-CM

## 2021-01-25 PROCEDURE — 1036F TOBACCO NON-USER: CPT | Performed by: PSYCHIATRY & NEUROLOGY

## 2021-01-25 PROCEDURE — 99213 OFFICE O/P EST LOW 20 MIN: CPT | Performed by: PSYCHIATRY & NEUROLOGY

## 2021-01-25 PROCEDURE — 1160F RVW MEDS BY RX/DR IN RCRD: CPT | Performed by: PSYCHIATRY & NEUROLOGY

## 2021-01-25 RX ORDER — DONEPEZIL HYDROCHLORIDE 10 MG/1
10 TABLET, FILM COATED ORAL
Qty: 30 TABLET | Refills: 6 | Status: SHIPPED | OUTPATIENT
Start: 2021-01-25 | End: 2021-06-14

## 2021-01-25 NOTE — PROGRESS NOTES
Progress Note - Neurology   Radhika Brady 68 y o  male MRN: [id number]  Unit/Bed#:  Encounter: [id number]      Subjective:   Patient is here for a follow-up visit accompanied with his daughter and known to suffer from dementia, was last seen by me 6 months ago has been maintained on Aricept 10 mg daily and Zoloft 50 mg daily and uses multivitamins on a regular basis  As per the daughter he has been doing well with occasional difficulty with episodic memory but otherwise can perform his activities of daily living, has been eating and sleeping well, and denies any other neurological symptoms  His last blood work was done over 6 months ago and patient is due to get his blood work for his PCP in the near future  ROS:   Review of Systems   Constitutional: Negative  Negative for appetite change and fever  HENT: Negative  Negative for hearing loss, tinnitus, trouble swallowing and voice change  Eyes: Negative  Negative for photophobia and pain  Respiratory: Negative  Negative for shortness of breath  Cardiovascular: Negative  Negative for palpitations  Gastrointestinal: Negative  Negative for nausea and vomiting  Endocrine: Negative  Negative for cold intolerance  Genitourinary: Negative  Negative for dysuria, frequency and urgency  Musculoskeletal: Negative  Negative for myalgias and neck pain  Skin: Negative  Negative for rash  Allergic/Immunologic: Negative  Neurological: Negative  Negative for dizziness, tremors, seizures, syncope, facial asymmetry, speech difficulty, weakness, light-headedness, numbness and headaches  Hematological: Negative  Does not bruise/bleed easily  Psychiatric/Behavioral: Negative  Negative for confusion, hallucinations and sleep disturbance  MA review of systems was reviewed by myself      Vitals:   Vitals:    01/25/21 1205   BP: 132/80   BP Location: Left arm   Patient Position: Sitting   Cuff Size: Adult   Pulse: 73   Weight: 80 1 kg (176 lb 9 6 oz)   Height: 5' 9" (1 753 m)       MEDS:      Current Outpatient Medications:     acetaminophen (TYLENOL) 500 mg tablet, Take 500 mg by mouth daily, Disp: , Rfl:     aspirin 81 mg chewable tablet, Chew 81 mg daily as needed for mild pain, Disp: , Rfl:     donepezil (ARICEPT) 10 mg tablet, TAKE ONE (1) TABLET BY MOUTH AT BEDTIME, Disp: 30 tablet, Rfl: 6    fexofenadine (ALLEGRA) 60 MG tablet, Take 60 mg by mouth daily, Disp: , Rfl:     guaiFENesin (MUCINEX) 600 mg 12 hr tablet, Take 1,200 mg by mouth 2 (two) times a day as needed for cough, Disp: , Rfl:     Melatonin 3 MG CAPS, Take 3 mg by mouth daily at bedtime Patient takes 1 1/2 mg tablets , Disp: , Rfl:     Multiple Vitamins-Minerals (CENTRUM SILVER ULTRA MENS PO), Take 1 tablet by mouth daily, Disp: , Rfl:     rosuvastatin (CRESTOR) 5 mg tablet, TAKE ONE TABLET BY MOUTH DAILY, Disp: 360 tablet, Rfl: 1    sertraline (ZOLOFT) 50 mg tablet, Take 1 tablet (50 mg total) by mouth daily, Disp: 30 tablet, Rfl: 6    tamsulosin (FLOMAX) 0 4 mg, TAKE ONE TABLET DAILY WITH DINNER, Disp: 90 capsule, Rfl: 3  :    Physical Exam:  General appearance: alert, appears stated age and cooperative  Head: Normocephalic, without obvious abnormality, atraumatic    [place] cognitive assessment score is 17/30 as compared to 18/30 in July of last year, patient otherwise is alert awake oriented his speech is fluent, he had no difficulty obeying 3 step commands across the midline, cranial nerves 2-12 intact, no focal motor or sensory deficits were noted, no evidence of any dysmetria was noted and his gait is normal based  No bruits were appreciable in the neck  Lab Results: I have personally reviewed pertinent reports  Imaging Studies: I have personally reviewed pertinent reports  Assessment:  1  Dementia associated with 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 behavioral dysfunction  2  Mood disorder      Plan:  Patient is advised to continue present medications, home exercise program is encouraged, he does participate with mental stimulating exercises, and will return back to see me in 6 months       1/25/2021,12:05 PM    Dictation voice to text software has been used in the creation of this document  Please consider this in light of any contextual or grammatical errors

## 2021-02-22 DIAGNOSIS — E78.2 MIXED HYPERLIPIDEMIA: ICD-10-CM

## 2021-02-22 RX ORDER — ROSUVASTATIN CALCIUM 5 MG/1
5 TABLET, COATED ORAL DAILY
Qty: 360 TABLET | Refills: 1 | Status: CANCELLED | OUTPATIENT
Start: 2021-02-22

## 2021-02-24 ENCOUNTER — OFFICE VISIT (OUTPATIENT)
Dept: FAMILY MEDICINE CLINIC | Facility: CLINIC | Age: 81
End: 2021-02-24
Payer: COMMERCIAL

## 2021-02-24 VITALS
HEART RATE: 73 BPM | WEIGHT: 173.2 LBS | DIASTOLIC BLOOD PRESSURE: 80 MMHG | HEIGHT: 69 IN | SYSTOLIC BLOOD PRESSURE: 131 MMHG | RESPIRATION RATE: 16 BRPM | OXYGEN SATURATION: 94 % | TEMPERATURE: 98.1 F | BODY MASS INDEX: 25.65 KG/M2

## 2021-02-24 DIAGNOSIS — C61 MALIGNANT NEOPLASM OF PROSTATE (HCC): ICD-10-CM

## 2021-02-24 DIAGNOSIS — F02.81 DEMENTIA ASSOCIATED WITH OTHER UNDERLYING DISEASE WITH BEHAVIORAL DISTURBANCE (HCC): ICD-10-CM

## 2021-02-24 DIAGNOSIS — R91.8 PULMONARY NODULES: ICD-10-CM

## 2021-02-24 DIAGNOSIS — E78.2 MIXED HYPERLIPIDEMIA: Primary | ICD-10-CM

## 2021-02-24 DIAGNOSIS — F10.20 ALCOHOLISM (HCC): ICD-10-CM

## 2021-02-24 DIAGNOSIS — F02.80 DEMENTIA ASSOCIATED WITH OTHER UNDERLYING DISEASE WITHOUT BEHAVIORAL DISTURBANCE (HCC): ICD-10-CM

## 2021-02-24 PROBLEM — F02.818 DEMENTIA ASSOCIATED WITH OTHER UNDERLYING DISEASE WITH BEHAVIORAL DISTURBANCE: Status: ACTIVE | Noted: 2021-02-24

## 2021-02-24 PROCEDURE — 1036F TOBACCO NON-USER: CPT | Performed by: FAMILY MEDICINE

## 2021-02-24 PROCEDURE — 99214 OFFICE O/P EST MOD 30 MIN: CPT | Performed by: FAMILY MEDICINE

## 2021-02-24 PROCEDURE — 1160F RVW MEDS BY RX/DR IN RCRD: CPT | Performed by: FAMILY MEDICINE

## 2021-02-24 RX ORDER — ROSUVASTATIN CALCIUM 5 MG/1
5 TABLET, COATED ORAL DAILY
Qty: 360 TABLET | Refills: 1 | Status: SHIPPED | OUTPATIENT
Start: 2021-02-24 | End: 2021-08-16 | Stop reason: SDUPTHER

## 2021-02-24 NOTE — PROGRESS NOTES
Assessment/Plan:     Chronic Problems:  No problem-specific Assessment & Plan notes found for this encounter  Visit Diagnosis:  Diagnoses and all orders for this visit:    Mixed hyperlipidemia  -     Comprehensive metabolic panel; Future  -     CBC and differential; Future  -     TSH, 3rd generation; Future  -     Lipid panel; Future  -     Hemoglobin A1C; Future  -     rosuvastatin (CRESTOR) 5 mg tablet; Take 1 tablet (5 mg total) by mouth daily    Dementia associated with 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 ([facility] )  -     Comprehensive metabolic panel; Future  -     CBC and differential; Future  -     TSH, 3rd generation; Future  -     Lipid panel; Future  -     Hemoglobin A1C; Future    Malignant neoplasm of prostate (HCC)  -     Comprehensive metabolic panel; Future  -     CBC and differential; Future  -     TSH, 3rd generation; Future  -     Lipid panel; Future  -     Hemoglobin A1C; Future    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 (HCC)  -     Comprehensive metabolic panel; Future  -     CBC and differential; Future  -     TSH, 3rd generation; Future  -     Lipid panel; Future  -     Hemoglobin A1C; Future    Alcoholism ([facility] )  -     Comprehensive metabolic panel; Future  -     CBC and differential; Future  -     TSH, 3rd generation; Future  -     Lipid panel; Future  -     Hemoglobin A1C; Future    Pulmonary nodules  -     CT chest high resolution;  Future     hyperlipidemia   stable, tolerating statin negative missed dosing will obtain updated profile   pulmonary nodules   incidentally found previous exam, history of smoking, will need updated CT scan for follow-up   smoking, alcohol   abstinent, encouraged continue   dementia   stable, continue current medications continued follow-up neurology    Cancer prostate   stable, no reported urinary symptoms, continued follow-up [facility]      Subjective:    Patient ID: Christina hampton a 68 y o  male  Follow-up of here with daughter   Dementia/ mood disorder, meds continued negative changes   seen evaluated with Neurology Dr Josette Schulz   dermatology md meneses  Prostate ca = md gerard , followed on a regular basis , urine flow not a complaint   Smoking quit approximately 4 years ago, cold turkey along with alcohol none   doing well,   has yet to go for repeat CT I scan in relation to pulmonary nodules, would like to hold off secondary current pandemic, in also will be receiving 2nd COVID vaccine next few days   blood pressure is relatively normal checks at home no elevations   cholesterol, will need refill on Crestor   blood sugars without issue dietary controlled     Colonoscopy completed will needd a repeat   Delayed secondary to covid       will be receiving 2nd covid           The following portions of the patient's history were reviewed and updated as appropriate: allergies, current medications, past family history, past medical history, past social history, past surgical history and problem list     Review of Systems   Constitutional: Negative for appetite change, chills, fever and unexpected weight change  HENT: Negative for congestion, dental problem, ear pain, hearing loss, postnasal drip, rhinorrhea, sinus pressure, sinus pain, sneezing, sore throat, tinnitus and voice change  Eyes: Negative for visual disturbance  Respiratory: Negative for apnea, cough, chest tightness and shortness of breath  Cardiovascular: Negative for chest pain, palpitations and leg swelling  Gastrointestinal: Negative for abdominal pain, blood in stool, constipation, diarrhea, nausea and vomiting  Endocrine: Negative for cold intolerance, heat intolerance, polydipsia, polyphagia and polyuria  Genitourinary: Negative for decreased urine volume, difficulty urinating, dysuria, frequency and hematuria  Musculoskeletal: Negative for arthralgias, back pain, gait problem, joint swelling and myalgias  Skin: Negative for color change, rash and wound  Allergic/Immunologic: Negative for environmental allergies and food allergies  Neurological: Negative for dizziness, syncope, weakness, light-headedness, numbness and headaches  Hematological: Negative for adenopathy  Does not bruise/bleed easily  Psychiatric/Behavioral: Negative for sleep disturbance and suicidal ideas  The patient is not nervous/anxious  /80 (BP Location: Left arm, Patient Position: Sitting, Cuff Size: Adult)   Pulse 73   Temp 98 1 °F (36 7 °C)   Resp 16   Ht 5' 9" (1 753 m)   Wt 78 6 kg (173 lb 3 2 oz)   SpO2 94%   BMI 25 58 kg/m²   Social History     Socioeconomic History    Marital status: Legally      Spouse name: Not on file    Number of children: Not on file    Years of education: Not on file    Highest education level: Not on file   Occupational History    Occupation: employed   Social Needs    Financial resource strain: Not on file    Food insecurity     Worry: Not on file     Inability: Not on file   [employer] needs     Medical: Not on file     Non-medical: Not on file   Tobacco Use    Smoking status: Former Smoker     Packs/day: 1 00     Years: 60 00     Pack years: 60 00     Types: Cigarettes     Quit date: 3/19/2019     Years since quittin 9    Smokeless tobacco: Never Used    Tobacco comment: daily   Substance and Sexual Activity    Alcohol use: No     Comment: former    Drug use: No    Sexual activity: Yes     Partners: Female   Lifestyle    Physical activity     Days per week: 2 days     Minutes per session: 30 min    Stress:  Only a little   Relationships    Social connections     Talks on phone: Not on file     Gets together: Not on file     Attends [employer] service: Not on file     Active member of club or organization: Not on file     Attends meetings of clubs or organizations: Not on file     Relationship status: Not on file    Intimate partner violence     Fear of current or ex partner: Not on file     Emotionally abused: Not on file     Physically abused: Not on file     Forced sexual activity: Not on file   Other Topics Concern    Not on file   Social History Narrative    Not on file     Past Medical History:   Diagnosis Date    Alzheimer disease ([facility] )     Anxiety     Basal cell carcinoma 08/18/2020    Dr Mattie Berrios Bile duct disease     Cancer ([facility] )     prostate, skin     Cardiac arrest ([facility] )     Choledocholithiasis     Dementia ([facility] )     Depression     Kidney stone     Liver disease     Malignant neoplasm of prostate ([facility] )     Mood disorder ([facility] )     Prostate cancer ([facility] )     Right carotid bruit     Seizure disorder ([facility] )     Skin cancer      Family History   Problem Relation Age of Onset    Cancer Mother     Dementia Mother     Alzheimer's disease Mother     Sudden death Father      Past Surgical History:   Procedure Laterality Date    CHOLECYSTECTOMY      ERCP N/A 3/9/2018    Procedure: ENDOSCOPIC RETROGRADE CHOLANGIOPANCREATOGRAPHY (ERCP); Surgeon: Gerhardt Kitten, MD;  [facility]: [facility];  Service: Gastroenterology    ERCP  04/13/2018    stenting    ERCP N/A 6/8/2018    Procedure: ENDOSCOPIC RETROGRADE CHOLANGIOPANCREATOGRAPHY (ERCP); Surgeon: Gerhardt Kitten, MD;  [facility];  Service: Gastroenterology    [facility]  [facility]    Right Dr Asa hahn BODY/STENT BILIARY/PANC DUCT N/A 4/13/2018    Procedure: ENDOSCOPIC RETROGRADE CHOLANGIOPANCREATOGRAPHY (ERCP);   Surgeon: Gerhardt Kitten, MD;  [facility];  Service: Gastroenterology    [place] LAP,CHOLECYSTECTOMY N/A 5/15/2018    Procedure: Kelsi Grapes;  Surgeon: Rui Mclain MD;  [facility]: [facility];  Service: General    PROSTATE SURGERY      TONSILLECTOMY         Current Outpatient Medications:     acetaminophen (TYLENOL) 500 mg tablet, Take 500 mg by mouth daily, Disp: , Rfl:    donepezil (ARICEPT) 10 mg tablet, Take 1 tablet (10 mg total) by mouth daily at bedtime, Disp: 30 tablet, Rfl: 6    fexofenadine (ALLEGRA) 60 MG tablet, Take 60 mg by mouth daily, Disp: , Rfl:     guaiFENesin (MUCINEX) 600 mg 12 hr tablet, Take 1,200 mg by mouth 2 (two) times a day as needed for cough, Disp: , Rfl:     Melatonin 3 MG CAPS, Take 3 mg by mouth daily at bedtime Patient takes 1 1/2 mg tablets , Disp: , Rfl:     Multiple Vitamins-Minerals (CENTRUM SILVER ULTRA MENS PO), Take 1 tablet by mouth daily, Disp: , Rfl:     rosuvastatin (CRESTOR) 5 mg tablet, Take 1 tablet (5 mg total) by mouth daily, Disp: 360 tablet, Rfl: 1    sertraline (ZOLOFT) 50 mg tablet, Take 1 tablet (50 mg total) by mouth daily, Disp: 30 tablet, Rfl: 6    tamsulosin (FLOMAX) 0 4 mg, TAKE ONE TABLET DAILY WITH DINNER, Disp: 90 capsule, Rfl: 3    aspirin 81 mg chewable tablet, Chew 81 mg daily as needed for mild pain, Disp: , Rfl:     No Known Allergies       Lab Review   No visits with results within 6 Month(s) from this visit     Latest known visit with results is:   Office Visit on 08/03/2020   Component Date Value    Case Report 08/03/2020                      Value:Surgical Pathology Report                         Case: [id number]                                   Authorizing Provider:  Misbah Jose MD          Collected:           08/03/2020 1110              Ordering Location:     [facility]           Received:            08/03/2020 [address] [facility]                                                           Pathologist:           Carlos Luke MD                                                           Specimens:   A) - Skin, Other, Right Nares                                                                       B) - Chest Wall, Left Chest  Final Diagnosis 08/03/2020                      Value: This result contains rich text formatting which cannot be displayed here   Additional Information 08/03/2020                      Value: This result contains rich text formatting which cannot be displayed here  Jesús Castrejon Gross Description 08/03/2020                      Value: This result contains rich text formatting which cannot be displayed here   Clinical Information 08/03/2020                      Value:Rule Out Basal Cell Carcinoma/Irritated Keratosis Rule Out Atypia        Imaging: No results found  Objective:     Physical Exam  Constitutional:       General: He is not in acute distress  Appearance: He is well-developed  He is not ill-appearing  HENT:      Head: Normocephalic and atraumatic  Neck:      Musculoskeletal: Normal range of motion and neck supple  Vascular: No carotid bruit  Cardiovascular:      Rate and Rhythm: Normal rate and regular rhythm  Heart sounds: Normal heart sounds  Pulmonary:      Effort: Pulmonary effort is normal       Breath sounds: Normal breath sounds  Musculoskeletal: Normal range of motion  Lymphadenopathy:      Cervical: No cervical adenopathy  Skin:     General: Skin is warm and dry  Neurological:      Mental Status: He is alert  He is disoriented  Deep Tendon Reflexes: Reflexes are normal and symmetric  Psychiatric:         Behavior: Behavior normal          Thought Content: Thought content normal          Judgment: Judgment normal            There are no Patient Instructions on file for this visit  SAAD Gutierrez    Portions of the record may have been created with voice recognition software  Occasional wrong word or "sound a like" substitutions may have occurred due to the inherent limitations of voice recognition software  Read the chart carefully and recognize, using context, where substitutions have occurred

## 2021-06-03 ENCOUNTER — OFFICE VISIT (OUTPATIENT)
Dept: DERMATOLOGY | Facility: CLINIC | Age: 81
End: 2021-06-03
Payer: COMMERCIAL

## 2021-06-03 VITALS — HEIGHT: 69 IN | BODY MASS INDEX: 26.07 KG/M2 | WEIGHT: 176 LBS | TEMPERATURE: 98 F

## 2021-06-03 DIAGNOSIS — Z85.828 HISTORY OF BASAL CELL CARCINOMA: Primary | ICD-10-CM

## 2021-06-03 DIAGNOSIS — D22.60 MULTIPLE BENIGN MELANOCYTIC NEVI OF UPPER EXTREMITY, LOWER EXTREMITY, AND TRUNK: ICD-10-CM

## 2021-06-03 DIAGNOSIS — D22.5 MULTIPLE BENIGN MELANOCYTIC NEVI OF UPPER EXTREMITY, LOWER EXTREMITY, AND TRUNK: ICD-10-CM

## 2021-06-03 DIAGNOSIS — D18.01 CHERRY ANGIOMA: ICD-10-CM

## 2021-06-03 DIAGNOSIS — L82.1 SEBORRHEIC KERATOSIS: ICD-10-CM

## 2021-06-03 DIAGNOSIS — D48.5 NEOPLASM OF UNCERTAIN BEHAVIOR OF SKIN: ICD-10-CM

## 2021-06-03 DIAGNOSIS — D22.70 MULTIPLE BENIGN MELANOCYTIC NEVI OF UPPER EXTREMITY, LOWER EXTREMITY, AND TRUNK: ICD-10-CM

## 2021-06-03 PROCEDURE — 88305 TISSUE EXAM BY PATHOLOGIST: CPT | Performed by: STUDENT IN AN ORGANIZED HEALTH CARE EDUCATION/TRAINING PROGRAM

## 2021-06-03 PROCEDURE — 11102 TANGNTL BX SKIN SINGLE LES: CPT | Performed by: DERMATOLOGY

## 2021-06-03 PROCEDURE — 99214 OFFICE O/P EST MOD 30 MIN: CPT | Performed by: DERMATOLOGY

## 2021-06-03 PROCEDURE — 1036F TOBACCO NON-USER: CPT | Performed by: DERMATOLOGY

## 2021-06-03 PROCEDURE — 1160F RVW MEDS BY RX/DR IN RCRD: CPT | Performed by: DERMATOLOGY

## 2021-06-03 NOTE — PROGRESS NOTES
[facility] Follow Up Note    Patient Name: Paloma Vilchis  Encounter Date: 06/03/2021    Today's Chief Concerns:  [facility] Concern #1:  BCC follow up     Current Medications:    Current Outpatient Medications:     acetaminophen (TYLENOL) 500 mg tablet, Take 500 mg by mouth daily, Disp: , Rfl:     aspirin 81 mg chewable tablet, Chew 81 mg daily as needed for mild pain, Disp: , Rfl:     donepezil (ARICEPT) 10 mg tablet, Take 1 tablet (10 mg total) by mouth daily at bedtime, Disp: 30 tablet, Rfl: 6    fexofenadine (ALLEGRA) 60 MG tablet, Take 60 mg by mouth daily, Disp: , Rfl:     guaiFENesin (MUCINEX) 600 mg 12 hr tablet, Take 1,200 mg by mouth 2 (two) times a day as needed for cough, Disp: , Rfl:     Melatonin 3 MG CAPS, Take 3 mg by mouth daily at bedtime Patient takes 1 1/2 mg tablets , Disp: , Rfl:     Multiple Vitamins-Minerals (CENTRUM SILVER ULTRA MENS PO), Take 1 tablet by mouth daily, Disp: , Rfl:     rosuvastatin (CRESTOR) 5 mg tablet, Take 1 tablet (5 mg total) by mouth daily, Disp: 360 tablet, Rfl: 1    sertraline (ZOLOFT) 50 mg tablet, Take 1 tablet (50 mg total) by mouth daily, Disp: 30 tablet, Rfl: 6    tamsulosin (FLOMAX) 0 4 mg, TAKE ONE TABLET DAILY WITH DINNER, Disp: 90 capsule, Rfl: 3    CONSTITUTIONAL:   Vitals:    06/03/21 0901   Temp: 98 °F (36 7 °C)   TempSrc: Temporal   Weight: 79 8 kg (176 lb)   Height: 5' 9" (1 753 m)     Specific Alerts:    Have you been seen by a [facility]atologist in the last 3 years? YES    No Known Allergies    May we call your Preferred Phone number to discuss your specific medical information? YES    May we leave a detailed message that includes your specific medical information? YES    Have you traveled outside of the [facility] in the past 3 months? No    Do you currently have a pacemaker or defibrillator?  No    Do you have any artificial heart valves, joints, plates, screws, rods, stents, pins, etc? No   - If Yes, were any placed within the last 2 years? Do you require any medications prior to a surgical procedure? No     Are you taking any medications that cause you to bleed more easily ("blood thinners") No    Have you ever experienced a rapid heartbeat with epinephrine? No    Have you ever been treated with "gold" (gold sodium thiomalate) therapy? No    [facility] can help with wrinkles, "laugh lines," facial volume loss, "double chin," "love handles," age spots, and more  Are you interested in learning today about some of the skin enhancement procedures that we offer? (If Yes, please provide more detail) No    Review of Systems:  Have you recently had or currently have any of the following?     · Fever or chills: No  · Night Sweats: No  · Headaches: No  · Weight Gain: No  · Weight Loss: No  · Blurry Vision: No  · Nausea: No  · Vomiting: No  · Diarrhea: No  · Blood in Stool: No  · Abdominal Pain: No  · Itchy Skin: No  · Painful Joints: No  · Swollen Joints: No  · Muscle Pain: No  · Irregular Mole: No  · Sun Burn: No  · Dry Skin: No  · Skin Color Changes: No  · Scar or Keloid: No  · Cold Sores/Fever Blisters: No  · Bacterial Infections/MRSA: No  · Anxiety: No  · Depression: No  · Suicidal or Homicidal Thoughts: No      PSYCH: Normal mood and affect  EYES: Normal conjunctiva  ENT: Normal lips and oral mucosa  CARDIOVASCULAR: No edema  RESPIRATORY: Normal respirations  HEME/LYMPH/IMMUNO:  No regional lymphadenopathy except as noted below in ASSESSMENT AND PLAN BY DIAGNOSIS    FULL ORGAN SYSTEM SKIN EXAM (SKIN)   Hair, Scalp, Ears, Face Normal except as noted below in Assessment   Neck, Cervical Chain Nodes Normal except as noted below in Assessment   Right Arm/Hand/Fingers Normal except as noted below in Assessment   Left Arm/Hand/Fingers Normal except as noted below in Assessment   Chest/Breasts/Axillae Viewed areas Normal except as noted below in Assessment   Abdomen, Umbilicus Normal except as noted below in Assessment   Back/Spine Normal except as noted below in Assessment   Groin/Genitalia/Buttocks Viewed areas Normal except as noted below in Assessment   Right Leg, Foot, Toes Normal except as noted below in Assessment   Left Leg, Foot, Toes Normal except as noted below in Assessment       HISTORY OF BASAL CELL CARCINOMA    Physical Exam:   Anatomic Location Affected:  Right nares   Morphological Description of scar:  Well healed scar   Suspected Recurrence: No   Pertinent Positives:   Pertinent Negatives: Additional History of Present Condition:  History of basal cell carcinoma with no sign of recurrence    Assessment and Plan:  Based on a thorough discussion of this condition and the management approach to it (including a comprehensive discussion of the known risks, side effects and potential benefits of treatment), the patient (family) agrees to implement the following specific plan:  · Monitor for changes  · When outside we recommend using a wide brim hat, sunglasses, long sleeve and pants, sunscreen with SPF 56+ with reapplication every 2 hours, or SPF specific clothing   · Recommend a full skin check within the next 6 months    How can basal cell carcinoma be prevented? The most important way to prevent BCC is to avoid sunburn  This is especially important in childhood and early life  Fair skinned individuals and those with a personal or family history of BCC should protect their skin from sun exposure daily, year-round and lifelong   Stay indoors or under the shade in the middle of the day    Wear covering clothing    Apply high protection factor SPF50+ broad-spectrum sunscreens generously to exposed skin if outdoors    Avoid indoor tanning (sun beds, solaria)   Oral nicotinamide (vitamin B3) in a dose of 500 mg twice daily may reduce the number and severity of BCCs  What is the outlook for basal cell carcinoma? Most BCCs are cured by treatment   Cure is most likely if treatment is undertaken when the lesion is small  About 50% of people with BCC develop a second one within 3 years of the first  They are also at increased risk of other skin cancers, especially melanoma  Regular self-skin examinations and long-term annual skin checks by an experienced health professional are recommended  NEOPLASM OF UNCERTAIN BEHAVIOR OF SKIN    Physical Exam:   (Anatomic Location); (Size and Morphological Description); (Differential Diagnosis):  o Specimen A: Right upper forehead; skin; shave biopsy; 68year old male with 0 6 cm luscent nodule; differential diagnosis:  rule out basal cell carcinoma    Pertinent Positives:   Pertinent Negatives: Additional History of Present Condition:  Present on exam     Assessment and Plan:   I have discussed with the patient that a sample of skin via a "skin biopsy would be potentially helpful to further make a specific diagnosis under the microscope   Based on a thorough discussion of this condition and the management approach to it (including a comprehensive discussion of the known risks, side effects and potential benefits of treatment), the patient (family) agrees to implement the following specific plan:    o Procedure:  Skin Biopsy  After a thorough discussion of treatment options and risk/benefits/alternatives (including but not limited to local pain, scarring, dyspigmentation, blistering, possible superinfection, and inability to confirm a diagnosis via histopathology), verbal and written consent were obtained and portion of the rash was biopsied for tissue sample    See below for consent that was obtained from patient and subsequent Procedure Note   o Pictures taken for chart       PROCEDURE SHAVE BIOPSY NOTE:     Performing Physician: Dr Orozco   Anatomic Location; Clinical Description with size (cm); Pre-Op Diagnosis:   Specimen A: Right upper forehead; skin; shave biopsy; 68year old male with 0 6 cm luscent nodule; differential diagnosis:  rule out basal cell carcinoma    Post-op diagnosis: Same      Local anesthesia: 1% xylocaine with epi       Topical anesthesia: None     Hemostasis: Aluminum chloride       After obtaining informed consent  at which time there was a discussion about the purpose of biopsy  and low risks of infection and bleeding  The area was prepped and draped in the usual fashion  Anesthesia was obtained with 1% lidocaine with epinephrine  A shave biopsy to an appropriate sampling depth was obtained with a sterile blade (such as a 15-blade or DermaBlade)  The resulting wound was covered with surgical ointment and bandaged appropriately  The patient tolerated the procedure well without complications and was without signs of functional compromise  Specimen has been sent for review by Dermatopathology  Standard post-procedure care has been explained and has been included in written form within the patient's copy of Informed Consent  INFORMED CONSENT DISCUSSION AND POST-OPERATIVE INSTRUCTIONS FOR PATIENT    I   RATIONALE FOR PROCEDURE  I understand that a skin biopsy allows the Dermatologist to test a lesion or rash under the microscope to obtain a diagnosis  It usually involves numbing the area with numbing medication and removing a small piece of skin; sometimes the area will be closed with sutures  In this specific procedure, sutures are not usually needed  If any sutures are placed, then they are usually need to be removed in 2 weeks or less  I understand that my Dermatologist recommends that a skin "shave" biopsy be performed today  A local anesthetic, similar to the kind that a dentist uses when filling a cavity, will be injected with a very small needle into the skin area to be sampled  The injected skin and tissue underneath "will go to sleep and become numb so no pain should be felt afterwards    An instrument shaped like a tiny "razor blade" (shave biopsy instrument) will be used to cut a small piece of tissue and skin from the area so that a sample of tissue can be taken and examined more closely under the microscope  A slight amount of bleeding will occur, but it will be stopped with direct pressure and a pressure bandage and any other appropriate methods  I understands that a scar will form where the wound was created  Surgical ointment will be applied to help protect the wound  Sutures are not usually needed  II   RISKS AND POTENTIAL COMPLICATIONS   I understand the risks and potential complications of a skin biopsy include but are not limited to the following:   Bleeding   Infection   Pain   Scar/keloid   Skin discoloration   Incomplete Removal   Recurrence   Nerve Damage/Numbness/Loss of Function   Allergic Reaction to Anesthesia   Biopsies are diagnostic procedures and based on findings additional treatment or evaluation may be required   Loss or destruction of specimen resulting in no additional findings    My Dermatologist has explained to me the nature of the condition, the nature of the procedure, and the benefits to be reasonably expected compared with alternative approaches  My Dermatologist has discussed the likelihood of major risks or complications of this procedure including the specific risks listed above, such as bleeding, infection, and scarring/keloid  I understand that a scar is expected after this procedure  I understand that my physician cannot predict if the scar will form a "keloid," which extends beyond the borders of the wound that is created  A keloid is a thick, painful, and bumpy scar  A keloid can be difficult to treat, as it does not always respond well to therapy, which includes injecting cortisone directly into the keloid every few weeks  While this usually reduces the pain and size of the scar, it does not eliminate it  I understand that photographs may be taken before and after the procedure    These will be maintained as part of the medical providers confidential records and may not be made available to me  I further authorize the medical provider to use the photographs for teaching purposes or to illustrate scientific papers, books, or lectures if in his/her judgment, medical research, education, or science may benefit from its use  I have had an opportunity to fully inquire about the risks and benefits of this procedure and its alternatives  I have been given ample time and opportunity to ask questions and to seek a second opinion if I wished to do so  I acknowledge that there have specifically been no guarantees as to the cosmetic results from the procedure  I am aware that with any procedure there is always the possibility of an unexpected complication  III  POST-PROCEDURAL CARE (WHAT YOU WILL NEED TO DO "AFTER THE BIOPSY" TO OPTIMIZE HEALING)     Keep the area clean and dry  Try NOT to remove the bandage or get it wet for the first 24 hours   Gently clean the area and apply surgical ointment (such as Vaseline petrolatum ointment, which is available "over the counter" and not a prescription) to the biopsy site for up to 2 weeks straight  This acts to protect the wound from the outside world   Sutures are not usually placed in this procedure  If any sutures were placed, return for suture removal as instructed (generally 1 week for the face, 2 weeks for the body)   Take Acetaminophen (Tylenol) for discomfort, if no contraindications  Ibuprofen or aspirin could make bleeding worse   Call our office immediately for signs of infection: fever, chills, increased redness, warmth, tenderness, discomfort/pain, or pus or foul smell coming from the wound  WHAT TO DO IF THERE IS ANY BLEEDING? If a small amount of bleeding is noticed, place a clean cloth over the area and apply firm pressure for ten minutes  Check the wound after 10 minutes of direct pressure    If bleeding persists, try one more time for an additional 10 minutes of direct pressure on the area  If the bleeding becomes heavier or does not stop after the second attempt, or if you have any other questions about this procedure, then please call your [facility] by calling [phone] (SKIN)  I hereby acknowledge that I have reviewed and verified the site with my Dermatologist and have requested and authorized my Dermatologist to proceed with the procedure  SEBORRHEIC KERATOSIS; NON-INFLAMED    Physical Exam:   Anatomic Location Affected:  Trunk and extremities    Morphological Description:  Flat and raised, waxy, smooth to warty textured, yellow to brownish-grey to dark brown to blackish, discrete, "stuck-on" appearing papules   Pertinent Positives:   Pertinent Negatives: Additional History of Present Condition:  Patient reports new bumps on the skin  Denies itch, burn, pain, bleeding or ulceration  Present constantly; nothing seems to make it worse or better  No prior treatment  Assessment and Plan:  Based on a thorough discussion of this condition and the management approach to it (including a comprehensive discussion of the known risks, side effects and potential benefits of treatment), the patient (family) agrees to implement the following specific plan:   Reassure benign    Monitor for changes   The nature of sun-induced photo-aging and skin cancers is discussed  Sun avoidance, protective clothing, and the use of 30-SPF sunscreens is advised  Observe closely for skin damage/changes, and call if such occurs  Seborrheic Keratosis  A seborrheic keratosis is a harmless warty spot that appears during adult life as a common sign of skin aging  Seborrheic keratoses can arise on any area of skin, covered or uncovered, with the usual exception of the palms and soles  They do not arise from mucous membranes  Seborrheic keratoses can have highly variable appearance  Seborrheic keratoses are extremely common   It has been estimated that over 90% of adults over the age of 61 years have one or more of them  They occur in males and females of all races, typically beginning to erupt in the 35s or 45s  They are uncommon under the age of 21 years  The precise cause of seborrhoeic keratoses is not known  Seborrhoeic keratoses are considered degenerative in nature  As time goes by, seborrheic keratoses tend to become more numerous  Some people inherit a tendency to develop a very large number of them; some people may have hundreds of them  The name "seborrheic keratosis" is misleading, because these lesions are not limited to a seborrhoeic distribution (scalp, mid-face, chest, upper back), nor are they formed from sebaceous glands, nor are they associated with sebum -- which is greasy  Seborrheic keratosis may also be called "SK," "Seb K," "basal cell papilloma," "senile wart," or "barnacle "      Researchers have noted:   Eruptive seborrhoeic keratoses can follow sunburn or dermatitis   Skin friction may be the reason they appear in body folds   Viral cause (e g , human papillomavirus) seems unlikely   Stable and clonal mutations or activation of FRFR3, PIK3CA, MICHELET, AKT1 and EGFR genes are found in seborrhoeic keratoses   Seborrhoeic keratosis can arise from solar lentigo   FRFR3 mutations also arise in solar lentigines  These mutations are associated with increased age and location on the head and neck, suggesting a role of ultraviolet radiation in these lesions   Seborrheic keratoses do not harbour tumour suppressor gene mutations   Epidermal growth factor receptor inhibitors, which are used to treat some cancers, often result in an increase in verrucal (warty) keratoses  There is no easy way to remove multiple lesions on a single occasion  Unless a specific lesion is "inflamed" and is causing pain or stinging/burning or is bleeding, most insurance companies do not authorize treatment      SOLO ANGIOMAS    Physical Exam:   Anatomic Location Affected:  Trunk and extremities    Morphological Description:  Scattered cherry red, 1-4 mm papules   Pertinent Positives:   Pertinent Negatives: Additional History of Present Condition:  Present on exam     Assessment and Plan:  Based on a thorough discussion of this condition and the management approach to it (including a comprehensive discussion of the known risks, side effects and potential benefits of treatment), the patient (family) agrees to implement the following specific plan:   Reassured benign    Monitor for changes     Assessment and Plan:    Cherry angioma, also known as [employer] spots, are benign vascular skin lesions  A "cherry angioma" is a firm red, blue or purple papule, 0 1-1 cm in diameter  When thrombosed, they can appear black in colour until evaluated with a dermatoscope when the red or purple colour is more easily seen  Cherry angioma may develop on any part of the body but most often appear on the scalp, face, lips and trunk  An angioma is due to proliferating endothelial cells; these are the cells that line the inside of a blood vessel  Angiomas can arise in early life or later in life; the most common type of angioma is a cherry angioma  Cherry angiomas are very common in males and females of any age or race  They are more noticeable in white skin than in skin of colour  They markedly increase in number from about the age of 36  There may be a family history of similar lesions  Eruptive cherry angiomas have been rarely reported to be associated with internal malignancy  The cause of angiomas is unknown  Genetic analysis of cherry angiomas has shown that they frequently carry specific somatic missense mutations in the GNAQ and GNA11 (Q209H) genes, which are involved in other vascular and melanocytic proliferations  Cherry angioma is usually diagnosed clinically and no investigations are necessary for the majority of lesions   It has a characteristic red-clod or lobular pattern on dermatoscopy (called lacunar pattern using conventional pattern analysis)  When there is uncertainty about the diagnosis, a biopsy may be performed  The angioma is composed of venules in a thickened papillary dermis  Collagen bundles may be prominent between the lobules  Cherry angiomas are harmless, so they do not usually have to be treated  Occasionally, they are removed to exclude a malignant skin lesion such as a nodular melanoma or because they are irritated or bleeding (and a subsequent risk for infection)  To decrease friction over the lesions, we recommend Neutrogena Daily Defense SPF 50+ at least 3 times a day  MELANOCYTIC NEVI ("Moles")    Physical Exam:   Anatomic Location Affected:   Mostly on sun-exposed areas of the trunk and extremities    Morphological Description:  Scattered, 1-4mm round to ovoid, symmetrical-appearing, even bordered, skin colored to dark brown macules/papules, mostly in sun-exposed areas   Pertinent Positives:   Pertinent Negatives: Additional History of Present Condition:  Present on exam     Assessment and Plan:  Based on a thorough discussion of this condition and the management approach to it (including a comprehensive discussion of the known risks, side effects and potential benefits of treatment), the patient (family) agrees to implement the following specific plan:   Reassured benign    Monitor for changes      Melanocytic Nevi  Melanocytic nevi ("moles") are caused by collections of the color producing skin cells, or melanocytes, in 1 area in the skin  They can range in color from pink to dark brown and be either raised or flat  Some moles are present at birth (I e , "congenital nevi"), while others come up later in life (i e , "acquired nevi")  [place] exposure also stimulates the body to make more moles, ie the more sun you get the more moles you'll grow      Clinically distinguishing a healthy mole from melanoma may be difficult  The "ABCDE's" of moles have been suggested as a means of helping to alert a person to a suspicious mole and the possible increased risk of melanoma  Asymmetry: Healthy moles tend to be symmetric, while melanomas are often asymmetric  Asymmetry means if you draw a line through the mole, the two halves do not match in color, size, shape, or surface texture Any mole that starts to demonstrate "asymmetry" should be examined promptly by a board certified dermatologist      Border: Healthy moles tend to have discrete, even borders  The border of a melanoma often blends into the normal skin and does not sharply delineate the mole from normal skin  Any mole that starts to demonstrate "uneven borders" should be examined promptly     Color: Healthy moles tend to be one color throughout  Melanomas tend to be made up of different colors ranging from dark black, blue, white, or red  Any mole that demonstrates a color change should be examined promptly    Diameter: Healthy moles tend to be smaller than 0 6 cm in size; an exception are "congenital nevi" that can be larger  Melanomas tend to grow and can often be greater than 0 6 cm (1/4 of an inch, or the size of a pencil eraser)  This is only a guideline, and many normal moles may be larger than 0 6 cm without being unhealthy  Any mole that starts to change in size (small to bigger or bigger to smaller) should be examined promptly    Evolving: Healthy moles tend to "stay the same "  Melanomas may often show signs of change or evolution such as a change in size, shape, color, or elevation  Any mole that starts to itch, bleed, crust, burn, hurt, or ulcerate or demonstrate a change or evolution should be examined promptly by a board certified dermatologist       What are atypical moles or dysplastic nevi?   Dysplastic moles are moles that have some of the ABCDE  changes listed above but  are not cancerous  Sometimes a biopsy and microscopic examination are needed to determine the difference  They may indicate an increased risk of melanoma in that person, especially if there is a family history of melanoma  What is a Melanoma? The main concern when looking at a new or changing mole it to evaluate whether it may be a melanoma  The appearance of a "new mole" remains one of the most reliable methods for identifying a malignant melanoma  A melanoma is a type of skin cancer that can be deadly if it spreads throughout the body  The prognosis of a Melanoma depends on how deep it has penetrated in the skin  If caught early, they generally will not have had time to grow into the deeper layers of the skin and they cure rate is then very high  Once the melanoma grows deeper into the skin, the cure rate drops dramatically  Therefore, early detection and removal of a malignant melanoma results in a much better chance of complete cure           Scribe Attestation    I,:  Quita Garcia am acting as a scribe while in the presence of the attending physician :       I,:  Daniel Schulte MD personally performed the services described in this documentation    as scribed in my presence :

## 2021-06-03 NOTE — PATIENT INSTRUCTIONS
HISTORY OF BASAL CELL CARCINOMA    Assessment and Plan:  Based on a thorough discussion of this condition and the management approach to it (including a comprehensive discussion of the known risks, side effects and potential benefits of treatment), the patient (family) agrees to implement the following specific plan:  · No treatment necessary  Monitor for changes  · When outside we recommend using a wide brim hat, sunglasses, long sleeve and pants, sunscreen with SPF 08+ with reapplication every 2 hours, or SPF specific clothing   · Recommend a full skin check within the next 6 months    How can basal cell carcinoma be prevented? The most important way to prevent BCC is to avoid sunburn  This is especially important in childhood and early life  Fair skinned individuals and those with a personal or family history of BCC should protect their skin from sun exposure daily, year-round and lifelong   Stay indoors or under the shade in the middle of the day    Wear covering clothing    Apply high protection factor SPF50+ broad-spectrum sunscreens generously to exposed skin if outdoors    Avoid indoor tanning (sun beds, solaria)   Oral nicotinamide (vitamin B3) in a dose of 500 mg twice daily may reduce the number and severity of BCCs  What is the outlook for basal cell carcinoma? Most BCCs are cured by treatment  Cure is most likely if treatment is undertaken when the lesion is small  About 50% of people with BCC develop a second one within 3 years of the first  They are also at increased risk of other skin cancers, especially melanoma  Regular self-skin examinations and long-term annual skin checks by an experienced health professional are recommended  NEOPLASM OF UNCERTAIN BEHAVIOR OF SKIN    Assessment and Plan:   I have discussed with the patient that a sample of skin via a "skin biopsy would be potentially helpful to further make a specific diagnosis under the microscope     Based on a thorough discussion of this condition and the management approach to it (including a comprehensive discussion of the known risks, side effects and potential benefits of treatment), the patient (family) agrees to implement the following specific plan:    o Procedure:  Skin Biopsy  After a thorough discussion of treatment options and risk/benefits/alternatives (including but not limited to local pain, scarring, dyspigmentation, blistering, possible superinfection, and inability to confirm a diagnosis via histopathology), verbal and written consent were obtained and portion of the rash was biopsied for tissue sample  See below for consent that was obtained from patient and subsequent Procedure Note   o Pictures taken for chart     INFORMED CONSENT DISCUSSION AND POST-OPERATIVE INSTRUCTIONS FOR PATIENT    I   RATIONALE FOR PROCEDURE  I understand that a skin biopsy allows the Dermatologist to test a lesion or rash under the microscope to obtain a diagnosis  It usually involves numbing the area with numbing medication and removing a small piece of skin; sometimes the area will be closed with sutures  In this specific procedure, sutures are not usually needed  If any sutures are placed, then they are usually need to be removed in 2 weeks or less  I understand that my Dermatologist recommends that a skin "shave" biopsy be performed today  A local anesthetic, similar to the kind that a dentist uses when filling a cavity, will be injected with a very small needle into the skin area to be sampled  The injected skin and tissue underneath "will go to sleep and become numb so no pain should be felt afterwards  An instrument shaped like a tiny "razor blade" (shave biopsy instrument) will be used to cut a small piece of tissue and skin from the area so that a sample of tissue can be taken and examined more closely under the microscope    A slight amount of bleeding will occur, but it will be stopped with direct pressure and a pressure bandage and any other appropriate methods  I understands that a scar will form where the wound was created  Surgical ointment will be applied to help protect the wound  Sutures are not usually needed  II   RISKS AND POTENTIAL COMPLICATIONS   I understand the risks and potential complications of a skin biopsy include but are not limited to the following:   Bleeding   Infection   Pain   Scar/keloid   Skin discoloration   Incomplete Removal   Recurrence   Nerve Damage/Numbness/Loss of Function   Allergic Reaction to Anesthesia   Biopsies are diagnostic procedures and based on findings additional treatment or evaluation may be required   Loss or destruction of specimen resulting in no additional findings    My Dermatologist has explained to me the nature of the condition, the nature of the procedure, and the benefits to be reasonably expected compared with alternative approaches  My Dermatologist has discussed the likelihood of major risks or complications of this procedure including the specific risks listed above, such as bleeding, infection, and scarring/keloid  I understand that a scar is expected after this procedure  I understand that my physician cannot predict if the scar will form a "keloid," which extends beyond the borders of the wound that is created  A keloid is a thick, painful, and bumpy scar  A keloid can be difficult to treat, as it does not always respond well to therapy, which includes injecting cortisone directly into the keloid every few weeks  While this usually reduces the pain and size of the scar, it does not eliminate it  I understand that photographs may be taken before and after the procedure  These will be maintained as part of the medical providers confidential records and may not be made available to me    I further authorize the medical provider to use the photographs for teaching purposes or to illustrate scientific papers, books, or lectures if in his/her judgment, medical research, education, or science may benefit from its use  I have had an opportunity to fully inquire about the risks and benefits of this procedure and its alternatives  I have been given ample time and opportunity to ask questions and to seek a second opinion if I wished to do so  I acknowledge that there have specifically been no guarantees as to the cosmetic results from the procedure  I am aware that with any procedure there is always the possibility of an unexpected complication  III  POST-PROCEDURAL CARE (WHAT YOU WILL NEED TO DO "AFTER THE BIOPSY" TO OPTIMIZE HEALING)     Keep the area clean and dry  Try NOT to remove the bandage or get it wet for the first 24 hours   Gently clean the area and apply surgical ointment (such as Vaseline petrolatum ointment, which is available "over the counter" and not a prescription) to the biopsy site for up to 2 weeks straight  This acts to protect the wound from the outside world   Sutures are not usually placed in this procedure  If any sutures were placed, return for suture removal as instructed (generally 1 week for the face, 2 weeks for the body)   Take Acetaminophen (Tylenol) for discomfort, if no contraindications  Ibuprofen or aspirin could make bleeding worse   Call our office immediately for signs of infection: fever, chills, increased redness, warmth, tenderness, discomfort/pain, or pus or foul smell coming from the wound  WHAT TO DO IF THERE IS ANY BLEEDING? If a small amount of bleeding is noticed, place a clean cloth over the area and apply firm pressure for ten minutes  Check the wound after 10 minutes of direct pressure  If bleeding persists, try one more time for an additional 10 minutes of direct pressure on the area    If the bleeding becomes heavier or does not stop after the second attempt, or if you have any other questions about this procedure, then please call your [facility] Dermatologist by calling [phone] (SKIN)  I hereby acknowledge that I have reviewed and verified the site with my Dermatologist and have requested and authorized my Dermatologist to proceed with the procedure  SEBORRHEIC KERATOSIS; NON-INFLAMED    Assessment and Plan:  Based on a thorough discussion of this condition and the management approach to it (including a comprehensive discussion of the known risks, side effects and potential benefits of treatment), the patient (family) agrees to implement the following specific plan:   Reassure benign    Monitor for changes   The nature of sun-induced photo-aging and skin cancers is discussed  Sun avoidance, protective clothing, and the use of 30-SPF sunscreens is advised  Observe closely for skin damage/changes, and call if such occurs  Seborrheic Keratosis  A seborrheic keratosis is a harmless warty spot that appears during adult life as a common sign of skin aging  Seborrheic keratoses can arise on any area of skin, covered or uncovered, with the usual exception of the palms and soles  They do not arise from mucous membranes  Seborrheic keratoses can have highly variable appearance  Seborrheic keratoses are extremely common  It has been estimated that over 90% of adults over the age of 61 years have one or more of them  They occur in males and females of all races, typically beginning to erupt in the 35s or 45s  They are uncommon under the age of 21 years  The precise cause of seborrhoeic keratoses is not known  Seborrhoeic keratoses are considered degenerative in nature  As time goes by, seborrheic keratoses tend to become more numerous  Some people inherit a tendency to develop a very large number of them; some people may have hundreds of them      The name "seborrheic keratosis" is misleading, because these lesions are not limited to a seborrhoeic distribution (scalp, mid-face, chest, upper back), nor are they formed from sebaceous glands, nor are they associated with sebum -- which is greasy  Seborrheic keratosis may also be called "SK," "Seb K," "basal cell papilloma," "senile wart," or "barnacle "      Researchers have noted:   Eruptive seborrhoeic keratoses can follow sunburn or dermatitis   Skin friction may be the reason they appear in body folds   Viral cause (e g , human papillomavirus) seems unlikely   Stable and clonal mutations or activation of FRFR3, PIK3CA, MICHELET, AKT1 and EGFR genes are found in seborrhoeic keratoses   Seborrhoeic keratosis can arise from solar lentigo   FRFR3 mutations also arise in solar lentigines  These mutations are associated with increased age and location on the head and neck, suggesting a role of ultraviolet radiation in these lesions   Seborrheic keratoses do not harbour tumour suppressor gene mutations   Epidermal growth factor receptor inhibitors, which are used to treat some cancers, often result in an increase in verrucal (warty) keratoses  There is no easy way to remove multiple lesions on a single occasion  Unless a specific lesion is "inflamed" and is causing pain or stinging/burning or is bleeding, most insurance companies do not authorize treatment  SOLO ANGIOMAS    Assessment and Plan:  Based on a thorough discussion of this condition and the management approach to it (including a comprehensive discussion of the known risks, side effects and potential benefits of treatment), the patient (family) agrees to implement the following specific plan:   Reassured benign    Monitor for changes     Assessment and Plan:    Cherry angioma, also known as [employer] spots, are benign vascular skin lesions  A "cherry angioma" is a firm red, blue or purple papule, 0 1-1 cm in diameter  When thrombosed, they can appear black in colour until evaluated with a dermatoscope when the red or purple colour is more easily seen   Cherry angioma may develop on any part of the body but most often appear on the scalp, face, lips and trunk  An angioma is due to proliferating endothelial cells; these are the cells that line the inside of a blood vessel  Angiomas can arise in early life or later in life; the most common type of angioma is a cherry angioma  Cherry angiomas are very common in males and females of any age or race  They are more noticeable in white skin than in skin of colour  They markedly increase in number from about the age of 36  There may be a family history of similar lesions  Eruptive cherry angiomas have been rarely reported to be associated with internal malignancy  The cause of angiomas is unknown  Genetic analysis of cherry angiomas has shown that they frequently carry specific somatic missense mutations in the GNAQ and GNA11 (Q209H) genes, which are involved in other vascular and melanocytic proliferations  Cherry angioma is usually diagnosed clinically and no investigations are necessary for the majority of lesions  It has a characteristic red-clod or lobular pattern on dermatoscopy (called lacunar pattern using conventional pattern analysis)  When there is uncertainty about the diagnosis, a biopsy may be performed  The angioma is composed of venules in a thickened papillary dermis  Collagen bundles may be prominent between the lobules  Cherry angiomas are harmless, so they do not usually have to be treated  Occasionally, they are removed to exclude a malignant skin lesion such as a nodular melanoma or because they are irritated or bleeding (and a subsequent risk for infection)  To decrease friction over the lesions, we recommend Neutrogena Daily Defense SPF 50+ at least 3 times a day      MELANOCYTIC NEVI ("Moles")    Assessment and Plan:  Based on a thorough discussion of this condition and the management approach to it (including a comprehensive discussion of the known risks, side effects and potential benefits of treatment), the patient (family) agrees to implement the following specific plan:   Reassured benign    Monitor for changes      Melanocytic Nevi  Melanocytic nevi ("moles") are caused by collections of the color producing skin cells, or melanocytes, in 1 area in the skin  They can range in color from pink to dark brown and be either raised or flat  Some moles are present at birth (I e , "congenital nevi"), while others come up later in life (i e , "acquired nevi")  Javan Lions exposure also stimulates the body to make more moles, ie the more sun you get the more moles you'll grow  Clinically distinguishing a healthy mole from melanoma may be difficult  The "ABCDE's" of moles have been suggested as a means of helping to alert a person to a suspicious mole and the possible increased risk of melanoma  Asymmetry: Healthy moles tend to be symmetric, while melanomas are often asymmetric  Asymmetry means if you draw a line through the mole, the two halves do not match in color, size, shape, or surface texture Any mole that starts to demonstrate "asymmetry" should be examined promptly by a board certified dermatologist      Border: Healthy moles tend to have discrete, even borders  The border of a melanoma often blends into the normal skin and does not sharply delineate the mole from normal skin  Any mole that starts to demonstrate "uneven borders" should be examined promptly     Color: Healthy moles tend to be one color throughout  Melanomas tend to be made up of different colors ranging from dark black, blue, white, or red  Any mole that demonstrates a color change should be examined promptly    Diameter: Healthy moles tend to be smaller than 0 6 cm in size; an exception are "congenital nevi" that can be larger  Melanomas tend to grow and can often be greater than 0 6 cm (1/4 of an inch, or the size of a pencil eraser)  This is only a guideline, and many normal moles may be larger than 0 6 cm without being unhealthy    Any mole that starts to change in size (small to bigger or bigger to smaller) should be examined promptly    Evolving: Healthy moles tend to "stay the same "  Melanomas may often show signs of change or evolution such as a change in size, shape, color, or elevation  Any mole that starts to itch, bleed, crust, burn, hurt, or ulcerate or demonstrate a change or evolution should be examined promptly by a board certified dermatologist       What are atypical moles or dysplastic nevi? Dysplastic moles are moles that have some of the ABCDE  changes listed above but  are not cancerous  Sometimes a biopsy and microscopic examination are needed to determine the difference  They may indicate an increased risk of melanoma in that person, especially if there is a family history of melanoma  What is a Melanoma? The main concern when looking at a new or changing mole it to evaluate whether it may be a melanoma  The appearance of a "new mole" remains one of the most reliable methods for identifying a malignant melanoma  A melanoma is a type of skin cancer that can be deadly if it spreads throughout the body  The prognosis of a Melanoma depends on how deep it has penetrated in the skin  If caught early, they generally will not have had time to grow into the deeper layers of the skin and they cure rate is then very high  Once the melanoma grows deeper into the skin, the cure rate drops dramatically  Therefore, early detection and removal of a malignant melanoma results in a much better chance of complete cure

## 2021-06-08 NOTE — RESULT ENCOUNTER NOTE
DERMATOPATHOLOGY RESULT NOTE    Results reviewed by ordering physician  Called patient to personally discuss results  No answer, left voicemail with result  Instructions for Clinical Derm Team:   (remember to route Result Note to appropriate staff):    Call patient and schedule for MOHS  Advise patient that spot is infiltrating BCE on forehead  Best treated with MOHS, one lesion  Any provider  Result & Plan by Specimen:    Specimen A: malignant  Plan: [facility]    Final Diagnosis  A  Skin, right upper forehead, shave biopsy:     BASAL CELL CARCINOMA, SUPERFICIAL, NODULAR, AND INFILTRATIVE TYPES; transected (see note)      Note: In the appropriate clinical context, this histologic subtype may qualify for Mohs surgery; clinical pathological correlation is advised

## 2021-06-09 ENCOUNTER — TELEPHONE (OUTPATIENT)
Dept: DERMATOLOGY | Facility: CLINIC | Age: 81
End: 2021-06-09

## 2021-06-09 NOTE — TELEPHONE ENCOUNTER
Telephone call to patient, In regards scheduling MOHS  Left voice message to patient to please return my call at there earliest convenience   ;

## 2021-06-09 NOTE — TELEPHONE ENCOUNTER
Pre- operative Mohs Telephone Scheduling Note    Do you have a pacemaker or defibrillator? no    Do you take antibiotics before skin or dental procedures? no  If yes, will likely require pre-operative antibiotics  Ask  the patient why they take the antibiotics (usually because of joint replacement)  Do you have a history of a joint replacements within the past 2 years? no   If yes, will likely require pre-operative antibiotics  Ask if orthopaedic surgeon has prescribed pre-operative antibiotics to take before procedures/dental work? Do you take any OTC medications that thin your blood (Aspirin, Aleve, Ibuprofen) or supplements that thin your blood (fish oil, garlic, vitamin E, Ginko Biloba)? yes: Tylenol     Do you take any prescribed medications that thin your blood (Coumadin, Plavix, Xarelto, Eliquis or another prescribed blood thinner)? no    Do you have an allergy to lidocaine or epinephrine? no    Do you have an allergy to shellfish? no    Do you smoke? no      If yes,  patient to try and stop 2 days before surgery and 7 days after the surgery  Minimizing smoking as much as possible during this time will improve healing and the cosmetic result after surgery  Date scheduled: 06/30/2021 Dr Beth Em [facility]) required? no   IF YES, PLEASE FORWARD TO APPROPRIATE PERSONNEL TO HELP COORDINATE  Are there remaining tumors to be scheduled?  no

## 2021-06-14 DIAGNOSIS — F02.80 DEMENTIA ASSOCIATED WITH OTHER UNDERLYING DISEASE WITHOUT BEHAVIORAL DISTURBANCE (HCC): ICD-10-CM

## 2021-06-14 RX ORDER — DONEPEZIL HYDROCHLORIDE 10 MG/1
TABLET, FILM COATED ORAL
Qty: 30 TABLET | Refills: 6 | Status: SHIPPED | OUTPATIENT
Start: 2021-06-14 | End: 2021-08-11 | Stop reason: SDUPTHER

## 2021-06-30 ENCOUNTER — PROCEDURE VISIT (OUTPATIENT)
Dept: DERMATOLOGY | Facility: CLINIC | Age: 81
End: 2021-06-30
Payer: COMMERCIAL

## 2021-06-30 VITALS
WEIGHT: 175.2 LBS | HEIGHT: 69 IN | TEMPERATURE: 96.5 F | BODY MASS INDEX: 25.95 KG/M2 | SYSTOLIC BLOOD PRESSURE: 132 MMHG | DIASTOLIC BLOOD PRESSURE: 82 MMHG

## 2021-06-30 DIAGNOSIS — C44.319 BASAL CELL CARCINOMA (BCC) OF RIGHT FOREHEAD: Primary | ICD-10-CM

## 2021-06-30 PROCEDURE — 17311 MOHS 1 STAGE H/N/HF/G: CPT | Performed by: STUDENT IN AN ORGANIZED HEALTH CARE EDUCATION/TRAINING PROGRAM

## 2021-06-30 PROCEDURE — 12052 INTMD RPR FACE/MM 2.6-5.0 CM: CPT | Performed by: STUDENT IN AN ORGANIZED HEALTH CARE EDUCATION/TRAINING PROGRAM

## 2021-06-30 PROCEDURE — 17312 MOHS ADDL STAGE: CPT | Performed by: STUDENT IN AN ORGANIZED HEALTH CARE EDUCATION/TRAINING PROGRAM

## 2021-06-30 NOTE — PATIENT INSTRUCTIONS
Mohs Microscopic Surgery After Care    WOUND CARE AFTER SURGERY:    1  Do NOT to remove the pressure bandage for 48 hours  Keep the area clean and dry while this bandage is on  2  After removing the bandage for the first time, gently clean the area with soap and water  If the bandage is difficult to remove, getting the bandage wet in the shower will sometimes help soften the adhesive and allow it to be removed more easily  3  You will now need to cleanse this area daily in the shower with gentle soap  There is no need to scrub the area  You will need to apply plain Vaseline ointment (this is over the counter and not a prescription) to the site for up to 2 weeks followed by a clean appropriately sized bandage to area  Non stick dressing and paper tape (or Hypafix) are recommended for sensitive skin but a bandaid is fine if it covers the area well  4  All your stitches will dissolve over the next two weeks  You will need to keep these moist with Vaseline and covered with a bandage over the next 2 weeks for them to dissolve appropriately  RESTRICTIONS:     For two DAYS:   - You will need to take it very easy as this time is highest risk for bleeding  Being a "couch potato" during these two days is generally recommended  - For surgeries on the face/neck/scalp: Avoid leaning down to pick things up off the floor as this brings blood up to your head  Instead, squat down to pick things up  For two WEEKS:   - No heavy lifting (anything greater than 10 pounds)   - You can start to do slow, gentle activities such as slow walking but nothing to increase your heart rate and blood pressure too much (such as cardiovascular exercise)  It is important to take it easy as there is still a risk for bleeding and a high risk popping of stitches open during this time  If we did surgery near the eyes (including the nose, forehead, front part of your scalp, cheeks):  It is VERY common to get a large amount of swelling around your eyes (puffy eyes)  Although less frequent, this can be enough to swell your eyes shut and can also come along with bruising  This should not hurt and is very expected and normal  It is typically worst at ~ 3 days out from your surgery and dramatically better 1 week post-operatively  MANAGING YOUR PAIN AFTER SURGERY     You can expect to have some pain after surgery  This is normal  The pain is typically worse the first two days after surgery, and quickly begins to get better  The best strategy for controlling your pain after surgery is around the clock pain control  You can take over the counter Acetaminophen (Tylenol) for discomfort, if no contraindications  If you are taking this at the maximum dose, you can alternate this with Motrin (ibuprofen or Advil) as well  Alternating these medications with each other allows you to maximize your pain control  In addition to Tylenol and Motrin, you can use heating pads or ice packs on your incisions to help reduce your pain  How will I alternate your regular strength over-the-counter pain medication? You will take a dose of pain medication every three hours   Start by taking 650 mg of Tylenol (2 pills of 325 mg)   3 hours later take 600 mg of Motrin (3 pills of 200 mg)   3 hours after taking the Motrin take 650 mg of Tylenol   3 hours after that take 600 mg of Motrin  See example - if your first dose of Tylenol is at 12:00 PM     12:00 PM  Tylenol 650 mg (2 pills of 325 mg)    3:00 PM  Motrin 600 mg (3 pills of 200 mg)    6:00 PM  Tylenol 650 mg (2 pills of 325 mg)    9:00 PM  Motrin 600 mg (3 pills of 200 mg)    Continue alternating every 3 hours      Important:   Do not take more than 4000mg of Tylenol or 3200mg of Motrin in a 24-hour period  What if I still have pain?    If you have pain that is not controlled with the over-the-counter pain medications (Tylenol and Motrin or Advil), don't hesitate to call our staff using the number provided  We will help make sure you are managing your pain in the best way possible, and if necessary, we can provide a prescription for additional pain medication  CALL OUR OFFICE IMMEDIATELY FOR ANY SIGNS OF INFECTION:    This includes fever, chills, increased redness, warmth, tenderness, severe discomfort/pain, or pus or foul smell coming from the wound  [facility] directly at [phone] (SKIN)    IF BLEEDING IS NOTICED:    Place a clean cloth over the area and apply firm pressure for thirty minutes  Check the wound ONLY after 30 minutes of direct pressure; do not cheat and sneak a peak, as that does not count  If bleeding persists after 30 minutes of legitimate direct pressure, then try one more round of direct pressure to the area  Should the bleeding become heavier or not stop after the second attempt, call [facility] directly at [phone] (SKIN)  Your call will get routed to the dermatology surgeon on call even after hours

## 2021-06-30 NOTE — PROGRESS NOTES
MOHS Procedure Note    Patient: Giacomo Almonte  : 1940  MRN: [id number]  Date: 2021    History of Present Illness: The patient is a 68 y o  male who presents with complaints of Basal cell carcinoma Superficial Nodular Right upper forehead  Past Medical History:   Diagnosis Date    Alzheimer disease ([facility] [facility]     Anxiety     Basal cell carcinoma 2020    Right nares, Dr Laural Frankel Basal cell carcinoma 2021    Right upper forehead    Bile duct disease     Cancer ([facility]     prostate, skin     Cardiac arrest ([facility] [facility]     Choledocholithiasis     Dementia ([facility] [address] [facility]     Depression     Kidney stone     Liver disease     Malignant neoplasm of prostate ([facility] [facility]     Mood disorder ([facility] [address] [facility]     Prostate cancer ([facility] [address] [facility]     Right carotid bruit     [facility]     Skin cancer        Past Surgical History:   Procedure Laterality Date    CHOLECYSTECTOMY      ERCP N/A 3/9/2018    Procedure: ENDOSCOPIC RETROGRADE CHOLANGIOPANCREATOGRAPHY (ERCP); Surgeon: Barnie Gosselin, MD;  [facility];  Service: Gastroenterology    ERCP  2018    stenting    ERCP N/A 2018    Procedure: ENDOSCOPIC RETROGRADE CHOLANGIOPANCREATOGRAPHY (ERCP); Surgeon: Barnie Gosselin, MD;  [facility];  Service: Gastroenterology    MOHS SURGERY  2020    Right nares, Dr Domi Villarreal  2021    Right upper forehead; Dr Bradley Spencer BODY/STENT BILIARY/PANC DUCT N/A 2018    Procedure: ENDOSCOPIC RETROGRADE CHOLANGIOPANCREATOGRAPHY (ERCP);   Surgeon: Barnie Gosselin, MD;  [facility];  Service: Gastroenterology    RI LAP,CHOLECYSTECTOMY N/A 5/15/2018    Procedure: Gissell Shell;  Surgeon: Hemal Walker MD;  [facility]: [facility];  Service: General    PROSTATE SURGERY      TONSILLECTOMY           Current Outpatient Medications:     acetaminophen (TYLENOL) 500 mg tablet, Take 500 mg by mouth daily, Disp: , Rfl:    aspirin 81 mg chewable tablet, Chew 81 mg daily as needed for mild pain, Disp: , Rfl:     donepezil (ARICEPT) 10 mg tablet, TAKE ONE (1) TABLET BY MOUTH AT BEDTIME, Disp: 30 tablet, Rfl: 6    fexofenadine (ALLEGRA) 60 MG tablet, Take 60 mg by mouth daily, Disp: , Rfl:     guaiFENesin (MUCINEX) 600 mg 12 hr tablet, Take 1,200 mg by mouth 2 (two) times a day as needed for cough, Disp: , Rfl:     Melatonin 3 MG CAPS, Take 3 mg by mouth daily at bedtime Patient takes 1 1/2 mg tablets , Disp: , Rfl:     Multiple Vitamins-Minerals (CENTRUM SILVER ULTRA MENS PO), Take 1 tablet by mouth daily, Disp: , Rfl:     rosuvastatin (CRESTOR) 5 mg tablet, Take 1 tablet (5 mg total) by mouth daily, Disp: 360 tablet, Rfl: 1    sertraline (ZOLOFT) 50 mg tablet, Take 1 tablet (50 mg total) by mouth daily, Disp: 30 tablet, Rfl: 6    tamsulosin (FLOMAX) 0 4 mg, TAKE ONE TABLET DAILY WITH DINNER, Disp: 90 capsule, Rfl: 3    No Known Allergies    Physical Exam:   Vitals:    06/30/21 1007   BP: 132/82   Temp: (!) 96 5 °F (35 8 °C)     General: Awake, Alert, Oriented x 3, Mood and affect appropriate  Respiratory: Respirations even and unlabored  Cardiovascular: Peripheral pulses intact; no edema  Musculoskeletal Exam: N/A  Skin: 0 8 cm x 0 7 cm Right upper forehead; Pink shiny papule  Assessment: Bx proven to be Basal cell carcinoma on the Right upper forehead      Plan: MOHS    MOHS Procedure Timeout      Most Recent Value   Timeout:  56   Patient Identity Verified:  Yes   Correct Site Verified:  Yes   Correct Procedure Verified:  Yes          MOHS Diagnosis/Indication/Location/ID      Most Recent Value   Pathology Type  Basal cell carcinoma   Anatomic Site  right forehead [Upper]   Indications for MOHS  tumor location   MOHS ID  [id number]          MOHS Site/Accession/Pre-Post      Most Recent Value   Original Site Identified (as submitted by referring clinician)  Photo   Biopsy Accession/Specimen # (as submitted by referring clincian)  [id number]   Pre-MOHS Size Length (cm)  0 8   Pre-MOHS Size Width (cm)  0 7   Post-MOHS Size-Length (cm)  1 5   Post MOHS Size-Width (cm)  1 2   Repair Type  Intermediate layered closure   Suture Type  Fast absorbing gut, Vicryl   Fast Absorbing Suture Size  5   Vicryl Suture Size  4 [and 5]   Final repair length (cm):  3 9   Anesthetic Used  1% Lidocaine with epinephrine          MOHS Tumor Stage 1 Information      Most Recent Value   Tissue Sections (blocks)  1   Microscopic Exam Section 1:  Arising focally from the epidermis and superficial hair follicles are small, superficial, multicentric buds of basaloid keratinocytes  The cells have scant cytoplasm and round dark nuclei  Mitotic figures and apoptotic bodies are evident  The nuclei at the periphery of the islands have a palisaded arrangement  The islands are associated with a fibromyxoid stroma and there is cleft formation between some of the islands and stroma  The pathology is consistent with superficial basal cell carcinoma  Tumor Clear After Stage I? No          MOHS Tumor Stage 2 Information      Most Recent Value   Tissue Sections (blocks)  1   Microscopic Exam Section 1:  No tumor identified  Tumor Clear After Stage II? Yes      Patient identified, procedure verified, site identified and verified  Time out completed  Surgical removal of the lesion discussed with the patient (risks and benefits, including possibility of scarring, infection, recurrence or potential for further treatment)  I have specifically identified the site with the patient  I have discussed the fact that the patient will have a scar after the procedure regardless of granulation or repair with sutures  I have discussed that the repair options can range from granulation in some cases to linear or curvilinear closures to larger flaps or grafts    There are sometimes flaps or grafts used that require multiples stages of surgery and will not be completed today, rather be completed over a series of appointments  I have discussed that occasionally due to location, size or depth of the lesion I may recommend consultation with and transfer of care for further removal or the reconstruction to another provider such as ophthalmology surgery, plastic surgery, ENT surgery, or surgical oncology  There are cases in which other testing such as imaging with MRI or CT scan or testing of lymph nodes is recommended because of the nature/depth/location of tumor seen during the removal  There is a risk of injury to nerves causing temporary or permanent numbness or the inability to move muscles full such as the inability to lift eyebrows  Questions answered and verbal and written consent was obtained  The tumor qualifies for Mohs based on AUC criteria  Dr Anastacio Sanabria served as the surgeon and pathologist during the procedure  Postoperative care: No would care should be necessary prior to the next visit but I urged the patient to call us if any problems or question should arise prior to that time  If circumstances should change, we will contact the patient to make other arrangements                   Scribe Attestation    I,:  Alexx Pace am acting as a scribe while in the presence of the attending physician :       I,:  Mili Mathew MD personally performed the services described in this documentation    as scribed in my presence :

## 2021-08-11 ENCOUNTER — OFFICE VISIT (OUTPATIENT)
Dept: NEUROLOGY | Facility: CLINIC | Age: 81
End: 2021-08-11
Payer: COMMERCIAL

## 2021-08-11 VITALS
HEART RATE: 82 BPM | TEMPERATURE: 98.3 F | DIASTOLIC BLOOD PRESSURE: 92 MMHG | RESPIRATION RATE: 16 BRPM | HEIGHT: 69 IN | SYSTOLIC BLOOD PRESSURE: 140 MMHG | WEIGHT: 175 LBS | BODY MASS INDEX: 25.92 KG/M2

## 2021-08-11 DIAGNOSIS — E78.2 MIXED HYPERLIPIDEMIA: ICD-10-CM

## 2021-08-11 DIAGNOSIS — F02.80 DEMENTIA ASSOCIATED WITH OTHER UNDERLYING DISEASE WITHOUT BEHAVIORAL DISTURBANCE (HCC): ICD-10-CM

## 2021-08-11 PROCEDURE — 99214 OFFICE O/P EST MOD 30 MIN: CPT | Performed by: PSYCHIATRY & NEUROLOGY

## 2021-08-11 RX ORDER — MEMANTINE HYDROCHLORIDE 5 MG/1
5 TABLET ORAL 2 TIMES DAILY
Qty: 60 TABLET | Refills: 6 | Status: SHIPPED | OUTPATIENT
Start: 2021-08-11 | End: 2022-01-03

## 2021-08-11 RX ORDER — DONEPEZIL HYDROCHLORIDE 10 MG/1
10 TABLET, FILM COATED ORAL
Qty: 30 TABLET | Refills: 6 | Status: SHIPPED | OUTPATIENT
Start: 2021-08-11 | End: 2022-05-05 | Stop reason: SDUPTHER

## 2021-08-11 RX ORDER — ROSUVASTATIN CALCIUM 5 MG/1
5 TABLET, COATED ORAL DAILY
Qty: 90 TABLET | Refills: 0 | Status: CANCELLED | OUTPATIENT
Start: 2021-08-11

## 2021-08-11 NOTE — PROGRESS NOTES
Progress Note - Neurology   Inga Kyle 80 y o  male MRN: [id number]  Unit/Bed#:  Encounter: [id number]      Subjective:    Patient is here for a follow-up visit accompanied with his daughter for dementia, and overall has been maintaining himself, keeps himself active throughout the day, can perform his activities of daily living, uses compensatory  strategies such as making notes for himself, and is compliant with his medications  His daughter has noticed slight decline as far as his short-term memory is concern but is generally stable in all other domains  Patient denies any other changes in health since his last visit and most recent blood work showed a mild elevation of his PSA for which patient is seen by Urology and close surveillance was recommended  ROS:   Review of Systems   Constitutional: Negative  Negative for appetite change and fever  HENT: Negative  Negative for hearing loss, tinnitus, trouble swallowing and voice change  Eyes: Negative  Negative for photophobia and pain  Respiratory: Negative  Negative for shortness of breath  Cardiovascular: Negative  Negative for palpitations  Gastrointestinal: Negative  Negative for nausea and vomiting  Endocrine: Negative  Negative for cold intolerance  Genitourinary: Negative  Negative for dysuria, frequency and urgency  Musculoskeletal: Negative  Negative for myalgias and neck pain  Skin: Negative  Negative for rash  Neurological: Negative  Negative for dizziness, tremors, seizures, syncope, facial asymmetry, speech difficulty, weakness, light-headedness, numbness and headaches  Hematological: Negative  Does not bruise/bleed easily  Psychiatric/Behavioral: Positive for confusion and decreased concentration  Negative for hallucinations and sleep disturbance  MA review of systems was reviewed by myself      Vitals:   Vitals:    08/11/21 1027   BP: 140/92   BP Location: Left arm   Patient Position: Sitting   Cuff Size: Standard   Pulse: 82   Resp: 16   Temp: 98 3 °F (36 8 °C)   TempSrc: Temporal   Weight: 79 4 kg (175 lb)   Height: 5' 9" (1 753 m)   ,Body mass index is 25 84 kg/m²  MEDS:      Current Outpatient Medications:     acetaminophen (TYLENOL) 500 mg tablet, Take 500 mg by mouth as needed , Disp: , Rfl:     donepezil (ARICEPT) 10 mg tablet, TAKE ONE (1) TABLET BY MOUTH AT BEDTIME, Disp: 30 tablet, Rfl: 6    fexofenadine (ALLEGRA) 60 MG tablet, Take 60 mg by mouth daily, Disp: , Rfl:     guaiFENesin (MUCINEX) 600 mg 12 hr tablet, Take 1,200 mg by mouth 2 (two) times a day as needed for cough, Disp: , Rfl:     Melatonin 3 MG CAPS, Take 3 mg by mouth daily at bedtime Patient takes 1 1/2 mg tablets , Disp: , Rfl:     Multiple Vitamins-Minerals (CENTRUM SILVER ULTRA MENS PO), Take 1 tablet by mouth daily, Disp: , Rfl:     rosuvastatin (CRESTOR) 5 mg tablet, Take 1 tablet (5 mg total) by mouth daily, Disp: 360 tablet, Rfl: 1    sertraline (ZOLOFT) 50 mg tablet, Take 1 tablet (50 mg total) by mouth daily, Disp: 30 tablet, Rfl: 6    tamsulosin (FLOMAX) 0 4 mg, TAKE ONE TABLET DAILY WITH DINNER, Disp: 90 capsule, Rfl: 3  :    Physical Exam:  General appearance: alert, appears stated age and cooperative  Head: Normocephalic, without obvious abnormality, atraumatic      On neurological examination patient is alert awake oriented, speech is fluent, [place] score is 14/30 as compared to 17/30, 6 months ago, with difficulty noted in visuospatial perception as well as attention  And delayed recall  On motor and sensory exam there is no evidence of any focal weakness, mild past pointing was noted on finger-to-nose exam prominently on the right side as compared to the left  No bruits were appreciable in the neck  His gait is normal based  Lab Results: I have personally reviewed pertinent reports  Imaging Studies: I have personally reviewed pertinent reports  Assessment:  1     Dementia most likely vascular, versus other underlying disease   Such asnutritional deficiency  Plan:   patient's blood pressure noted to be mildly elevated he is advised to monitor his blood pressure, in the meanwhile will get B1 B12 levels, continue Aricept and sertraline at the present dosage, and will also start him on memantine 5 mg twice a day  And mental stimulating exercises were discussed, patient will return back to see me in 6 months       8/11/2021,10:32 AM    Dictation voice to text software has been used in the creation of this document  Please consider this in light of any contextual or grammatical errors

## 2021-08-16 ENCOUNTER — APPOINTMENT (OUTPATIENT)
Dept: LAB | Facility: CLINIC | Age: 81
End: 2021-08-16
Payer: COMMERCIAL

## 2021-08-16 DIAGNOSIS — F02.81 DEMENTIA ASSOCIATED WITH OTHER UNDERLYING DISEASE WITH BEHAVIORAL DISTURBANCE (HCC): ICD-10-CM

## 2021-08-16 DIAGNOSIS — F02.80 DEMENTIA ASSOCIATED WITH OTHER UNDERLYING DISEASE WITHOUT BEHAVIORAL DISTURBANCE (HCC): ICD-10-CM

## 2021-08-16 DIAGNOSIS — R17 ELEVATED BILIRUBIN: ICD-10-CM

## 2021-08-16 DIAGNOSIS — C61 MALIGNANT NEOPLASM OF PROSTATE (HCC): ICD-10-CM

## 2021-08-16 DIAGNOSIS — E78.2 MIXED HYPERLIPIDEMIA: ICD-10-CM

## 2021-08-16 DIAGNOSIS — F10.20 ALCOHOLISM (HCC): ICD-10-CM

## 2021-08-16 LAB
ALBUMIN SERPL BCP-MCNC: 3.8 G/DL (ref 3.5–5)
ALP SERPL-CCNC: 103 U/L (ref 46–116)
ALT SERPL W P-5'-P-CCNC: 44 U/L (ref 12–78)
ANION GAP SERPL CALCULATED.3IONS-SCNC: 3 MMOL/L (ref 4–13)
AST SERPL W P-5'-P-CCNC: 28 U/L (ref 5–45)
BASOPHILS # BLD AUTO: 0.04 THOUSANDS/ΜL (ref 0–0.1)
BASOPHILS NFR BLD AUTO: 1 % (ref 0–1)
BILIRUB DIRECT SERPL-MCNC: 0.26 MG/DL (ref 0–0.2)
BILIRUB SERPL-MCNC: 1.01 MG/DL (ref 0.2–1)
BUN SERPL-MCNC: 20 MG/DL (ref 5–25)
CALCIUM SERPL-MCNC: 8.8 MG/DL (ref 8.3–10.1)
CHLORIDE SERPL-SCNC: 109 MMOL/L (ref 100–108)
CHOLEST SERPL-MCNC: 172 MG/DL (ref 50–200)
CO2 SERPL-SCNC: 26 MMOL/L (ref 21–32)
CREAT SERPL-MCNC: 0.92 MG/DL (ref 0.6–1.3)
EOSINOPHIL # BLD AUTO: 0.05 THOUSAND/ΜL (ref 0–0.61)
EOSINOPHIL NFR BLD AUTO: 1 % (ref 0–6)
ERYTHROCYTE [DISTWIDTH] IN BLOOD BY AUTOMATED COUNT: 14.4 % (ref 11.6–15.1)
EST. AVERAGE GLUCOSE BLD GHB EST-MCNC: 117 MG/DL
GFR SERPL CREATININE-BSD FRML MDRD: 79 ML/MIN/1.73SQ M
GLUCOSE P FAST SERPL-MCNC: 105 MG/DL (ref 65–99)
HBA1C MFR BLD: 5.7 %
HCT VFR BLD AUTO: 50.5 % (ref 36.5–49.3)
HDLC SERPL-MCNC: 35 MG/DL
HGB BLD-MCNC: 16.7 G/DL (ref 12–17)
IMM GRANULOCYTES # BLD AUTO: 0.04 THOUSAND/UL (ref 0–0.2)
IMM GRANULOCYTES NFR BLD AUTO: 1 % (ref 0–2)
LDLC SERPL CALC-MCNC: 110 MG/DL (ref 0–100)
LYMPHOCYTES # BLD AUTO: 1.47 THOUSANDS/ΜL (ref 0.6–4.47)
LYMPHOCYTES NFR BLD AUTO: 26 % (ref 14–44)
MCH RBC QN AUTO: 29.2 PG (ref 26.8–34.3)
MCHC RBC AUTO-ENTMCNC: 33.1 G/DL (ref 31.4–37.4)
MCV RBC AUTO: 88 FL (ref 82–98)
MONOCYTES # BLD AUTO: 0.41 THOUSAND/ΜL (ref 0.17–1.22)
MONOCYTES NFR BLD AUTO: 7 % (ref 4–12)
NEUTROPHILS # BLD AUTO: 3.64 THOUSANDS/ΜL (ref 1.85–7.62)
NEUTS SEG NFR BLD AUTO: 64 % (ref 43–75)
NONHDLC SERPL-MCNC: 137 MG/DL
NRBC BLD AUTO-RTO: 0 /100 WBCS
PLATELET # BLD AUTO: 191 THOUSANDS/UL (ref 149–390)
PMV BLD AUTO: 10.5 FL (ref 8.9–12.7)
POTASSIUM SERPL-SCNC: 4 MMOL/L (ref 3.5–5.3)
PROT SERPL-MCNC: 7.6 G/DL (ref 6.4–8.2)
RBC # BLD AUTO: 5.72 MILLION/UL (ref 3.88–5.62)
SODIUM SERPL-SCNC: 138 MMOL/L (ref 136–145)
TRIGL SERPL-MCNC: 135 MG/DL
TSH SERPL DL<=0.05 MIU/L-ACNC: 0.89 UIU/ML (ref 0.36–3.74)
VIT B12 SERPL-MCNC: 539 PG/ML (ref 100–900)
WBC # BLD AUTO: 5.65 THOUSAND/UL (ref 4.31–10.16)

## 2021-08-16 PROCEDURE — 83036 HEMOGLOBIN GLYCOSYLATED A1C: CPT

## 2021-08-16 PROCEDURE — 36415 COLL VENOUS BLD VENIPUNCTURE: CPT

## 2021-08-16 PROCEDURE — 82607 VITAMIN B-12: CPT

## 2021-08-16 PROCEDURE — 84425 ASSAY OF VITAMIN B-1: CPT

## 2021-08-16 PROCEDURE — 82248 BILIRUBIN DIRECT: CPT

## 2021-08-16 PROCEDURE — 84443 ASSAY THYROID STIM HORMONE: CPT

## 2021-08-16 PROCEDURE — 85025 COMPLETE CBC W/AUTO DIFF WBC: CPT

## 2021-08-16 PROCEDURE — 80061 LIPID PANEL: CPT

## 2021-08-16 PROCEDURE — 80053 COMPREHEN METABOLIC PANEL: CPT

## 2021-08-17 RX ORDER — ROSUVASTATIN CALCIUM 5 MG/1
5 TABLET, COATED ORAL DAILY
Qty: 90 TABLET | Refills: 1 | Status: SHIPPED | OUTPATIENT
Start: 2021-08-17 | End: 2022-03-22

## 2021-08-19 LAB — VIT B1 BLD-SCNC: 163 NMOL/L (ref 66.5–200)

## 2021-08-24 ENCOUNTER — OFFICE VISIT (OUTPATIENT)
Dept: FAMILY MEDICINE CLINIC | Facility: CLINIC | Age: 81
End: 2021-08-24
Payer: COMMERCIAL

## 2021-08-24 VITALS
DIASTOLIC BLOOD PRESSURE: 87 MMHG | HEIGHT: 69 IN | HEART RATE: 79 BPM | WEIGHT: 175 LBS | SYSTOLIC BLOOD PRESSURE: 130 MMHG | TEMPERATURE: 97.4 F | OXYGEN SATURATION: 97 % | RESPIRATION RATE: 16 BRPM | BODY MASS INDEX: 25.92 KG/M2

## 2021-08-24 DIAGNOSIS — Z12.11 SCREENING FOR COLON CANCER: Primary | ICD-10-CM

## 2021-08-24 DIAGNOSIS — Z00.00 MEDICARE ANNUAL WELLNESS VISIT, SUBSEQUENT: ICD-10-CM

## 2021-08-24 DIAGNOSIS — F02.80 DEMENTIA ASSOCIATED WITH OTHER UNDERLYING DISEASE WITHOUT BEHAVIORAL DISTURBANCE (HCC): ICD-10-CM

## 2021-08-24 DIAGNOSIS — C61 MALIGNANT NEOPLASM OF PROSTATE (HCC): ICD-10-CM

## 2021-08-24 DIAGNOSIS — I46.9 CARDIAC ARREST (HCC): ICD-10-CM

## 2021-08-24 DIAGNOSIS — C44.311 BASAL CELL CARCINOMA (BCC) OF SKIN OF NOSE: ICD-10-CM

## 2021-08-24 DIAGNOSIS — Z13.6 SCREENING FOR AAA (ABDOMINAL AORTIC ANEURYSM): ICD-10-CM

## 2021-08-24 PROBLEM — F10.20 ALCOHOLISM (HCC): Status: RESOLVED | Noted: 2018-04-06 | Resolved: 2021-08-24

## 2021-08-24 PROCEDURE — 99214 OFFICE O/P EST MOD 30 MIN: CPT | Performed by: FAMILY MEDICINE

## 2021-08-24 PROCEDURE — 3725F SCREEN DEPRESSION PERFORMED: CPT | Performed by: FAMILY MEDICINE

## 2021-08-24 PROCEDURE — 1160F RVW MEDS BY RX/DR IN RCRD: CPT | Performed by: FAMILY MEDICINE

## 2021-08-24 PROCEDURE — G0439 PPPS, SUBSEQ VISIT: HCPCS | Performed by: FAMILY MEDICINE

## 2021-08-24 PROCEDURE — 1170F FXNL STATUS ASSESSED: CPT | Performed by: FAMILY MEDICINE

## 2021-08-24 PROCEDURE — 3288F FALL RISK ASSESSMENT DOCD: CPT | Performed by: FAMILY MEDICINE

## 2021-08-24 PROCEDURE — 1036F TOBACCO NON-USER: CPT | Performed by: FAMILY MEDICINE

## 2021-08-24 PROCEDURE — 1125F AMNT PAIN NOTED PAIN PRSNT: CPT | Performed by: FAMILY MEDICINE

## 2021-08-24 NOTE — PROGRESS NOTES
BMI Counseling: Body mass index is 25 84 kg/m²  The BMI is above normal  Nutrition recommendations include decreasing portion sizes, decreasing fast food intake, limiting drinks that contain sugar, moderation in carbohydrate intake, increasing intake of lean protein and reducing intake of saturated and trans fat  Exercise recommendations include moderate physical activity 150 minutes/week and exercising 3-5 times per week  No pharmacotherapy was ordered  Depression Screening and Follow-up Plan: Patient's depression screening was positive with a PHQ-2 score of 0  Clincally patient does not have depression  No treatment is required  Falls Plan of Care: balance, strength, and gait training instructions were provided  Assessment/Plan:     Chronic Problems:  No problem-specific Assessment & Plan notes found for this encounter  Visit Diagnosis:  Diagnoses and all orders for this visit:    Screening for colon cancer  -     Ambulatory referral for colonoscopy; Future    Screening for AAA (abdominal aortic aneurysm)  -      abdominal aorta screening aaa; Future    Cardiac arrest ([facility] )    Dementia associated with 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 ([facility] )  Comments:   stable, reviewed present issues concerns with daughter, continue current medications here under care Neurology    Basal cell carcinoma (BCC) of skin of nose  Comments:   stable, receives annual skin checks dermatology    Malignant neoplasm of prostate [facility])  Comments:   mild elevation currently under care Urology trending PSAs    Medicare annual wellness visit, subsequent          Subjective:    Patient ID: Marcelino Hawkins is a 80 y o  male      F/u    generally doing well with no specific complaints today follow-up  Here with daughter Primary history   dementia md Boswell and Reggie evaluated, continue Aricept Zoloft, has added on the  Namenda   BPH, elevated PSA, mild seen and has been trending with Urology [facility] Dr Mac Wang continues on present medicines, negative changes   presently living at home,  Able to perform most activities daily living caring for dogsx4 , daughter enclosed proximity assisting as needed        The following portions of the patient's history were reviewed and updated as appropriate: allergies, current medications, past family history, past medical history, past social history, past surgical history and problem list     Review of Systems   Constitutional: Negative for appetite change, chills, fever and unexpected weight change  HENT: Negative for congestion, dental problem, ear pain, hearing loss, postnasal drip, rhinorrhea, sinus pressure, sinus pain, sneezing, sore throat, tinnitus and voice change  Eyes: Negative for visual disturbance  Respiratory: Negative for apnea, cough, chest tightness and shortness of breath  Cardiovascular: Negative for chest pain, palpitations and leg swelling  Gastrointestinal: Negative for abdominal pain, blood in stool, constipation, diarrhea, nausea and vomiting  Endocrine: Negative for cold intolerance, heat intolerance, polydipsia, polyphagia and polyuria  Genitourinary: Negative for decreased urine volume, difficulty urinating, dysuria, frequency and hematuria  Musculoskeletal: Negative for arthralgias, back pain, gait problem, joint swelling and myalgias  Skin: Negative for color change, rash and wound  Allergic/Immunologic: Negative for environmental allergies and food allergies  Neurological: Negative for dizziness, syncope, weakness, light-headedness, numbness and headaches  Hematological: Negative for adenopathy  Does not bruise/bleed easily  Psychiatric/Behavioral: Negative for sleep disturbance and suicidal ideas  The patient is not nervous/anxious            /87   Pulse 79   Temp (!) 97 4 °F (36 3 °C)   Resp 16   Ht 5' 9" (1 753 m)   Wt 79 4 kg (175 lb)   SpO2 97%   BMI 25 84 kg/m²   Social History     Socioeconomic History    Marital status: Legally      Spouse name: Not on file    Number of children: Not on file    Years of education: Not on file    Highest education level: Not on file   Occupational History    Occupation: employed   Tobacco Use    Smoking status: Former Smoker     Packs/day: 1 00     Years: 60 00     Pack years: 60 00     Types: Cigarettes     Quit date: 3/19/2019     Years since quittin 4    Smokeless tobacco: Never Used    Tobacco comment: daily   Vaping Use    Vaping Use: Never used   Substance and Sexual Activity    Alcohol use: No     Comment: former    Drug use: No    Sexual activity: Yes     Partners: Female   Other Topics Concern    Not on file   Social History Narrative    Not on file     Social Determinants of Health     Financial Resource Strain:     Difficulty of Paying Living Expenses:    Food Insecurity:     Worried About Running Out of Food in the Last Year:     [address] in the Last Year:    Transportation Needs:     Lack of Transportation (Medical):      Lack of Transportation (Non-Medical):    Physical Activity:     Days of Exercise per Week:     Minutes of Exercise per Session:    Stress:     Feeling of Stress :    Social Connections:     Frequency of Communication with Friends and Family:     Frequency of Social Gatherings with Friends and Family:     Attends Hindu Services:     Active Member of Clubs or Organizations:     Attends Club or Organization Meetings:     Marital Status:    Intimate Partner Violence:     Fear of Current or Ex-Partner:     Emotionally Abused:     Physically Abused:     Sexually Abused:      Past Medical History:   Diagnosis Date    Alcoholism ([facility] ) 2018    Alzheimer disease ([facility] )     Anxiety     Basal cell carcinoma 2020    Dr Fahad Berrios cell carcinoma 2021    Right upper forehead    Bile duct disease     Cancer ([facility] )     prostate, skin     Cardiac arrest ([facility] )     Choledocholithiasis     Dementia (HCC)     Depression     Kidney stone     Liver disease     Malignant ne[facility] )     Mood disorder (HCC)     Prostate cancer ([facility] )     Right carotid bruit     Seizure disorder ([facility] )     Skin cancer      Family History   Problem Relation Age of Onset    Cancer Mother     Dementia Mother     Alzheimer's disease Mother     Sudden death Father      Past Surgical History:   Procedure Laterality Date    CHOLECYSTECTOMY      ERCP N/A 3/9/2018    Procedure: ENDOSCOPIC RETROGRADE CHOLANGIOPANCREATOGRAPHY (ERCP); Surgeon: Va Padron MD;  [facility];  Service: Gastroenterology    ERCP  04/13/2018    stenting    ERCP N/A 6/8/2018    Procedure: ENDOSCOPIC RETROGRADE CHOLANGIOPANCREATOGRAPHY (ERCP); Surgeon: Va Padron MD;  [facility];  Service: Gastroenterology    MOHS SURGERY  08/18/2020    Right nares, Dr Xin Griffin  06/30/2021    Right upper forehead; Dr Perico Gonzalez BODY/STENT BILIARY/PANC DUCT N/A 4/13/2018    Procedure: ENDOSCOPIC RETROGRADE CHOLANGIOPANCREATOGRAPHY (ERCP);   Surgeon: Va Padron MD;  [facility];  Service: Gastroenterology    VT LAP,CHOLECYSTECTOMY N/A 5/15/2018    Procedure: Phillip Cuello;  Surgeon: Nii Thakkar MD;  [facility];  Service: General    PROSTATE SURGERY      TONSILLECTOMY         Current Outpatient Medications:     acetaminophen (TYLENOL) 500 mg tablet, Take 500 mg by mouth as needed , Disp: , Rfl:     donepezil (ARICEPT) 10 mg tablet, Take 1 tablet (10 mg total) by mouth daily at bedtime, Disp: 30 tablet, Rfl: 6    fexofenadine (ALLEGRA) 60 MG tablet, Take 60 mg by mouth daily, Disp: , Rfl:     Melatonin 3 MG CAPS, Take 3 mg by mouth daily at bedtime Patient takes 1 1/2 mg tablets , Disp: , Rfl:     memantine (NAMENDA) 5 mg tablet, Take 1 tablet (5 mg total) by mouth 2 (two) times a day, Disp: 60 tablet, Rfl: 6    Multiple Vitamins-Minerals (CENTRUM SILVER ULTRA MENS PO), Take 1 tablet by mouth daily, Disp: , Rfl:     rosuvastatin (CRESTOR) 5 mg tablet, Take 1 tablet (5 mg total) by mouth daily, Disp: 90 tablet, Rfl: 1    sertraline (ZOLOFT) 50 mg tablet, Take 1 tablet (50 mg total) by mouth daily, Disp: 30 tablet, Rfl: 6    tamsulosin (FLOMAX) 0 4 mg, TAKE ONE TABLET DAILY WITH DINNER, Disp: 90 capsule, Rfl: 3    No Known Allergies       Lab Review   Appointment on 08/16/2021   Component Date Value    Sodium 08/16/2021 138     Potassium 08/16/2021 4 0     Chloride 08/16/2021 109*    CO2 08/16/2021 26     ANION GAP 08/16/2021 3*    BUN 08/16/2021 20     Creatinine 08/16/2021 0 92     Glucose, Fasting 08/16/2021 105*    Calcium 08/16/2021 8 8     AST 08/16/2021 28     ALT 08/16/2021 44     Alkaline Phosphatase 08/16/2021 103     Total Protein 08/16/2021 7 6     Albumin 08/16/2021 3 8     Total Bilirubin 08/16/2021 1 01*    eGFR 08/16/2021 79     WBC 08/16/2021 5 65     RBC 08/16/2021 5 72*    Hemoglobin 08/16/2021 16 7     Hematocrit 08/16/2021 50 5*    MCV 08/16/2021 88     MCH 08/16/2021 29 2     MCHC 08/16/2021 33 1     RDW 08/16/2021 14 4     MPV 08/16/2021 10 5     Platelets 90/15/4763 191     nRBC 08/16/2021 0     Neutrophils Relative 08/16/2021 64     Immat GRANS % 08/16/2021 1     Lymphocytes Relative 08/16/2021 26     Monocytes Relative 08/16/2021 7     Eosinophils Relative 08/16/2021 1     Basophils Relative 08/16/2021 1     Neutrophils Absolute 08/16/2021 3 64     Immature Grans Absolute 08/16/2021 0 04     Lymphocytes Absolute 08/16/2021 1 47     Monocytes Absolute 08/16/2021 0 41     Eosinophils Absolute 08/16/2021 0 05     Basophils Absolute 08/16/2021 0 04     TSH 3RD GENERATON 08/16/2021 0 891     Hemoglobin A1C 08/16/2021 5 7*    EAG 08/16/2021 117     Vitamin B1, Whole Blood 08/16/2021 163 0     Vitamin B-12 08/16/2021 539     Cholesterol 08/16/2021 172     Triglycerides 08/16/2021 135     HDL, Direct 08/16/2021 35*    LDL Calculated 08/16/2021 110*    Non-HDL-Chol (CHOL-HDL) 08/16/2021 137     Bilirubin, Direct 08/16/2021 0 26*   Office Visit on 06/03/2021   Component Date Value    Case Report 06/03/2021                      Value:Surgical Pathology Report                         Case: [id number]                                   Authorizing Provider:  Kristopher Malik MD          Collected:           06/03/2021 0949              Ordering Location[facility]     [facility]      Received:            06/03/2021 [address] [place] [address]                                                                       Pathologist:           Cleopatra Joseph MD                                                           Specimen:    Skin, Other, specimen A: right upper forehead                                              Final Diagnosis 06/03/2021                      Value: This result contains rich text formatting which cannot be displayed here   Additional Information 06/03/2021                      Value: This result contains rich text formatting which cannot be displayed here  Latha Silence Gross Description 06/03/2021                      Value: This result contains rich text formatting which cannot be displayed here   Clinical Information 06/03/2021                      Value:Specimen A: Right upper forehead; skin; shave biopsy; 68year old male with 0 6 cm luscent nodule; differential diagnosis:  rule out basal cell carcinoma     Attention: Dr Cari Liang        Imaging: No results found  Objective:     Physical Exam  Constitutional:       General: He is not in acute distress  Appearance: He is well-developed  He is not ill-appearing or toxic-appearing  HENT:      Head: Normocephalic and atraumatic  Cardiovascular:      Rate and Rhythm: Normal rate and regular rhythm  Heart sounds: Normal heart sounds     Pulmonary:      Effort: Pulmonary effort is normal       Breath sounds: Normal breath sounds  Musculoskeletal:         General: Normal range of motion  Cervical back: Normal range of motion and neck supple  Skin:     General: Skin is warm and dry  Neurological:      Mental Status: He is alert and oriented to person, place, and time  Deep Tendon Reflexes: Reflexes are normal and symmetric  Psychiatric:         Behavior: Behavior normal          Thought Content: Thought content normal          Judgment: Judgment normal            Patient Instructions       Medicare Preventive Visit Patient Instructions  Thank you for completing your Welcome to Medicare Visit or Medicare Annual Wellness Visit today  Your next wellness visit will be due in one year (8/25/2022)  The screening/preventive services that you may require over the next 5-10 years are detailed below  Some tests may not apply to you based off risk factors and/or age  Screening tests ordered at today's visit but not completed yet may show as past due  Also, please note that scanned in results may not display below  Preventive Screenings:  Service Recommendations Previous Testing/Comments   Colorectal Cancer Screening  · Colonoscopy    · Fecal Occult Blood Test (FOBT)/Fecal Immunochemical Test (FIT)  · Fecal DNA/Cologuard Test  · Flexible Sigmoidoscopy Age: 54-65 years old   Colonoscopy: every 10 years (May be performed more frequently if at higher risk)  OR  FOBT/FIT: every 1 year  OR  Cologuard: every 3 years  OR  Sigmoidoscopy: every 5 years  Screening may be recommended earlier than age 48 if at higher risk for colorectal cancer  Also, an individualized decision between you and your healthcare provider will decide whether screening between the ages of 74-80 would be appropriate   Colonoscopy: 07/07/2020  FOBT/FIT: Not on file  Cologuard: 03/26/2020  Sigmoidoscopy: Not on file          Prostate Cancer Screening Individualized decision between patient and health care provider in men between ages of 53-78   Medicare will cover every 12 months beginning on the day after your 50th birthday PSA: No results in last 5 years     History Prostate Cancer  Screening Not Indicated     Hepatitis C Screening Once for adults born between 1945 and 1965  More frequently in patients at high risk for Hepatitis C Hep C Antibody: 04/06/2018    Screening Current   Diabetes Screening 1-2 times per year if you're at risk for diabetes or have pre-diabetes Fasting glucose: 105 mg/dL   A1C: 5 7 %    Screening Current   Cholesterol Screening Once every 5 years if you don't have a lipid disorder  May order more often based on risk factors  Lipid panel: 08/16/2021    Screening Not Indicated  History Lipid Disorder      Other Preventive Screenings Covered by Medicare:  1  Abdominal Aortic Aneurysm (AAA) Screening: covered once if your at risk  You're considered to be at risk if you have a family history of AAA or a male between the age of 73-68 who smoking at least 100 cigarettes in your lifetime  2  Lung Cancer Screening: covers low dose CT scan once per year if you meet all of the following conditions: (1) Age 50-69; (2) No signs or symptoms of lung cancer; (3) Current smoker or have quit smoking within the last 15 years; (4) You have a tobacco smoking history of at least 30 pack years (packs per day x number of years you smoked); (5) You get a written order from a healthcare provider  3  Glaucoma Screening: covered annually if you're considered high risk: (1) You have diabetes OR (2) Family history of glaucoma OR (3)  aged 48 and older OR (3)  American aged 72 and older  3  Osteoporosis Screening: covered every 2 years if you meet one of the following conditions: (1) Have a vertebral abnormality; (2) On glucocorticoid therapy for more than 3 months; (3) Have primary hyperparathyroidism; (4) On osteoporosis medications and need to assess response to drug therapy    5  HIV Screening: covered annually if you're between the age of 15-65  Also covered annually if you are younger than 13 and older than 72 with risk factors for HIV infection  For pregnant patients, it is covered up to 3 times per pregnancy  Immunizations:  Immunization Recommendations   Influenza Vaccine Annual influenza vaccination during flu season is recommended for all persons aged >= 6 months who do not have contraindications   Pneumococcal Vaccine (Prevnar and Pneumovax)  * Prevnar = PCV13  * Pneumovax = PPSV23 Adults 25-60 years old: 1-3 doses may be recommended based on certain risk factors  Adults 72 years old: Prevnar (PCV13) vaccine recommended followed by Pneumovax (PPSV23) vaccine  If already received PPSV23 since turning 65, then PCV13 recommended at least one year after PPSV23 dose  Hepatitis B Vaccine 3 dose series if at intermediate or high risk (ex: diabetes, end stage renal disease, liver disease)   Tetanus (Td) Vaccine - COST NOT COVERED BY MEDICARE PART B Following completion of primary series, a booster dose should be given every 10 years to maintain immunity against tetanus  Td may also be given as tetanus wound prophylaxis  Tdap Vaccine - COST NOT COVERED BY MEDICARE PART B Recommended at least once for all adults  For pregnant patients, recommended with each pregnancy  Shingles Vaccine (Shingrix) - COST NOT COVERED BY MEDICARE PART B  2 shot series recommended in those aged 48 and above     Health Maintenance Due:      Topic Date Due    Lung Cancer Screening  03/22/2020    Colorectal Cancer Screening  07/07/2021    Hepatitis C Screening  Completed     Immunizations Due:      Topic Date Due    DTaP,Tdap,and Td Vaccines (1 - Tdap) Never done    Influenza Vaccine (1) 09/01/2021     Advance Directives   What are advance directives? Advance directives are legal documents that state your wishes and plans for medical care   These plans are made ahead of time in case you lose your ability to make decisions for yourself  Advance directives can apply to any medical decision, such as the treatments you want, and if you want to donate organs  What are the types of advance directives? There are many types of advance directives, and each state has rules about how to use them  You may choose a combination of any of the following:  · Living will: This is a written record of the treatment you want  You can also choose which treatments you do not want, which to limit, and which to stop at a certain time  This includes surgery, medicine, IV fluid, and tube feedings  · Durable power of  for healthcare [facility]): This is a written record that states who you want to make healthcare choices for you when you are unable to make them for yourself  This person, called a proxy, is usually a family member or a friend  You may choose more than 1 proxy  · Do not resuscitate (DNR) order:  A DNR order is used in case your heart stops beating or you stop breathing  It is a request not to have certain forms of treatment, such as CPR  A DNR order may be included in other types of advance directives  · Medical directive: This covers the care that you want if you are in a coma, near death, or unable to make decisions for yourself  You can list the treatments you want for each condition  Treatment may include pain medicine, surgery, blood transfusions, dialysis, IV or tube feedings, and a ventilator (breathing machine)  · Values history: This document has questions about your views, beliefs, and how you feel and think about life  This information can help others choose the care that you would choose  Why are advance directives important? An advance directive helps you control your care  Although spoken wishes may be used, it is better to have your wishes written down  Spoken wishes can be misunderstood, or not followed  Treatments may be given even if you do not want them   An advance directive may make it easier for your family to make difficult choices about your care  Weight Management   Why it is important to manage your weight:  Being overweight increases your risk of health conditions such as heart disease, high blood pressure, type 2 diabetes, and certain types of cancer  It can also increase your risk for osteoarthritis, sleep apnea, and other respiratory problems  Aim for a slow, steady weight loss  Even a small amount of weight loss can lower your risk of health problems  How to lose weight safely:  A safe and healthy way to lose weight is to eat fewer calories and get regular exercise  You can lose up about 1 pound a week by decreasing the number of calories you eat by 500 calories each day  Healthy meal plan for weight management:  A healthy meal plan includes a variety of foods, contains fewer calories, and helps you stay healthy  A healthy meal plan includes the following:  · Eat whole-grain foods more often  A healthy meal plan should contain fiber  Fiber is the part of grains, fruits, and vegetables that is not broken down by your body  Whole-grain foods are healthy and provide extra fiber in your diet  Some examples of whole-grain foods are whole-wheat breads and pastas, oatmeal, brown rice, and bulgur  · Eat a variety of vegetables every day  Include dark, leafy greens such as spinach, kale, khang greens, and mustard greens  Eat yellow and orange vegetables such as carrots, sweet potatoes, and winter squash  · Eat a variety of fruits every day  Choose fresh or canned fruit (canned in its own juice or light syrup) instead of juice  Fruit juice has very little or no fiber  · Eat low-fat dairy foods  Drink fat-free (skim) milk or 1% milk  Eat fat-free yogurt and low-fat cottage cheese  Try low-fat cheeses such as mozzarella and other reduced-fat cheeses  · Choose meat and other protein foods that are low in fat  Choose beans or other legumes such as split peas or lentils   Choose fish, skinless poultry (chicken or turkey), or lean cuts of red meat (beef or pork)  Before you cook meat or poultry, cut off any visible fat  · Use less fat and oil  Try baking foods instead of frying them  Add less fat, such as margarine, sour cream, regular salad dressing and mayonnaise to foods  Eat fewer high-fat foods  Some examples of high-fat foods include french fries, doughnuts, ice cream, and cakes  · Eat fewer sweets  Limit foods and drinks that are high in sugar  This includes candy, cookies, regular soda, and sweetened drinks  Exercise:  Exercise at least 30 minutes per day on most days of the week  Some examples of exercise include walking, biking, dancing, and swimming  You can also fit in more physical activity by taking the stairs instead of the elevator or parking farther away from stores  Ask your healthcare provider about the best exercise plan for you  © Copyright [address] 2018 Information is for End User's use only and may not be sold, redistributed or otherwise used for commercial purposes  All illustrations and images included in CareNotes® are the copyrighted property of [employer]  or [address]    Portions of the record may have been created with voice recognition software  Occasional wrong word or "sound a like" substitutions may have occurred due to the inherent limitations of voice recognition software  Read the chart carefully and recognize, using context, where substitutions have occurred

## 2021-08-24 NOTE — PROGRESS NOTES
Assessment and Plan:     Problem List Items Addressed This Visit     None           Preventive health issues were discussed with patient, and age appropriate screening tests were ordered as noted in patient's After Visit Summary  Personalized health advice and appropriate referrals for health education or preventive services given if needed, as noted in patient's After Visit Summary       History of Present Illness:     Patient presents for Medicare Annual Wellness visit    Patient Care Team:  Andrae Moore as PCP - General (Family Medicine)  Joe Page MD (Urology)     Problem List:     Patient Active Problem List   Diagnosis    Transaminitis    Hypokalemia    Fall    Altered mental status    Total bilirubin, elevated    Weight loss counseling, encounter for    Common bile duct (CBD) obstruction    H/O prostate cancer    Common bile duct obstruction    Memory difficulty    Alcoholism ([address]    Dementia associated with 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 ([address]    Cardiac arrest ([address]    Acute hypoxemic respiratory failure ([address]    Perforated intestine ([address]    Traumatic hematuria    Pneumonia due to infectious organism    Tobacco abuse    Choledocholithiasis    Cholelithiasis, common bile duct    Mood disorder ([address]    Malignant neoplasm of prostate ([address]    Pulmonary nodules    Borderline hyperlipidemia    Hyperglycemia    Mixed hyperlipidemia    Basal cell carcinoma (BCC) of skin of nose    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 ([address] )      Past Medical and Surgical History:     Past Medical History:   Diagnosis Date    Alzheimer disease ([address] )     Anxiety     Basal cell carcinoma 08/18/2020    Right nares, Dr John Bee    Basal cell carcinoma 06/03/2021    Right upper forehead    Bile duct disease     Cancer ([address] )     prostate, skin     Cardiac arrest ([address]     Choledocholithiasis     Dementia ([address]     Depression  Kidney stone     Liver disease     [facility]     Mood disorder (HCC)     Prostate cancer ([facility]     Right carotid bruit     Seizure disorder ([facility]     Skin cancer      Past Surgical History:   Procedure Laterality Date    CHOLECYSTECTOMY      ERCP N/A 3/9/2018    Procedure: ENDOSCOPIC RETROGRADE CHOLANGIOPANCREATOGRAPHY (ERCP); Surgeon: Danuta Zaldivar MD;  [facility];  Service: Gastroenterology    ERCP  2018    stenting    ERCP N/A 2018    Procedure: ENDOSCOPIC RETROGRADE CHOLANGIOPANCREATOGRAPHY (ERCP); Surgeon: Danuta Zaldivar MD;  [facility];  Service: Gastroenterology    MOHS SURGERY  2020    Right nares, Dr Mulugeta Diop  2021    Right upper forehead; Dr Reta Redmond BODY/STENT BILIARY/PANC DUCT N/A 2018    Procedure: ENDOSCOPIC RETROGRADE CHOLANGIOPANCREATOGRAPHY (ERCP);   Surgeon: Danuta Zaldivar MD;  [facility];  Service: Gastroenterology    SD LAP,CHOLECYSTECTOMY N/A 5/15/2018    Procedure: Letha Major;  Surgeon: Ernst Crawford MD;  [facility];  Service: General    PROSTATE SURGERY      TONSILLECTOMY        Family History:     Family History   Problem Relation Age of Onset    Cancer Mother     Dementia Mother     Alzheimer's disease Mother    Levora Wise Sudden death Father       Social History:     Social History     Socioeconomic History    Marital status: Legally      Spouse name: None    Number of children: None    Years of education: None    Highest education level: None   Occupational History    Occupation: employed   Tobacco Use    Smoking status: Former Smoker     Packs/day: 1 00     Years: 60 00     Pack years: 60 00     Types: Cigarettes     Quit date: 3/19/2019     Years since quittin 4    Smokeless tobacco: Never Used    Tobacco comment: daily   Vaping Use    Vaping Use: Never used   Substance and Sexual Activity    Alcohol use: No     Comment: former    Drug use: No    Sexual activity: Yes     Partners: Female   Other Topics Concern    None   Social History Narrative    None     Social Determinants of Health     Financial Resource Strain:     Difficulty of Paying Living Expenses:    Food Insecurity:     Worried About Running Out of Food in the Last Year:     [address] in the Last Year:    Transportation Needs:     Lack of Transportation (Medical):      Lack of Transportation (Non-Medical):    Physical Activity:     Days of Exercise per Week:     Minutes of Exercise per Session:    Stress:     Feeling of Stress :    Social Connections:     Frequency of Communication with Friends and Family:     Frequency of Social Gatherings with Friends and Family:     Attends [employer] Services:     Active Member of Clubs or Organizations:     Attends Club or Organization Meetings:     Marital Status:    Intimate Partner Violence:     Fear of Current or Ex-Partner:     Emotionally Abused:     Physically Abused:     Sexually Abused:       Medications and Allergies:     Current Outpatient Medications   Medication Sig Dispense Refill    acetaminophen (TYLENOL) 500 mg tablet Take 500 mg by mouth as needed       donepezil (ARICEPT) 10 mg tablet Take 1 tablet (10 mg total) by mouth daily at bedtime 30 tablet 6    fexofenadine (ALLEGRA) 60 MG tablet Take 60 mg by mouth daily      Melatonin 3 MG CAPS Take 3 mg by mouth daily at bedtime Patient takes 1 1/2 mg tablets       memantine (NAMENDA) 5 mg tablet Take 1 tablet (5 mg total) by mouth 2 (two) times a day 60 tablet 6    Multiple Vitamins-Minerals (CENTRUM SILVER ULTRA MENS PO) Take 1 tablet by mouth daily      rosuvastatin (CRESTOR) 5 mg tablet Take 1 tablet (5 mg total) by mouth daily 90 tablet 1    sertraline (ZOLOFT) 50 mg tablet Take 1 tablet (50 mg total) by mouth daily 30 tablet 6    tamsulosin (FLOMAX) 0 4 mg TAKE ONE TABLET DAILY WITH DINNER 90 capsule 3     No current facility-administered medications for this visit  No Known Allergies   Immunizations:     Immunization History   Administered Date(s) Administered    Influenza, high dose seasonal 0 7 mL 12/11/2018, 03/13/2020, 10/14/2020    Pneumococcal Conjugate 13-Valent 12/11/2018    Pneumococcal Polysaccharide PPV23 03/13/2020    SARS-CoV-2 / COVID-19 mRNA IM (Tamsen Hockey) 01/25/2021, 03/01/2021      Health Maintenance:         Topic Date Due    Lung Cancer Screening  03/22/2020    Colorectal Cancer Screening  07/07/2021    Hepatitis C Screening  Completed         Topic Date Due    DTaP,Tdap,and Td Vaccines (1 - Tdap) Never done    Influenza Vaccine (1) 09/01/2021      Medicare Health Risk Assessment:     /87   Pulse 79   Temp (!) 97 4 °F (36 3 °C)   Resp 16   Ht 5' 9" (1 753 m)   Wt 79 4 kg (175 lb)   SpO2 97%   BMI 25 84 kg/m²      Mindi Traore is here for his Subsequent Wellness visit  Last Medicare Wellness visit information reviewed, patient interviewed and updates made to the record today  Health Risk Assessment:   Patient rates overall health as very good  Patient feels that their physical health rating is same  Patient is satisfied with their life  Eyesight was rated as same  Hearing was rated as same  Patient feels that their emotional and mental health rating is same  Patients states they are never, rarely angry  Patient states they are never, rarely unusually tired/fatigued  Pain experienced in the last 7 days has been none  Patient states that he has experienced no weight loss or gain in last 6 months  Depression Screening:   PHQ-2 Score: 0      Fall Risk Screening: In the past year, patient has experienced: no history of falling in past year      Home Safety:  Patient does not have trouble with stairs inside or outside of their home  Patient has working smoke alarms and has working carbon monoxide detector  Home safety hazards include: none  Nutrition:   Current diet is Regular  Medications:   Patient is not currently taking any over-the-counter supplements  Patient is able to manage medications  Activities of Daily Living (ADLs)/Instrumental Activities of Daily Living (IADLs):   Walk and transfer into and out of bed and chair?: Yes  Dress and groom yourself?: Yes    Bathe or shower yourself?: Yes    Feed yourself? Yes  Do your laundry/housekeeping?: Yes  Manage your money, pay your bills and track your expenses?: Yes  Make your own meals?: Yes    Do your own shopping?: Yes    Previous Hospitalizations:   Any hospitalizations or ED visits within the last 12 months?: No      Cognitive Screening:   Provider or family/friend/caregiver concerned regarding cognition?: Yes    Cognition Comments: md patel   Currently undercare     PREVENTIVE SCREENINGS      Cardiovascular Screening:    General: Screening Not Indicated and History Lipid Disorder      Diabetes Screening:     General: Screening Current      Prostate Cancer Screening:    General: History Prostate Cancer and Screening Not Indicated      Osteoporosis Screening:    General: Screening Not Indicated and Risks and Benefits Discussed      Abdominal Aortic Aneurysm (AAA) Screening:    Risk factors include: tobacco use        General: Risks and Benefits Discussed and Screening Not Indicated      Lung Cancer Screening:     General: Screening Not Indicated      Hepatitis C Screening:    General: Screening Current    Screening, Brief Intervention, and Referral to Treatment (SBIRT)    Screening  Typical number of drinks in a day: 0  Typical number of drinks in a week: 0  Interpretation: Low risk drinking behavior      Single Item Drug Screening:  How often have you used an illegal drug (including marijuana) or a prescription medication for non-medical reasons in the past year? never    Single Item Drug Screen Score: 0  Interpretation: Negative screen for possible drug use disorder      Mel Maher Amaury Mitchell

## 2021-08-24 NOTE — PATIENT INSTRUCTIONS

## 2021-09-23 ENCOUNTER — OFFICE VISIT (OUTPATIENT)
Dept: GASTROENTEROLOGY | Facility: CLINIC | Age: 81
End: 2021-09-23
Payer: COMMERCIAL

## 2021-09-23 VITALS
HEIGHT: 69 IN | DIASTOLIC BLOOD PRESSURE: 82 MMHG | HEART RATE: 64 BPM | BODY MASS INDEX: 26.07 KG/M2 | SYSTOLIC BLOOD PRESSURE: 120 MMHG | WEIGHT: 176 LBS

## 2021-09-23 DIAGNOSIS — D12.6 TUBULAR ADENOMA OF COLON: ICD-10-CM

## 2021-09-23 DIAGNOSIS — K63.5 POLYP OF COLON, UNSPECIFIED PART OF COLON, UNSPECIFIED TYPE: Primary | ICD-10-CM

## 2021-09-23 PROCEDURE — 1036F TOBACCO NON-USER: CPT | Performed by: PHYSICIAN ASSISTANT

## 2021-09-23 PROCEDURE — 1160F RVW MEDS BY RX/DR IN RCRD: CPT | Performed by: PHYSICIAN ASSISTANT

## 2021-09-23 PROCEDURE — 99213 OFFICE O/P EST LOW 20 MIN: CPT | Performed by: PHYSICIAN ASSISTANT

## 2021-09-23 NOTE — PATIENT INSTRUCTIONS
Colonoscopy   AMBULATORY CARE:   What you need to know about a colonoscopy:  A colonoscopy is a procedure to examine the inside of your colon (intestine) with a scope  A scope is a flexible tube with a small light and camera on the end  Polyps or tissue growths may be removed during your colonoscopy  What you need to do the week before your colonoscopy: You will need to stop taking medicines that contain aspirin or iron for 7 days before your colonoscopy  If you take a blood thinner, such as warfarin, ask when you should stop taking it  Make plans for someone to drive you home after your procedure  How to prepare for your colonoscopy: Your healthcare provider will have you prepare your bowels before your procedure  Your bowels will need to be empty before your procedure to allow him or her to see your colon clearly  You will need to do the following:  · Have only clear liquids for the entire day before your colonoscopy  Clear liquids include plain gelatin, unsweetened fruit juices, clear soup, and broth  Do not drink any liquid that is blue, red, or purple  · Follow your bowel prep as directed  There are many different preparations that can be given before a colonoscopy  Some are given over 2 hours and others over 6 hours  Some are given earlier in the afternoon the day before the colonoscopy  Others are given the day before and then the morning of the colonoscopy  With any bowel prep, stay close to the bathroom  This liquid will cause your bowels to move often  · Use an enema if directed  Your healthcare provider may tell you to use an enema to help clean out your bowels  · Do not eat or drink anything after midnight  This will help prevent problems that can happen if you vomit while under anesthesia  What will happen during your colonoscopy:   · You will be given medicine to help you relax   You will lie on your left side and raise one or both knees toward your chest  Your healthcare provider will examine your anus and use a finger to check your rectum  You may need another enema if your bowel is not empty  The scope will be lubricated and gently placed into your anus  It will then be passed through your rectum and into your colon  Water or air will be put into your colon to help clean or expand it  This is done so your healthcare provider can see your colon clearly  · Tissue samples may be taken from the walls of your bowel and sent to a lab for tests  If you have a polyp, your healthcare provider will pass a wire loop through the scope and use it to hold the polyp  The polyp is then removed from the wall of your colon  The polyps are sent to a lab for tests  Pictures of your colon may be taken during the procedure  What will happen after your colonoscopy: You may feel bloated or have some gas and abdominal discomfort  You may need to lie on your left side with a heating pad on your abdomen  Risks of a colonoscopy: You may have pain or bleeding after the scope or polyps are removed  You may also have a slow heartbeat, decreased blood pressure, or increased sweating  Your colon may tear due to the increased pressure from the scope and other instruments  This may cause bowel contents to leak out of your colon and into your abdomen  If this happens, you will need to have surgery on your colon  Call your doctor if:   · You have a large amount of bright red blood in your bowel movements  · Your abdomen is hard and firm and you have severe pain  · You have sudden trouble breathing  · You develop a rash or hives  · You have a fever within 24 hours of your procedure  · You have not had a bowel movement for 3 days after your procedure  · You have questions or concerns about your condition or care  After your colonoscopy:   · Do not lift, strain, or run  for 3 days  · Rest as much as possible  You have been given medicine to relax you   Do not  drive or make important decisions for at least 24 hours  Return to your normal activity as directed  · Relieve gas and discomfort from bloating  by lying on your left side with a heating pad on your abdomen  You may need to take short walks to help the gas move out  Eat small meals until bloating is relieved  If you had polyps removed: For 7 days after your procedure:  · Do not  take aspirin  · Do not  go on long car rides  Help prevent constipation:   · Eat a variety of healthy foods  Healthy foods include fruit, vegetables, whole-grain breads, low-fat dairy products, beans, lean meat, and fish  Ask if you need to be on a special diet  Your healthcare provider may recommend that you eat high-fiber foods such as cooked beans  Fiber helps you have regular bowel movements  · Drink liquids as directed  Adults should drink between 9 and 13 eight-ounce cups of liquid every day  Ask what amount is best for you  For most people, good liquids to drink are water, juice, and milk  · Exercise as directed  Talk to your healthcare provider about the best exercise plan for you  Exercise can help prevent constipation, decrease your blood pressure and improve your health  Follow up with your healthcare provider as directed:  Write down your questions so you remember to ask them during your visits  © Copyright [employer] 2021 Information is for End User's use only and may not be sold, redistributed or otherwise used for commercial purposes  All illustrations and images included in CareNotes® are the copyrighted property of [employer]  or Shahana Almanzar   The above information is an  only  It is not intended as medical advice for individual conditions or treatments  Talk to your doctor, nurse or pharmacist before following any medical regimen to see if it is safe and effective for you

## 2021-09-23 NOTE — PROGRESS NOTES
[facility] - Outpatient Follow-up Note  Supriya Lou 80 y o  male MRN: [id number]  Encounter: [id number]          ASSESSMENT AND PLAN:      1  Polyp of colon, unspecified part of colon, unspecified type  2  Tubular adenoma of colon  Will plan colonoscopy    ______________________________________________________________________    SUBJECTIVE:   82-year-old male who presents the office today for consultation prior to a colonoscopy  Patient is due for a 1 year recall colonoscopy  Patient's colonoscopy performed in July of 2020 is as follows  1  Polyp of the proximal transverse colon  2  Polyp of the mid transverse colon  3  Two polyps of the descending colon at 56 cm  4  Two polyps of the descending colon at 52 cm  5  Two polyps at the sigmoid colon 40 cm  6  Pedunculated polyp in the distal sigmoid colon at 23 cm  7  Polyp of the rectum  8  Approximately a dozen small polyps of the rectum, not removed  9  Diverticulosis coli, pancolonic, severe     Pathology   A  Colon, proximal transverse polyps, biopsies:  -  Portions of tubular adenomas, negative for high-grade dysplasia      B  Colon, mid transverse polyp, biopsy:  -  Tubular adenoma, negative for high-grade dysplasia     C  Colon, descending polyps at 61, biopsies:  -  Portions of tubular adenomas, negative for high-grade dysplasia      D  Colon, descending polyps at 64, biopsies:  -  Portions of tubular adenomas, negative for high-grade dysplasia      E  Colon, descending polyps at 47, biopsies:  -  Portions of tubular adenomas, negative for high-grade dysplasia      F  Colon, polyps at 40 cm, biopsies:  -  Portions of tubular adenomas, negative for high-grade dysplasia      G  Colon, polyp at 23 cm, biopsy:  -  High-grade dysplasiainvolving tubular adenoma  -  The evaluated portions of inked polyp margin are negative for dysplasia     -  Immunohistochemical stains performed with appropriate controls for SMA and desmin highlight muscularis mucosa with no distinct features of invasive lesion      H  Colon, rectal polyp, biopsy:  -  High-grade dysplasia bordering on intramucosal carcinoma involving tubular adenoma (see note)  At the present time patient denies any diarrhea or constipation  Patient denies any melena or rectal bleeding  Patient denies any abdominal pain  He denies any history of colon cancer or colon polyps  REVIEW OF SYSTEMS IS OTHERWISE NEGATIVE  Historical Information   Past Medical History:   Diagnosis Date    [facility] 4/6/2018    Alzheimer disease ([facility]     Anxiety     Basal cell carcinoma 08/18/2020    Right nares, Dr Kayleigh Alfaro Basal cell carcinoma 06/03/2021    Right upper forehead    Bile duct disease     Cancer ([facility] )     prostate, skin     [facility]     Choledocholithiasis     Dementia ([facility]     Depression     Kidney stone     Liver disease     Malignant neoplasm of prostate ([facility]     Mood disorder ([facility]     Prostate cancer ([facility]     Right carotid bruit     Seizure disorder ([place] [address] )     Skin cancer      Past Surgical History:   Procedure Laterality Date    CHOLECYSTECTOMY      ERCP N/A 3/9/2018    Procedure: ENDOSCOPIC RETROGRADE CHOLANGIOPANCREATOGRAPHY (ERCP); Surgeon: Boogie Pham MD;  [facility];  Service: Gastroenterology    ERCP  04/13/2018    stenting    ERCP N/A 6/8/2018    Procedure: ENDOSCOPIC RETROGRADE CHOLANGIOPANCREATOGRAPHY (ERCP); Surgeon: Boogie Pham MD;  [facility];  Service: Gastroenterology    [facility]  08/18/2020    Right nares, Dr Alon Winston  06/30/2021    Right upper forehead; Dr Rula Kendall BODY/STENT BILIARY/PANC DUCT N/A 4/13/2018    Procedure: ENDOSCOPIC RETROGRADE CHOLANGIOPANCREATOGRAPHY (ERCP);   Surgeon: Boogie Pham MD;  [facility];  Service: Gastroenterology    MD LAP,CHOLECYSTECTOMY N/A 5/15/2018    Procedure: LAPAROSCOPIC CHOLECYSTECTOMY;  Surgeon: Bo Mcneil MD;  [facility];  Service: General    PROSTATE SURGERY      TONSILLECTOMY       Social History   Social History     Substance and Sexual Activity   Alcohol Use No    Comment: former     Social History     Substance and Sexual Activity   Drug Use No     Social History     Tobacco Use   Smoking Status Former Smoker    Packs/day: 1 00    Years: 60 00    Pack years: 60 00    Types: Cigarettes    Quit date: 3/19/2019    Years since quittin 5   Smokeless Tobacco Never Used   Tobacco Comment    daily     Family History   Problem Relation Age of Onset    Cancer Mother     Dementia Mother     Alzheimer's disease Mother     Sudden death Father        Meds/Allergies       Current Outpatient Medications:     acetaminophen (TYLENOL) 500 mg tablet    donepezil (ARICEPT) 10 mg tablet    fexofenadine (ALLEGRA) 60 MG tablet    Melatonin 3 MG CAPS    memantine (NAMENDA) 5 mg tablet    Multiple Vitamins-Minerals (CENTRUM SILVER ULTRA MENS PO)    rosuvastatin (CRESTOR) 5 mg tablet    sertraline (ZOLOFT) 50 mg tablet    tamsulosin (FLOMAX) 0 4 mg    No Known Allergies        Objective     Blood pressure 120/82, pulse 64, height 5' 9" (1 753 m), weight 79 8 kg (176 lb)  Body mass index is 25 99 kg/m²  PHYSICAL EXAM:      General Appearance:   Alert, cooperative, no distress   HEENT:   Normocephalic, atraumatic, anicteric      Neck:  Supple, symmetrical, trachea midline   Lungs:   Clear to auscultation bilaterally; no rales, rhonchi or wheezing; respirations unlabored    Heart[de-identified]   Regular rate and rhythm; no murmur, rub, or gallop     Abdomen:   Soft, non-tender, non-distended; normal bowel sounds; no masses, no organomegaly    Genitalia:   Deferred    Rectal:   Deferred    Extremities:  No cyanosis, clubbing or edema    Pulses:  2+ and symmetric    Skin:  No jaundice, rashes, or lesions    Lymph nodes:  No palpable cervical lymphadenopathy  Lab Results:   No visits with results within 1 Day(s) from this visit  Latest known visit with results is:   Appointment on 08/16/2021   Component Date Value    Sodium 08/16/2021 138     Potassium 08/16/2021 4 0     Chloride 08/16/2021 109*    CO2 08/16/2021 26     ANION GAP 08/16/2021 3*    BUN 08/16/2021 20     Creatinine 08/16/2021 0 92     Glucose, Fasting 08/16/2021 105*    Calcium 08/16/2021 8 8     AST 08/16/2021 28     ALT 08/16/2021 44     Alkaline Phosphatase 08/16/2021 103     Total Protein 08/16/2021 7 6     Albumin 08/16/2021 3 8     Total Bilirubin 08/16/2021 1 01*    eGFR 08/16/2021 79     WBC 08/16/2021 5 65     RBC 08/16/2021 5 72*    Hemoglobin 08/16/2021 16 7     Hematocrit 08/16/2021 50 5*    MCV 08/16/2021 88     MCH 08/16/2021 29 2     MCHC 08/16/2021 33 1     RDW 08/16/2021 14 4     MPV 08/16/2021 10 5     Platelets 49/37/9571 191     nRBC 08/16/2021 0     Neutrophils Relative 08/16/2021 64     Immat GRANS % 08/16/2021 1     Lymphocytes Relative 08/16/2021 26     Monocytes Relative 08/16/2021 7     Eosinophils Relative 08/16/2021 1     Basophils Relative 08/16/2021 1     Neutrophils Absolute 08/16/2021 3 64     Immature Grans Absolute 08/16/2021 0 04     Lymphocytes Absolute 08/16/2021 1 47     Monocytes Absolute 08/16/2021 0 41     Eosinophils Absolute 08/16/2021 0 05     Basophils Absolute 08/16/2021 0 04     TSH 3RD GENERATON 08/16/2021 0 891     Hemoglobin A1C 08/16/2021 5 7*    EAG 08/16/2021 117     Vitamin B1, Whole Blood 08/16/2021 163 0     Vitamin B-12 08/16/2021 539     Cholesterol 08/16/2021 172     Triglycerides 08/16/2021 135     HDL, Direct 08/16/2021 35*    LDL Calculated 08/16/2021 110*    Non-HDL-Chol (CHOL-HDL) 08/16/2021 137     Bilirubin, Direct 08/16/2021 0 26*         Radiology Results:   No results found

## 2021-09-29 ENCOUNTER — VBI (OUTPATIENT)
Dept: ADMINISTRATIVE | Facility: OTHER | Age: 81
End: 2021-09-29

## 2021-10-13 ENCOUNTER — TELEPHONE (OUTPATIENT)
Dept: GASTROENTEROLOGY | Facility: HOSPITAL | Age: 81
End: 2021-10-13

## 2021-10-14 ENCOUNTER — HOSPITAL ENCOUNTER (OUTPATIENT)
Dept: GASTROENTEROLOGY | Facility: HOSPITAL | Age: 81
Setting detail: OUTPATIENT SURGERY
Discharge: HOME | End: 2021-10-14
Payer: COMMERCIAL

## 2021-10-14 ENCOUNTER — ANESTHESIA EVENT (OUTPATIENT)
Dept: GASTROENTEROLOGY | Facility: HOSPITAL | Age: 81
End: 2021-10-14

## 2021-10-14 ENCOUNTER — ANESTHESIA (OUTPATIENT)
Dept: GASTROENTEROLOGY | Facility: HOSPITAL | Age: 81
End: 2021-10-14

## 2021-10-14 VITALS
HEIGHT: 69 IN | RESPIRATION RATE: 16 BRPM | SYSTOLIC BLOOD PRESSURE: 136 MMHG | WEIGHT: 176.15 LBS | BODY MASS INDEX: 26.09 KG/M2 | OXYGEN SATURATION: 98 % | HEART RATE: 59 BPM | TEMPERATURE: 97 F | DIASTOLIC BLOOD PRESSURE: 79 MMHG

## 2021-10-14 DIAGNOSIS — D12.6 TUBULAR ADENOMA OF COLON: ICD-10-CM

## 2021-10-14 DIAGNOSIS — K63.5 POLYP OF COLON, UNSPECIFIED PART OF COLON, UNSPECIFIED TYPE: ICD-10-CM

## 2021-10-14 PROBLEM — J96.01 ACUTE HYPOXEMIC RESPIRATORY FAILURE (HCC): Status: RESOLVED | Noted: 2018-04-14 | Resolved: 2021-10-14

## 2021-10-14 PROBLEM — Z72.0 TOBACCO ABUSE: Status: RESOLVED | Noted: 2018-04-27 | Resolved: 2021-10-14

## 2021-10-14 PROCEDURE — 88305 TISSUE EXAM BY PATHOLOGIST: CPT | Performed by: PATHOLOGY

## 2021-10-14 PROCEDURE — 45385 COLONOSCOPY W/LESION REMOVAL: CPT | Performed by: INTERNAL MEDICINE

## 2021-10-14 RX ORDER — PROPOFOL 10 MG/ML
INJECTION, EMULSION INTRAVENOUS AS NEEDED
Status: DISCONTINUED | OUTPATIENT
Start: 2021-10-14 | End: 2021-10-14

## 2021-10-14 RX ORDER — SODIUM CHLORIDE, SODIUM LACTATE, POTASSIUM CHLORIDE, CALCIUM CHLORIDE 600; 310; 30; 20 MG/100ML; MG/100ML; MG/100ML; MG/100ML
INJECTION, SOLUTION INTRAVENOUS CONTINUOUS PRN
Status: DISCONTINUED | OUTPATIENT
Start: 2021-10-14 | End: 2021-10-14

## 2021-10-14 RX ORDER — LIDOCAINE HYDROCHLORIDE 10 MG/ML
INJECTION, SOLUTION EPIDURAL; INFILTRATION; INTRACAUDAL; PERINEURAL AS NEEDED
Status: DISCONTINUED | OUTPATIENT
Start: 2021-10-14 | End: 2021-10-14

## 2021-10-14 RX ADMIN — SODIUM CHLORIDE, SODIUM LACTATE, POTASSIUM CHLORIDE, AND CALCIUM CHLORIDE: .6; .31; .03; .02 INJECTION, SOLUTION INTRAVENOUS at 08:15

## 2021-10-14 RX ADMIN — PROPOFOL 30 MG: 10 INJECTION, EMULSION INTRAVENOUS at 08:48

## 2021-10-14 RX ADMIN — PROPOFOL 30 MG: 10 INJECTION, EMULSION INTRAVENOUS at 08:38

## 2021-10-14 RX ADMIN — LIDOCAINE HYDROCHLORIDE 50 MG: 10 INJECTION, SOLUTION EPIDURAL; INFILTRATION; INTRACAUDAL; PERINEURAL at 08:31

## 2021-10-14 RX ADMIN — PROPOFOL 30 MG: 10 INJECTION, EMULSION INTRAVENOUS at 08:36

## 2021-10-14 RX ADMIN — PROPOFOL 30 MG: 10 INJECTION, EMULSION INTRAVENOUS at 08:46

## 2021-10-14 RX ADMIN — PROPOFOL 30 MG: 10 INJECTION, EMULSION INTRAVENOUS at 08:44

## 2021-10-14 RX ADMIN — PROPOFOL 30 MG: 10 INJECTION, EMULSION INTRAVENOUS at 08:41

## 2021-10-14 RX ADMIN — PROPOFOL 30 MG: 10 INJECTION, EMULSION INTRAVENOUS at 08:53

## 2021-10-14 RX ADMIN — PROPOFOL 30 MG: 10 INJECTION, EMULSION INTRAVENOUS at 08:34

## 2021-10-14 RX ADMIN — PROPOFOL 80 MG: 10 INJECTION, EMULSION INTRAVENOUS at 08:31

## 2021-10-22 ENCOUNTER — TELEPHONE (OUTPATIENT)
Dept: GASTROENTEROLOGY | Facility: CLINIC | Age: 81
End: 2021-10-22

## 2021-11-10 ENCOUNTER — IMMUNIZATIONS (OUTPATIENT)
Dept: FAMILY MEDICINE CLINIC | Facility: HOSPITAL | Age: 81
End: 2021-11-10

## 2021-11-10 DIAGNOSIS — Z23 ENCOUNTER FOR IMMUNIZATION: Primary | ICD-10-CM

## 2021-11-10 PROCEDURE — 0013A COVID-19 MODERNA VACC 0.25 ML BOOSTER: CPT

## 2021-11-10 PROCEDURE — 91306 COVID-19 MODERNA VACC 0.25 ML BOOSTER: CPT

## 2022-01-03 DIAGNOSIS — F02.80 DEMENTIA ASSOCIATED WITH OTHER UNDERLYING DISEASE WITHOUT BEHAVIORAL DISTURBANCE (HCC): ICD-10-CM

## 2022-01-03 RX ORDER — MEMANTINE HYDROCHLORIDE 5 MG/1
5 TABLET ORAL 2 TIMES DAILY
Qty: 60 TABLET | Refills: 6 | Status: SHIPPED | OUTPATIENT
Start: 2022-01-03 | End: 2022-05-05 | Stop reason: SDUPTHER

## 2022-03-03 ENCOUNTER — OFFICE VISIT (OUTPATIENT)
Dept: FAMILY MEDICINE CLINIC | Facility: CLINIC | Age: 82
End: 2022-03-03
Payer: COMMERCIAL

## 2022-03-03 VITALS
OXYGEN SATURATION: 96 % | TEMPERATURE: 97 F | BODY MASS INDEX: 27.99 KG/M2 | DIASTOLIC BLOOD PRESSURE: 70 MMHG | SYSTOLIC BLOOD PRESSURE: 128 MMHG | WEIGHT: 189 LBS | HEIGHT: 69 IN | HEART RATE: 62 BPM

## 2022-03-03 DIAGNOSIS — F39 MOOD DISORDER (HCC): ICD-10-CM

## 2022-03-03 DIAGNOSIS — F02.80 DEMENTIA ASSOCIATED WITH OTHER UNDERLYING DISEASE WITHOUT BEHAVIORAL DISTURBANCE (HCC): ICD-10-CM

## 2022-03-03 DIAGNOSIS — C61 MALIGNANT NEOPLASM OF PROSTATE (HCC): ICD-10-CM

## 2022-03-03 DIAGNOSIS — C44.311 BASAL CELL CARCINOMA (BCC) OF SKIN OF NOSE: ICD-10-CM

## 2022-03-03 DIAGNOSIS — Z13.6 SCREENING FOR AAA (ABDOMINAL AORTIC ANEURYSM): ICD-10-CM

## 2022-03-03 DIAGNOSIS — F02.81 DEMENTIA ASSOCIATED WITH OTHER UNDERLYING DISEASE WITH BEHAVIORAL DISTURBANCE (HCC): ICD-10-CM

## 2022-03-03 DIAGNOSIS — Z12.2 SCREENING FOR LUNG CANCER: Primary | ICD-10-CM

## 2022-03-03 PROCEDURE — 99214 OFFICE O/P EST MOD 30 MIN: CPT | Performed by: FAMILY MEDICINE

## 2022-03-03 NOTE — PROGRESS NOTES
Assessment/Plan:     Chronic Problems:  No problem-specific Assessment & Plan notes found for this encounter  Visit Diagnosis:  Diagnoses and all orders for this visit:    Screening for lung cancer  -     CT lung screening program; Future  -     Comprehensive metabolic panel; Future  -     CBC and differential; Future  -     Lipid panel; Future  -     Hemoglobin A1C; Future  -     TSH, 3rd generation; Future    Screening for AAA (abdominal aortic aneurysm)  -      abdominal aorta screening aaa; Future  -     Comprehensive metabolic panel; Future  -     CBC and differential; Future  -     Lipid panel; Future  -     Hemoglobin A1C; Future  -     TSH, 3rd generation; Future    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 ([facility] )  -     Comprehensive metabolic panel; Future  -     CBC and differential; Future  -     Lipid panel; Future  -     Hemoglobin A1C; Future  -     TSH, 3rd generation; Future    Malignant neoplasm of prostate ([facility] )  -     Comprehensive metabolic panel; Future  -     CBC and differential; Future  -     Lipid panel; Future  -     Hemoglobin A1C; Future  -     TSH, 3rd generation; Future          Subjective:    Patient ID: Sharan Velez is a 80 y o  male      Patient here with for follow-up accompanied by daughter  Dementia overall doing well able to care for self activities of daily living  Daughter assisting with history mildly  Denies any significant issues, negative concerns  Has recently completed colon cancer screening, has also completed screening in regard to skin cancer, also being followed by urology for mildly elevated PSA, for which continues with Flomax  Continues to be followed by Neurology, Warren Mccabe  Cholesterol presently continues with Crestor        The following portions of the patient's history were reviewed and updated as appropriate: allergies, current medications, past family history, past medical history, past social history, past surgical history and problem list     Review of Systems   Constitutional: Negative for appetite change, chills, fever and unexpected weight change  HENT: Negative for congestion, dental problem, ear pain, hearing loss, postnasal drip, rhinorrhea, sinus pressure, sinus pain, sneezing, sore throat, tinnitus and voice change  Eyes: Negative for visual disturbance  Respiratory: Negative for apnea, cough, chest tightness and shortness of breath  Cardiovascular: Negative for chest pain, palpitations and leg swelling  Gastrointestinal: Negative for abdominal pain, blood in stool, constipation, diarrhea, nausea and vomiting  Endocrine: Negative for cold intolerance, heat intolerance, polydipsia, polyphagia and polyuria  Genitourinary: Negative for decreased urine volume, difficulty urinating, dysuria, frequency and hematuria  Musculoskeletal: Negative for arthralgias, back pain, gait problem, joint swelling and myalgias  Skin: Negative for color change, rash and wound  Allergic/Immunologic: Negative for environmental allergies and food allergies  Neurological: Negative for dizziness, syncope, weakness, light-headedness, numbness and headaches  Hematological: Negative for adenopathy  Does not bruise/bleed easily  Psychiatric/Behavioral: Negative for sleep disturbance and suicidal ideas  The patient is not nervous/anxious            /70   Pulse 62   Temp (!) 97 °F (36 1 °C)   Ht 5' 9" (1 753 m)   Wt 85 7 kg (189 lb)   SpO2 96%   BMI 27 91 kg/m²   Social History     Socioeconomic History    Marital status: Legally      Spouse name: Not on file    Number of children: Not on file    Years of education: Not on file    Highest education level: Not on file   Occupational History    Occupation: employed   Tobacco Use    Smoking status: Former Smoker     Packs/day: 1 00     Years: 60 00     Pack years: 60 00     Types: Cigarettes     Quit date: 3/19/2019     Years since quittin 9    Smokeless tobacco: Never Used    Tobacco comment: daily   Vaping Use    Vaping Use: Never used   Substance and Sexual Activity    Alcohol use: No     Comment: former    Drug use: No    Sexual activity: Yes     Partners: Female   Other Topics Concern    Not on file   Social History Narrative    Not on file     Social Determinants of Health     Financial Resource Strain: Not on file   Food Insecurity: Not on file   Transportation Needs: Not on file   Physical Activity: Not on file   Stress: Not on file   Social Connections: Not on file   Intimate Partner Violence: Not on file   Housing Stability: Not on file     Past Medical History:   Diagnosis Date    Alcoholism [facility] ) 2018    Alzheimer disease ([facility] )     Anxiety     Basal cell carcinoma 2020    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    Right upper forehead    Bile duct disease     Cancer ([facility] )     prostate, skin     Cardiac arrest ([facility] )     Choledocholithiasis     Colon polyp     Dementia ([facility] )     Depression     Kidney stone     Liver disease     Malignant neoplasm of prostate ([facility] )     Mood disorder ([facility] )     Prostate cancer ([facility] )     Right carotid bruit     Seizure disorder ([facility] )     Skin cancer      Family History   Problem Relation Age of Onset    Cancer Mother     Dementia Mother     Alzheimer's disease Mother     Sudden death Father      Past Surgical History:   Procedure Laterality Date    CHOLECYSTECTOMY      COLONOSCOPY      ERCP N/A 3/9/2018    Procedure: ENDOSCOPIC RETROGRADE CHOLANGIOPANCREATOGRAPHY (ERCP); Surgeon: Vadim Donaldson MD;  [facility];  Service: Gastroenterology    ERCP  2018    stenting    ERCP N/A 2018    Procedure: ENDOSCOPIC RETROGRADE CHOLANGIOPANCREATOGRAPHY (ERCP);   Surgeon: Vadmi Donaldson MD;  [facility];  Service: Gastroenterology    MOHS SURGERY  2020    Dr Nani Berrios 06/30/2021    Right upper forehead; Dr Orlando Sauceda BODY/STENT BILIARY/PANC DUCT N/A 4/13/2018    Procedure: ENDOSCOPIC RETROGRADE CHOLANGIOPANCREATOGRAPHY (ERCP);   Surgeon: Madeleine Triana MD;  [facility];  Service: Gastroenterology    LA LAP,CHOLECYSTECTOMY N/A 5/15/2018    Procedure: Mikhail Randall;  Surgeon: Claire Blackman MD;  [facility];  Service: General    PROSTATE SURGERY      TONSILLECTOMY         Current Outpatient Medications:     acetaminophen (TYLENOL) 500 mg tablet, Take 500 mg by mouth as needed , Disp: , Rfl:     donepezil (ARICEPT) 10 mg tablet, Take 1 tablet (10 mg total) by mouth daily at bedtime, Disp: 30 tablet, Rfl: 6    fexofenadine (ALLEGRA) 60 MG tablet, Take 60 mg by mouth daily, Disp: , Rfl:     Melatonin 3 MG CAPS, Take 3 mg by mouth daily at bedtime Patient takes 1 1/2 mg tablets , Disp: , Rfl:     memantine (NAMENDA) 5 mg tablet, TAKE 1 TABLET (5 MG TOTAL) BY MOUTH 2 (TWO) TIMES A DAY, Disp: 60 tablet, Rfl: 6    Multiple Vitamins-Minerals (CENTRUM SILVER ULTRA MENS PO), Take 1 tablet by mouth daily, Disp: , Rfl:     rosuvastatin (CRESTOR) 5 mg tablet, Take 1 tablet (5 mg total) by mouth daily, Disp: 90 tablet, Rfl: 1    sertraline (ZOLOFT) 50 mg tablet, TAKE 1 TABLET (50 MG TOTAL) BY MOUTH DAILY, Disp: 30 tablet, Rfl: 6    tamsulosin (FLOMAX) 0 4 mg, TAKE ONE TABLET DAILY WITH DINNER, Disp: 90 capsule, Rfl: 3    No Known Allergies          Lab Review   Hospital Outpatient Visit on 10/14/2021   Component Date Value    Case Report 10/14/2021                      Value:Surgical Pathology Report                         Case: [id number]                                   Authorizing Provider:  Shivam Pedraza DO          Collected:           10/14/2021 0841              Ordering Location:      [facility]       Received:            10/14/2021 2049                                     [facility] Pathologist:           Carie De La Cruz MD                                                               Specimens:   A) - Polyp, Colorectal, cecum polyp x1                                                              B) - Polyp, Colorectal, proximal transverse polyp x4                                                C) - Polyp, Colorectal, distal transverse polyp x1                                                  D) - Polyp, Colorectal, distal sigmoid polyp x1                                            Final Diagnosis 10/14/2021                      Value: This result contains rich text formatting which cannot be displayed here   Additional Information 10/14/2021                      Value: This result contains rich text formatting which cannot be displayed here   Synoptic Checklist 10/14/2021                      Value:  (COLON/RECTUM POLYP FORM - GI - A, B, C)                                                                                                                 :    Adenoma(s)      Gross Description 10/14/2021                      Value: This result contains rich text formatting which cannot be displayed here   Clinical Information 10/14/2021                      Value:Cold snare polypectomy x1        Imaging: No results found  Objective:     Physical Exam  Constitutional:       General: He is not in acute distress  Appearance: He is well-developed  He is not ill-appearing or toxic-appearing  HENT:      Head: Normocephalic and atraumatic  Right Ear: Tympanic membrane normal    Eyes:      Conjunctiva/sclera: Conjunctivae normal    Neck:      Vascular: No carotid bruit  Cardiovascular:      Rate and Rhythm: Normal rate and regular rhythm  Heart sounds: Normal heart sounds  Pulmonary:      Effort: Pulmonary effort is normal       Breath sounds: Normal breath sounds  Musculoskeletal:         General: Normal range of motion  Cervical back: Normal range of motion and neck supple  Lymphadenopathy:      Cervical: No cervical adenopathy  Skin:     General: Skin is warm and dry  Findings: No lesion or rash  Neurological:      Mental Status: He is alert and oriented to person, place, and time  Deep Tendon Reflexes: Reflexes are normal and symmetric  Psychiatric:         Mood and Affect: Mood normal          Behavior: Behavior normal            There are no Patient Instructions on file for this visit  SAAD Sanchez    Portions of the record may have been created with voice recognition software  Occasional wrong word or "sound a like" substitutions may have occurred due to the inherent limitations of voice recognition software  Read the chart carefully and recognize, using context, where substitutions have occurred

## 2022-03-07 PROBLEM — I46.9 CARDIAC ARREST (HCC): Status: RESOLVED | Noted: 2018-04-14 | Resolved: 2022-03-07

## 2022-03-22 DIAGNOSIS — E78.2 MIXED HYPERLIPIDEMIA: ICD-10-CM

## 2022-03-22 RX ORDER — ROSUVASTATIN CALCIUM 5 MG/1
5 TABLET, COATED ORAL DAILY
Qty: 360 TABLET | Refills: 0 | Status: SHIPPED | OUTPATIENT
Start: 2022-03-22 | End: 2022-06-13

## 2022-03-28 ENCOUNTER — OFFICE VISIT (OUTPATIENT)
Dept: DERMATOLOGY | Facility: CLINIC | Age: 82
End: 2022-03-28
Payer: COMMERCIAL

## 2022-03-28 VITALS — HEIGHT: 69 IN | WEIGHT: 186 LBS | BODY MASS INDEX: 27.55 KG/M2 | TEMPERATURE: 98.3 F

## 2022-03-28 DIAGNOSIS — B07.9 VERRUCA VULGARIS: ICD-10-CM

## 2022-03-28 DIAGNOSIS — L82.1 SEBORRHEIC KERATOSIS: ICD-10-CM

## 2022-03-28 DIAGNOSIS — D18.01 CHERRY ANGIOMA: ICD-10-CM

## 2022-03-28 DIAGNOSIS — D22.9 MULTIPLE MELANOCYTIC NEVI: ICD-10-CM

## 2022-03-28 DIAGNOSIS — D48.9 NEOPLASM OF UNCERTAIN BEHAVIOR: ICD-10-CM

## 2022-03-28 DIAGNOSIS — L85.3 XEROSIS OF SKIN: ICD-10-CM

## 2022-03-28 DIAGNOSIS — L72.0 EPIDERMAL INCLUSION CYST: Primary | ICD-10-CM

## 2022-03-28 PROCEDURE — 99214 OFFICE O/P EST MOD 30 MIN: CPT | Performed by: DERMATOLOGY

## 2022-03-28 PROCEDURE — 88305 TISSUE EXAM BY PATHOLOGIST: CPT | Performed by: STUDENT IN AN ORGANIZED HEALTH CARE EDUCATION/TRAINING PROGRAM

## 2022-03-28 PROCEDURE — 11102 TANGNTL BX SKIN SINGLE LES: CPT | Performed by: DERMATOLOGY

## 2022-03-28 NOTE — PATIENT INSTRUCTIONS
Assessment and Plan:  Based on a thorough discussion of this condition and the management approach to it (including a comprehensive discussion of the known risks, side effects and potential benefits of treatment), the patient (family) agrees to implement the following specific plan:   Reassured benign     What are epidermal inclusion cysts? Epidermal inclusion cysts are the most common, benign cutaneous cysts  There are many different names for epidermal inclusion cysts, including epidermoid cyst, epidermal cyst, infundibular cyst, inclusion cyst, and keratin cyst  These cysts can occur anywhere on the body and typically present as nodules directly underneath the skin  There is often a visible pore or opening in the center  The cysts are freely moveable and can range from a few millimeters to several centimeters in diameter  The center of epidermoid cysts almost always contains keratin, which has a cheesy appearance, and not sebum  They also do not originate from sebaceous glands  Therefore, epidermal inclusion cysts are not the same as sebaceous cysts  Cysts may remain stable or progressively enlarge over time  There are no reliable predictive factors to tell if an epidermal inclusion cyst will enlarge, become inflamed, or remain quiescent  Infected cysts tend to become larger, turn red, and are more noticeable to the patient  There may be accompanying pain and discomfort  What causes epidermal inclusion cysts? Epidermal inclusion cysts often appear out of the blue and are not contagious  They are due to a proliferation of epidermal cells within the dermis and are more common in men than women  They occur more frequently in patients in their 20s to 45s   Epidermal inclusion cysts by themselves are usually not inherited, but they can be hereditary in rare syndromes such as Tse syndrome, nodular elastosis with cysts and comedones (Favre-Racouchot syndrome), and basal cell nevus syndrome (Gorlin syndrome)  Elderly patients with chronic sun-damaged skin areas have a higher likelihood of developing epidermoid cysts  They often occur in areas where hair follicles have been inflamed or repeatedly irritated are more frequent in patients with acne vulgaris  In the  period, they are called milia  Patients on BRAF inhibitors such as imiquimod and cyclosporine have a higher incidence of epidermoid cysts of the face  How do we diagnose an epidermal inclusion cyst?  Epidermoid inclusion cysts are often diagnosed by history and physical exam  There is usually no need for biopsy prior to removal   Radiographic and laboratory exams, such as ultrasound studies, are unnecessary and not typically ordered unless the practitioner suspects a genetic condition  What is the treatment for an epidermal inclusion cyst?  Inflamed, uninfected epidermal inclusion cysts rarely resolve spontaneously without therapy or surgical intervention  Treatment is not emergent unless desired by the patient  Definitive treatment is via surgical excision with walls intact  This method will prevent recurrence  This is best done when the cyst is not inflamed, to decrease the probability of rupture during surgery  - A local anesthetic will be injected around the cyst  - A small incision is made in the skin overlying the cyst, and contents are expressed  - The incision is repaired with sutures    Another option is to use a 4mm punch biopsy with cyst extraction through the defect  Incision and drainage is often needed if the cyst is infected or inflamed  If there is surrounding cellulitis, oral antibiotic therapy may be necessary  The common agents used target methicillin sensitive Staphylococcal aureus and methicillin resistant S aureus in areas of high prevalence     Assessment and Plan:  Based on a thorough discussion of this condition and the management approach to it (including a comprehensive discussion of the known risks, side effects and potential benefits of treatment), the patient (family) agrees to implement the following specific plan:   cryotherapy done in office today  Verruca Vulgaris  A verruca is a common growth of the skin caused by infection by human papilloma virus (HPV)  There are many strains of the virus that cause different types of warts on the body  The virus infects the most superficial layers of the skin, causing increased production of skin cells and thickening  Warts can be spread through direct contact with infected skin and may spread to other parts of the body if scratched or picked  A verruca is more commonly called a "wart " Warts are particularly common in school-aged children but can arise at any age  Patients who have a history of eczema are especially prone due to impaired skin barrier  Those taking immunosuppressive drugs or with HIV infections may experience prolonged symptoms despite treatment  Warts generally have a rough surface with a tiny black dot sometimes observed in the middle of each scaly spot  They can range in size from a small bump to large scaly growths  Common warts are often found on the backs of fingers or toes, around the nails, and on the knees  Plantar warts can grow inwardly on the soles of the feet causing pain  There are many possible ways to treat warts and sometimes several different treatments are needed to get the warts to go away completely  There is no single perfect treatment for warts, and successful treatment can take many months  In-office treatments usually require multiple visits, and include:  1) Cryotherapy  a cold spray with liquid nitrogen will destroy the infected cells but may lead to discomfort and blistering  It may also leave a permanent white latanya or scar  2) Electrosurgery (curettage and cautery) can be used for large resistant warts which involves shaving the growth down and burning the base   It is performed under local anesthesia and may leave a permanent scar    3) Candida (yeast) antigen injections  These are extracts of the common yeast (Candida) that cannot cause an infection  The medication is injected into/under the wart  It is thought to stimulate the immune system to recognize the wart virus and attack it  Multiple injections are often needed about one month apart  There are also several at-home wart treatments:    1) Soak the warts in warm water for 5 minutes every night followed by gentle filing with a nail file or pumice stone  2) Topical salicylic acid or similar compounds work by removing the dead surface skin cells  a  Apply the medicine directly to the wart, wait for it to dry completely, then cover with duct tape overnight   b  Repeat until the wart is gone, which can take 2-4 months  c  Do not use on the face or groin area   d  If the wart paint makes the skin sore, stop treatment until the discomfort has settled, then recommence as above   e  Take care to keep the chemical off normal skin  3) Podophyllin is a cytotoxic agent used in some products and must not be used in pregnancy or women considering pregnancy  4) Some prescription medications include   a  Topical retinoids (adapalene, tretinoin, tazarotene), 5-fluorouracil (Efudex) or imiquimod (Aldara) creams are sometimes used to treat flat warts or warts on the face and other sensitive anatomical areas  They are usually applied directly to the warts once a day for 2-4 months and can be irritating  These treatments should only be used as directed by your health care provider  b  Systemic treatment with oral cimetidine (Tagamet) may help boost the immune system against the wart virus in patients, some of the time  Initiation of cimetidine therapy should ONLY be done under the supervision of your health care provider, who can discuss possible side effects and drug-to-drug interactions of this specific treatment     Assessment and Plan:  Based on a thorough discussion of this condition and the management approach to it (including a comprehensive discussion of the known risks, side effects and potential benefits of treatment), the patient (family) agrees to implement the following specific plan:   When outside we recommend using a wide brim hat, sunglasses, long sleeve and pants, sunscreen with SPF 46+ with reapplication every 2 hours, or SPF specific clothing    Benign, reassured   Annual skin check     Melanocytic Nevi  Melanocytic nevi ("moles") are tan or brown, raised or flat areas of the skin which have an increased number of melanocytes  Melanocytes are the cells in our body which make pigment and account for skin color  Some moles are present at birth (I e , "congenital nevi"), while others come up later in life (i e , "acquired nevi")  The sun can stimulate the body to make more moles  Sunburns are not the only thing that triggers more moles  Chronic sun exposure can do it too  Clinically distinguishing a healthy mole from melanoma may be difficult, even for experienced dermatologists  The "ABCDE's" of moles have been suggested as a means of helping to alert a person to a suspicious mole and the possible increased risk of melanoma  The suggestions for raising alert are as follows:    Asymmetry: Healthy moles tend to be symmetric, while melanomas are often asymmetric  Asymmetry means if you draw a line through the mole, the two halves do not match in color, size, shape, or surface texture  Asymmetry can be a result of rapid enlargement of a mole, the development of a raised area on a previously flat lesion, scaling, ulceration, bleeding or scabbing within the mole  Any mole that starts to demonstrate "asymmetry" should be examined promptly by a board certified dermatologist      Border: Healthy moles tend to have discrete, even borders    The border of a melanoma often blends into the normal skin and does not sharply delineate the mole from normal skin  Any mole that starts to demonstrate "uneven borders" should be examined promptly by a board certified dermatologist      Color: Healthy moles tend to be one color throughout  Melanomas tend to be made up of different colors ranging from dark black, blue, white, or red  Any mole that demonstrates a color change should be examined promptly by a board certified dermatologist      Diameter: Healthy moles tend to be smaller than 0 6 cm in size; an exception are "congenital nevi" that can be larger  Melanomas tend to grow and can often be greater than 0 6 cm (1/4 of an inch, or the size of a pencil eraser)  This is only a guideline, and many normal moles may be larger than 0 6 cm without being unhealthy  Any mole that starts to change in size (small to bigger or bigger to smaller) should be examined promptly by a board certified dermatologist      Evolving: Healthy moles tend to "stay the same "  Melanomas may often show signs of change or evolution such as a change in size, shape, color, or elevation  Any mole that starts to itch, bleed, crust, burn, hurt, or ulcerate or demonstrate a change or evolution should be examined promptly by a board certified dermatologist     Assessment and Plan:  Based on a thorough discussion of this condition and the management approach to it (including a comprehensive discussion of the known risks, side effects and potential benefits of treatment), the patient (family) agrees to implement the following specific plan:   Monitor for changes   Benign, reassured       Assessment and Plan:    Cherry angioma, also known as Saloni Lindsey spots, are benign vascular skin lesions  A "cherry angioma" is a firm red, blue or purple papule, 0 1-1 cm in diameter  When thrombosed, they can appear black in colour until evaluated with a dermatoscope when the red or purple colour is more easily seen   Cherry angioma may develop on any part of the body but most often appear on the scalp, face, lips and trunk  An angioma is due to proliferating endothelial cells; these are the cells that line the inside of a blood vessel  Angiomas can arise in early life or later in life; the most common type of angioma is a cherry angioma  Cherry angiomas are very common in males and females of any age or race  They are more noticeable in white skin than in skin of colour  They markedly increase in number from about the age of 36  There may be a family history of similar lesions  Eruptive cherry angiomas have been rarely reported to be associated with internal malignancy  The cause of angiomas is unknown  Genetic analysis of cherry angiomas has shown that they frequently carry specific somatic missense mutations in the GNAQ and GNA11 (Q209H) genes, which are involved in other vascular and melanocytic proliferations  Assessment and Plan:  Based on a thorough discussion of this condition and the management approach to it (including a comprehensive discussion of the known risks, side effects and potential benefits of treatment), the patient (family) agrees to implement the following specific plan:   Monitor for changes   Benign, reassured       Seborrheic Keratosis  A seborrheic keratosis is a harmless warty spot that appears during adult life as a common sign of skin aging  Seborrheic keratoses can arise on any area of skin, covered or uncovered, with the usual exception of the palms and soles  They do not arise from mucous membranes  Seborrheic keratoses can have highly variable appearance  Seborrheic keratoses are extremely common  It has been estimated that over 90% of adults over the age of 61 years have one or more of them  They occur in males and females of all races, typically beginning to erupt in the 35s or 45s  They are uncommon under the age of 21 years  The precise cause of seborrhoeic keratoses is not known  Seborrhoeic keratoses are considered degenerative in nature   As time goes by, seborrheic keratoses tend to become more numerous  Some people inherit a tendency to develop a very large number of them; some people may have hundreds of them  There is no easy way to remove multiple lesions on a single occasion  Unless a specific lesion is "inflamed" and is causing pain or stinging/burning or is bleeding, most insurance companies do not authorize treatment  Assessment and Plan:  Based on a thorough discussion of this condition and the management approach to it (including a comprehensive discussion of the known risks, side effects and potential benefits of treatment), the patient (family) agrees to implement the following specific plan:   Use moisturizer like Eucerin,Cerave or Aveeno Cream 3 times a day for the dry skin               Dry skin refers to skin that feels dry to touch  Dry skin has a dull surface with a rough, scaly quality  The skin is less pliable and cracked  When dryness is severe, the skin may become inflamed and fissured  Although any body site can be dry, dry skin tends to affect the shins more than any other site  Dry skin is lacking moisture in the outer horny cell layer (stratum corneum) and this results in cracks in the skin surface  Dry skin is also called xerosis, xeroderma or asteatosis (lack of fat)  It can affect males and females of all ages  There is some racial variability in water and lipid content of the skin   Dry skin that starts in early childhood may be one of about 20 types of ichthyosis (fish-scale skin)  There is often a family history of dry skin   Dry skin is commonly seen in people with atopic dermatitis   Nearly everyone > 60 years has dry skin  Dry skin that begins later may be seen in people with certain diseases and conditions     Postmenopausal women   Hypothyroidism   Chronic renal disease    Malnutrition and weight loss    Subclinical dermatitis    Treatment with certain drugs such as oral retinoids, diuretics and epidermal growth factor receptor inhibitors      What is the treatment for dry skin? The mainstay of treatment of dry skin and ichthyosis is moisturisers/emollients  They should be applied liberally and often enough to:   Reduce itch    Improve the barrier function    Prevent entry of irritants, bacteria    Reduce transepidermal water loss  How can dry skin be prevented? Eliminate aggravating factors:   Reduce the frequency of bathing   A humidifier in winter and air conditioner in summer    Compare having a short shower with a prolonged soak in a bath   Use lukewarm, not hot, water   Replace standard soap with a substitute such as a synthetic detergent cleanser, water-miscible emollient, bath oil, anti-pruritic tar oil, colloidal oatmeal etc     Apply an emollient liberally and often, particularly shortly after bathing, and when itchy  The drier the skin, the thicker this should be, especially on the hands  What is the outlook for dry skin? A tendency to dry skin may persist life-long, or it may improve once contributing factors are controlled  Assessment and Plan:   I have discussed with the patient that a sample of skin via a "skin biopsy would be potentially helpful to further make a specific diagnosis under the microscope   Based on a thorough discussion of this condition and the management approach to it (including a comprehensive discussion of the known risks, side effects and potential benefits of treatment), the patient (family) agrees to implement the following specific plan:    o Procedure:  Skin Biopsy  After a thorough discussion of treatment options and risk/benefits/alternatives (including but not limited to local pain, scarring, dyspigmentation, blistering, possible superinfection, and inability to confirm a diagnosis via histopathology), verbal and written consent were obtained and portion of the rash was biopsied for tissue sample    See below for consent that was obtained from patient and subsequent Procedure Note  I   RATIONALE FOR PROCEDURE  I understand that a skin biopsy allows the Dermatologist to test a lesion or rash under the microscope to obtain a diagnosis  It usually involves numbing the area with numbing medication and removing a small piece of skin; sometimes the area will be closed with sutures  In this specific procedure, sutures are not usually needed  If any sutures are placed, then they are usually need to be removed in 2 weeks or less  I understand that my Dermatologist recommends that a skin "shave" biopsy be performed today  A local anesthetic, similar to the kind that a dentist uses when filling a cavity, will be injected with a very small needle into the skin area to be sampled  The injected skin and tissue underneath "will go to sleep and become numb so no pain should be felt afterwards  An instrument shaped like a tiny "razor blade" (shave biopsy instrument) will be used to cut a small piece of tissue and skin from the area so that a sample of tissue can be taken and examined more closely under the microscope  A slight amount of bleeding will occur, but it will be stopped with direct pressure and a pressure bandage and any other appropriate methods  I understands that a scar will form where the wound was created  Surgical ointment will be applied to help protect the wound  Sutures are not usually needed      II   RISKS AND POTENTIAL COMPLICATIONS   I understand the risks and potential complications of a skin biopsy include but are not limited to the following:   Bleeding   Infection   Pain   Scar/keloid   Skin discoloration   Incomplete Removal   Recurrence   Nerve Damage/Numbness/Loss of Function   Allergic Reaction to Anesthesia   Biopsies are diagnostic procedures and based on findings additional treatment or evaluation may be required   Loss or destruction of specimen resulting in no additional findings    My Dermatologist has explained to me the nature of the condition, the nature of the procedure, and the benefits to be reasonably expected compared with alternative approaches  My Dermatologist has discussed the likelihood of major risks or complications of this procedure including the specific risks listed above, such as bleeding, infection, and scarring/keloid  I understand that a scar is expected after this procedure  I understand that my physician cannot predict if the scar will form a "keloid," which extends beyond the borders of the wound that is created  A keloid is a thick, painful, and bumpy scar  A keloid can be difficult to treat, as it does not always respond well to therapy, which includes injecting cortisone directly into the keloid every few weeks  While this usually reduces the pain and size of the scar, it does not eliminate it  I understand that photographs may be taken before and after the procedure  These will be maintained as part of the medical providers confidential records and may not be made available to me  I further authorize the medical provider to use the photographs for teaching purposes or to illustrate scientific papers, books, or lectures if in his/her judgment, medical research, education, or science may benefit from its use  I have had an opportunity to fully inquire about the risks and benefits of this procedure and its alternatives  I have been given ample time and opportunity to ask questions and to seek a second opinion if I wished to do so  I acknowledge that there have specifically been no guarantees as to the cosmetic results from the procedure  I am aware that with any procedure there is always the possibility of an unexpected complication  III  POST-PROCEDURAL CARE (WHAT YOU WILL NEED TO DO "AFTER THE BIOPSY" TO OPTIMIZE HEALING)     Keep the area clean and dry    Try NOT to remove the bandage or get it wet for the first 24 hours      Gently clean the area and apply surgical ointment (such as Vaseline petrolatum ointment, which is available "over the counter" and not a prescription) to the biopsy site for up to 2 weeks straight  This acts to protect the wound from the outside world   Sutures are not usually placed in this procedure  If any sutures were placed, return for suture removal as instructed (generally 1 week for the face, 2 weeks for the body)   Take Acetaminophen (Tylenol) for discomfort, if no contraindications  Ibuprofen or aspirin could make bleeding worse   Call our office immediately for signs of infection: fever, chills, increased redness, warmth, tenderness, discomfort/pain, or pus or foul smell coming from the wound  WHAT TO DO IF THERE IS ANY BLEEDING? If a small amount of bleeding is noticed, place a clean cloth over the area and apply firm pressure for ten minutes  Check the wound after 10 minutes of direct pressure  If bleeding persists, try one more time for an additional 10 minutes of direct pressure on the area  If the bleeding becomes heavier or does not stop after the second attempt, or if you have any other questions about this procedure, then please call your [facility]'s Dermatologist by calling [phone] (SKIN)  I hereby acknowledge that I have reviewed and verified the site with my Dermatologist and have requested and authorized my Dermatologist to proceed with the procedure

## 2022-03-28 NOTE — PROGRESS NOTES
[facility] Follow Up Note    Patient Name: Steven Desir  Encounter Date: 03/28/2022    Today's Chief Concerns:  Jarrellal Berryesther Concern #1:  Skin check       Current Medications:    Current Outpatient Medications:     acetaminophen (TYLENOL) 500 mg tablet, Take 500 mg by mouth as needed , Disp: , Rfl:     donepezil (ARICEPT) 10 mg tablet, Take 1 tablet (10 mg total) by mouth daily at bedtime, Disp: 30 tablet, Rfl: 6    fexofenadine (ALLEGRA) 60 MG tablet, Take 60 mg by mouth daily, Disp: , Rfl:     Melatonin 3 MG CAPS, Take 3 mg by mouth daily at bedtime Patient takes 1 1/2 mg tablets , Disp: , Rfl:     memantine (NAMENDA) 5 mg tablet, TAKE 1 TABLET (5 MG TOTAL) BY MOUTH 2 (TWO) TIMES A DAY, Disp: 60 tablet, Rfl: 6    Multiple Vitamins-Minerals (CENTRUM SILVER ULTRA MENS PO), Take 1 tablet by mouth daily, Disp: , Rfl:     rosuvastatin (CRESTOR) 5 mg tablet, TAKE 1 TABLET (5 MG TOTAL) BY MOUTH DAILY, Disp: 360 tablet, Rfl: 0    sertraline (ZOLOFT) 50 mg tablet, TAKE 1 TABLET (50 MG TOTAL) BY MOUTH DAILY, Disp: 30 tablet, Rfl: 6    tamsulosin (FLOMAX) 0 4 mg, TAKE ONE TABLET DAILY WITH DINNER, Disp: 90 capsule, Rfl: 3    CONSTITUTIONAL:   Vitals:    03/28/22 1040   Temp: 98 3 °F (36 8 °C)   Weight: 84 4 kg (186 lb)   Height: 5' 9" (1 753 m)             Specific Alerts:    Have you been seen by a [facility]ologist in the last 3 years? YES    Are you pregnant or planning to become pregnant? N/A    Are you currently or planning to be nursing or breast feeding? N/A    No Known Allergies    May we call your Preferred Phone number to discuss your specific medical information? YES    May we leave a detailed message that includes your specific medical information? YES    Have you traveled outside of the [facility] in the past 3 months? No    Do you currently have a pacemaker or defibrillator?  No    Do you have any artificial heart valves, joints, plates, screws, rods, stents, pins, etc? No   - If Yes, were any placed within the last 2 years? Do you require any medications prior to a surgical procedure? No   - If Yes, for which procedure? N/a    - If Yes, what medications to you require? N/a     Are you taking any medications that cause you to bleed more easily ("blood thinners") No    Have you ever experienced a rapid heartbeat with epinephrine? No    Have you ever been treated with "gold" (gold sodium thiomalate) therapy? No    [address]  Dermatology can help with wrinkles, "laugh lines," facial volume loss, "double chin," "love handles," age spots, and more  Are you interested in learning today about some of the skin enhancement procedures that we offer? (If Yes, please provide more detail) No    Review of Systems:  Have you recently had or currently have any of the following?     · Fever or chills: No  · Night Sweats: No  · Headaches: No  · Weight Gain: No  · Weight Loss: No  · Blurry Vision: No  · Nausea: No  · Vomiting: No  · Diarrhea: No  · Blood in Stool: No  · Abdominal Pain: No  · Itchy Skin: No  · Painful Joints: No  · Swollen Joints: No  · Muscle Pain: No  · Irregular Mole: No  · Sun Burn: No  · Dry Skin: No  · Skin Color Changes: No  · Scar or Keloid: No  · Cold Sores/Fever Blisters: No  · Bacterial Infections/MRSA: No  · Anxiety: No  · Depression: No  · Suicidal or Homicidal Thoughts: No      PSYCH: Normal mood and affect  EYES: Normal conjunctiva  ENT: Normal lips and oral mucosa  CARDIOVASCULAR: No edema  RESPIRATORY: Normal respirations  HEME/LYMPH/IMMUNO:  No regional lymphadenopathy except as noted below in ASSESSMENT AND PLAN BY DIAGNOSIS    FULL ORGAN SYSTEM SKIN EXAM (SKIN)  Hair, Scalp, Ears, Face Normal except as noted below in Assessment   Neck, Cervical Chain Nodes Normal except as noted below in Assessment   Right Arm/Hand/Fingers Normal except as noted below in Assessment   Left Arm/Hand/Fingers Normal except as noted below in Assessment   Chest/Breasts/Axillae Viewed areas Normal except as noted below in Assessment   Abdomen, Umbilicus Normal except as noted below in Assessment   Back/Spine Normal except as noted below in Assessment   Groin/Genitalia/Buttocks Viewed areas Normal except as noted below in Assessment   Right Leg, Foot, Toes Normal except as noted below in Assessment   Left Leg, Foot, Toes Normal except as noted below in Assessment       EPIDERMAL INCLUSION CYST    Physical Exam:   Anatomic Location Affected:  Posterior neck    Morphological Description:  subcutaneous nodule    Pertinent Positives:   Pertinent Negatives:        Assessment and Plan:  Based on a thorough discussion of this condition and the management approach to it (including a comprehensive discussion of the known risks, side effects and potential benefits of treatment), the patient (family) agrees to implement the following specific plan:   Reassured benign     What are epidermal inclusion cysts? Epidermal inclusion cysts are the most common, benign cutaneous cysts  There are many different names for epidermal inclusion cysts, including epidermoid cyst, epidermal cyst, infundibular cyst, inclusion cyst, and keratin cyst  These cysts can occur anywhere on the body and typically present as nodules directly underneath the skin  There is often a visible pore or opening in the center  The cysts are freely moveable and can range from a few millimeters to several centimeters in diameter  The center of epidermoid cysts almost always contains keratin, which has a cheesy appearance, and not sebum  They also do not originate from sebaceous glands  Therefore, epidermal inclusion cysts are not the same as sebaceous cysts  Cysts may remain stable or progressively enlarge over time  There are no reliable predictive factors to tell if an epidermal inclusion cyst will enlarge, become inflamed, or remain quiescent  Infected cysts tend to become larger, turn red, and are more noticeable to the patient   There may be accompanying pain and discomfort  What causes epidermal inclusion cysts? Epidermal inclusion cysts often appear out of the blue and are not contagious  They are due to a proliferation of epidermal cells within the dermis and are more common in men than women  They occur more frequently in patients in their 20s to 45s  Epidermal inclusion cysts by themselves are usually not inherited, but they can be hereditary in rare syndromes such as Tse syndrome, nodular elastosis with cysts and comedones (Favre-Racouchot syndrome), and basal cell nevus syndrome (Gorlin syndrome)  Elderly patients with chronic sun-damaged skin areas have a higher likelihood of developing epidermoid cysts  They often occur in areas where hair follicles have been inflamed or repeatedly irritated are more frequent in patients with acne vulgaris  In the  period, they are called milia  Patients on BRAF inhibitors such as imiquimod and cyclosporine have a higher incidence of epidermoid cysts of the face  How do we diagnose an epidermal inclusion cyst?  Epidermoid inclusion cysts are often diagnosed by history and physical exam  There is usually no need for biopsy prior to removal   Radiographic and laboratory exams, such as ultrasound studies, are unnecessary and not typically ordered unless the practitioner suspects a genetic condition  What is the treatment for an epidermal inclusion cyst?  Inflamed, uninfected epidermal inclusion cysts rarely resolve spontaneously without therapy or surgical intervention  Treatment is not emergent unless desired by the patient  Definitive treatment is via surgical excision with walls intact  This method will prevent recurrence  This is best done when the cyst is not inflamed, to decrease the probability of rupture during surgery     - A local anesthetic will be injected around the cyst  - A small incision is made in the skin overlying the cyst, and contents are expressed  - The incision is repaired with sutures    Another option is to use a 4mm punch biopsy with cyst extraction through the defect  Incision and drainage is often needed if the cyst is infected or inflamed  If there is surrounding cellulitis, oral antibiotic therapy may be necessary  The common agents used target methicillin sensitive Staphylococcal aureus and methicillin resistant S aureus in areas of high prevalence  VERRUCA VULGARIS ("Common Warts")    Physical Exam:   Anatomic Location Affected:  Left [employer]    Morphological Description:  Verrucous papule    Pertinent Positives:   Pertinent Negatives: Additional History of Present Condition:  Has had frozen in the past     Assessment and Plan:  Based on a thorough discussion of this condition and the management approach to it (including a comprehensive discussion of the known risks, side effects and potential benefits of treatment), the patient (family) agrees to implement the following specific plan:   cryotherapy done in office today  PROCEDURE:  DESTRUCTION OF BENIGN LESIONS  After a thorough discussion of treatment options and risk/benefits/alternatives (including but not limited to local pain, scarring, dyspigmentation, blistering, and possible superinfection), verbal and written consent were obtained and the aforementioned lesions were treated on with cryotherapy using liquid nitrogen x 1 cycle for 5-10 seconds   TOTAL NUMBER of 1 lesions were treated today on the ANATOMIC LOCATION: temple  The patient tolerated the procedure well, and after-care instructions were provided  Verruca Vulgaris  A verruca is a common growth of the skin caused by infection by human papilloma virus (HPV)  There are many strains of the virus that cause different types of warts on the body  The virus infects the most superficial layers of the skin, causing increased production of skin cells and thickening   Warts can be spread through direct contact with infected skin and may spread to other parts of the body if scratched or picked  A verruca is more commonly called a "wart " Warts are particularly common in school-aged children but can arise at any age  Patients who have a history of eczema are especially prone due to impaired skin barrier  Those taking immunosuppressive drugs or with HIV infections may experience prolonged symptoms despite treatment  Warts generally have a rough surface with a tiny black dot sometimes observed in the middle of each scaly spot  They can range in size from a small bump to large scaly growths  Common warts are often found on the backs of fingers or toes, around the nails, and on the knees  Plantar warts can grow inwardly on the soles of the feet causing pain  There are many possible ways to treat warts and sometimes several different treatments are needed to get the warts to go away completely  There is no single perfect treatment for warts, and successful treatment can take many months  In-office treatments usually require multiple visits, and include:  1) Cryotherapy  a cold spray with liquid nitrogen will destroy the infected cells but may lead to discomfort and blistering  It may also leave a permanent white latanya or scar  2) Electrosurgery (curettage and cautery) can be used for large resistant warts which involves shaving the growth down and burning the base  It is performed under local anesthesia and may leave a permanent scar    3) Candida (yeast) antigen injections  These are extracts of the common yeast (Candida) that cannot cause an infection  The medication is injected into/under the wart  It is thought to stimulate the immune system to recognize the wart virus and attack it  Multiple injections are often needed about one month apart      There are also several at-home wart treatments:    1) Soak the warts in warm water for 5 minutes every night followed by gentle filing with a nail file or pumice stone  2) Topical salicylic acid or similar compounds work by removing the dead surface skin cells  a  Apply the medicine directly to the wart, wait for it to dry completely, then cover with duct tape overnight   b  Repeat until the wart is gone, which can take 2-4 months  c  Do not use on the face or groin area   d  If the wart paint makes the skin sore, stop treatment until the discomfort has settled, then recommence as above   e  Take care to keep the chemical off normal skin  3) Podophyllin is a cytotoxic agent used in some products and must not be used in pregnancy or women considering pregnancy  4) Some prescription medications include   a  Topical retinoids (adapalene, tretinoin, tazarotene), 5-fluorouracil (Efudex) or imiquimod (Aldara) creams are sometimes used to treat flat warts or warts on the face and other sensitive anatomical areas  They are usually applied directly to the warts once a day for 2-4 months and can be irritating  These treatments should only be used as directed by your health care provider  b  Systemic treatment with oral cimetidine (Tagamet) may help boost the immune system against the wart virus in patients, some of the time  Initiation of cimetidine therapy should ONLY be done under the supervision of your health care provider, who can discuss possible side effects and drug-to-drug interactions of this specific treatment  MELANOCYTIC NEVI ("Moles")    Physical Exam:   Anatomic Location Affected:   Mostly on sun-exposed areas of the trunk and extremities   Morphological Description:  Scattered, 1-4mm round to ovoid, symmetrical-appearing, even bordered, skin colored to dark brown macules/papules, mostly in sun-exposed areas   Pertinent Positives:   Pertinent Negatives:     Additional History of Present Condition:      Assessment and Plan:  Based on a thorough discussion of this condition and the management approach to it (including a comprehensive discussion of the known risks, side effects and potential benefits of treatment), the patient (family) agrees to implement the following specific plan:   When outside we recommend using a wide brim hat, sunglasses, long sleeve and pants, sunscreen with SPF 33+ with reapplication every 2 hours, or SPF specific clothing    Benign, reassured   Annual skin check     Melanocytic Nevi  Melanocytic nevi ("moles") are tan or brown, raised or flat areas of the skin which have an increased number of melanocytes  Melanocytes are the cells in our body which make pigment and account for skin color  Some moles are present at birth (I e , "congenital nevi"), while others come up later in life (i e , "acquired nevi")  The sun can stimulate the body to make more moles  Sunburns are not the only thing that triggers more moles  Chronic sun exposure can do it too  Clinically distinguishing a healthy mole from melanoma may be difficult, even for experienced dermatologists  The "ABCDE's" of moles have been suggested as a means of helping to alert a person to a suspicious mole and the possible increased risk of melanoma  The suggestions for raising alert are as follows:    Asymmetry: Healthy moles tend to be symmetric, while melanomas are often asymmetric  Asymmetry means if you draw a line through the mole, the two halves do not match in color, size, shape, or surface texture  Asymmetry can be a result of rapid enlargement of a mole, the development of a raised area on a previously flat lesion, scaling, ulceration, bleeding or scabbing within the mole  Any mole that starts to demonstrate "asymmetry" should be examined promptly by a board certified dermatologist      Border: Healthy moles tend to have discrete, even borders  The border of a melanoma often blends into the normal skin and does not sharply delineate the mole from normal skin    Any mole that starts to demonstrate "uneven borders" should be examined promptly by a board certified dermatologist      Color: Healthy moles tend to be one color throughout  Melanomas tend to be made up of different colors ranging from dark black, blue, white, or red  Any mole that demonstrates a color change should be examined promptly by a board certified dermatologist      Diameter: Healthy moles tend to be smaller than 0 6 cm in size; an exception are "congenital nevi" that can be larger  Melanomas tend to grow and can often be greater than 0 6 cm (1/4 of an inch, or the size of a pencil eraser)  This is only a guideline, and many normal moles may be larger than 0 6 cm without being unhealthy  Any mole that starts to change in size (small to bigger or bigger to smaller) should be examined promptly by a board certified dermatologist      Evolving: Healthy moles tend to "stay the same "  Melanomas may often show signs of change or evolution such as a change in size, shape, color, or elevation  Any mole that starts to itch, bleed, crust, burn, hurt, or ulcerate or demonstrate a change or evolution should be examined promptly by a board certified dermatologist         Theoplis Guille    Physical Exam:   Anatomic Location Affected:  trunk   Morphological Description:  Scattered cherry red, 1-4 mm papules   Pertinent Positives:   Pertinent Negatives: Additional History of Present Condition:      Assessment and Plan:  Based on a thorough discussion of this condition and the management approach to it (including a comprehensive discussion of the known risks, side effects and potential benefits of treatment), the patient (family) agrees to implement the following specific plan:   Monitor for changes   Benign, reassured       Assessment and Plan:    Cherry angioma, also known as [employer] spots, are benign vascular skin lesions  A "cherry angioma" is a firm red, blue or purple papule, 0 1-1 cm in diameter   When thrombosed, they can appear black in colour until evaluated with a dermatoscope when the red or purple colour is more easily seen  Cherry angioma may develop on any part of the body but most often appear on the scalp, face, lips and trunk  An angioma is due to proliferating endothelial cells; these are the cells that line the inside of a blood vessel  Angiomas can arise in early life or later in life; the most common type of angioma is a cherry angioma  Cherry angiomas are very common in males and females of any age or race  They are more noticeable in white skin than in skin of colour  They markedly increase in number from about the age of 36  There may be a family history of similar lesions  Eruptive cherry angiomas have been rarely reported to be associated with internal malignancy  The cause of angiomas is unknown  Genetic analysis of cherry angiomas has shown that they frequently carry specific somatic missense mutations in the GNAQ and GNA11 (Q209H) genes, which are involved in other vascular and melanocytic proliferations  SEBORRHEIC KERATOSIS; NON-INFLAMED    Physical Exam:   Anatomic Location Affected:  trunk   Morphological Description:  Flat and raised, waxy, smooth to warty textured, yellow to brownish-grey to dark brown to blackish, discrete, "stuck-on" appearing papules   Pertinent Positives:   Pertinent Negatives: Additional History of Present Condition:      Assessment and Plan:  Based on a thorough discussion of this condition and the management approach to it (including a comprehensive discussion of the known risks, side effects and potential benefits of treatment), the patient (family) agrees to implement the following specific plan:   Monitor for changes   Benign, reassured       Seborrheic Keratosis  A seborrheic keratosis is a harmless warty spot that appears during adult life as a common sign of skin aging  Seborrheic keratoses can arise on any area of skin, covered or uncovered, with the usual exception of the palms and soles   They do not arise from mucous membranes  Seborrheic keratoses can have highly variable appearance  Seborrheic keratoses are extremely common  It has been estimated that over 90% of adults over the age of 61 years have one or more of them  They occur in males and females of all races, typically beginning to erupt in the 35s or 45s  They are uncommon under the age of 21 years  The precise cause of seborrhoeic keratoses is not known  Seborrhoeic keratoses are considered degenerative in nature  As time goes by, seborrheic keratoses tend to become more numerous  Some people inherit a tendency to develop a very large number of them; some people may have hundreds of them  There is no easy way to remove multiple lesions on a single occasion  Unless a specific lesion is "inflamed" and is causing pain or stinging/burning or is bleeding, most insurance companies do not authorize treatment  XEROSIS ("DRY SKIN")    Physical Exam:   Anatomic Location Affected:  diffuse   Morphological Description:  xerosis   Pertinent Positives:   Pertinent Negatives: Additional History of Present Condition:      Assessment and Plan:  Based on a thorough discussion of this condition and the management approach to it (including a comprehensive discussion of the known risks, side effects and potential benefits of treatment), the patient (family) agrees to implement the following specific plan:   Use moisturizer like Eucerin,Cerave or Aveeno Cream 3 times a day for the dry skin               Dry skin refers to skin that feels dry to touch  Dry skin has a dull surface with a rough, scaly quality  The skin is less pliable and cracked  When dryness is severe, the skin may become inflamed and fissured  Although any body site can be dry, dry skin tends to affect the shins more than any other site  Dry skin is lacking moisture in the outer horny cell layer (stratum corneum) and this results in cracks in the skin surface    Dry skin is also called xerosis, xeroderma or asteatosis (lack of fat)  It can affect males and females of all ages  There is some racial variability in water and lipid content of the skin   Dry skin that starts in early childhood may be one of about 20 types of ichthyosis (fish-scale skin)  There is often a family history of dry skin   Dry skin is commonly seen in people with atopic dermatitis   Nearly everyone > 60 years has dry skin  Dry skin that begins later may be seen in people with certain diseases and conditions   Postmenopausal women   Hypothyroidism   Chronic renal disease    Malnutrition and weight loss    Subclinical dermatitis    Treatment with certain drugs such as oral retinoids, diuretics and epidermal growth factor receptor inhibitors      What is the treatment for dry skin? The mainstay of treatment of dry skin and ichthyosis is moisturisers/emollients  They should be applied liberally and often enough to:   Reduce itch    Improve the barrier function    Prevent entry of irritants, bacteria    Reduce transepidermal water loss  How can dry skin be prevented? Eliminate aggravating factors:   Reduce the frequency of bathing   A humidifier in winter and air conditioner in summer    Compare having a short shower with a prolonged soak in a bath   Use lukewarm, not hot, water   Replace standard soap with a substitute such as a synthetic detergent cleanser, water-miscible emollient, bath oil, anti-pruritic tar oil, colloidal oatmeal etc     Apply an emollient liberally and often, particularly shortly after bathing, and when itchy  The drier the skin, the thicker this should be, especially on the hands  What is the outlook for dry skin? A tendency to dry skin may persist life-long, or it may improve once contributing factors are controlled      NEOPLASM OF UNCERTAIN BEHAVIOR OF SKIN    Physical Exam:   (Anatomic Location); (Size and Morphological Description); (Differential Diagnosis):  o Right nostril; 0 7 cm perarly pink papule; diffdx; basal cell    Pertinent Positives:   Pertinent Negatives:      Assessment and Plan:   I have discussed with the patient that a sample of skin via a "skin biopsy would be potentially helpful to further make a specific diagnosis under the microscope   Based on a thorough discussion of this condition and the management approach to it (including a comprehensive discussion of the known risks, side effects and potential benefits of treatment), the patient (family) agrees to implement the following specific plan:    o Procedure:  Skin Biopsy  After a thorough discussion of treatment options and risk/benefits/alternatives (including but not limited to local pain, scarring, dyspigmentation, blistering, possible superinfection, and inability to confirm a diagnosis via histopathology), verbal and written consent were obtained and portion of the rash was biopsied for tissue sample  See below for consent that was obtained from patient and subsequent Procedure Note  PROCEDURE SHAVE BIOPSY NOTE:     Performing Physician: Adan Love    Anatomic Location; Clinical Description with size (cm); Pre-Op Diagnosis:   o Right nostril; 0 7 cm perarly pink papule; diffdx; basal cell      Post-op diagnosis: Same      Local anesthesia: 1% xylocaine with epi       Topical anesthesia: None     Hemostasis: Aluminum chloride       After obtaining informed consent  at which time there was a discussion about the purpose of biopsy  and low risks of infection and bleeding  The area was prepped and draped in the usual fashion  Anesthesia was obtained with 1% lidocaine with epinephrine  A shave biopsy to an appropriate sampling depth was obtained with a sterile blade (such as a 15-blade or DermaBlade)  The resulting wound was covered with surgical ointment and bandaged appropriately       The patient tolerated the procedure well without complications and was without signs of functional compromise  Specimen has been sent for review by Dermatopathology  Standard post-procedure care has been explained and has been included in written form within the patient's copy of Informed Consent  INFORMED CONSENT DISCUSSION AND POST-OPERATIVE INSTRUCTIONS FOR PATIENT    I   RATIONALE FOR PROCEDURE  I understand that a skin biopsy allows the Dermatologist to test a lesion or rash under the microscope to obtain a diagnosis  It usually involves numbing the area with numbing medication and removing a small piece of skin; sometimes the area will be closed with sutures  In this specific procedure, sutures are not usually needed  If any sutures are placed, then they are usually need to be removed in 2 weeks or less  I understand that my Dermatologist recommends that a skin "shave" biopsy be performed today  A local anesthetic, similar to the kind that a dentist uses when filling a cavity, will be injected with a very small needle into the skin area to be sampled  The injected skin and tissue underneath "will go to sleep and become numb so no pain should be felt afterwards  An instrument shaped like a tiny "razor blade" (shave biopsy instrument) will be used to cut a small piece of tissue and skin from the area so that a sample of tissue can be taken and examined more closely under the microscope  A slight amount of bleeding will occur, but it will be stopped with direct pressure and a pressure bandage and any other appropriate methods  I understands that a scar will form where the wound was created  Surgical ointment will be applied to help protect the wound  Sutures are not usually needed      II   RISKS AND POTENTIAL COMPLICATIONS   I understand the risks and potential complications of a skin biopsy include but are not limited to the following:   Bleeding   Infection   Pain   Scar/keloid   Skin discoloration   Incomplete Removal   Recurrence   Nerve Damage/Numbness/Loss of Function   Allergic Reaction to Anesthesia   Biopsies are diagnostic procedures and based on findings additional treatment or evaluation may be required   Loss or destruction of specimen resulting in no additional findings    My Dermatologist has explained to me the nature of the condition, the nature of the procedure, and the benefits to be reasonably expected compared with alternative approaches  My Dermatologist has discussed the likelihood of major risks or complications of this procedure including the specific risks listed above, such as bleeding, infection, and scarring/keloid  I understand that a scar is expected after this procedure  I understand that my physician cannot predict if the scar will form a "keloid," which extends beyond the borders of the wound that is created  A keloid is a thick, painful, and bumpy scar  A keloid can be difficult to treat, as it does not always respond well to therapy, which includes injecting cortisone directly into the keloid every few weeks  While this usually reduces the pain and size of the scar, it does not eliminate it  I understand that photographs may be taken before and after the procedure  These will be maintained as part of the medical providers confidential records and may not be made available to me  I further authorize the medical provider to use the photographs for teaching purposes or to illustrate scientific papers, books, or lectures if in his/her judgment, medical research, education, or science may benefit from its use  I have had an opportunity to fully inquire about the risks and benefits of this procedure and its alternatives  I have been given ample time and opportunity to ask questions and to seek a second opinion if I wished to do so  I acknowledge that there have specifically been no guarantees as to the cosmetic results from the procedure    I am aware that with any procedure there is always the possibility of an unexpected complication  III  POST-PROCEDURAL CARE (WHAT YOU WILL NEED TO DO "AFTER THE BIOPSY" TO OPTIMIZE HEALING)     Keep the area clean and dry  Try NOT to remove the bandage or get it wet for the first 24 hours   Gently clean the area and apply surgical ointment (such as Vaseline petrolatum ointment, which is available "over the counter" and not a prescription) to the biopsy site for up to 2 weeks straight  This acts to protect the wound from the outside world   Sutures are not usually placed in this procedure  If any sutures were placed, return for suture removal as instructed (generally 1 week for the face, 2 weeks for the body)   Take Acetaminophen (Tylenol) for discomfort, if no contraindications  Ibuprofen or aspirin could make bleeding worse   Call our office immediately for signs of infection: fever, chills, increased redness, warmth, tenderness, discomfort/pain, or pus or foul smell coming from the wound  WHAT TO DO IF THERE IS ANY BLEEDING? If a small amount of bleeding is noticed, place a clean cloth over the area and apply firm pressure for ten minutes  Check the wound after 10 minutes of direct pressure  If bleeding persists, try one more time for an additional 10 minutes of direct pressure on the area  If the bleeding becomes heavier or does not stop after the second attempt, or if you have any other questions about this procedure, then please call your [facility] Dermatologist by calling [phone] (SKIN)  I hereby acknowledge that I have reviewed and verified the site with my Dermatologist and have requested and authorized my Dermatologist to proceed with the procedure        Scribe Attestation    I,:  Chen Nielson MA am acting as a scribe while in the presence of the attending physician :       I,:  Kamini Varghese MD personally performed the services described in this documentation    as scribed in my presence :

## 2022-04-01 NOTE — RESULT ENCOUNTER NOTE
DERMATOPATHOLOGY RESULT NOTE    Results reviewed by ordering physician  Called patient to personally discuss results  Discussed results with patient's daughter, power of , Laura Conway  Instructions for Clinical Derm Team:   (remember to route Result Note to appropriate staff):    Call patient's daughter and schedule for MOHS  Consider plastics      Result & Plan by Specimen:    Specimen A: malignant  Plan: [facility]

## 2022-04-04 ENCOUNTER — TELEPHONE (OUTPATIENT)
Dept: DERMATOLOGY | Facility: CLINIC | Age: 82
End: 2022-04-04

## 2022-04-06 NOTE — TELEPHONE ENCOUNTER
Pre- operative Mohs Telephone Scheduling Note    Do you have a pacemaker or defibrillator? no    Do you take antibiotics before skin or dental procedures? no  If yes, will likely require pre-operative antibiotics  Ask  the patient why they take the antibiotics (usually because of joint replacement)  Do you have a history of a joint replacements within the past 2 years? no   If yes, will likely require pre-operative antibiotics  Ask if orthopaedic surgeon has prescribed pre-operative antibiotics to take before procedures/dental work? Do you take any OTC medications that thin your blood (Aspirin, Aleve, Ibuprofen) or supplements that thin your blood (fish oil, garlic, vitamin E, Ginko Biloba)? yes: Tylenol    Do you take any prescribed medications that thin your blood (Coumadin, Plavix, Xarelto, Eliquis or another prescribed blood thinner)? no    Do you have an allergy to lidocaine or epinephrine? no    Do you have an allergy to shellfish? no    Do you smoke? no      If yes,  patient to try and stop 2 days before surgery and 7 days after the surgery  Minimizing smoking as much as possible during this time will improve healing and the cosmetic result after surgery  Date scheduled: 05/18/2022 with Dr Kaitlin Major, [address] [facility]  [facility] right nostril    Coordination of Care with other provider (Oculoplastics, Plastics, ENT) required? no   IF YES, PLEASE FORWARD TO APPROPRIATE PERSONNEL TO HELP COORDINATE  Are there remaining tumors to be scheduled?  no

## 2022-05-05 ENCOUNTER — OFFICE VISIT (OUTPATIENT)
Dept: NEUROLOGY | Facility: CLINIC | Age: 82
End: 2022-05-05
Payer: COMMERCIAL

## 2022-05-05 VITALS
TEMPERATURE: 98.2 F | WEIGHT: 180 LBS | DIASTOLIC BLOOD PRESSURE: 70 MMHG | SYSTOLIC BLOOD PRESSURE: 132 MMHG | HEIGHT: 69 IN | BODY MASS INDEX: 26.66 KG/M2 | OXYGEN SATURATION: 98 % | HEART RATE: 68 BPM

## 2022-05-05 DIAGNOSIS — F02.80 DEMENTIA ASSOCIATED WITH OTHER UNDERLYING DISEASE WITHOUT BEHAVIORAL DISTURBANCE (HCC): ICD-10-CM

## 2022-05-05 PROBLEM — F02.818 DEMENTIA ASSOCIATED WITH OTHER UNDERLYING DISEASE WITH BEHAVIORAL DISTURBANCE: Status: RESOLVED | Noted: 2021-02-24 | Resolved: 2022-05-05

## 2022-05-05 PROBLEM — F02.81 DEMENTIA ASSOCIATED WITH OTHER UNDERLYING DISEASE WITH BEHAVIORAL DISTURBANCE (HCC): Status: RESOLVED | Noted: 2021-02-24 | Resolved: 2022-05-05

## 2022-05-05 PROCEDURE — 99213 OFFICE O/P EST LOW 20 MIN: CPT

## 2022-05-05 RX ORDER — DONEPEZIL HYDROCHLORIDE 10 MG/1
10 TABLET, FILM COATED ORAL
Qty: 30 TABLET | Refills: 6 | Status: SHIPPED | OUTPATIENT
Start: 2022-05-05

## 2022-05-05 RX ORDER — MEMANTINE HYDROCHLORIDE 5 MG/1
5 TABLET ORAL 2 TIMES DAILY
Qty: 60 TABLET | Refills: 6 | Status: SHIPPED | OUTPATIENT
Start: 2022-05-05

## 2022-05-05 NOTE — ASSESSMENT & PLAN NOTE
Overall Mirza Junior seems to be doing well today  He and his daughter do not endorse any significant change in his memory over the last 6 months  He is tolerating his medications well without any adverse effects  His [place] cognitive Assessment is actually improved today comparing it to prior  Recommendations provided to patient today include:  - Continue with mind stimulating activities (word search, cross word, sodoku, search & find, coloring, jig saw, reading etc )  - Continue current doses of Aricept and Donepezil  - Continue off of Zoloft (sertraline)  - Follow up in 6 months    Things that we know are helpful for thinking and memory   1  Exercise program: gradually increase your physical activity over time  Start small and be patient  Aerobic (cardio) activity is best but incorporate balance, strength and flexibility training as well    a  Try to get at least 30min 3 times per week   2  Diet: Mediterranean diet (colorful fruits and vegetables, olive oil, fish, whole grains, very little red meet is any), MIND diet, anti-inflammatory diet  a  Stay well hydrated: drink 6-8 glasses of water per day   b  What's good for your heart is good for your brain  c  Avoid high-salt foods   3  Sleep: aim for at least 7-8 hours per night   a  Avoid alcohol and medications like Benadryl (Tylenol PM) or other sedating drugs  4  Stress management/mindfulness practice:   a  Talk with our social workers about finding a cognitive behavioral therapists  b  Try a smart phone nica like [employer] or mindfulness for beginners   i  Try curable for pain    c  Take a course in Mindfulness Based Stress Reduction (MBSR)   i  Patti worrell  Read or listen to an audiobook about it:  i  Mindfulness for beginners   ii  10% happier  iii  The happiness advantage   5   Social engagement:   a  Stay in touch with family and friends   b  Plan a few specific activities for your social health every week   c  Beti Score a local support group   d  Volunteer! www [email or web address] or call [phone]     MIND diet score:  1 point for each component      · Green leafy vegetables: at least 6 per week  · Other vegetable: at least 1 per day   · Berries: at least 2 per week  · Red meat: fewer than 4 per week   · Fish: at least 1 per week   · Poultry: at least 2 per week  · Beans: at least 3 per week  · Nuts: at least 5 per week  · Fast or fried food: less than 1 per week  · Olive oil   · Butter less: less than 1 table-spoon per day   · Cheese: less than 1 serving per week   · Pastries/sweets: less than 5 servings per week  · Alcohol: no more than 1 serving/ day

## 2022-05-05 NOTE — PROGRESS NOTES
Patient ID: Rashmi Baldwin is a 80 y o  male  Assessment/Plan:    Dementia associated with 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  Overall Adina Leyva seems to be doing well today  He and his daughter do not endorse any significant change in his memory over the last 6 months  He is tolerating his medications well without any adverse effects  His [place] cognitive Assessment is actually improved today comparing it to prior  Recommendations provided to patient today include:  - Continue with mind stimulating activities (word search, cross word, sodoku, search & find, coloring, jig saw, reading etc )  - Continue current doses of Aricept and Donepezil  - Continue off of Zoloft (sertraline)  - Follow up in 6 months    Things that we know are helpful for thinking and memory   1  Exercise program: gradually increase your physical activity over time  Start small and be patient  Aerobic (cardio) activity is best but incorporate balance, strength and flexibility training as well    a  Try to get at least 30min 3 times per week   2  Diet: Mediterranean diet (colorful fruits and vegetables, olive oil, fish, whole grains, very little red meet is any), MIND diet, anti-inflammatory diet  a  Stay well hydrated: drink 6-8 glasses of water per day   b  What's good for your heart is good for your brain  c  Avoid high-salt foods   3  Sleep: aim for at least 7-8 hours per night   a  Avoid alcohol and medications like Benadryl (Tylenol PM) or other sedating drugs  4  Stress management/mindfulness practice:   a  Talk with our social workers about finding a cognitive behavioral therapists  b  Try a smart phone nica like FFFavs or mindfulness for beginners   i  Try curable for pain    c  Take a course in Mindfulness Based Stress Reduction (MBSR)   leo worrell  Read or listen to an audiobook about it:  i  Mindfulness for beginners   ii  10% happier  iii   The happiness advantage 5  Social engagement:   a  Stay in touch with family and friends   b  Plan a few specific activities for your social health every week   c  Acacia Ace a local support group   d  Volunteer! www [email or web address] or call [phone]     MIND diet score:  1 point for each component  · Green leafy vegetables: at least 6 per week  · Other vegetable: at least 1 per day   · Berries: at least 2 per week  · Red meat: fewer than 4 per week   · Fish: at least 1 per week   · Poultry: at least 2 per week  · Beans: at least 3 per week  · Nuts: at least 5 per week  · Fast or fried food: less than 1 per week  · Olive oil   · Butter less: less than 1 table-spoon per day   · Cheese: less than 1 serving per week   · Pastries/sweets: less than 5 servings per week  · Alcohol: no more than 1 serving/ day          Diagnoses and all orders for this visit:    Dementia associated with 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 (HCC)  -     donepezil (ARICEPT) 10 mg tablet; Take 1 tablet (10 mg total) by mouth daily at bedtime  -     memantine (NAMENDA) 5 mg tablet; Take 1 tablet (5 mg total) by mouth 2 (two) times a day           Subjective:    BECK Desir is a 80year old male known to the practice for dementia  Last seen by Lamonte Holstein 8/11/22  He presents to the office today to follow-up with his daughter accompanying him  They state overall he is doing very well  He is keeping himself active throughout the day  He lives alone with his 3 dogs  He is completely independent of all ADLs  He is no longer driving  His daughter does manage his medications but he does remember to take them daily  He is continuing on donepezil 10 milligrams at bedtime and Namenda 5 milligrams twice daily  He states his appetite and mood are both good  Since last time he was seen he self discontinued Zoloft as he was no longer feeling anxious or nervous    He has had no significant changes in his health, and they both feel that his memory is stable from the last time he was seen  He spends his day attending to his house, doing lawn care, and walking his dogs  He is also an avid reader  They do not have any safety concerns, and he has never left the stove on  He denies any alcohol or smoking  He does continue to use notes to compensate for memory  He is also taking a centrum Silver daily  MoCA is 20/30, compared to his prior of 14/30  The following portions of the patient's history were reviewed and updated as appropriate: allergies, current medications, past family history, past medical history, past social history and past surgical history  Objective:    Blood pressure 132/70, pulse 68, temperature 98 2 °F (36 8 °C), temperature source Temporal, height 5' 9" (1 753 m), weight 81 6 kg (180 lb), SpO2 98 %  Neurological Exam     He correctly states his name, date of birth, today's date including the month day and year  He correctly identifies the day of the week and the current President of [place]  He is able to correctly read, repeat, name an item, stated purpose, and recall what he had for dinner  He is able to easily name 6/10 animals in 1 minute, 10/10 in 2 minutes  His delayed recall is 0/3 at 5 minutes  MoCA is 20/30, compared to his prior of 14/30  ROS:    Review of Systems   Constitutional: Negative  Negative for appetite change and fever  HENT: Negative  Negative for hearing loss, tinnitus, trouble swallowing and voice change  Eyes: Negative  Negative for photophobia and pain  Respiratory: Negative  Negative for shortness of breath  Cardiovascular: Negative  Negative for palpitations  Gastrointestinal: Negative  Negative for nausea and vomiting  Endocrine: Negative  Negative for cold intolerance  Genitourinary: Negative  Negative for dysuria, frequency and urgency  Musculoskeletal: Negative  Negative for myalgias and neck pain  Skin: Negative  Negative for rash  Neurological: Negative  Negative for dizziness, tremors, seizures, syncope, facial asymmetry, speech difficulty, weakness, light-headedness, numbness and headaches  Hematological: Negative  Does not bruise/bleed easily  Psychiatric/Behavioral: Positive for confusion  Negative for hallucinations and sleep disturbance  Reviewed ROS as entered by medical assistant

## 2022-05-18 ENCOUNTER — PROCEDURE VISIT (OUTPATIENT)
Dept: DERMATOLOGY | Facility: CLINIC | Age: 82
End: 2022-05-18
Payer: COMMERCIAL

## 2022-05-18 VITALS
HEART RATE: 63 BPM | HEIGHT: 69 IN | DIASTOLIC BLOOD PRESSURE: 70 MMHG | SYSTOLIC BLOOD PRESSURE: 128 MMHG | TEMPERATURE: 97.8 F | WEIGHT: 177.4 LBS | BODY MASS INDEX: 26.28 KG/M2

## 2022-05-18 DIAGNOSIS — C44.311 BASAL CELL CARCINOMA OF RIGHT SIDE OF NOSE: Primary | ICD-10-CM

## 2022-05-18 PROCEDURE — 14060 TIS TRNFR E/N/E/L 10 SQ CM/<: CPT | Performed by: DERMATOLOGY

## 2022-05-18 PROCEDURE — 17311 MOHS 1 STAGE H/N/HF/G: CPT | Performed by: DERMATOLOGY

## 2022-05-18 NOTE — PROGRESS NOTES
MOHS Procedure Note    Patient: Briseida Martin  : 1940  MRN: [id number]  Date: 3/18/2022     History of Present Illness: The patient is a 80 y o  male who presents with complaints of Basal cell carcinoma nodular of Right nostril  Past Medical History:   Diagnosis Date    Alcoholism ([facility] ) 2018    Alzheimer disease ([facility] )     Anxiety     Basal cell carcinoma 2020    Right nares, Dr Muñoz Abdias Basal cell carcinoma 2021    Right upper forehead    Basal cell carcinoma (BCC) of skin of face 2022    Right nostril    Bile duct disease     Cancer ([facility] )     prostate, skin     [facility]     Cardiac arrest ([facility] ) 2018    Choledocholithiasis     Colon polyp     Dementia ([facility] )     Depression     Kidney stone     Liver disease     Malignant neoplasm of prostate ([facility] )     Mood disorder ([facility]     Prostate cancer ([facility] )     Right carotid bruit     Seizure disorder ([facility]     Skin cancer        Past Surgical History:   Procedure Laterality Date    CHOLECYSTECTOMY      COLONOSCOPY      ERCP N/A 2018    Procedure: ENDOSCOPIC RETROGRADE CHOLANGIOPANCREATOGRAPHY (ERCP); Surgeon: Juan Antonio Cosme MD;  [facility];  Service: Gastroenterology    ERCP  2018    stenting    ERCP N/A 2018    Procedure: ENDOSCOPIC RETROGRADE CHOLANGIOPANCREATOGRAPHY (ERCP); Surgeon: Juan Antonio Cosme MD;  [facility];  Service: Gastroenterology    MOHS SURGERY  2020    Right nares, Dr Marissa Paz  2021    Right upper forehead; Dr Marissa Paz  2022    [facility]  [facility]; Right nostril; Dr Whitten Cross BODY/STENT BILIARY/PANC DUCT N/A 2018    Procedure: ENDOSCOPIC RETROGRADE CHOLANGIOPANCREATOGRAPHY (ERCP);   Surgeon: Juan Antonio Cosme MD;  [facility];  Service: Gastroenterology    [place] LAP,CHOLECYSTECTOMY N/A 05/15/2018    Procedure: LAPAROSCOPIC CHOLECYSTECTOMY;  Surgeon: Emely Farr Yared Grey MD;  [facility];  Service: General    PROSTATE SURGERY      TONSILLECTOMY           Current Outpatient Medications:     acetaminophen (TYLENOL) 500 mg tablet, Take 500 mg by mouth as needed , Disp: , Rfl:     donepezil (ARICEPT) 10 mg tablet, Take 1 tablet (10 mg total) by mouth daily at bedtime, Disp: 30 tablet, Rfl: 6    fexofenadine (ALLEGRA) 60 MG tablet, Take 60 mg by mouth daily, Disp: , Rfl:     Melatonin 3 MG CAPS, Take 3 mg by mouth daily at bedtime Patient takes 1 1/2 mg tablets, Disp: , Rfl:     memantine (NAMENDA) 5 mg tablet, Take 1 tablet (5 mg total) by mouth 2 (two) times a day, Disp: 60 tablet, Rfl: 6    Multiple Vitamins-Minerals (CENTRUM SILVER ULTRA MENS PO), Take 1 tablet by mouth daily, Disp: , Rfl:     rosuvastatin (CRESTOR) 5 mg tablet, TAKE 1 TABLET (5 MG TOTAL) BY MOUTH DAILY, Disp: 360 tablet, Rfl: 0    tamsulosin (FLOMAX) 0 4 mg, TAKE ONE TABLET DAILY WITH DINNER, Disp: 90 capsule, Rfl: 3    No Known Allergies    Physical Exam:   Vitals:    05/18/22 1154   BP: 128/70   Pulse: 63   Temp: 97 8 °F (36 6 °C)     General: Awake, Alert, Oriented x 3, Mood and affect appropriate  Respiratory: Respirations even and unlabored  Cardiovascular: Peripheral pulses intact; no edema    Musculoskeletal Exam: N/A    Assessment: 0 8 cm x 0 8 cm; Pink scar; bx proven to be BCC    Plan:  Mohs    MOHS Procedure Timeout    Flowsheet Row Most Recent Value   Timeout: 6803   Patient Identity Verified: Yes   Correct Site Verified: Yes   Correct Procedure Verified: Yes          MOHS Diagnosis/Indication/Location/ID    Flowsheet Row Most Recent Value   Pathology Type Basal cell carcinoma   Anatomic Site --  [Right nostril]   Indications for MOHS tumor location   MOHS ID [id number]          MOHS Site/Accession/Pre-Post    Flowsheet Row Most Recent Value   Original Site Identified (as submitted by referring clinician) Photo   Biopsy Accession/Specimen # (as submitted by referring clincian) [id number]   Pre-MOHS Size Length (cm) 0 8   Pre-MOHS Size Width (cm) 0 8   Post-MOHS Size-Length (cm) 1 2   Post MOHS Size-Width (cm) 1 3   Repair Type --  [Transpositional flap]   Suture Type Vicryl, Ethilon   Ethilon Suture Size 6   Vicryl Suture Size 6   Flap/Graft area (cm2) 3 45   Anesthetic Used 1% Lidocaine with epinephrine          MOHS Tumor Stage 1 Information    Flowsheet Row Most Recent Value   Tissue Sections (blocks) 2   Microscopic Exam Section 1: No tumor identified in section  [post curettage]   Microscopic Exam Section 2: No tumor identified in section  [post curettage]   Tumor Clear After Stage I? Yes      Patient identified, procedure verified, site identified and verified  Time out completed  Surgical removal of the lesion discussed with the patient (risks and benefits, including possibility of scarring, infection, recurrence or potential for further treatment)  I have specifically identified the site with the patient  I have discussed the fact that the patient will have a scar after the procedure regardless of granulation or repair with sutures  I have discussed that the repair options can range from granulation in some cases to linear or curvilinear closures to larger flaps or grafts  There are sometimes flaps or grafts used that require multiples stages of surgery and will not be completed today, rather be completed over a series of appointments  I have discussed that occasionally due to location, size or depth of the lesion I may recommend consultation with and transfer of care for further removal or the reconstruction to another provider such as ophthalmology surgery, plastic surgery, ENT surgery, or surgical oncology   There are cases in which other testing such as imaging with MRI or CT scan or testing of lymph nodes is recommended because of the nature/depth/location of tumor seen during the removal  There is a risk of injury to nerves causing temporary or permanent numbness or the inability to move muscles full such as the inability to lift eyebrows  Questions answered and verbal and written consent was obtained  With the patient in the supine position and under adequate local anesthesia with 1% lidocaine with epinephrine 1:200,000, the defect was scrubbed with Hibiclens  Sterile drapes were placed from the sterile tray  Because of the location of the surgical defect, a transposition flap was judged to give the best possible cosmetic and functional result  The alar transposition flap was chose to minimize tension and distortion of alar rim and to preserve airway patency  The edges of the defect were carefully debrided removing any dead or coagulated tissue  The edges of the defect and the area to be used for transposition of tissue at the base of the flap were widely undermined  A geometric form of the modified rhombic type was outlined on the skin with gentian violet  The outline of the geometric form was carefully planned so that the final scar lines would lie in the most favorable relaxed skin tension lines  The outlined flap was cut with a #15 scalpel blade  The flaps were undermined and hemostasis was carefully obtained with pinpoint electrocautery  The geometric flap was transposed into the defect and a tension bearing suture was placed  The flap was secured in place by a deep layer of buried sutures and a layer of superficial sutures to approximate the cutaneous margins, as noted above  Estimated blood loss was less than 5 mL  The patient tolerated the procedure well  The wound was dressed with petrolatum, a non-stick pad, and a compression dressing  Wound care was discussed with the patient    The patient tolerated the procedure well  The wound was dressed with petrolatum, a non-stick pad, and a compression dressing  Capri Quiñones MD served as the surgeon and pathologist during the procedure  Postoperative care:  Wound care discussed at length, and a wound care instruction sheet was given  Patient urged the patient to call us if any problems or question should arise  Complications: none  Post-op medications: none  Patient condition after procedure: stable  Discharge plans: Plan for suture removal 7 days at next scheduled appointment  The tumor qualifies for Mohs based on AUC criteria  Dr Boris Ward served as the surgeon and pathologist during the procedure  Postoperative care: No would care should be necessary prior to the next visit but I urged the patient to call us if any problems or question should arise prior to that time  If circumstances should change, we will contact the patient to make other arrangements                   Scribe Attestation    I,:  Ramu Garcia am acting as a scribe while in the presence of the attending physician :       I,:  Tiara Diaz MD personally performed the services described in this documentation    as scribed in my presence :

## 2022-05-18 NOTE — LETTER
May 18, 2022     Tony Patiño MD  [address] [place] [address]    Patient: Williams Dawson   YOB: 1940   Date of Visit: 2022       Dear Dr Jack Underwood: Thank you for referring Derick Vazquez to me for evaluation  Below are my notes for this consultation  If you have questions, please do not hesitate to call me  I look forward to following your patient along with you  Sincerely,        Sohan Jdae MD        CC: No Recipients  Sohan Jade MD  2022  6:03 PM  Sign when Signing Visit  MOHS Procedure Note    Patient: Williams Dawson  : 1940  MRN: [id number]  Date: 3/18/2022     History of Present Illness: The patient is a 80 y o  male who presents with complaints of Basal cell carcinoma nodular of Right nostril  Past Medical History:   Diagnosis Date    Alcoholism [facility] ) 2018    Alzheimer disease ([facility] )     Anxiety     Basal cell carcinoma 2020    Right nares, Dr Mccartney [place] Basal cell carcinoma 2021    Right upper forehead    Basal cell carcinoma (BCC) of skin of face 2022    Right nostril    Bile duct disease     Cancer ([facility] )     prostate, skin     Cardiac arrest ([address] )     Cardiac arrest ([facility] ) 2018    Choledocholithiasis     Colon polyp     Dementia ([address] )     Depression     Kidney stone     Liver disease     Malignant neoplasm of prostate ([address] )     Mood disorder ([address] )     Prostate cancer ([address] )     Right carotid bruit     Seizure disorder ([facility] )     Skin cancer        Past Surgical History:   Procedure Laterality Date    CHOLECYSTECTOMY      COLONOSCOPY      ERCP N/A 2018    Procedure: ENDOSCOPIC RETROGRADE CHOLANGIOPANCREATOGRAPHY (ERCP); Surgeon: Anthony Garrison MD;  [facility];  Service: Gastroenterology    ERCP  2018    stenting    ERCP N/A 2018    Procedure: ENDOSCOPIC RETROGRADE CHOLANGIOPANCREATOGRAPHY (ERCP);   Surgeon: Anthony Garrison MD;  [facility]; Service: Gastroenterology    MOHS SURGERY  08/18/2020    Right nares, Dr Jaky Mena  06/30/2021    Right upper forehead; Dr Jaky Mena  05/18/2022    [address]  [employer]; Right nostril; Dr Koffi Ricci BODY/STENT BILIARY/PANC DUCT N/A 04/13/2018    Procedure: ENDOSCOPIC RETROGRADE CHOLANGIOPANCREATOGRAPHY (ERCP); Surgeon: Osa Eisenmenger, MD;  [facility];  Service: Gastroenterology    [place] LAP,CHOLECYSTECTOMY N/A 05/15/2018    Procedure: Luster Mike;  Surgeon: Lambert Dao MD;  [facility];  Service: General    PROSTATE SURGERY      TONSILLECTOMY           Current Outpatient Medications:     acetaminophen (TYLENOL) 500 mg tablet, Take 500 mg by mouth as needed , Disp: , Rfl:     donepezil (ARICEPT) 10 mg tablet, Take 1 tablet (10 mg total) by mouth daily at bedtime, Disp: 30 tablet, Rfl: 6    fexofenadine (ALLEGRA) 60 MG tablet, Take 60 mg by mouth daily, Disp: , Rfl:     Melatonin 3 MG CAPS, Take 3 mg by mouth daily at bedtime Patient takes 1 1/2 mg tablets, Disp: , Rfl:     memantine (NAMENDA) 5 mg tablet, Take 1 tablet (5 mg total) by mouth 2 (two) times a day, Disp: 60 tablet, Rfl: 6    Multiple Vitamins-Minerals (CENTRUM SILVER ULTRA MENS PO), Take 1 tablet by mouth daily, Disp: , Rfl:     rosuvastatin (CRESTOR) 5 mg tablet, TAKE 1 TABLET (5 MG TOTAL) BY MOUTH DAILY, Disp: 360 tablet, Rfl: 0    tamsulosin (FLOMAX) 0 4 mg, TAKE ONE TABLET DAILY WITH DINNER, Disp: 90 capsule, Rfl: 3    No Known Allergies    Physical Exam:   Vitals:    05/18/22 1154   BP: 128/70   Pulse: 63   Temp: 97 8 °F (36 6 °C)     General: Awake, Alert, Oriented x 3, Mood and affect appropriate  Respiratory: Respirations even and unlabored  Cardiovascular: Peripheral pulses intact; no edema    Musculoskeletal Exam: N/A    Assessment: 0 8 cm x 0 8 cm; Pink scar; bx proven to be BCC    Plan:  Mohs MOHS Procedure Timeout    Flowsheet Row Most Recent Value Timeout: 1015   Patient Identity Verified: Yes   Correct Site Verified: Yes   Correct Procedure Verified: Yes          MOHS Diagnosis/Indication/Location/ID    Flowsheet Row Most Recent Value   Pathology Type Basal cell carcinoma   Anatomic Site --  [Right nostril]   Indications for MOHS tumor location   MOHS ID [id number]          MOHS Site/Accession/Pre-Post    Flowsheet Row Most Recent Value   Original Site Identified (as submitted by referring clinician) Photo   Biopsy Accession/Specimen # (as submitted by referring clincian) [id number]   Pre-MOHS Size Length (cm) 0 8   Pre-MOHS Size Width (cm) 0 8   Post-MOHS Size-Length (cm) 1 2   Post MOHS Size-Width (cm) 1 3   Repair Type --  [Transpositional flap]   Suture Type Vicryl, Ethilon   Ethilon Suture Size 6   Vicryl Suture Size 6   Flap/Graft area (cm2) 3 45   Anesthetic Used 1% Lidocaine with epinephrine          MOHS Tumor Stage 1 Information    Flowsheet Row Most Recent Value   Tissue Sections (blocks) 2   Microscopic Exam Section 1: No tumor identified in section  [post curettage]   Microscopic Exam Section 2: No tumor identified in section  [post curettage]   Tumor Clear After Stage I? Yes      Patient identified, procedure verified, site identified and verified  Time out completed  Surgical removal of the lesion discussed with the patient (risks and benefits, including possibility of scarring, infection, recurrence or potential for further treatment)  I have specifically identified the site with the patient  I have discussed the fact that the patient will have a scar after the procedure regardless of granulation or repair with sutures  I have discussed that the repair options can range from granulation in some cases to linear or curvilinear closures to larger flaps or grafts  There are sometimes flaps or grafts used that require multiples stages of surgery and will not be completed today, rather be completed over a series of appointments   I have discussed that occasionally due to location, size or depth of the lesion I may recommend consultation with and transfer of care for further removal or the reconstruction to another provider such as ophthalmology surgery, plastic surgery, ENT surgery, or surgical oncology  There are cases in which other testing such as imaging with MRI or CT scan or testing of lymph nodes is recommended because of the nature/depth/location of tumor seen during the removal  There is a risk of injury to nerves causing temporary or permanent numbness or the inability to move muscles full such as the inability to lift eyebrows  Questions answered and verbal and written consent was obtained  With the patient in the supine position and under adequate local anesthesia with 1% lidocaine with epinephrine 1:200,000, the defect was scrubbed with Hibiclens  Sterile drapes were placed from the sterile tray  Because of the location of the surgical defect, a transposition flap was judged to give the best possible cosmetic and functional result  The alar transposition flap was chose to minimize tension and distortion of alar rim and to preserve airway patency  The edges of the defect were carefully debrided removing any dead or coagulated tissue  The edges of the defect and the area to be used for transposition of tissue at the base of the flap were widely undermined  A geometric form of the modified rhombic type was outlined on the skin with gentian violet  The outline of the geometric form was carefully planned so that the final scar lines would lie in the most favorable relaxed skin tension lines  The outlined flap was cut with a #15 scalpel blade  The flaps were undermined and hemostasis was carefully obtained with pinpoint electrocautery  The geometric flap was transposed into the defect and a tension bearing suture was placed   The flap was secured in place by a deep layer of buried sutures and a layer of superficial sutures to approximate the cutaneous margins, as noted above  Estimated blood loss was less than 5 mL  The patient tolerated the procedure well  The wound was dressed with petrolatum, a non-stick pad, and a compression dressing  Wound care was discussed with the patient    The patient tolerated the procedure well  The wound was dressed with petrolatum, a non-stick pad, and a compression dressing  Bull Cooper MD served as the surgeon and pathologist during the procedure  Postoperative care: Wound care discussed at length, and a wound care instruction sheet was given  Patient urged the patient to call us if any problems or question should arise  Complications: none  Post-op medications: none  Patient condition after procedure: stable  Discharge plans: Plan for suture removal 7 days at next scheduled appointment  The tumor qualifies for Mohs based on AUC criteria  Dr Octavio Dale served as the surgeon and pathologist during the procedure  Postoperative care: No would care should be necessary prior to the next visit but I urged the patient to call us if any problems or question should arise prior to that time  If circumstances should change, we will contact the patient to make other arrangements                   Scribe Attestation    I,:  Josesito Zepeda am acting as a scribe while in the presence of the attending physician :       I,:  Bull Cooper MD personally performed the services described in this documentation    as scribed in my presence :

## 2022-05-18 NOTE — PATIENT INSTRUCTIONS
Mohs Microscopic Surgery After Care    WOUND CARE AFTER SURGERY:    Do NOT to remove the pressure bandage for 48 hours  Keep the area clean and dry while this bandage is on  After removing the bandage for the first time, gently clean the area with soap and water  If the bandage is difficult to remove, getting the bandage wet in the shower will sometimes help soften the adhesive and allow it to be removed more easily  You will now need to cleanse this area daily in the shower with gentle soap  There is no need to scrub the area  Apply plain Vaseline ointment (this is over the counter and not a prescription) to the site followed by a clean appropriately sized bandage to area  Non stick dressing and paper tape (or Hypafix) are recommended for sensitive skin but a bandaid is fine if it covers the area well  You will need to continue the above daily wound care until you return for suture removal in 7 days (generally 7 days for the face, 10-14 days off the face)      RESTRICTIONS:     For two DAYS:   - You will need to take it very easy as this time is highest risk for bleeding  Being a "couch potato" during these two days is generally recommended  - For surgeries on the face/neck/scalp: Avoid leaning down to pick things up off the floor as this brings blood up to your head  Instead, squat down to pick things up  For two WEEKS:   - No heavy lifting (anything greater than 10 pounds)   - You can start to do slow, gentle activities such as slow walking but nothing to increase your heart rate and blood pressure too much (such as cardiovascular exercise)  It is important to take it easy as there is still a risk for bleeding and a high risk popping of stitches open during this time  If we did surgery near the eyes (including the nose): It is VERY common to get a large amount of swelling around your eyes (puffy eyes)   Although less frequent, this can be enough to swell your eyes shut and can also come along with bruising  This should not hurt and is very expected and normal  It is typically worst at ~ 3 days out from your surgery and dramatically better 1 week post-operatively  If we did surgery around your nose: No blowing your nose as this puts you at higher risk of popping stitches durign this time  Instead dab under your nose with a tissue or use a Q-tip inside your nose  MANAGING YOUR PAIN AFTER SURGERY     You can expect to have some pain after surgery  This is normal  The pain is typically worse the first two days after surgery, and quickly begins to get better  The best strategy for controlling your pain after surgery is around the clock pain control  You can take over the counter Acetaminophen (Tylenol) for discomfort, if no contraindications  If you are taking this at the maximum dose, you can alternate this with Motrin (ibuprofen or Advil) as well  Alternating these medications with each other allows you to maximize your pain control  In addition to Tylenol and Motrin, you can use heating pads or ice packs on your incisions to help reduce your pain  How will I alternate your regular strength over-the-counter pain medication? You will take a dose of pain medication every three hours  Start by taking 650 mg of Tylenol (2 pills of 325 mg)   3 hours later take 600 mg of Motrin (3 pills of 200 mg)   3 hours after taking the Motrin take 650 mg of Tylenol   3 hours after that take 600 mg of Motrin  See example - if your first dose of Tylenol is at 12:00 PM     12:00 PM  Tylenol 650 mg (2 pills of 325 mg)    3:00 PM  Motrin 600 mg (3 pills of 200 mg)    6:00 PM  Tylenol 650 mg (2 pills of 325 mg)    9:00 PM  Motrin 600 mg (3 pills of 200 mg)    Continue alternating every 3 hours      Important:   Do not take more than 4000mg of Tylenol or 3200mg of Motrin in a 24-hour period  What if I still have pain?    If you have pain that is not controlled with the over-the-counter pain medications (Tylenol and Motrin or Advil), don't hesitate to call our staff using the number provided  We will help make sure you are managing your pain in the best way possible, and if necessary, we can provide a prescription for additional pain medication  CALL OUR OFFICE IMMEDIATELY FOR ANY SIGNS OF INFECTION:    This includes fever, chills, increased redness, warmth, tenderness, severe discomfort/pain, or pus or foul smell coming from the wound  [facility] directly at [phone] (SKIN)    IF BLEEDING IS NOTICED:    Place a clean cloth over the area and apply firm pressure for thirty minutes  Check the wound ONLY after 30 minutes of direct pressure; do not cheat and sneak a peak, as that does not count  If bleeding persists after 30 minutes of legitimate direct pressure, then try one more round of direct pressure to the area  Should the bleeding become heavier or not stop after the second attempt, call [facility] directly at [phone] (SKIN)  Your call will get routed to the dermatology surgeon on call even after hours

## 2022-05-26 ENCOUNTER — OFFICE VISIT (OUTPATIENT)
Dept: DERMATOLOGY | Facility: CLINIC | Age: 82
End: 2022-05-26

## 2022-05-26 DIAGNOSIS — C44.311 BASAL CELL CARCINOMA OF RIGHT SIDE OF NOSE: Primary | ICD-10-CM

## 2022-05-26 PROCEDURE — 99024 POSTOP FOLLOW-UP VISIT: CPT | Performed by: DERMATOLOGY

## 2022-05-26 NOTE — PROGRESS NOTES
Suture removal    Date/Time: 5/26/2022 11:00 AM  Performed by: Scott Monae  Authorized by: Josefa Paez MD   Universal Protocol:  Risks and benefits: risks, benefits and alternatives were discussed  Consent given by: patient  Patient identity confirmed: verbally with patient        Location:     Laterality:  Right    Location:  [address] location:  Nose  Procedure details: Tools used:  Suture removal kit    Wound appearance:  Pink (with crusting)    Sutures removed: 4  Post-procedure details:     Post-removal:  No dressing applied    Patient tolerance of procedure: Tolerated well, no immediate complications  Comments:      Patient is due for skin exam with dr Woo in 4 months

## 2022-06-13 DIAGNOSIS — E78.2 MIXED HYPERLIPIDEMIA: ICD-10-CM

## 2022-06-13 RX ORDER — ROSUVASTATIN CALCIUM 5 MG/1
5 TABLET, COATED ORAL DAILY
Qty: 360 TABLET | Refills: 0 | Status: SHIPPED | OUTPATIENT
Start: 2022-06-13

## 2022-07-11 ENCOUNTER — PATIENT MESSAGE (OUTPATIENT)
Dept: NEUROLOGY | Facility: CLINIC | Age: 82
End: 2022-07-11

## 2022-07-11 DIAGNOSIS — F02.80 DEMENTIA ASSOCIATED WITH OTHER UNDERLYING DISEASE WITHOUT BEHAVIORAL DISTURBANCE (HCC): Primary | ICD-10-CM

## 2022-07-19 RX ORDER — SERTRALINE HYDROCHLORIDE 25 MG/1
25 TABLET, FILM COATED ORAL
Qty: 30 TABLET | Refills: 5 | Status: SHIPPED | OUTPATIENT
Start: 2022-07-19

## 2022-08-23 ENCOUNTER — APPOINTMENT (OUTPATIENT)
Dept: LAB | Facility: CLINIC | Age: 82
End: 2022-08-23
Payer: COMMERCIAL

## 2022-08-23 DIAGNOSIS — F02.818 DEMENTIA ASSOCIATED WITH OTHER UNDERLYING DISEASE WITH BEHAVIORAL DISTURBANCE: ICD-10-CM

## 2022-08-23 DIAGNOSIS — Z13.6 SCREENING FOR AAA (ABDOMINAL AORTIC ANEURYSM): ICD-10-CM

## 2022-08-23 DIAGNOSIS — Z12.2 SCREENING FOR LUNG CANCER: ICD-10-CM

## 2022-08-23 DIAGNOSIS — C61 MALIGNANT NEOPLASM OF PROSTATE (HCC): ICD-10-CM

## 2022-08-23 LAB
ALBUMIN SERPL BCP-MCNC: 3.9 G/DL (ref 3.5–5)
ALP SERPL-CCNC: 81 U/L (ref 46–116)
ALT SERPL W P-5'-P-CCNC: 56 U/L (ref 12–78)
ANION GAP SERPL CALCULATED.3IONS-SCNC: 6 MMOL/L (ref 4–13)
AST SERPL W P-5'-P-CCNC: 34 U/L (ref 5–45)
BASOPHILS # BLD AUTO: 0.05 THOUSANDS/ΜL (ref 0–0.1)
BASOPHILS NFR BLD AUTO: 1 % (ref 0–1)
BILIRUB SERPL-MCNC: 0.96 MG/DL (ref 0.2–1)
BUN SERPL-MCNC: 16 MG/DL (ref 5–25)
CALCIUM SERPL-MCNC: 9.1 MG/DL (ref 8.3–10.1)
CHLORIDE SERPL-SCNC: 110 MMOL/L (ref 96–108)
CHOLEST SERPL-MCNC: 165 MG/DL
CO2 SERPL-SCNC: 24 MMOL/L (ref 21–32)
CREAT SERPL-MCNC: 1.08 MG/DL (ref 0.6–1.3)
EOSINOPHIL # BLD AUTO: 0.08 THOUSAND/ΜL (ref 0–0.61)
EOSINOPHIL NFR BLD AUTO: 2 % (ref 0–6)
ERYTHROCYTE [DISTWIDTH] IN BLOOD BY AUTOMATED COUNT: 15.6 % (ref 11.6–15.1)
EST. AVERAGE GLUCOSE BLD GHB EST-MCNC: 117 MG/DL
GFR SERPL CREATININE-BSD FRML MDRD: 64 ML/MIN/1.73SQ M
GLUCOSE P FAST SERPL-MCNC: 110 MG/DL (ref 65–99)
HBA1C MFR BLD: 5.7 %
HCT VFR BLD AUTO: 51.4 % (ref 36.5–49.3)
HDLC SERPL-MCNC: 52 MG/DL
HGB BLD-MCNC: 16.8 G/DL (ref 12–17)
IMM GRANULOCYTES # BLD AUTO: 0.02 THOUSAND/UL (ref 0–0.2)
IMM GRANULOCYTES NFR BLD AUTO: 1 % (ref 0–2)
LDLC SERPL CALC-MCNC: 97 MG/DL (ref 0–100)
LYMPHOCYTES # BLD AUTO: 1.41 THOUSANDS/ΜL (ref 0.6–4.47)
LYMPHOCYTES NFR BLD AUTO: 33 % (ref 14–44)
MCH RBC QN AUTO: 29.5 PG (ref 26.8–34.3)
MCHC RBC AUTO-ENTMCNC: 32.7 G/DL (ref 31.4–37.4)
MCV RBC AUTO: 90 FL (ref 82–98)
MONOCYTES # BLD AUTO: 0.39 THOUSAND/ΜL (ref 0.17–1.22)
MONOCYTES NFR BLD AUTO: 9 % (ref 4–12)
NEUTROPHILS # BLD AUTO: 2.31 THOUSANDS/ΜL (ref 1.85–7.62)
NEUTS SEG NFR BLD AUTO: 54 % (ref 43–75)
NONHDLC SERPL-MCNC: 113 MG/DL
NRBC BLD AUTO-RTO: 0 /100 WBCS
PLATELET # BLD AUTO: 206 THOUSANDS/UL (ref 149–390)
PMV BLD AUTO: 10.2 FL (ref 8.9–12.7)
POTASSIUM SERPL-SCNC: 4.1 MMOL/L (ref 3.5–5.3)
PROT SERPL-MCNC: 7.3 G/DL (ref 6.4–8.4)
RBC # BLD AUTO: 5.69 MILLION/UL (ref 3.88–5.62)
SODIUM SERPL-SCNC: 140 MMOL/L (ref 135–147)
TRIGL SERPL-MCNC: 82 MG/DL
TSH SERPL DL<=0.05 MIU/L-ACNC: 2.12 UIU/ML (ref 0.45–4.5)
WBC # BLD AUTO: 4.26 THOUSAND/UL (ref 4.31–10.16)

## 2022-08-23 PROCEDURE — 84443 ASSAY THYROID STIM HORMONE: CPT

## 2022-08-23 PROCEDURE — 83036 HEMOGLOBIN GLYCOSYLATED A1C: CPT

## 2022-08-23 PROCEDURE — 85025 COMPLETE CBC W/AUTO DIFF WBC: CPT

## 2022-08-23 PROCEDURE — 80061 LIPID PANEL: CPT

## 2022-08-23 PROCEDURE — 36415 COLL VENOUS BLD VENIPUNCTURE: CPT

## 2022-08-23 PROCEDURE — 80053 COMPREHEN METABOLIC PANEL: CPT

## 2022-08-31 ENCOUNTER — RA CDI HCC (OUTPATIENT)
Dept: OTHER | Facility: HOSPITAL | Age: 82
End: 2022-08-31

## 2022-08-31 NOTE — PROGRESS NOTES
[facility]  coding opportunities       Chart reviewed, no opportunity found:   [address]        Patients Insurance     Medicare Insurance: [employer] Advantage

## 2022-09-06 ENCOUNTER — OFFICE VISIT (OUTPATIENT)
Dept: FAMILY MEDICINE CLINIC | Facility: CLINIC | Age: 82
End: 2022-09-06
Payer: COMMERCIAL

## 2022-09-06 VITALS
HEART RATE: 68 BPM | SYSTOLIC BLOOD PRESSURE: 124 MMHG | BODY MASS INDEX: 26.36 KG/M2 | OXYGEN SATURATION: 97 % | HEIGHT: 69 IN | DIASTOLIC BLOOD PRESSURE: 70 MMHG | WEIGHT: 178 LBS

## 2022-09-06 DIAGNOSIS — Z00.00 MEDICARE ANNUAL WELLNESS VISIT, SUBSEQUENT: ICD-10-CM

## 2022-09-06 DIAGNOSIS — E78.2 MIXED HYPERLIPIDEMIA: ICD-10-CM

## 2022-09-06 DIAGNOSIS — Z23 NEED FOR VACCINATION: Primary | ICD-10-CM

## 2022-09-06 DIAGNOSIS — F02.818 DEMENTIA ASSOCIATED WITH OTHER UNDERLYING DISEASE WITH BEHAVIORAL DISTURBANCE: ICD-10-CM

## 2022-09-06 PROCEDURE — 99214 OFFICE O/P EST MOD 30 MIN: CPT | Performed by: FAMILY MEDICINE

## 2022-09-06 PROCEDURE — G0439 PPPS, SUBSEQ VISIT: HCPCS | Performed by: FAMILY MEDICINE

## 2022-09-06 NOTE — PROGRESS NOTES
Assessment and Plan:     Problem List Items Addressed This Visit        Other    Mixed hyperlipidemia      Other Visit Diagnoses     Need for vaccination    -  Primary    Relevant Orders    Zoster Vaccine Recombinant IM    Medicare annual wellness visit, subsequent        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 ([address] )        Stable, continue follow-up with Neurology, negative change in medications        BMI Counseling: Body mass index is 26 2 kg/m²  The BMI is above normal  Nutrition recommendations include decreasing portion sizes, decreasing fast food intake, limiting drinks that contain sugar, moderation in carbohydrate intake, increasing intake of lean protein, reducing intake of saturated and trans fat and reducing intake of cholesterol  Exercise recommendations include moderate physical activity 150 minutes/week and exercising 3-5 times per week  No pharmacotherapy was ordered  Rationale for BMI follow-up plan is due to patient being overweight or obese  Preventive health issues were discussed with patient, and age appropriate screening tests were ordered as noted in patient's After Visit Summary  Personalized health advice and appropriate referrals for health education or preventive services given if needed, as noted in patient's After Visit Summary       History of Present Illness:     Patient presents for a Medicare Wellness Visit     accompanied with his daughter Igor Rocha  , no significant changes over the past year  Remains active at home, with assistance of family minimum  Exercise, regular walks 3 dogs  dementia, unchanged continues follow-up neurology  Continues with zoloft found to be beneficial   meds aricept Namenda  mild elevation of his PSA for which patient is seen by Urology , md gerard   meds flomax   Hyperlipidemia, Crestor 5 taking daily diet fairly consistent regular low-cholesterol     Patient Care Team:  Raquel Lucas as PCP - General (Family Medicine)  Norma Ramos MD as PCP - [address] [facility] [[email or web address] [facility] ([facility])  Rajeev Ochoa MD (Urology)  Constance Kendall PA-C as Physician Assistant (Physician Assistant)     Review of Systems:     Review of Systems   Constitutional: Negative for chills and fever  HENT: Negative for ear pain and sore throat  Eyes: Negative for pain and visual disturbance  Respiratory: Negative for cough and shortness of breath  Cardiovascular: Negative for chest pain and palpitations  Gastrointestinal: Negative for abdominal pain and vomiting  Genitourinary: Negative for dysuria and hematuria  Musculoskeletal: Negative for arthralgias and back pain  Skin: Negative for color change and rash  Neurological: Negative for seizures and syncope  All other systems reviewed and are negative         Problem List:     Patient Active Problem List   Diagnosis    Transaminitis    Hypokalemia    Fall    Altered mental status    Total bilirubin, elevated    Weight loss counseling, encounter for    Common bile duct (CBD) obstruction    H/O prostate cancer    Common bile duct obstruction    Memory difficulty    Dementia associated with 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 [facility]    Perforated intestine ([facility] )    Traumatic hematuria    Pneumonia due to infectious organism    Choledocholithiasis    Cholelithiasis, common bile duct    Mood disorder ([facility] )    Malignant neoplasm of prostate ([facility] )    Pulmonary nodules    Borderline hyperlipidemia    Hyperglycemia    Mixed hyperlipidemia    Basal cell carcinoma (BCC) of skin of nose      Past Medical and Surgical History:     Past Medical History:   Diagnosis Date    Alcoholism ([facility] ) 04/06/2018    Alzheimer disease ([facility] )     Anxiety     Basal cell carcinoma 08/18/2020    Right nares, Dr Prasanna Martinez    Basal cell carcinoma 06/03/2021    Right upper forehead    Basal cell carcinoma (BCC) of skin of face 03/28/2022    Right nostril    Bile duct disease     Cancer [facility])     prostate, skin     Cardiac arrest [facility])     Cardiac arrest ([facility] ) 04/14/2018    Choledocholithiasis     Colon polyp     D[facility]     Depression     Kidney stone     Liver disease     Malignant neoplasm of prostate ([facility] )     Mood disorder (HCC)     Prostate cancer ([facility] )     Right carotid bruit     Seizure disorder ([place] [address] )     Skin cancer      Past Surgical History:   Procedure Laterality Date    CHOLECYSTECTOMY      COLONOSCOPY      ERCP N/A 03/09/2018    Procedure: ENDOSCOPIC RETROGRADE CHOLANGIOPANCREATOGRAPHY (ERCP); Surgeon: Vadim Donaldson MD;  [facility];  Service: Gastroenterology    ERCP  04/13/2018    stenting    ERCP N/A 06/08/2018    Procedure: ENDOSCOPIC RETROGRADE CHOLANGIOPANCREATOGRAPHY (ERCP); Surgeon: Vadim Donaldson MD;  [facility];  Service: Gastroenterology    MOHS SURGERY  08/18/2020    Right nares, Dr Nani Marx  06/30/2021    Right upper forehead; Dr Nani Marx  05/18/2022    [address]; Right nostril; Dr Chano Yeung BODY/STENT BILIARY/PANC DUCT N/A 04/13/2018    Procedure: ENDOSCOPIC RETROGRADE CHOLANGIOPANCREATOGRAPHY (ERCP);   Surgeon: Vadim Donaldson MD;  [facility];  Service: Gastroenterology    [place],CHOLECYSTECTOMY N/A 05/15/2018    Procedure: Mott Model;  Surgeon: Anabel Triana MD;  [facility];  Service: General    PROSTATE SURGERY      TONSILLECTOMY        Family History:     Family History   Problem Relation Age of Onset    Cancer Mother     Dementia Mother     Alzheimer's disease Mother    [facility] Sudden death Father       Social History:     Social History     Socioeconomic History    Marital status:      Spouse name: None    Number of children: None    Years of education: None    Highest education level: None   Occupational History    Occupation: employed   Tobacco Use    Smoking status: Former Smoker Packs/day: 1 00     Years: 60 00     Pack years: 60 00     Types: Cigarettes     Quit date: 3/19/2019     Years since quitting: 3 4    Smokeless tobacco: Never Used    Tobacco comment: daily   Vaping Use    Vaping Use: Never used   Substance and Sexual Activity    Alcohol use: No     Comment: former    Drug use: No    Sexual activity: Yes     Partners: Female   Other Topics Concern    None   Social History Narrative    None     Social Determinants of Health     Financial Resource Strain: Low Risk     Difficulty of Paying Living Expenses: Not very hard   Food Insecurity: Not on file   Transportation Needs: No Transportation Needs    Lack of Transportation (Medical): No    Lack of Transportation (Non-Medical): No   Physical Activity: Not on file   Stress: Not on file   Social Connections: Not on file   Intimate Partner Violence: Not on file   Housing Stability: Not on file      Medications and Allergies:     Current Outpatient Medications   Medication Sig Dispense Refill    acetaminophen (TYLENOL) 500 mg tablet Take 500 mg by mouth as needed       donepezil (ARICEPT) 10 mg tablet Take 1 tablet (10 mg total) by mouth daily at bedtime 30 tablet 6    fexofenadine (ALLEGRA) 60 MG tablet Take 60 mg by mouth daily      Melatonin 3 MG CAPS Take 3 mg by mouth daily at bedtime Patient takes 1 1/2 mg tablets      memantine (NAMENDA) 5 mg tablet Take 1 tablet (5 mg total) by mouth 2 (two) times a day 60 tablet 6    Multiple Vitamins-Minerals (CENTRUM SILVER ULTRA MENS PO) Take 1 tablet by mouth daily      rosuvastatin (CRESTOR) 5 mg tablet TAKE 1 TABLET (5 MG TOTAL) BY MOUTH DAILY 360 tablet 0    sertraline (Zoloft) 25 mg tablet Take 1 tablet (25 mg total) by mouth daily at bedtime 30 tablet 5    tamsulosin (FLOMAX) 0 4 mg TAKE ONE TABLET DAILY WITH DINNER 90 capsule 3     No current facility-administered medications for this visit       No Known Allergies   Immunizations:     Immunization History Administered Date(s) Administered    COVID-19 MODERNA VACC 0 25 ML IM BOOSTER 11/10/2021    COVID-19 MODERNA VACC 0 5 ML IM 01/25/2021, 03/01/2021, 04/12/2022    Influenza, high dose seasonal 0 7 mL 12/11/2018, 03/13/2020, 10/14/2020    Pneumococcal Conjugate 13-Valent 12/11/2018    Pneumococcal Polysaccharide PPV23 03/13/2020      Health Maintenance:         Topic Date Due    Lung Cancer Screening  03/22/2020    Colorectal Cancer Screening  10/13/2024    Hepatitis C Screening  Completed         Topic Date Due    Influenza Vaccine (1) 09/01/2022      Medicare Screening Tests and Risk Assessments:     Davina Stafford is here for his Subsequent Wellness visit  Last Medicare Wellness visit information reviewed, patient interviewed, no change since last AWV  Health Risk Assessment:   Patient rates overall health as good  Patient feels that their physical health rating is same  Patient is very satisfied with their life  Eyesight was rated as same  Hearing was rated as same  Patient feels that their emotional and mental health rating is same  Patients states they are never, rarely angry  Patient states they are sometimes unusually tired/fatigued  Pain experienced in the last 7 days has been none  Patient states that he has experienced no weight loss or gain in last 6 months  Depression Screening:   PHQ-2 Score: 0      Fall Risk Screening: In the past year, patient has experienced: no history of falling in past year      Home Safety:  Patient does not have trouble with stairs inside or outside of their home  Patient has working smoke alarms and has working carbon monoxide detector  Home safety hazards include: none  Nutrition:   Current diet is Regular  Medications:   Patient is currently taking over-the-counter supplements  OTC medications include: Multivitamin,  melatonin  Patient is able to manage medications       Activities of Daily Living (ADLs)/Instrumental Activities of Daily Living (IADLs): Walk and transfer into and out of bed and chair?: Yes  Dress and groom yourself?: Yes    Bathe or shower yourself?: Yes    Feed yourself? Yes  Do your laundry/housekeeping?: Yes  Manage your money, pay your bills and track your expenses?: Yes  Make your own meals?: Yes    Do your own shopping?: Yes    Previous Hospitalizations:   Any hospitalizations or ED visits within the last 12 months?: No      Advance Care Planning:   Living will: Yes    Durable POA for healthcare: Yes    Advanced directive: Yes      PREVENTIVE SCREENINGS      Cardiovascular Screening:    General: Screening Not Indicated and History Lipid Disorder      Diabetes Screening:     General: Screening Current      Colorectal Cancer Screening:     General: Screening Current      Prostate Cancer Screening:    General: History Prostate Cancer and Screening Not Indicated      Osteoporosis Screening:    General: Screening Not Indicated      Abdominal Aortic Aneurysm (AAA) Screening:    Risk factors include: tobacco use        Lung Cancer Screening:     General: Screening Not Indicated      Hepatitis C Screening:    General: Screening Current    Screening, Brief Intervention, and Referral to Treatment (SBIRT)    Screening  Typical number of drinks in a day: 0  Typical number of drinks in a week: 0  Interpretation: Low risk drinking behavior  AUDIT-C Screenin) How often did you have a drink containing alcohol in the past year? monthly or less  2) How many drinks did you have on a typical day when you were drinking in the past year?  1 to 2  3) How often did you have 6 or more drinks on one occasion in the past year? never    AUDIT-C Score: 1  Interpretation: Score 0-3 (male): Negative screen for alcohol misuse    Single Item Drug Screening:  How often have you used an illegal drug (including marijuana) or a prescription medication for non-medical reasons in the past year? never    Single Item Drug Screen Score: 0  Interpretation: Negative screen for possible drug use disorder    No exam data present     Physical Exam:     /70   Pulse 68   Ht 5' 9" (1 753 m)   Wt 80 7 kg (178 lb)   SpO2 97%   BMI 26 29 kg/m²     Physical Exam  Vitals and nursing note reviewed  Constitutional:       General: He is not in acute distress  Appearance: He is well-developed  He is not ill-appearing or toxic-appearing  HENT:      Head: Normocephalic and atraumatic  Eyes:      Conjunctiva/sclera: Conjunctivae normal    Cardiovascular:      Rate and Rhythm: Normal rate and regular rhythm  Heart sounds: No murmur heard  Pulmonary:      Effort: Pulmonary effort is normal  No respiratory distress  Breath sounds: Normal breath sounds  Abdominal:      Tenderness: There is no abdominal tenderness  Musculoskeletal:         General: Normal range of motion  Cervical back: Neck supple  Skin:     General: Skin is warm and dry  Neurological:      General: No focal deficit present  Mental Status: He is alert        Gait: Gait normal    Psychiatric:         Mood and Affect: Mood normal          Behavior: Behavior normal           Nils Gazemargaret, ANOOPNP

## 2022-10-05 DIAGNOSIS — E78.2 MIXED HYPERLIPIDEMIA: ICD-10-CM

## 2022-10-05 RX ORDER — ROSUVASTATIN CALCIUM 5 MG/1
5 TABLET, COATED ORAL DAILY
Qty: 90 TABLET | Refills: 0 | Status: SHIPPED | OUTPATIENT
Start: 2022-10-05

## 2022-10-19 DIAGNOSIS — F02.80 DEMENTIA ASSOCIATED WITH OTHER UNDERLYING DISEASE WITHOUT BEHAVIORAL DISTURBANCE (HCC): ICD-10-CM

## 2022-10-19 RX ORDER — MEMANTINE HYDROCHLORIDE 5 MG/1
5 TABLET ORAL 2 TIMES DAILY
Qty: 60 TABLET | Refills: 6 | Status: SHIPPED | OUTPATIENT
Start: 2022-10-19

## 2022-10-19 RX ORDER — DONEPEZIL HYDROCHLORIDE 10 MG/1
10 TABLET, FILM COATED ORAL
Qty: 30 TABLET | Refills: 6 | Status: SHIPPED | OUTPATIENT
Start: 2022-10-19

## 2022-11-07 ENCOUNTER — TELEMEDICINE (OUTPATIENT)
Dept: NEUROLOGY | Facility: CLINIC | Age: 82
End: 2022-11-07

## 2022-11-07 DIAGNOSIS — F02.80 DEMENTIA ASSOCIATED WITH OTHER UNDERLYING DISEASE WITHOUT BEHAVIORAL DISTURBANCE (HCC): Primary | ICD-10-CM

## 2022-11-07 NOTE — PROGRESS NOTES
Virtual Regular Visit    Verification of patient location:    Patient is located in the following state in which I hold an active license [place]      Assessment/Plan:    Problem List Items Addressed This Visit        Nervous and Auditory    Dementia associated with 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 ([facility] ) - Primary     Overall Deyanira Pacheco seems to be doing well today  He and his daughter do not endorse any significant change in his memory over the last 6 months  He is tolerating his medications well without any adverse effects  His anxiety is improved with the re-initiation of Zoloft 25 mg daily  Recommendations provided to patient today include:  - Continue with mind stimulating activities (word search, cross word, sodoku, search & find, coloring, jig saw, reading etc )  - Continue current doses of Aricept and Donepezil  - Continue Zoloft 25 mg daily   - Follow up in 6 months in person with [facility] at that time  Things that we know are helpful for thinking and memory   1  Exercise program: gradually increase your physical activity over time  Start small and be patient  Aerobic (cardio) activity is best but incorporate balance, strength and flexibility training as well    a  Try to get at least 30min 3 times per week   2  Diet: Mediterranean diet (colorful fruits and vegetables, olive oil, fish, whole grains, very little red meet is any), MIND diet, anti-inflammatory diet  a  Stay well hydrated: drink 6-8 glasses of water per day   b  What's good for your heart is good for your brain  c  Avoid high-salt foods   3  Sleep: aim for at least 7-8 hours per night   a  Avoid alcohol and medications like Benadryl (Tylenol PM) or other sedating drugs  4  Stress management/mindfulness practice:   a  Talk with our social workers about finding a cognitive behavioral therapists  b  Try a smart phone nica like “24/7 Card” or “mindfulness for beginners”   i  Try “curable” for pain    c   Take a course in Mindfulness Based Stress Reduction (MBSR)   i  Patti worrell  Read or listen to an audiobook about it:  i  Mindfulness for beginners   ii  10% happier  iii  The happiness advantage   5  Social engagement:   a  Stay in touch with family and friends   b  Plan a few specific activities for your social health every week   c  Moira Picking a local support group   d  Volunteer! www [email or web address] or call [phone]     MIND diet score:  1 point for each component  · Green leafy vegetables: at least 6 per week  · Other vegetable: at least 1 per day   · Berries: at least 2 per week  · Red meat: fewer than 4 per week   · Fish: at least 1 per week   · Poultry: at least 2 per week  · Beans: at least 3 per week  · Nuts: at least 5 per week  · Fast or fried food: less than 1 per week  · Olive oil   · Butter less: less than 1 table-spoon per day   · Cheese: less than 1 serving per week   · Pastries/sweets: less than 5 servings per week  · Alcohol: no more than 1 serving/ day                       Reason for visit is   Chief Complaint   Patient presents with   • Virtual Regular Visit        Encounter provider Tarun Ching, [facility]    Provider located at [address]      Recent Visits  No visits were found meeting these conditions  Showing recent visits within past 7 days and meeting all other requirements  Today's Visits  Date Type Provider Dept   11/07/22 Telemedicine Shalini 154Lisa Smilax , [address] [facility] [address] today's visits and meeting all other requirements  Future Appointments  No visits were found meeting these conditions  Showing future appointments within next 150 days and meeting all other requirements       The patient was identified by name and date of birth   Renetta Beauchamp was informed that this is a telemedicine visit and that the visit is being conducted through the [facility] platform  He agrees to proceed     My office door was closed  No one else was in the room  He acknowledged consent and understanding of privacy and security of the video platform  The patient has agreed to participate and understands they can discontinue the visit at any time  Patient is aware this is a billable service  Subjective  Eduardo Swenson is a [de-identified] y o  male  known to the practice for mild dementia  Last seen 5/5/2022  He presents to the office today virtually to follow-up with his daughter accompanying him      They state overall he is doing very well  He is keeping himself active throughout the day  He lives alone with his 3 dogs  He is completely independent of all ADLs  He is no longer driving  His daughter does manage his medications but he does remember to take them daily  He is continuing on donepezil 10 mg at bedtime and Namenda 5 mg twice daily  He states his appetite and mood are both good  At the time of his last visit he had self discontinued Zoloft 50 mg as he was no longer feeling anxious or nervous however soon after his daughters did note an increase in his anxiety, especially when they would leave for work etc  They asked to restart Zoloft and he was prescribed 25 mg to begin with  Since re-initiating Zoloft he has noted a significant improvement in his anxiety  He has had no significant changes in his health, and they both feel that his memory is stable from the last time he was seen  He spends his day attending to his house, doing lawn care, and walking his dogs  He is also an avid reader  They do not have any safety concerns, and he has never left the stove on, wandered or become lost   He denies any alcohol or smoking  He does continue to use notes and lists to compensate for memory  He is also taking a centrum Silver daily  [facility] when last completed was 20/30  He denies any new neurologic complaints today      HPI     Past Medical History:   Diagnosis Date   • Alcoholism ([facility] ) 04/06/2018   • Alzheimer disease ([place] [address] )    • Anxiety    • Basal cell carcinoma 08/18/2020    Right nares, Dr Louie Bal   • Basal cell carcinoma 06/03/2021    Right upper forehead   • Basal cell carcinoma (BCC) of skin of face 03/28/2022    Right nostril   • Bile duct disease    • Cancer [facility])     prostate, skin    • Cardiac arrest [facility])    • Cardiac arrest ([place] [address] ) 04/14/2018   • Choledocholithiasis    • Colon polyp    • Dementia (HCC)    • Depression    • Kidney stone    • Liver disease    • Malignant neoplasm of prostate ([place] [address] )    • Mood disorder ([place] [address] )    • Prostate cancer ([place] [address] )    • Right carotid bruit    • Seizure disorder [facility])    • Skin cancer        Past Surgical History:   Procedure Laterality Date   • CHOLECYSTECTOMY     • COLONOSCOPY     • ERCP N/A 03/09/2018    Procedure: ENDOSCOPIC RETROGRADE CHOLANGIOPANCREATOGRAPHY (ERCP); Surgeon: Uche Paez MD;  Location: [facility];  Service: Gastroenterology   • ERCP  04/13/2018    stenting   • ERCP N/A 06/08/2018    Procedure: ENDOSCOPIC RETROGRADE CHOLANGIOPANCREATOGRAPHY (ERCP); Surgeon: Uche Peaz MD;  Location: [facility];  Service: Gastroenterology   • MOHS SURGERY  08/18/2020    Right nares, Dr Louie Bal   • MOHS SURGERY  06/30/2021    Right upper forehead; Dr Louie Bal   • [address]  05/18/2022    [address]  [employer]; Right nostril; Dr Mariela Naranjo   • [place] ERCP [address] BODY/STENT BILIARY/PANC DUCT N/A 04/13/2018    Procedure: ENDOSCOPIC RETROGRADE CHOLANGIOPANCREATOGRAPHY (ERCP);   Surgeon: Uche Paez MD;  Location: [facility];  Service: Gastroenterology   • MI LAP,CHOLECYSTECTOMY N/A 05/15/2018    Procedure: Charu Hollingsworth;  Surgeon: Tati Galeano MD;  Location: [facility];  Service: General   • PROSTATE SURGERY     • TONSILLECTOMY         Current Outpatient Medications   Medication Sig Dispense Refill   • acetaminophen (TYLENOL) 500 mg tablet Take 500 mg by mouth as needed      • donepezil (ARICEPT) 10 mg tablet TAKE 1 TABLET (10 MG TOTAL) BY MOUTH DAILY AT BEDTIME 30 tablet 6   • fexofenadine (ALLEGRA) 60 MG tablet Take 60 mg by mouth daily     • Melatonin 3 MG CAPS Take 3 mg by mouth daily at bedtime Patient takes 1 1/2 mg tablets     • memantine (NAMENDA) 5 mg tablet TAKE 1 TABLET (5 MG TOTAL) BY MOUTH 2 (TWO) TIMES A DAY 60 tablet 6   • Multiple Vitamins-Minerals (CENTRUM SILVER ULTRA MENS PO) Take 1 tablet by mouth daily     • rosuvastatin (CRESTOR) 5 mg tablet TAKE 1 TABLET (5 MG TOTAL) BY MOUTH DAILY 90 tablet 0   • sertraline (Zoloft) 25 mg tablet Take 1 tablet (25 mg total) by mouth daily at bedtime 30 tablet 5   • tamsulosin (FLOMAX) 0 4 mg TAKE ONE TABLET DAILY WITH DINNER 90 capsule 3     No current facility-administered medications for this visit  No Known Allergies    Review of Systems   Constitutional: Negative  Negative for appetite change and fever  HENT: Negative  Negative for hearing loss, tinnitus, trouble swallowing and voice change  Eyes: Negative  Negative for photophobia, pain and visual disturbance  Respiratory: Negative  Negative for shortness of breath  Cardiovascular: Negative  Negative for palpitations  Gastrointestinal: Negative  Negative for nausea and vomiting  Endocrine: Negative  Negative for cold intolerance  Genitourinary: Negative  Negative for dysuria, frequency and urgency  Musculoskeletal: Negative  Negative for gait problem, myalgias and neck pain  Skin: Negative  Negative for rash  Allergic/Immunologic: Negative  Neurological: Negative  Negative for dizziness, tremors, seizures, syncope, facial asymmetry, speech difficulty, weakness, light-headedness, numbness and headaches  Hematological: Negative  Does not bruise/bleed easily  Psychiatric/Behavioral: Positive for confusion  Negative for hallucinations and sleep disturbance  All other systems reviewed and are negative  Reviewed ROS as entered by medical assistant        Video Exam    There were no vitals filed for this visit  Physical Exam     Memory Exam  He correctly states his name, date of birth, today's date (including day, month, year, and day of week), and the current POTUS  He is able to correctly, read, repeat x2, name 2 simple items and state their purpose  He is able to recall what he had for dinner and names 7/10 kinds of animals in <1 minute, 8/10 in >1 minute  Delayed recall is 1/3 at 3 minutes  Neurological Exam  On neurological examination the patient was awake, alert, attentive, oriented to person, place, and time  Recent and remote memory intact to conversation with no evidence of language dysfunction  Satisfactory fund of knowledge  Normal attention span and concentration  Mood, affect and judgement are appropriate  Speech is fluent without dysarthria or aphasia  Face appears symmetric, with no obvious weakness noted  Audition is intact to casual conversation  Eye movements are intact  Tongue is midline  Able to move bilateral upper extremities antigravity without difficulty          I spent 11 minutes directly with the patient during this visit

## 2022-11-07 NOTE — ASSESSMENT & PLAN NOTE
Overall Kaveh Tomas seems to be doing well today  He and his daughter do not endorse any significant change in his memory over the last 6 months  He is tolerating his medications well without any adverse effects  His anxiety is improved with the re-initiation of Zoloft 25 mg daily  Recommendations provided to patient today include:  - Continue with mind stimulating activities (word search, cross word, sodoku, search & find, coloring, jig saw, reading etc )  - Continue current doses of Aricept and Donepezil  - Continue Zoloft 25 mg daily   - Follow up in 6 months in person with [facility] at that time  Things that we know are helpful for thinking and memory   1  Exercise program: gradually increase your physical activity over time  Start small and be patient  Aerobic (cardio) activity is best but incorporate balance, strength and flexibility training as well    a  Try to get at least 30min 3 times per week   2  Diet: Mediterranean diet (colorful fruits and vegetables, olive oil, fish, whole grains, very little red meet is any), MIND diet, anti-inflammatory diet  a  Stay well hydrated: drink 6-8 glasses of water per day   b  What's good for your heart is good for your brain  c  Avoid high-salt foods   3  Sleep: aim for at least 7-8 hours per night   a  Avoid alcohol and medications like Benadryl (Tylenol PM) or other sedating drugs  4  Stress management/mindfulness practice:   a  Talk with our social workers about finding a cognitive behavioral therapists  b  Try a smart phone nica like “CleanTie” or “mindfulness for beginners”   i  Try “curable” for pain    c  Take a course in Mindfulness Based Stress Reduction (MBSR)   i  Patti worrell  Read or listen to an audiobook about it:  i  Mindfulness for beginners   ii  10% happier  iii  The happiness advantage   5   Social engagement:   a  Stay in touch with family and friends   b  Plan a few specific activities for your social health every week   lovely [place] a local support group   d  Volunteer! www [email or web address] [email or web address] or call [phone]     MIND diet score:  1 point for each component      · Green leafy vegetables: at least 6 per week  · Other vegetable: at least 1 per day   · Berries: at least 2 per week  · Red meat: fewer than 4 per week   · Fish: at least 1 per week   · Poultry: at least 2 per week  · Beans: at least 3 per week  · Nuts: at least 5 per week  · Fast or fried food: less than 1 per week  · Olive oil   · Butter less: less than 1 table-spoon per day   · Cheese: less than 1 serving per week   · Pastries/sweets: less than 5 servings per week  · Alcohol: no more than 1 serving/ day

## 2022-11-11 DIAGNOSIS — F02.80 DEMENTIA ASSOCIATED WITH OTHER UNDERLYING DISEASE WITHOUT BEHAVIORAL DISTURBANCE (HCC): ICD-10-CM

## 2022-11-11 RX ORDER — SERTRALINE HYDROCHLORIDE 25 MG/1
25 TABLET, FILM COATED ORAL
Qty: 30 TABLET | Refills: 5 | Status: SHIPPED | OUTPATIENT
Start: 2022-11-11

## 2022-11-25 ENCOUNTER — TELEPHONE (OUTPATIENT)
Dept: GASTROENTEROLOGY | Facility: CLINIC | Age: 82
End: 2022-11-25

## 2022-11-25 NOTE — TELEPHONE ENCOUNTER
Patients GI provider:  NARCISO Alaniz    Number to return call: [phone]     Reason for call: Pts daughter called and stated she is very  worry because patient has very low appetite please reach out to patients daughter thank you     Scheduled procedure/appointment date if applicable: n/a

## 2022-11-25 NOTE — TELEPHONE ENCOUNTER
Spoke with patients daughter  History of colon polyps    Patient recently is showing altered mental status, unsteady gait, lack of conversation, and decreased appetite for 2 weeks  Advised patient to be seen in ED for evaluation

## 2022-11-26 ENCOUNTER — APPOINTMENT (EMERGENCY)
Dept: CT IMAGING | Facility: HOSPITAL | Age: 82
End: 2022-11-26

## 2022-11-26 ENCOUNTER — HOSPITAL ENCOUNTER (EMERGENCY)
Facility: HOSPITAL | Age: 82
Discharge: HOME | End: 2022-11-26
Attending: EMERGENCY MEDICINE | Admitting: EMERGENCY MEDICINE

## 2022-11-26 VITALS
RESPIRATION RATE: 12 BRPM | BODY MASS INDEX: 23.31 KG/M2 | HEIGHT: 69 IN | HEART RATE: 56 BPM | TEMPERATURE: 98.1 F | WEIGHT: 157.41 LBS | OXYGEN SATURATION: 99 % | DIASTOLIC BLOOD PRESSURE: 54 MMHG | SYSTOLIC BLOOD PRESSURE: 112 MMHG

## 2022-11-26 DIAGNOSIS — K57.92 DIVERTICULITIS: Primary | ICD-10-CM

## 2022-11-26 DIAGNOSIS — R93.5 ABNORMAL CT OF THE ABDOMEN: ICD-10-CM

## 2022-11-26 DIAGNOSIS — N20.0 NEPHROLITHIASIS: ICD-10-CM

## 2022-11-26 DIAGNOSIS — E86.0 DEHYDRATION: ICD-10-CM

## 2022-11-26 DIAGNOSIS — R93.89 ABNORMAL CXR: ICD-10-CM

## 2022-11-26 DIAGNOSIS — E87.6 HYPOKALEMIA: ICD-10-CM

## 2022-11-26 DIAGNOSIS — E87.1 HYPONATREMIA: ICD-10-CM

## 2022-11-26 LAB
ALBUMIN SERPL BCP-MCNC: 2.8 G/DL (ref 3.5–5)
ALP SERPL-CCNC: 150 U/L (ref 46–116)
ALT SERPL W P-5'-P-CCNC: 23 U/L (ref 12–78)
ANION GAP SERPL CALCULATED.3IONS-SCNC: 18 MMOL/L (ref 4–13)
AST SERPL W P-5'-P-CCNC: 20 U/L (ref 5–45)
BASOPHILS # BLD AUTO: 0.03 THOUSANDS/ÂΜL (ref 0–0.1)
BASOPHILS NFR BLD AUTO: 1 % (ref 0–1)
BILIRUB SERPL-MCNC: 0.79 MG/DL (ref 0.2–1)
BUN SERPL-MCNC: 21 MG/DL (ref 5–25)
CALCIUM ALBUM COR SERPL-MCNC: 10.4 MG/DL (ref 8.3–10.1)
CALCIUM SERPL-MCNC: 9.4 MG/DL (ref 8.3–10.1)
CHLORIDE SERPL-SCNC: 93 MMOL/L (ref 96–108)
CO2 SERPL-SCNC: 22 MMOL/L (ref 21–32)
CREAT SERPL-MCNC: 1.39 MG/DL (ref 0.6–1.3)
EOSINOPHIL # BLD AUTO: 0.13 THOUSAND/ÂΜL (ref 0–0.61)
EOSINOPHIL NFR BLD AUTO: 2 % (ref 0–6)
ERYTHROCYTE [DISTWIDTH] IN BLOOD BY AUTOMATED COUNT: 14.6 % (ref 11.6–15.1)
ETHANOL SERPL-MCNC: <3 MG/DL (ref 0–3)
GFR SERPL CREATININE-BSD FRML MDRD: 47 ML/MIN/1.73SQ M
GLUCOSE SERPL-MCNC: 91 MG/DL (ref 65–140)
HCT VFR BLD AUTO: 43.2 % (ref 36.5–49.3)
HGB BLD-MCNC: 14.6 G/DL (ref 12–17)
IMM GRANULOCYTES # BLD AUTO: 0.05 THOUSAND/UL (ref 0–0.2)
IMM GRANULOCYTES NFR BLD AUTO: 1 % (ref 0–2)
LIPASE SERPL-CCNC: 178 U/L (ref 73–393)
LYMPHOCYTES # BLD AUTO: 0.89 THOUSANDS/ÂΜL (ref 0.6–4.47)
LYMPHOCYTES NFR BLD AUTO: 15 % (ref 14–44)
MCH RBC QN AUTO: 29.6 PG (ref 26.8–34.3)
MCHC RBC AUTO-ENTMCNC: 33.8 G/DL (ref 31.4–37.4)
MCV RBC AUTO: 87 FL (ref 82–98)
MONOCYTES # BLD AUTO: 0.69 THOUSAND/ÂΜL (ref 0.17–1.22)
MONOCYTES NFR BLD AUTO: 11 % (ref 4–12)
NEUTROPHILS # BLD AUTO: 4.35 THOUSANDS/ÂΜL (ref 1.85–7.62)
NEUTS SEG NFR BLD AUTO: 70 % (ref 43–75)
NRBC BLD AUTO-RTO: 0 /100 WBCS
PLATELET # BLD AUTO: 336 THOUSANDS/UL (ref 149–390)
PMV BLD AUTO: 9.4 FL (ref 8.9–12.7)
POTASSIUM SERPL-SCNC: 3.4 MMOL/L (ref 3.5–5.3)
PROT SERPL-MCNC: 7.5 G/DL (ref 6.4–8.4)
RBC # BLD AUTO: 4.94 MILLION/UL (ref 3.88–5.62)
SODIUM SERPL-SCNC: 133 MMOL/L (ref 135–147)
WBC # BLD AUTO: 6.14 THOUSAND/UL (ref 4.31–10.16)

## 2022-11-26 RX ORDER — POTASSIUM CHLORIDE 20 MEQ/1
40 TABLET, EXTENDED RELEASE ORAL ONCE
Status: COMPLETED | OUTPATIENT
Start: 2022-11-26 | End: 2022-11-26

## 2022-11-26 RX ORDER — AMOXICILLIN AND CLAVULANATE POTASSIUM 875; 125 MG/1; MG/1
1 TABLET, FILM COATED ORAL ONCE
Status: COMPLETED | OUTPATIENT
Start: 2022-11-26 | End: 2022-11-26

## 2022-11-26 RX ORDER — AMOXICILLIN AND CLAVULANATE POTASSIUM 875; 125 MG/1; MG/1
1 TABLET, FILM COATED ORAL EVERY 12 HOURS
Qty: 14 TABLET | Refills: 0 | Status: SHIPPED | OUTPATIENT
Start: 2022-11-26 | End: 2022-12-03

## 2022-11-26 RX ADMIN — IOHEXOL 100 ML: 350 INJECTION, SOLUTION INTRAVENOUS at 11:52

## 2022-11-26 RX ADMIN — POTASSIUM CHLORIDE 40 MEQ: 1500 TABLET, EXTENDED RELEASE ORAL at 12:16

## 2022-11-26 RX ADMIN — SODIUM CHLORIDE, POTASSIUM CHLORIDE, SODIUM LACTATE AND CALCIUM CHLORIDE 1000 ML: 600; 310; 30; 20 INJECTION, SOLUTION INTRAVENOUS at 12:16

## 2022-11-26 RX ADMIN — SODIUM CHLORIDE 1000 ML: 0.9 INJECTION, SOLUTION INTRAVENOUS at 11:28

## 2022-11-26 RX ADMIN — AMOXICILLIN AND CLAVULANATE POTASSIUM 1 TABLET: 875; 125 TABLET, FILM COATED ORAL at 14:33

## 2022-11-26 NOTE — ED PROVIDER NOTES
Pt Name: Briseida Martin  MRN: [id number]  [place] 1940  Age/Sex: [de-identified] y o  male  Date of evaluation: 11/26/2022  PCP: Glenis Grajeda[address]    Chief Complaint   Patient presents with   • Loss of Appetite     Family reports a decrease in appetite for the past week, pt lives by himself and does have a dementia diagnosis  Family is concerned , pt also has a weight loss of 20 lbs in the past 2 months          HPI    [de-identified] y o  male presenting with loss of appetite and weight loss  Patient lives by himself per daughter, has been diagnosed with dementia, has lost approximately 20 lb of weight in the past 2 months  Patient still functions independently and makes his own medical decisions  Patient has had similar symptoms to previous episodes of problems with his gallbladder, had a surgery to address same  Patient's daughter verbalized concern for possible problem the gallbladder other illness causing him to have decreased appetite  Patient denies fever, abdominal pain, chest pain, shortness of breath, trauma, nausea, vomiting, other symptoms        HPI      Past Medical and Surgical History    Past Medical History:   Diagnosis Date   • Alcoholism ([place] [address] ) 04/06/2018   • Alzheimer disease ([place] [address] )    • Anxiety    • Basal cell carcinoma 08/18/2020    Right nares, Dr Edna Macedo   • Basal cell carcinoma 06/03/2021    Right upper forehead   • Basal cell carcinoma (BCC) of skin of face 03/28/2022    Right nostril   • Bile duct disease    • Cancer [facility])     prostate, skin    • Cardiac arrest [facility])    • Cardiac arrest ([place] [address] ) 04/14/2018   • Choledocholithiasis    • Colon polyp    • Dementia (HCC)    • Depression    • Kidney stone    • Liver disease    • Malignant neoplasm of prostate ([facility] )    • Mood disorder ([place] [address] )    • Prostate cancer ([place] [address] )    • Right carotid bruit    • Seizure disorder [facility])    • Skin cancer        Past Surgical History:   Procedure Laterality Date   • CHOLECYSTECTOMY     • COLONOSCOPY     • ERCP N/A 03/09/2018    Procedure: ENDOSCOPIC RETROGRADE CHOLANGIOPANCREATOGRAPHY (ERCP); Surgeon: Denisha Duran MD;  [facility];  Service: Gastroenterology   • ERCP  04/13/2018    stenting   • ERCP N/A 06/08/2018    Procedure: ENDOSCOPIC RETROGRADE CHOLANGIOPANCREATOGRAPHY (ERCP); Surgeon: Denisha Duran MD;  [facility];  Service: Gastroenterology   • MOHS SURGERY  08/18/2020    Right nares, Dr Luba Meza   • MOHS SURGERY  06/30/2021    Right upper forehead; Dr Luba Meza   • [address]  05/18/2022    [address]  [facility]; Right nostril; Dr Boris Ward   • [place] ERCP [address] BODY/STENT BILIARY/PANC DUCT N/A 04/13/2018    Procedure: ENDOSCOPIC RETROGRADE CHOLANGIOPANCREATOGRAPHY (ERCP); Surgeon: Denisha Duran MD;  [facility];  Service: Gastroenterology   • [place] LAP,CHOLECYSTECTOMY N/A 05/15/2018    Procedure: Lore Gamble;  Surgeon: Eder Velasquez MD;  [facility];  Service: General   • PROSTATE SURGERY     • TONSILLECTOMY         Family History   Problem Relation Age of Onset   • Cancer Mother    • Dementia Mother    • Alzheimer's disease Mother    • Sudden death Father        Social History     Tobacco Use   • Smoking status: Former     Packs/day: 1 00     Years: 60 00     Pack years: 60 00     Types: Cigarettes     Quit date: 3/19/2019     Years since quitting: 3 6   • Smokeless tobacco: Never   • Tobacco comments:     daily   Vaping Use   • Vaping Use: Never used   Substance Use Topics   • Alcohol use: No     Comment: former   • Drug use: No           Allergies    No Known Allergies    Home Medications    Prior to Admission medications    Medication Sig Start Date End Date Taking?  Authorizing Provider   acetaminophen (TYLENOL) 500 mg tablet Take 500 mg by mouth as needed     Historical Provider, MD   donepezil (ARICEPT) 10 mg tablet TAKE 1 TABLET (10 MG TOTAL) BY MOUTH DAILY AT BEDTIME 10/19/22   SAAD Rolle   fexofenadine (ALLEGRA) 60 MG tablet Take 60 mg by mouth daily    Historical Provider, MD   Melatonin 3 MG CAPS Take 3 mg by mouth daily at bedtime Patient takes 1 1/2 mg tablets    Historical Provider, MD   memantine (NAMENDA) 5 mg tablet TAKE 1 TABLET (5 MG TOTAL) BY MOUTH 2 (TWO) TIMES A DAY 10/19/22   SAAD Rolle   Multiple Vitamins-Minerals (CENTRUM SILVER ULTRA MENS PO) Take 1 tablet by mouth daily    Historical Provider, MD   rosuvastatin (CRESTOR) 5 mg tablet TAKE 1 TABLET (5 MG TOTAL) BY MOUTH DAILY 10/5/22   Jaspal SAAD Moore   sertraline (ZOLOFT) 25 mg tablet TAKE 1 TABLET (25 MG TOTAL) BY MOUTH DAILY AT BEDTIME 11/11/22   SAAD Arteaga   tamsulosin (FLOMAX) 0 4 mg TAKE ONE TABLET DAILY WITH DINNER 4/22/19   Preet Harrison,            Review of Systems    Review of Systems   Constitutional: Positive for appetite change and unexpected weight change  Negative for chills and diaphoresis  HENT: Negative for drooling, facial swelling, trouble swallowing and voice change  Respiratory: Negative for apnea, shortness of breath and wheezing  Cardiovascular: Negative for chest pain and leg swelling  Gastrointestinal: Negative for abdominal distention, abdominal pain, diarrhea, nausea and vomiting  Genitourinary: Negative for dysuria and urgency  Musculoskeletal: Negative for arthralgias, back pain, gait problem and neck pain  Skin: Negative for color change, rash and wound  Neurological: Negative for seizures, speech difficulty, weakness and headaches  Psychiatric/Behavioral: Negative for agitation, behavioral problems and dysphoric mood  The patient is not nervous/anxious  All other systems reviewed and negative      Physical Exam      ED Triage Vitals [11/26/22 0905]   Temperature Pulse Respirations Blood Pressure SpO2   98 1 °F (36 7 °C) 88 15 116/65 98 %      Temp Source Heart Rate Source Patient Position - Orthostatic VS BP Location FiO2 (%)   Oral Monitor Sitting Left arm --      Pain Score       No Pain               Physical Exam  Vitals and nursing note reviewed  Constitutional:       General: He is not in acute distress  Appearance: He is well-developed  He is not ill-appearing, toxic-appearing or diaphoretic  HENT:      Head: Normocephalic and atraumatic  Right Ear: External ear normal       Left Ear: External ear normal       Nose: Nose normal       Mouth/Throat:      Mouth: Mucous membranes are dry  Pharynx: Oropharynx is clear  Eyes:      Conjunctiva/sclera: Conjunctivae normal       Pupils: Pupils are equal, round, and reactive to light  Neck:      Trachea: No tracheal deviation  Cardiovascular:      Rate and Rhythm: Normal rate and regular rhythm  Pulses: Normal pulses  Heart sounds: Normal heart sounds  No murmur heard  Pulmonary:      Effort: Pulmonary effort is normal  No respiratory distress  Breath sounds: Normal breath sounds  No stridor  No wheezing or rales  Abdominal:      General: There is no distension  Palpations: Abdomen is soft  Tenderness: There is no abdominal tenderness  There is no guarding or rebound  Comments: Negative Pino sign, no tenderness palpation, no rebound or guarding  Musculoskeletal:         General: No deformity  Normal range of motion  Cervical back: Normal range of motion and neck supple  Skin:     General: Skin is warm and dry  Capillary Refill: Capillary refill takes less than 2 seconds  Findings: No rash  Neurological:      Mental Status: He is alert and oriented to person, place, and time  Cranial Nerves: No cranial nerve deficit  Motor: No weakness  Coordination: Coordination normal       Gait: Gait normal       Comments: Patient oriented to self, place, situation  Psychiatric:         Behavior: Behavior normal          Thought Content:  Thought content normal          Judgment: Judgment normal               Diagnostic Results      Labs:    Results Reviewed     Procedure Component Value Units Date/Time    Lipase [[id number]]  (Normal) Collected: 11/26/22 1109    Lab Status: Final result Specimen: Blood from Arm, Right Updated: 11/26/22 1329     Lipase 178 u/L     Ethanol [[id number]]  (Normal) Collected: 11/26/22 1128    Lab Status: Final result Specimen: Blood from Arm, Right Updated: 11/26/22 1202     Ethanol Lvl <3 mg/dL     Comprehensive metabolic panel [[id number]]  (Abnormal) Collected: 11/26/22 1109    Lab Status: Final result Specimen: Blood from Arm, Right Updated: 11/26/22 1146     Sodium 133 mmol/L      Potassium 3 4 mmol/L      Chloride 93 mmol/L      CO2 22 mmol/L      ANION GAP 18 mmol/L      BUN 21 mg/dL      Creatinine 1 39 mg/dL      Glucose 91 mg/dL      Calcium 9 4 mg/dL      Corrected Calcium 10 4 mg/dL      AST 20 U/L      ALT 23 U/L      Alkaline Phosphatase 150 U/L      Total Protein 7 5 g/dL      Albumin 2 8 g/dL      Total Bilirubin 0 79 mg/dL      eGFR 47 ml/min/1 73sq m     Narrative:      [place] guidelines for Chronic Kidney Disease (CKD):   •  Stage 1 with normal or high GFR (GFR > 90 mL/min/1 73 square meters)  •  Stage 2 Mild CKD (GFR = 60-89 mL/min/1 73 square meters)  •  Stage 3A Moderate CKD (GFR = 45-59 mL/min/1 73 square meters)  •  Stage 3B Moderate CKD (GFR = 30-44 mL/min/1 73 square meters)  •  Stage 4 Severe CKD (GFR = 15-29 mL/min/1 73 square meters)  •  Stage 5 End Stage CKD (GFR <15 mL/min/1 73 square meters)  Note: GFR calculation is accurate only with a steady state creatinine    CBC and differential [[id number]] Collected: 11/26/22 1109    Lab Status: Final result Specimen: Blood from Arm, Right Updated: 11/26/22 1118     WBC 6 14 Thousand/uL      RBC 4 94 Million/uL      Hemoglobin 14 6 g/dL      Hematocrit 43 2 %      MCV 87 fL      MCH 29 6 pg      MCHC 33 8 g/dL      RDW 14 6 %      MPV 9 4 fL      Platelets 500 Thousands/uL      nRBC 0 /100 WBCs      Neutrophils Relative 70 %      Immat GRANS % 1 %      Lymphocytes Relative 15 %      Monocytes Relative 11 %      Eosinophils Relative 2 %      Basophils Relative 1 %      Neutrophils Absolute 4 35 Thousands/µL      Immature Grans Absolute 0 05 Thousand/uL      Lymphocytes Absolute 0 89 Thousands/µL      Monocytes Absolute 0 69 Thousand/µL      Eosinophils Absolute 0 13 Thousand/µL      Basophils Absolute 0 03 Thousands/µL           All labs reviewed and utilized in the medical decision making process    Radiology:    CT abdomen pelvis with contrast   Final Result      1  Mild inflammatory change adjacent to the distal descending colon likely related to acute diverticulitis  2   Extensive diverticulosis throughout the colon  3   Nodule along the undersurface of the left rectus muscle, not present previously  This is of uncertain etiology and short interval follow-up CT in 3 months is recommended to exclude a neoplastic process  4   Bilateral nonobstructive nephrolithiasis  5   Bibasilar patchy opacity  This is nonspecific, possibly representing atelectasis or scarring, though pneumonia/aspiration cannot be excluded  6   Questionable nodule versus artifact in the right lower lobe on  image only  Dual energy chest x-ray recommended for complete evaluation  The study was marked in [facility] for immediate notification  Workstation performed: [id number]             All radiology studies independently viewed by me and interpreted by the radiologist     Procedure    Procedures        ED Course of Care and Re-Assessments      Patient given IV fluids with improvement of symptoms, also given potassium for mild hypokalemia  Started on Augmentin for treatment of diverticulitis  Patient was offered admission but declined after discussion of risks and benefits      Medications   sodium chloride 0 9 % bolus 1,000 mL (0 mL Intravenous Stopped 11/26/22 4201)   iohexol (OMNIPAQUE) 350 MG/ML injection (SINGLE-DOSE) 100 mL (100 mL Intravenous Given 11/26/22 1152)   potassium chloride (K-DUR,KLOR-CON) CR tablet 40 mEq (40 mEq Oral Given 11/26/22 1216)   lactated ringers bolus 1,000 mL (0 mL Intravenous Stopped 11/26/22 1351)   amoxicillin-clavulanate (AUGMENTIN) 875-125 mg per tablet 1 tablet (1 tablet Oral Given 11/26/22 1433)           FINAL IMPRESSION    Final diagnoses:   Diverticulitis   Abnormal CT of the abdomen   Abnormal CXR   Dehydration   Hypokalemia   Hyponatremia   Nephrolithiasis         DISPOSITION/PLAN    Presentation as above felt most consistent with acute diverticulitis, labs consistent with mild hypokalemia hyponatremia likely due to dehydration  Vital signs reassuring, examination nonspecific with benign abdomen  CT the abdomen overall reassuring, consistent with mild diverticulitis and multiple incidental findings, patient and his daughter both counseled regarding these findings as well as recommended follow-up  Patient offered admission but declined, discharged strict return precautions, follow up primary care doctor  Time reflects when diagnosis was documented in both MDM as applicable and the Disposition within this note     Time User Action Codes Description Comment    11/26/2022  2:26 PM Arcelia Oliveira Add [K57 92] Diverticulitis     11/26/2022  2:27 PM Arcelia Oliveira Add [R93 5] Abnormal CT of the abdomen     11/26/2022  2:27 PM Arcelia Oliveira Add [R93 89] Abnormal CXR     11/26/2022  2:27 PM Arcelia Oliveira Add [E86 0] Dehydration     11/26/2022  2:27 PM Arcelia Oliveira Add [E87 6] Hypokalemia     11/26/2022  2:27 PM Blocheo Cyn T Add [E87 1] Hyponatremia     11/26/2022  2:28 PM Arcelia Oliveira Add [N20 0] Nephrolithiasis       ED Disposition     ED Disposition   Discharge    Condition   Stable    Date/Time   Sat Nov 26, 2022  2:26 PM    Comment   Yodit La discharge to home/self care                 Follow-up Information     Follow up With Specialties Details Why Contact Info Additional [address] [facility] Emergency Department Emergency Medicine Go to  If symptoms worsen [address] [place] [id number] [facility] Emergency [facility], [address] [facility], [place], [place], [address], [address] [facility], Nurse Practitioner Call in 2 days To discuss this visit schedule close outpatient follow-up  Discuss your abnormal CT scan as well as the recommendation for repeat CT scan in 3 months to check on the nodule under the left rectus muscle [id number] [facility] [address] [place]  [phone]               PATIENT REFERRED TO:    5[address] [facility] Emergency Department  [address] [facility] [address] [phone]  Go to   If symptoms worsen    [address]  [address]  [phone]    Call in 2 days  To discuss this visit schedule close outpatient follow-up    Discuss your abnormal CT scan as well as the recommendation for repeat CT scan in 3 months to check on the nodule under the left rectus muscle      DISCHARGE MEDICATIONS:    Discharge Medication List as of 11/26/2022  2:29 PM      START taking these medications    Details   amoxicillin-clavulanate (AUGMENTIN) 875-125 mg per tablet Take 1 tablet by mouth every 12 (twelve) hours for 7 days, Starting Sat 11/26/2022, Until Sat 12/3/2022, Print         CONTINUE these medications which have NOT CHANGED    Details   acetaminophen (TYLENOL) 500 mg tablet Take 500 mg by mouth as needed , Historical Med      donepezil (ARICEPT) 10 mg tablet TAKE 1 TABLET (10 MG TOTAL) BY MOUTH DAILY AT BEDTIME, Starting Wed 10/19/2022, Normal      fexofenadine (ALLEGRA) 60 MG tablet Take 60 mg by mouth daily, Historical Med      Melatonin 3 MG CAPS Take 3 mg by mouth daily at bedtime Patient takes 1 1/2 mg tablets, Historical Med      memantine (NAMENDA) 5 mg tablet TAKE 1 TABLET (5 MG TOTAL) BY MOUTH 2 (TWO) TIMES A DAY, Starting Wed 10/19/2022, Normal      Multiple Vitamins-Minerals (CENTRUM SILVER ULTRA MENS PO) Take 1 tablet by mouth daily, Historical Med      rosuvastatin (CRESTOR) 5 mg tablet TAKE 1 TABLET (5 MG TOTAL) BY MOUTH DAILY, Starting Wed 10/5/2022, Normal      sertraline (ZOLOFT) 25 mg tablet TAKE 1 TABLET (25 MG TOTAL) BY MOUTH DAILY AT BEDTIME, Starting Fri 11/11/2022, Normal      tamsulosin (FLOMAX) 0 4 mg TAKE ONE TABLET DAILY WITH DINNER, Normal             No discharge procedures on file  Alexis Moore MD    Portions of the record may have been created with voice recognition software  Occasional wrong word or "sound alike" substitutions may have occurred due to the inherent limitations of voice recognition software    Please read the chart carefully and recognize, using context, where substitutions have occurred     Alexis Moore MD  11/26/22 0052

## 2022-11-27 LAB
ATRIAL RATE: 77 BPM
P AXIS: 54 DEGREES
PR INTERVAL: 152 MS
QRS AXIS: 71 DEGREES
QRSD INTERVAL: 84 MS
QT INTERVAL: 384 MS
QTC INTERVAL: 434 MS
T WAVE AXIS: 64 DEGREES
VENTRICULAR RATE: 77 BPM

## 2022-12-08 ENCOUNTER — OFFICE VISIT (OUTPATIENT)
Dept: FAMILY MEDICINE CLINIC | Facility: CLINIC | Age: 82
End: 2022-12-08

## 2022-12-08 VITALS
DIASTOLIC BLOOD PRESSURE: 60 MMHG | SYSTOLIC BLOOD PRESSURE: 108 MMHG | BODY MASS INDEX: 24.14 KG/M2 | OXYGEN SATURATION: 99 % | HEART RATE: 63 BPM | HEIGHT: 69 IN | WEIGHT: 163 LBS

## 2022-12-08 DIAGNOSIS — Z71.3 WEIGHT LOSS COUNSELING, ENCOUNTER FOR: ICD-10-CM

## 2022-12-08 DIAGNOSIS — K57.92 DIVERTICULITIS: ICD-10-CM

## 2022-12-08 DIAGNOSIS — E78.2 MIXED HYPERLIPIDEMIA: ICD-10-CM

## 2022-12-08 DIAGNOSIS — J18.9 PNEUMONIA DUE TO INFECTIOUS ORGANISM, UNSPECIFIED LATERALITY, UNSPECIFIED PART OF LUNG: Primary | ICD-10-CM

## 2022-12-08 DIAGNOSIS — F59 UNSPECIFIED BEHAVIORAL SYNDROMES ASSOCIATED WITH PHYSIOLOGICAL DISTURBANCES AND PHYSICAL FACTORS: ICD-10-CM

## 2022-12-08 NOTE — PROGRESS NOTES
Assessment & Plan     1  Pneumonia due to infectious organism, unspecified laterality, unspecified part of lung  Assessment & Plan:  Discussed findings of ER evaluation/x-rays, denies any fever or chills negative shortness of breath with daughter, completed Augmentin and repeat chest x-ray    Orders:  -     XR chest pa & lateral; Future; Expected date: 12/08/2022    2  Weight loss counseling, encounter for  -     Comprehensive metabolic panel; Future  -     CBC and differential; Future    3  Mixed hyperlipidemia  -     Lipid panel; Future    4  Unspecified behavioral syndromes associated with physiological disturbances and physical factors  -     CBC and differential; Future    5  Diverticulitis  Comments:  Resolving, reviewed dietary, completion of antibiotic , increasing fiber intake, scheduled follow-up with GI       Subjective     Transitional Care Management Review:   Azam Mtz is a [de-identified] y o  male here for TCM follow up  During the TCM phone call patient stated:  TCM Call     None      TCM Call     None        Here with daughter following ER admission   After noted 20 pound weight loss over the past weeks prior to, daughter noted change in appetite, also noted decrease intake  Presently well appetite improvement has made some adjustments in monitoring intake, negative fever chills bowels reported return to normal  Scheduled follow-up with GI  Denies any chronic cough shortness of breath difficulty breathing question in regard to chest x-ray and pneumonia? Review of Systems   Constitutional: Negative for appetite change, chills, fever and unexpected weight change  HENT: Negative for congestion, dental problem, ear pain, hearing loss, postnasal drip, rhinorrhea, sinus pressure, sinus pain, sneezing, sore throat, tinnitus and voice change  Eyes: Negative for visual disturbance  Respiratory: Negative for apnea, cough, chest tightness and shortness of breath      Cardiovascular: Negative for chest pain, palpitations and leg swelling  Gastrointestinal: Negative for abdominal pain, blood in stool, constipation, diarrhea, nausea and vomiting  Endocrine: Negative for cold intolerance, heat intolerance, polydipsia, polyphagia and polyuria  Genitourinary: Negative for decreased urine volume, difficulty urinating, dysuria, frequency and hematuria  Musculoskeletal: Negative for arthralgias, back pain, gait problem, joint swelling and myalgias  Skin: Negative for color change, rash and wound  Allergic/Immunologic: Negative for environmental allergies and food allergies  Neurological: Negative for dizziness, syncope, weakness, light-headedness, numbness and headaches  Hematological: Negative for adenopathy  Does not bruise/bleed easily  Psychiatric/Behavioral: Negative for sleep disturbance and suicidal ideas  The patient is not nervous/anxious  /60   Pulse 63   Ht 5' 9" (1 753 m)   Wt 73 9 kg (163 lb)   SpO2 99%   BMI 24 07 kg/m²      Physical Exam  Vitals and nursing note reviewed  Constitutional:       Appearance: He is well-developed  He is not ill-appearing  HENT:      Head: Normocephalic and atraumatic  Eyes:      Conjunctiva/sclera: Conjunctivae normal    Cardiovascular:      Rate and Rhythm: Normal rate and regular rhythm  Heart sounds: No murmur heard  Pulmonary:      Effort: Pulmonary effort is normal  No respiratory distress  Breath sounds: Normal breath sounds  Abdominal:      Palpations: Abdomen is soft  Tenderness: There is no abdominal tenderness  There is no right CVA tenderness, left CVA tenderness or rebound  Musculoskeletal:         General: No swelling  Cervical back: Neck supple  Right lower leg: No edema  Left lower leg: No edema  Skin:     General: Skin is warm and dry  Capillary Refill: Capillary refill takes less than 2 seconds     Neurological:      Mental Status: He is alert and oriented to person, place, and time    Psychiatric:         Mood and Affect: Mood normal        SAAD Kraft

## 2022-12-08 NOTE — ASSESSMENT & PLAN NOTE
Discussed findings of ER evaluation/x-rays, denies any fever or chills negative shortness of breath with daughter, completed Augmentin and repeat chest x-ray

## 2022-12-14 ENCOUNTER — OFFICE VISIT (OUTPATIENT)
Dept: GASTROENTEROLOGY | Facility: CLINIC | Age: 82
End: 2022-12-14

## 2022-12-14 VITALS
OXYGEN SATURATION: 99 % | DIASTOLIC BLOOD PRESSURE: 78 MMHG | HEIGHT: 69 IN | BODY MASS INDEX: 24.44 KG/M2 | HEART RATE: 88 BPM | WEIGHT: 165 LBS | SYSTOLIC BLOOD PRESSURE: 120 MMHG

## 2022-12-14 DIAGNOSIS — R63.4 WEIGHT LOSS, ABNORMAL: ICD-10-CM

## 2022-12-14 DIAGNOSIS — K57.90 DIVERTICULAR DISEASE: Primary | ICD-10-CM

## 2022-12-14 NOTE — PROGRESS NOTES
[facility] - Outpatient Follow-up Note  Ellen Kaplan [de-identified] y o  male MRN: [id number]  Encounter: [id number]          ASSESSMENT AND PLAN:      1  Diverticular disease  - high fiber diet  - no need to eliminate nuts or seeds  - no indication for additional procedures    2  Weight loss, abnormal  - continue diet with 3 meals daily    ______________________________________________________________________    SUBJECTIVEDarol Daft comes to the office today for follow-up  He was seen in the emergency room on November 26 for the principal complaint of weight loss not associated with any abdominal pain, fever, chills, nausea, vomiting, change in bowel habits  CBC was performed at that time and showed a white blood cell count of 6 14   CT scan of the abdomen pelvis showed extensive diverticulosis coli as well as some inflammatory changes in the descending colon suggesting the diagnosis of diverticulitis  The emergency room doctor placed him on Augmentin which she took for 7 days  Since there was no abdominal pain or change in bowel habits, it is difficult to say whether or not the patient had improvement  He apparently had lost 20 pounds over 6 weeks and was not eating as much  Now he is eating 3 meals a day and has regained 8 pounds since that time  He still denying any problems with abdominal pain, change in bowel habits, diarrhea, constipation  Following the antibiotic treatment he did have a brief period of diarrhea for which she took Imodium  The diarrhea has since resolved and he is no longer taking any additional medications  REVIEW OF SYSTEMS IS OTHERWISE NEGATIVE        Historical Information   Past Medical History:   Diagnosis Date   • Alcoholism ([facility] [address] ) 04/06/2018   • [facility] disease [facility]    • Anxiety    • Basal cell carcinoma 08/18/2020    Dr Madelyn Berrios   • Basal cell carcinoma 06/03/2021    Right upper forehead   • Basal cell carcinoma (BCC) of skin of face 03/28/2022 Right nostril   • Bile duct disease    • Cancer [facility])     prostate, skin    • Cardiac arrest [facility])    • Cardiac arrest ([facility] [address] ) 04/14/2018   • Choledocholithiasis    • Colon polyp    • Dementia (HCC)    • Depression    • Diverticulitis of colon    • Kidney stone    • Liver disease    • Malignant neoplasm of prostate ([facility] [address] )    • Mood disorder ([facility] [address] )    • Prostate cancer ([facility] )    • Right carotid bruit    • Seizure disorder [facility])    • Skin cancer      Past Surgical History:   Procedure Laterality Date   • CHOLECYSTECTOMY     • COLONOSCOPY     • ERCP N/A 03/09/2018    Procedure: ENDOSCOPIC RETROGRADE CHOLANGIOPANCREATOGRAPHY (ERCP); Surgeon: Cecelia Caraballo MD;  Location: [facility];  Service: Gastroenterology   • ERCP  04/13/2018    stenting   • ERCP N/A 06/08/2018    Procedure: ENDOSCOPIC RETROGRADE CHOLANGIOPANCREATOGRAPHY (ERCP); Surgeon: Cecelia Caraballo MD;  [facility];  Service: Gastroenterology   [facility]  [facility]    Right nares, Dr Anastacio Sanabria   • MOHS SURGERY  06/30/2021    Right upper forehead; Dr Anastacio Sanabria   • [address]  05/18/2022    [address]; Right nostril; Dr Felipe Garcia   • [place] ERCP [address] BODY/STENT BILIARY/PANC DUCT N/A 04/13/2018    Procedure: ENDOSCOPIC RETROGRADE CHOLANGIOPANCREATOGRAPHY (ERCP);   Surgeon: Cecelia Caraballo MD;  Location: [facility];  Service: Gastroenterology   • [place] LAP,CHOLECYSTECTOMY N/A 05/15/2018    Procedure: Caro Rangel;  Surgeon: Chance Leiva MD;  Location: [facility];  Service: General   • PROSTATE SURGERY     • TONSILLECTOMY       Social History   Social History     Substance and Sexual Activity   Alcohol Use No    Comment: former     Social History     Substance and Sexual Activity   Drug Use No     Social History     Tobacco Use   Smoking Status Former   • Packs/day: 1 00   • Years: 60 00   • Pack years: 60 00   • Types: Cigarettes   • Quit date: 3/19/2019   • Years since quitting: 3 7   Smokeless Tobacco Never   Tobacco Comments    daily     Family History   Problem Relation Age of Onset   • Cancer Mother    • Dementia Mother    • Alzheimer's disease Mother    • Sudden death Father        Meds/Allergies       Current Outpatient Medications:   •  acetaminophen (TYLENOL) 500 mg tablet  •  donepezil (ARICEPT) 10 mg tablet  •  fexofenadine (ALLEGRA) 60 MG tablet  •  Melatonin 3 MG CAPS  •  memantine (NAMENDA) 5 mg tablet  •  Multiple Vitamins-Minerals (CENTRUM SILVER ULTRA MENS PO)  •  rosuvastatin (CRESTOR) 5 mg tablet  •  sertraline (ZOLOFT) 25 mg tablet  •  tamsulosin (FLOMAX) 0 4 mg    No Known Allergies        Objective     Blood pressure 120/78, pulse 88, height 5' 9" (1 753 m), weight 74 8 kg (165 lb), SpO2 99 %  Body mass index is 24 37 kg/m²  PHYSICAL EXAM:      General Appearance:   Alert, cooperative, no distress   HEENT:   Normocephalic, atraumatic, anicteric      Neck:  Supple, symmetrical, trachea midline   Lungs:   Clear to auscultation bilaterally; no rales, rhonchi or wheezing; respirations unlabored    Heart[de-identified]   Regular rate and rhythm; no murmur, rub, or gallop  Abdomen:   Soft, non-tender, non-distended; normal bowel sounds; no masses, no organomegaly    Genitalia:   Deferred    Rectal:   Deferred    Extremities:  No cyanosis, clubbing or edema    Pulses:  2+ and symmetric    Skin:  No jaundice, rashes, or lesions    Lymph nodes:  No palpable cervical lymphadenopathy        Lab Results:   No visits with results within 1 Day(s) from this visit     Latest known visit with results is:   Admission on 11/26/2022, Discharged on 11/26/2022   Component Date Value   • WBC 11/26/2022 6 14    • RBC 11/26/2022 4 94    • Hemoglobin 11/26/2022 14 6    • Hematocrit 11/26/2022 43 2    • MCV 11/26/2022 87    • MCH 11/26/2022 29 6    • MCHC 11/26/2022 33 8    • RDW 11/26/2022 14 6    • MPV 11/26/2022 9 4    • Platelets 86/50/2715 336    • nRBC 11/26/2022 0    • Neutrophils Relative 11/26/2022 70    • Immat GRANS % 11/26/2022 1 • Lymphocytes Relative 11/26/2022 15    • Monocytes Relative 11/26/2022 11    • Eosinophils Relative 11/26/2022 2    • Basophils Relative 11/26/2022 1    • Neutrophils Absolute 11/26/2022 4 35    • Immature Grans Absolute 11/26/2022 0 05    • Lymphocytes Absolute 11/26/2022 0 89    • Monocytes Absolute 11/26/2022 0 69    • Eosinophils Absolute 11/26/2022 0 13    • Basophils Absolute 11/26/2022 0 03    • Sodium 11/26/2022 133 (L)    • Potassium 11/26/2022 3 4 (L)    • Chloride 11/26/2022 93 (L)    • CO2 11/26/2022 22    • ANION GAP 11/26/2022 18 (H)    • BUN 11/26/2022 21    • Creatinine 11/26/2022 1 39 (H)    • Glucose 11/26/2022 91    • Calcium 11/26/2022 9 4    • Corrected Calcium 11/26/2022 10 4 (H)    • AST 11/26/2022 20    • ALT 11/26/2022 23    • Alkaline Phosphatase 11/26/2022 150 (H)    • Total Protein 11/26/2022 7 5    • Albumin 11/26/2022 2 8 (L)    • Total Bilirubin 11/26/2022 0 79    • eGFR 11/26/2022 47    • Ventricular Rate 11/26/2022 77    • Atrial Rate 11/26/2022 77    • NV Interval 11/26/2022 152    • QRSD Interval 11/26/2022 84    • QT Interval 11/26/2022 384    • QTC Interval 11/26/2022 434    • P Axis 11/26/2022 54    • QRS Axis 11/26/2022 71    • T Wave Axis 11/26/2022 64    • Ethanol Lvl 11/26/2022 <3    • Lipase 11/26/2022 178          Radiology Results:   CT abdomen pelvis with contrast    Result Date: 11/26/2022  Narrative: CT ABDOMEN AND PELVIS WITH IV CONTRAST INDICATION:   Abdominal pain, acute, nonlocalized abdominal discomfort, weight loss  COMPARISON:  4/13/2018 and 3/8/2018 TECHNIQUE:  CT examination of the abdomen and pelvis was performed  Axial, sagittal, and coronal 2D reformatted images were created from the source data and submitted for interpretation  Radiation dose length product (DLP) for this visit:  582 mGy-cm     This examination, like all CT scans performed in the [facility], was performed utilizing techniques to minimize radiation dose exposure, including the use of iterative reconstruction and automated exposure control  IV Contrast:  100 mL of iohexol (OMNIPAQUE) Enteric Contrast:  Enteric contrast was not administered  FINDINGS: ABDOMEN LOWER CHEST:  There is a density in the right lower lobe on the  image which is not included on the CT  It is uncertain if this represents a confluence of vasculature and rib cartilage or a true nodule  There are stable left lower lobe lung nodules  There is patchy density at the lung bases, nonspecific  LIVER/BILIARY TREE:  Unremarkable  GALLBLADDER:  Gallbladder is surgically absent  SPLEEN:  Unremarkable  PANCREAS:  Unremarkable  ADRENAL GLANDS:  Unremarkable  KIDNEYS/URETERS:  There are numerous bilateral nonobstructing renal calculi, the largest measuring 3 mm on the left  There are bilateral cysts  No hydronephrosis  STOMACH AND BOWEL:  There is extensive colonic diverticulosis  There is mild stranding and peritoneal thickening along the distal descending colon best seen on series 601 images 51 through 60 suggestive of acute diverticulitis  APPENDIX:  No findings to suggest appendicitis  ABDOMINOPELVIC CAVITY: There is a nodular density along the inner surface of the left rectus muscle measuring 1 5 x 1 7 cm  This is of uncertain etiology and was not present previously  No ascites  No pneumoperitoneum  No lymphadenopathy  VESSELS:  Unremarkable for patient's age  PELVIS REPRODUCTIVE ORGANS:  Status post prostatectomy  URINARY BLADDER:  Unremarkable  ABDOMINAL WALL/INGUINAL REGIONS:  Unremarkable  OSSEOUS STRUCTURES:  No acute fracture or destructive osseous lesion  Impression: 1  Mild inflammatory change adjacent to the distal descending colon likely related to acute diverticulitis  2   Extensive diverticulosis throughout the colon  3   Nodule along the undersurface of the left rectus muscle, not present previously    This is of uncertain etiology and short interval follow-up CT in 3 months is recommended to exclude a neoplastic process  4   Bilateral nonobstructive nephrolithiasis  5   Bibasilar patchy opacity  This is nonspecific, possibly representing atelectasis or scarring, though pneumonia/aspiration cannot be excluded  6   Questionable nodule versus artifact in the right lower lobe on  image only  Dual energy chest x-ray recommended for complete evaluation  The study was marked in [facility] for immediate notification  Workstation performed: [id number]     Answers for HPI/ROS submitted by the patient on 12/14/2022  Chronicity: new  Onset: 1 to 4 weeks ago  Onset quality: gradual  Frequency: intermittently  Episode duration: 2 Weeks  Progression since onset: gradually worsening  Pain location: periumbilical region  Pain - numeric: 2/10  Pain quality: a sensation of fullness  Radiates to: does not radiate  anorexia: Yes  arthralgias: No  belching: No  constipation: No  diarrhea: Yes  dysuria: No  fever: No  flatus: No  frequency: No  headaches: No  hematochezia: Yes  hematuria: No  melena: No  myalgias: No  nausea:  No  weight loss: Yes  vomiting: No  Aggravated by: nothing  Relieved by: nothing  Diagnostic workup: lower endoscopy

## 2023-01-04 ENCOUNTER — OFFICE VISIT (OUTPATIENT)
Dept: DERMATOLOGY | Facility: CLINIC | Age: 83
End: 2023-01-04

## 2023-01-04 VITALS — HEIGHT: 69 IN | WEIGHT: 165 LBS | BODY MASS INDEX: 24.44 KG/M2

## 2023-01-04 DIAGNOSIS — D22.70 MULTIPLE BENIGN MELANOCYTIC NEVI OF UPPER AND LOWER EXTREMITIES AND TRUNK: Primary | ICD-10-CM

## 2023-01-04 DIAGNOSIS — D22.5 MULTIPLE BENIGN MELANOCYTIC NEVI OF UPPER AND LOWER EXTREMITIES AND TRUNK: Primary | ICD-10-CM

## 2023-01-04 DIAGNOSIS — L82.1 SEBORRHEIC KERATOSES: ICD-10-CM

## 2023-01-04 DIAGNOSIS — L72.0 EPIDERMAL INCLUSION CYST: ICD-10-CM

## 2023-01-04 DIAGNOSIS — L81.4 LENTIGO: ICD-10-CM

## 2023-01-04 DIAGNOSIS — D22.60 MULTIPLE BENIGN MELANOCYTIC NEVI OF UPPER AND LOWER EXTREMITIES AND TRUNK: Primary | ICD-10-CM

## 2023-01-04 DIAGNOSIS — L85.3 XEROSIS OF SKIN: ICD-10-CM

## 2023-01-04 DIAGNOSIS — D17.1 LIPOMA OF TORSO: ICD-10-CM

## 2023-01-04 DIAGNOSIS — Z85.828 HISTORY OF BASAL CELL CARCINOMA: ICD-10-CM

## 2023-01-04 DIAGNOSIS — D18.01 CHERRY ANGIOMA: ICD-10-CM

## 2023-01-04 NOTE — PROGRESS NOTES
[facility] Note     Patient Name: William Gale  Encounter Date: 01/04/2023     Have you been cared for by a [place] [id number] Dermatologist in the last 3 years and, if so, which description applies to you? Yes  I have been here within the last 3 years, and my medical history has NOT changed since that time  I am MALE/not capable of bearing children  REVIEW OF SYSTEMS:  Have you recently had or currently have any of the following? · No changes in my recent health  PAST MEDICAL HISTORY:  Have you personally ever had or currently have any of the following? If "YES," then please provide more detail  · No changes in my medical history  FAMILY HISTORY:  Any "first degree relatives" (parent, brother, sister, or child) with the following? • No changes in my family's known health  PATIENT EXPERIENCE:    • Do you want the Dermatologist to perform a COMPLETE skin exam today including a clinical examination under the "bra and underwear" areas? Yes  • If necessary, do we have your permission to call and leave a detailed message on your Preferred Phone number that includes your specific medical information?   Yes      No Known Allergies   Current Outpatient Medications:   •  acetaminophen (TYLENOL) 500 mg tablet, Take 500 mg by mouth as needed , Disp: , Rfl:   •  donepezil (ARICEPT) 10 mg tablet, TAKE 1 TABLET (10 MG TOTAL) BY MOUTH DAILY AT BEDTIME, Disp: 30 tablet, Rfl: 6  •  fexofenadine (ALLEGRA) 60 MG tablet, Take 60 mg by mouth daily, Disp: , Rfl:   •  Melatonin 3 MG CAPS, Take 3 mg by mouth daily at bedtime Patient takes 1 1/2 mg tablets, Disp: , Rfl:   •  memantine (NAMENDA) 5 mg tablet, TAKE 1 TABLET (5 MG TOTAL) BY MOUTH 2 (TWO) TIMES A DAY, Disp: 60 tablet, Rfl: 6  •  Multiple Vitamins-Minerals (CENTRUM SILVER ULTRA MENS PO), Take 1 tablet by mouth daily, Disp: , Rfl:   •  rosuvastatin (CRESTOR) 5 mg tablet, TAKE 1 TABLET (5 MG TOTAL) BY MOUTH DAILY, Disp: 90 tablet, Rfl: 0  • sertraline (ZOLOFT) 25 mg tablet, TAKE 1 TABLET (25 MG TOTAL) BY MOUTH DAILY AT BEDTIME, Disp: 30 tablet, Rfl: 5  •  tamsulosin (FLOMAX) 0 4 mg, TAKE ONE TABLET DAILY WITH DINNER, Disp: 90 capsule, Rfl: 3          • Whom besides the patient is providing clinical information about today's encounter?   o NO ADDITIONAL HISTORIAN (patient alone provided history)    Physical Exam and Assessment/Plan by Diagnosis:    1  MELANOCYTIC NEVI ("Moles")    Physical Exam:  • Anatomic Location Affected:   Mostly on sun-exposed areas of the trunk and extremities  • Morphological Description:  Scattered, 1-4mm round to ovoid, symmetrical-appearing, even bordered, skin colored to dark brown macules/papules, mostly in sun-exposed areas  • Pertinent Positives:  • Pertinent Negatives:    Assessment and Plan:  Based on a thorough discussion of this condition and the management approach to it (including a comprehensive discussion of the known risks, side effects and potential benefits of treatment), the patient (family) agrees to implement the following specific plan:  • When outside we recommend using a wide brim hat, sunglasses, long sleeve and pants, sunscreen with SPF 32+ with reapplication every 2 hours, or SPF specific clothing   • Benign, reassured  • Annual skin check     Melanocytic Nevi  Melanocytic nevi ("moles") are tan or brown, raised or flat areas of the skin which have an increased number of melanocytes  Melanocytes are the cells in our body which make pigment and account for skin color  Some moles are present at birth (I e , "congenital nevi"), while others come up later in life (i e , "acquired nevi")  The sun can stimulate the body to make more moles  Sunburns are not the only thing that triggers more moles  Chronic sun exposure can do it too  Clinically distinguishing a healthy mole from melanoma may be difficult, even for experienced dermatologists   The "ABCDE's" of moles have been suggested as a means of helping to alert a person to a suspicious mole and the possible increased risk of melanoma  The suggestions for raising alert are as follows:    Asymmetry: Healthy moles tend to be symmetric, while melanomas are often asymmetric  Asymmetry means if you draw a line through the mole, the two halves do not match in color, size, shape, or surface texture  Asymmetry can be a result of rapid enlargement of a mole, the development of a raised area on a previously flat lesion, scaling, ulceration, bleeding or scabbing within the mole  Any mole that starts to demonstrate "asymmetry" should be examined promptly by a board certified dermatologist      Border: Healthy moles tend to have discrete, even borders  The border of a melanoma often blends into the normal skin and does not sharply delineate the mole from normal skin  Any mole that starts to demonstrate "uneven borders" should be examined promptly by a board certified dermatologist      Color: Healthy moles tend to be one color throughout  Melanomas tend to be made up of different colors ranging from dark black, blue, white, or red  Any mole that demonstrates a color change should be examined promptly by a board certified dermatologist      Diameter: Healthy moles tend to be smaller than 0 6 cm in size; an exception are "congenital nevi" that can be larger  Melanomas tend to grow and can often be greater than 0 6 cm (1/4 of an inch, or the size of a pencil eraser)  This is only a guideline, and many normal moles may be larger than 0 6 cm without being unhealthy  Any mole that starts to change in size (small to bigger or bigger to smaller) should be examined promptly by a board certified dermatologist      Evolving: Healthy moles tend to "stay the same "  Melanomas may often show signs of change or evolution such as a change in size, shape, color, or elevation    Any mole that starts to itch, bleed, crust, burn, hurt, or ulcerate or demonstrate a change or evolution should be examined promptly by a board certified dermatologist         2  LENTIGO    Physical Exam:  • Anatomic Location Affected:  Arms   • Morphological Description:  Light brown macules  • Pertinent Positives:  • Pertinent Negatives:    Assessment and Plan:  Based on a thorough discussion of this condition and the management approach to it (including a comprehensive discussion of the known risks, side effects and potential benefits of treatment), the patient (family) agrees to implement the following specific plan:  • When outside we recommend using a wide brim hat, sunglasses, long sleeve and pants, sunscreen with SPF 86+ with reapplication every 2 hours, or SPF specific clothing       What is a lentigo? A lentigo is a pigmented flat or slightly raised lesion with a clearly defined edge  Unlike an ephelis (freckle), it does not fade in the winter months  There are several kinds of lentigo  The name lentigo originally referred to its appearance resembling a small lentil  The plural of lentigo is lentigines, although “lentigos” is also in common use  Who gets lentigines? Lentigines can affect males and females of all ages and races  Solar lentigines are especially prevalent in fair skinned adults  Lentigines associated with syndromes are present at birth or arise during childhood  What causes lentigines? Common forms of lentigo are due to exposure to ultraviolet radiation:  • Sun damage including sunburn   • Indoor tanning   • Phototherapy, especially photochemotherapy (PUVA)    Ionizing radiation, eg radiation therapy, can also cause lentigines  Several familial syndromes associated with widespread lentigines originate from mutations in Armando-MAP kinase, mTOR signaling and PTEN pathways  What is the treatment for lentigines? Most lentigines are left alone  Attempts to lighten them may not be successful   The following approaches are used:  • SPF 50+ broad-spectrum sunscreen   • Hydroquinone bleaching cream   • Alpha hydroxy acids   • Vitamin C   • Retinoids   • Azelaic acid   • Chemical peels  Individual lesions can be permanently removed using:  • Cryotherapy   • Intense pulsed light   • Pigment lasers    How can lentigines be prevented? Lentigines associated with exposure ultraviolet radiation can be prevented by very careful sun protection  Clothing is more successful at preventing new lentigines than are sunscreens  What is the outlook for lentigines? Lentigines usually persist  They may increase in number with age and sun exposure  Some in sun-protected sites may fade and disappear  3  SOLO ANGIOMAS    Physical Exam:  • Anatomic Location Affected:  trunk  • Morphological Description:  Scattered cherry red, 1-4 mm papules  • Pertinent Positives:  • Pertinent Negatives:    Assessment and Plan:  Based on a thorough discussion of this condition and the management approach to it (including a comprehensive discussion of the known risks, side effects and potential benefits of treatment), the patient (family) agrees to implement the following specific plan:  • Monitor for changes  • Benign, reassured    Assessment and Plan:    Cherry angioma, also known as Doug Mustache spots, are benign vascular skin lesions  A "cherry angioma" is a firm red, blue or purple papule, 0 1-1 cm in diameter  When thrombosed, they can appear black in colour until evaluated with a dermatoscope when the red or purple colour is more easily seen  Cherry angioma may develop on any part of the body but most often appear on the scalp, face, lips and trunk  An angioma is due to proliferating endothelial cells; these are the cells that line the inside of a blood vessel  Angiomas can arise in early life or later in life; the most common type of angioma is a cherry angioma  Cherry angiomas are very common in males and females of any age or race  They are more noticeable in white skin than in skin of colour   They markedly increase in number from about the age of 36  There may be a family history of similar lesions  Eruptive cherry angiomas have been rarely reported to be associated with internal malignancy  The cause of angiomas is unknown  Genetic analysis of cherry angiomas has shown that they frequently carry specific somatic missense mutations in the GNAQ and GNA11 (Q209H) genes, which are involved in other vascular and melanocytic proliferations  4  SEBORRHEIC KERATOSIS; NON-INFLAMED    Physical Exam:  • Anatomic Location Affected:  trunk  • Morphological Description:  Flat and raised, waxy, smooth to warty textured, yellow to brownish-grey to dark brown to blackish, discrete, "stuck-on" appearing papules  • Pertinent Positives:  • Pertinent Negatives:    Assessment and Plan:  Based on a thorough discussion of this condition and the management approach to it (including a comprehensive discussion of the known risks, side effects and potential benefits of treatment), the patient (family) agrees to implement the following specific plan:  • Monitor for changes  • Benign, reassured    Seborrheic Keratosis  A seborrheic keratosis is a harmless warty spot that appears during adult life as a common sign of skin aging  Seborrheic keratoses can arise on any area of skin, covered or uncovered, with the usual exception of the palms and soles  They do not arise from mucous membranes  Seborrheic keratoses can have highly variable appearance  Seborrheic keratoses are extremely common  It has been estimated that over 90% of adults over the age of 61 years have one or more of them  They occur in males and females of all races, typically beginning to erupt in the 35s or 45s  They are uncommon under the age of 21 years  The precise cause of seborrhoeic keratoses is not known  Seborrhoeic keratoses are considered degenerative in nature  As time goes by, seborrheic keratoses tend to become more numerous   Some people inherit a tendency to develop a very large number of them; some people may have hundreds of them  There is no easy way to remove multiple lesions on a single occasion  Unless a specific lesion is "inflamed" and is causing pain or stinging/burning or is bleeding, most insurance companies do not authorize treatment  5  XEROSIS ("DRY SKIN")    Physical Exam:  • Anatomic Location Affected:  diffuse  • Morphological Description:  xerosis  • Pertinent Positives:  • Pertinent Negatives:    Assessment and Plan:  Based on a thorough discussion of this condition and the management approach to it (including a comprehensive discussion of the known risks, side effects and potential benefits of treatment), the patient (family) agrees to implement the following specific plan:  • Use moisturizer like Eucerin,Cerave or Aveeno Cream 3 times a day for the dry skin   •   •    •     Dry skin refers to skin that feels dry to touch  Dry skin has a dull surface with a rough, scaly quality  The skin is less pliable and cracked  When dryness is severe, the skin may become inflamed and fissured  Although any body site can be dry, dry skin tends to affect the shins more than any other site  Dry skin is lacking moisture in the outer horny cell layer (stratum corneum) and this results in cracks in the skin surface  Dry skin is also called xerosis, xeroderma or asteatosis (lack of fat)  It can affect males and females of all ages  There is some racial variability in water and lipid content of the skin  • Dry skin that starts in early childhood may be one of about 20 types of ichthyosis (fish-scale skin)  There is often a family history of dry skin  • Dry skin is commonly seen in people with atopic dermatitis  • Nearly everyone > 60 years has dry skin  Dry skin that begins later may be seen in people with certain diseases and conditions    • Postmenopausal women  • Hypothyroidism  • Chronic renal disease   • Malnutrition and weight loss • Subclinical dermatitis   • Treatment with certain drugs such as oral retinoids, diuretics and epidermal growth factor receptor inhibitors      What is the treatment for dry skin? The mainstay of treatment of dry skin and ichthyosis is moisturisers/emollients  They should be applied liberally and often enough to:  • Reduce itch   • Improve the barrier function   • Prevent entry of irritants, bacteria   • Reduce transepidermal water loss  How can dry skin be prevented? Eliminate aggravating factors:  • Reduce the frequency of bathing  • A humidifier in winter and air conditioner in summer   • Compare having a short shower with a prolonged soak in a bath  • Use lukewarm, not hot, water  • Replace standard soap with a substitute such as a synthetic detergent cleanser, water-miscible emollient, bath oil, anti-pruritic tar oil, colloidal oatmeal etc    • Apply an emollient liberally and often, particularly shortly after bathing, and when itchy  The drier the skin, the thicker this should be, especially on the hands  What is the outlook for dry skin? A tendency to dry skin may persist life-long, or it may improve once contributing factors are controlled  6  HISTORY OF BASAL CELL CARCINOMA    Physical Exam:  • Anatomic Location Affected:  Right side of nose   • Morphological Description of scar:  Well healed   • Suspected Recurrence: No  • Pertinent Positives:  • Pertinent Negatives:       Additional History of Present Condition:  History of basal cell carcinoma with no sign of recurrence    Assessment and Plan:  Based on a thorough discussion of this condition and the management approach to it (including a comprehensive discussion of the known risks, side effects and potential benefits of treatment), the patient (family) agrees to implement the following specific plan:  • When outside we recommend using a wide brim hat, sunglasses, long sleeve and pants, sunscreen with SPF 13+ with reapplication every 2 hours, or SPF specific clothing       How can basal cell carcinoma be prevented? The most important way to prevent BCC is to avoid sunburn  This is especially important in childhood and early life  Fair skinned individuals and those with a personal or family history of BCC should protect their skin from sun exposure daily, year-round and lifelong  • Stay indoors or under the shade in the middle of the day   • Wear covering clothing   • Apply high protection factor SPF50+ broad-spectrum sunscreens generously to exposed skin if outdoors   • Avoid indoor tanning (sun beds, solaria)  • Oral nicotinamide (vitamin B3) in a dose of 500 mg twice daily may reduce the number and severity of BCCs  What is the outlook for basal cell carcinoma? Most BCCs are cured by treatment  Cure is most likely if treatment is undertaken when the lesion is small  About 50% of people with BCC develop a second one within 3 years of the first  They are also at increased risk of other skin cancers, especially melanoma  Regular self-skin examinations and long-term annual skin checks by an experienced health professional are recommended  7  EPIDERMAL INCLUSION CYST    Physical Exam:  • Anatomic Location Affected:  Posterior neck   • Morphological Description:  Subcutaneous nodule   • Pertinent Positives:  • Pertinent Negatives:    Assessment and Plan:  Based on a thorough discussion of this condition and the management approach to it (including a comprehensive discussion of the known risks, side effects and potential benefits of treatment), the patient (family) agrees to implement the following specific plan:  • Reassure benign     What are epidermal inclusion cysts? Epidermal inclusion cysts are the most common, benign cutaneous cysts   There are many different names for epidermal inclusion cysts, including epidermoid cyst, epidermal cyst, infundibular cyst, inclusion cyst, and keratin cyst  These cysts can occur anywhere on the body and typically present as nodules directly underneath the skin  There is often a visible pore or opening in the center  The cysts are freely moveable and can range from a few millimeters to several centimeters in diameter  The center of epidermoid cysts almost always contains keratin, which has a cheesy appearance, and not sebum  They also do not originate from sebaceous glands  Therefore, epidermal inclusion cysts are not the same as sebaceous cysts  Cysts may remain stable or progressively enlarge over time  There are no reliable predictive factors to tell if an epidermal inclusion cyst will enlarge, become inflamed, or remain quiescent  Infected cysts tend to become larger, turn red, and are more noticeable to the patient  There may be accompanying pain and discomfort  What causes epidermal inclusion cysts? Epidermal inclusion cysts often appear out of the blue and are not contagious  They are due to a proliferation of epidermal cells within the dermis and are more common in men than women  They occur more frequently in patients in their 20s to 45s  Epidermal inclusion cysts by themselves are usually not inherited, but they can be hereditary in rare syndromes such as Tse syndrome, nodular elastosis with cysts and comedones (Favre-Racouchot syndrome), and basal cell nevus syndrome (Gorlin syndrome)  Elderly patients with chronic sun-damaged skin areas have a higher likelihood of developing epidermoid cysts  They often occur in areas where hair follicles have been inflamed or repeatedly irritated are more frequent in patients with acne vulgaris  In the  period, they are called milia  Patients on BRAF inhibitors such as imiquimod and cyclosporine have a higher incidence of epidermoid cysts of the face      How do we diagnose an epidermal inclusion cyst?  Epidermoid inclusion cysts are often diagnosed by history and physical exam  There is usually no need for biopsy prior to removal  Radiographic and laboratory exams, such as ultrasound studies, are unnecessary and not typically ordered unless the practitioner suspects a genetic condition  What is the treatment for an epidermal inclusion cyst?  Inflamed, uninfected epidermal inclusion cysts rarely resolve spontaneously without therapy or surgical intervention  Treatment is not emergent unless desired by the patient  Definitive treatment is via surgical excision with walls intact  This method will prevent recurrence  This is best done when the cyst is not inflamed, to decrease the probability of rupture during surgery  - A local anesthetic will be injected around the cyst  - A small incision is made in the skin overlying the cyst, and contents are expressed  - The incision is repaired with sutures    Another option is to use a 4mm punch biopsy with cyst extraction through the defect  Incision and drainage is often needed if the cyst is infected or inflamed  If there is surrounding cellulitis, oral antibiotic therapy may be necessary  The common agents used target methicillin sensitive Staphylococcal aureus and methicillin resistant S aureus in areas of high prevalence  8  LIPOMA    Physical Exam:  • Anatomic Location Affected:  Mid chest   • Morphological Description:  Subcutaneous nodule   • Pertinent Positives:  • Pertinent Negatives:    Assessment and Plan:  Based on a thorough discussion of this condition and the management approach to it (including a comprehensive discussion of the known risks, side effects and potential benefits of treatment), the patient (family) agrees to implement the following specific plan:  • Reassure benign     Lipoma  A lipoma is a non-cancerous tumor that is made up of fat cells  It slowly grows under the skin in the subcutaneous tissue  A person may have a single lipoma or may have many lipomas  They are very common    Lipomas can occur in people of all ages, however, they tend to develop in adulthood and are most noticeable during middle age  They affect both sexes equally, although solitary lipomas are more common in women whilst multiple lipomas occur more frequently in men  The cause of lipomas is unknown  It is possible there may be genetic involvement as many patients with lipomas come from a family with a history of these tumors  Sometimes an injury such as a blunt blow to part of the body may trigger growth of a lipoma  People are often unaware of lipomas until they have grown large enough to become visible and palpable  This growth occurs slowly over several years  Some features of lipomas include:  • A dome-shaped or egg-shaped lump about 2-10 cm in diameter (some may grow even larger)   • It feels soft and smooth and is easily moved under the skin with the fingers   • Some have a rubbery or doughy consistency   • They are most common on the shoulders, neck, trunk and arms, but they can occur anywhere on the body where fat tissue is present  Most lipomas are symptomless, but some are painful on applying pressure  Lipomas that are tender or painful are usually angiolipomas  This means the lipoma has an increased number of small blood vessels  Painful lipomas are also a feature of adiposis dolorosa or Dercum disease  Diagnosis of lipoma is usually made clinically by finding a soft lump under the skin  However, if there is any doubt, a deep skin biopsy can be performed which will show typical histopathological features of lipoma and its variants  Most lipomas require no treatment  Most lipomas eventually stop growing and remain indefinitely without causing any problems   Occasionally, lipomas that interfere with the movement of adjacent muscles may require surgical removal  Several methods are available:  • Simple surgical excision   • Squeeze technique (a small incision is made over the lipoma and the fatty tissue is squeezed through the hole)   • Liposuction    Scribe Attestation I,:  Stevie Smith MA am acting as a scribe while in the presence of the attending physician :       I,:  Nathan Piña MD personally performed the services described in this documentation    as scribed in my presence :

## 2023-01-04 NOTE — PATIENT INSTRUCTIONS
1  MELANOCYTIC NEVI ("Moles")  Assessment and Plan:  Based on a thorough discussion of this condition and the management approach to it (including a comprehensive discussion of the known risks, side effects and potential benefits of treatment), the patient (family) agrees to implement the following specific plan:  When outside we recommend using a wide brim hat, sunglasses, long sleeve and pants, sunscreen with SPF 76+ with reapplication every 2 hours, or SPF specific clothing   Benign, reassured  Annual skin check     Melanocytic Nevi  Melanocytic nevi ("moles") are tan or brown, raised or flat areas of the skin which have an increased number of melanocytes  Melanocytes are the cells in our body which make pigment and account for skin color  Some moles are present at birth (I e , "congenital nevi"), while others come up later in life (i e , "acquired nevi")  The sun can stimulate the body to make more moles  Sunburns are not the only thing that triggers more moles  Chronic sun exposure can do it too  Clinically distinguishing a healthy mole from melanoma may be difficult, even for experienced dermatologists  The "ABCDE's" of moles have been suggested as a means of helping to alert a person to a suspicious mole and the possible increased risk of melanoma  The suggestions for raising alert are as follows:    Asymmetry: Healthy moles tend to be symmetric, while melanomas are often asymmetric  Asymmetry means if you draw a line through the mole, the two halves do not match in color, size, shape, or surface texture  Asymmetry can be a result of rapid enlargement of a mole, the development of a raised area on a previously flat lesion, scaling, ulceration, bleeding or scabbing within the mole  Any mole that starts to demonstrate "asymmetry" should be examined promptly by a board certified dermatologist      Border: Healthy moles tend to have discrete, even borders    The border of a melanoma often blends into the normal skin and does not sharply delineate the mole from normal skin  Any mole that starts to demonstrate "uneven borders" should be examined promptly by a board certified dermatologist      Color: Healthy moles tend to be one color throughout  Melanomas tend to be made up of different colors ranging from dark black, blue, white, or red  Any mole that demonstrates a color change should be examined promptly by a board certified dermatologist      Diameter: Healthy moles tend to be smaller than 0 6 cm in size; an exception are "congenital nevi" that can be larger  Melanomas tend to grow and can often be greater than 0 6 cm (1/4 of an inch, or the size of a pencil eraser)  This is only a guideline, and many normal moles may be larger than 0 6 cm without being unhealthy  Any mole that starts to change in size (small to bigger or bigger to smaller) should be examined promptly by a board certified dermatologist      Evolving: Healthy moles tend to "stay the same "  Melanomas may often show signs of change or evolution such as a change in size, shape, color, or elevation  Any mole that starts to itch, bleed, crust, burn, hurt, or ulcerate or demonstrate a change or evolution should be examined promptly by a board certified dermatologist         2  LENTIGO  Assessment and Plan:  Based on a thorough discussion of this condition and the management approach to it (including a comprehensive discussion of the known risks, side effects and potential benefits of treatment), the patient (family) agrees to implement the following specific plan:  When outside we recommend using a wide brim hat, sunglasses, long sleeve and pants, sunscreen with SPF 99+ with reapplication every 2 hours, or SPF specific clothing       What is a lentigo? A lentigo is a pigmented flat or slightly raised lesion with a clearly defined edge  Unlike an ephelis (freckle), it does not fade in the winter months   There are several kinds of lentigo  The name lentigo originally referred to its appearance resembling a small lentil  The plural of lentigo is lentigines, although “lentigos” is also in common use  Who gets lentigines? Lentigines can affect males and females of all ages and races  Solar lentigines are especially prevalent in fair skinned adults  Lentigines associated with syndromes are present at birth or arise during childhood  What causes lentigines? Common forms of lentigo are due to exposure to ultraviolet radiation:  Sun damage including sunburn   Indoor tanning   Phototherapy, especially photochemotherapy (PUVA)    Ionizing radiation, eg radiation therapy, can also cause lentigines  Several familial syndromes associated with widespread lentigines originate from mutations in Armando-MAP kinase, mTOR signaling and PTEN pathways  What is the treatment for lentigines? Most lentigines are left alone  Attempts to lighten them may not be successful  The following approaches are used:  SPF 50+ broad-spectrum sunscreen   Hydroquinone bleaching cream   Alpha hydroxy acids   Vitamin C   Retinoids   Azelaic acid   Chemical peels  Individual lesions can be permanently removed using:  Cryotherapy   Intense pulsed light   Pigment lasers    How can lentigines be prevented? Lentigines associated with exposure ultraviolet radiation can be prevented by very careful sun protection  Clothing is more successful at preventing new lentigines than are sunscreens  What is the outlook for lentigines? Lentigines usually persist  They may increase in number with age and sun exposure  Some in sun-protected sites may fade and disappear      3  SOLO ANGIOMAS  Assessment and Plan:  Based on a thorough discussion of this condition and the management approach to it (including a comprehensive discussion of the known risks, side effects and potential benefits of treatment), the patient (family) agrees to implement the following specific plan:  Monitor for changes  Benign, reassured    Assessment and Plan:    Cherry angioma, also known as [employer] spots, are benign vascular skin lesions  A "cherry angioma" is a firm red, blue or purple papule, 0 1-1 cm in diameter  When thrombosed, they can appear black in colour until evaluated with a dermatoscope when the red or purple colour is more easily seen  Cherry angioma may develop on any part of the body but most often appear on the scalp, face, lips and trunk  An angioma is due to proliferating endothelial cells; these are the cells that line the inside of a blood vessel  Angiomas can arise in early life or later in life; the most common type of angioma is a cherry angioma  Cherry angiomas are very common in males and females of any age or race  They are more noticeable in white skin than in skin of colour  They markedly increase in number from about the age of 36  There may be a family history of similar lesions  Eruptive cherry angiomas have been rarely reported to be associated with internal malignancy  The cause of angiomas is unknown  Genetic analysis of cherry angiomas has shown that they frequently carry specific somatic missense mutations in the GNAQ and GNA11 (Q209H) genes, which are involved in other vascular and melanocytic proliferations  4  SEBORRHEIC KERATOSIS; NON-INFLAMED  Assessment and Plan:  Based on a thorough discussion of this condition and the management approach to it (including a comprehensive discussion of the known risks, side effects and potential benefits of treatment), the patient (family) agrees to implement the following specific plan:  Monitor for changes  Benign, reassured    Seborrheic Keratosis  A seborrheic keratosis is a harmless warty spot that appears during adult life as a common sign of skin aging  Seborrheic keratoses can arise on any area of skin, covered or uncovered, with the usual exception of the palms and soles  They do not arise from mucous membranes  Seborrheic keratoses can have highly variable appearance  Seborrheic keratoses are extremely common  It has been estimated that over 90% of adults over the age of 61 years have one or more of them  They occur in males and females of all races, typically beginning to erupt in the 35s or 45s  They are uncommon under the age of 21 years  The precise cause of seborrhoeic keratoses is not known  Seborrhoeic keratoses are considered degenerative in nature  As time goes by, seborrheic keratoses tend to become more numerous  Some people inherit a tendency to develop a very large number of them; some people may have hundreds of them  There is no easy way to remove multiple lesions on a single occasion  Unless a specific lesion is "inflamed" and is causing pain or stinging/burning or is bleeding, most insurance companies do not authorize treatment  5  XEROSIS ("DRY SKIN")  Assessment and Plan:  Based on a thorough discussion of this condition and the management approach to it (including a comprehensive discussion of the known risks, side effects and potential benefits of treatment), the patient (family) agrees to implement the following specific plan:  Use moisturizer like Eucerin,Cerave or Aveeno Cream 3 times a day for the dry skin            Dry skin refers to skin that feels dry to touch  Dry skin has a dull surface with a rough, scaly quality  The skin is less pliable and cracked  When dryness is severe, the skin may become inflamed and fissured  Although any body site can be dry, dry skin tends to affect the shins more than any other site  Dry skin is lacking moisture in the outer horny cell layer (stratum corneum) and this results in cracks in the skin surface  Dry skin is also called xerosis, xeroderma or asteatosis (lack of fat)  It can affect males and females of all ages  There is some racial variability in water and lipid content of the skin    Dry skin that starts in early childhood may be one of about 20 types of ichthyosis (fish-scale skin)  There is often a family history of dry skin  Dry skin is commonly seen in people with atopic dermatitis  Nearly everyone > 60 years has dry skin  Dry skin that begins later may be seen in people with certain diseases and conditions  Postmenopausal women  Hypothyroidism  Chronic renal disease   Malnutrition and weight loss   Subclinical dermatitis   Treatment with certain drugs such as oral retinoids, diuretics and epidermal growth factor receptor inhibitors      What is the treatment for dry skin? The mainstay of treatment of dry skin and ichthyosis is moisturisers/emollients  They should be applied liberally and often enough to:  Reduce itch   Improve the barrier function   Prevent entry of irritants, bacteria   Reduce transepidermal water loss  How can dry skin be prevented? Eliminate aggravating factors:  Reduce the frequency of bathing  A humidifier in winter and air conditioner in summer   Compare having a short shower with a prolonged soak in a bath  Use lukewarm, not hot, water  Replace standard soap with a substitute such as a synthetic detergent cleanser, water-miscible emollient, bath oil, anti-pruritic tar oil, colloidal oatmeal etc    Apply an emollient liberally and often, particularly shortly after bathing, and when itchy  The drier the skin, the thicker this should be, especially on the hands  What is the outlook for dry skin? A tendency to dry skin may persist life-long, or it may improve once contributing factors are controlled      6  HISTORY OF BASAL CELL CARCINOMA  Assessment and Plan:  Based on a thorough discussion of this condition and the management approach to it (including a comprehensive discussion of the known risks, side effects and potential benefits of treatment), the patient (family) agrees to implement the following specific plan:  When outside we recommend using a wide brim hat, sunglasses, long sleeve and pants, sunscreen with SPF 48+ with reapplication every 2 hours, or SPF specific clothing       How can basal cell carcinoma be prevented? The most important way to prevent BCC is to avoid sunburn  This is especially important in childhood and early life  Fair skinned individuals and those with a personal or family history of BCC should protect their skin from sun exposure daily, year-round and lifelong  Stay indoors or under the shade in the middle of the day   Wear covering clothing   Apply high protection factor SPF50+ broad-spectrum sunscreens generously to exposed skin if outdoors   Avoid indoor tanning (sun beds, solaria)  Oral nicotinamide (vitamin B3) in a dose of 500 mg twice daily may reduce the number and severity of BCCs  What is the outlook for basal cell carcinoma? Most BCCs are cured by treatment  Cure is most likely if treatment is undertaken when the lesion is small  About 50% of people with BCC develop a second one within 3 years of the first  They are also at increased risk of other skin cancers, especially melanoma  Regular self-skin examinations and long-term annual skin checks by an experienced health professional are recommended  7  EPIDERMAL INCLUSION CYST  Assessment and Plan:  Based on a thorough discussion of this condition and the management approach to it (including a comprehensive discussion of the known risks, side effects and potential benefits of treatment), the patient (family) agrees to implement the following specific plan:  Reassure benign     What are epidermal inclusion cysts? Epidermal inclusion cysts are the most common, benign cutaneous cysts  There are many different names for epidermal inclusion cysts, including epidermoid cyst, epidermal cyst, infundibular cyst, inclusion cyst, and keratin cyst  These cysts can occur anywhere on the body and typically present as nodules directly underneath the skin  There is often a visible pore or opening in the center   The cysts are freely moveable and can range from a few millimeters to several centimeters in diameter  The center of epidermoid cysts almost always contains keratin, which has a cheesy appearance, and not sebum  They also do not originate from sebaceous glands  Therefore, epidermal inclusion cysts are not the same as sebaceous cysts  Cysts may remain stable or progressively enlarge over time  There are no reliable predictive factors to tell if an epidermal inclusion cyst will enlarge, become inflamed, or remain quiescent  Infected cysts tend to become larger, turn red, and are more noticeable to the patient  There may be accompanying pain and discomfort  What causes epidermal inclusion cysts? Epidermal inclusion cysts often appear out of the blue and are not contagious  They are due to a proliferation of epidermal cells within the dermis and are more common in men than women  They occur more frequently in patients in their 20s to 45s  Epidermal inclusion cysts by themselves are usually not inherited, but they can be hereditary in rare syndromes such as Tse syndrome, nodular elastosis with cysts and comedones (Favre-Racouchot syndrome), and basal cell nevus syndrome (Gorlin syndrome)  Elderly patients with chronic sun-damaged skin areas have a higher likelihood of developing epidermoid cysts  They often occur in areas where hair follicles have been inflamed or repeatedly irritated are more frequent in patients with acne vulgaris  In the  period, they are called milia  Patients on BRAF inhibitors such as imiquimod and cyclosporine have a higher incidence of epidermoid cysts of the face      How do we diagnose an epidermal inclusion cyst?  Epidermoid inclusion cysts are often diagnosed by history and physical exam  There is usually no need for biopsy prior to removal   Radiographic and laboratory exams, such as ultrasound studies, are unnecessary and not typically ordered unless the practitioner suspects a genetic condition  What is the treatment for an epidermal inclusion cyst?  Inflamed, uninfected epidermal inclusion cysts rarely resolve spontaneously without therapy or surgical intervention  Treatment is not emergent unless desired by the patient  Definitive treatment is via surgical excision with walls intact  This method will prevent recurrence  This is best done when the cyst is not inflamed, to decrease the probability of rupture during surgery  A local anesthetic will be injected around the cyst  A small incision is made in the skin overlying the cyst, and contents are expressed  The incision is repaired with sutures    Another option is to use a 4mm punch biopsy with cyst extraction through the defect  Incision and drainage is often needed if the cyst is infected or inflamed  If there is surrounding cellulitis, oral antibiotic therapy may be necessary  The common agents used target methicillin sensitive Staphylococcal aureus and methicillin resistant S aureus in areas of high prevalence  8  LIPOMA  Assessment and Plan:  Based on a thorough discussion of this condition and the management approach to it (including a comprehensive discussion of the known risks, side effects and potential benefits of treatment), the patient (family) agrees to implement the following specific plan:  Reassure benign     Lipoma  A lipoma is a non-cancerous tumor that is made up of fat cells  It slowly grows under the skin in the subcutaneous tissue  A person may have a single lipoma or may have many lipomas  They are very common  Lipomas can occur in people of all ages, however, they tend to develop in adulthood and are most noticeable during middle age  They affect both sexes equally, although solitary lipomas are more common in women whilst multiple lipomas occur more frequently in men  The cause of lipomas is unknown   It is possible there may be genetic involvement as many patients with lipomas come from a family with a history of these tumors  Sometimes an injury such as a blunt blow to part of the body may trigger growth of a lipoma  People are often unaware of lipomas until they have grown large enough to become visible and palpable  This growth occurs slowly over several years  Some features of lipomas include:  A dome-shaped or egg-shaped lump about 2-10 cm in diameter (some may grow even larger)   It feels soft and smooth and is easily moved under the skin with the fingers   Some have a rubbery or doughy consistency   They are most common on the shoulders, neck, trunk and arms, but they can occur anywhere on the body where fat tissue is present  Most lipomas are symptomless, but some are painful on applying pressure  Lipomas that are tender or painful are usually angiolipomas  This means the lipoma has an increased number of small blood vessels  Painful lipomas are also a feature of adiposis dolorosa or Dercum disease  Diagnosis of lipoma is usually made clinically by finding a soft lump under the skin  However, if there is any doubt, a deep skin biopsy can be performed which will show typical histopathological features of lipoma and its variants  Most lipomas require no treatment  Most lipomas eventually stop growing and remain indefinitely without causing any problems   Occasionally, lipomas that interfere with the movement of adjacent muscles may require surgical removal  Several methods are available:  Simple surgical excision   Squeeze technique (a small incision is made over the lipoma and the fatty tissue is squeezed through the hole)   Liposuction

## 2023-01-10 ENCOUNTER — RA CDI HCC (OUTPATIENT)
Dept: OTHER | Facility: HOSPITAL | Age: 83
End: 2023-01-10

## 2023-01-10 NOTE — PROGRESS NOTES
[facility]  coding opportunities       Chart reviewed, no opportunity found:   [address]        Patients Insurance     Medicare Insurance: [employer] Advantage

## 2023-01-11 ENCOUNTER — APPOINTMENT (OUTPATIENT)
Dept: LAB | Facility: CLINIC | Age: 83
End: 2023-01-11

## 2023-01-11 ENCOUNTER — HOSPITAL ENCOUNTER (OUTPATIENT)
Dept: RADIOLOGY | Facility: HOSPITAL | Age: 83
Discharge: HOME | End: 2023-01-11

## 2023-01-11 DIAGNOSIS — F59 UNSPECIFIED BEHAVIORAL SYNDROMES ASSOCIATED WITH PHYSIOLOGICAL DISTURBANCES AND PHYSICAL FACTORS: ICD-10-CM

## 2023-01-11 DIAGNOSIS — E78.2 MIXED HYPERLIPIDEMIA: ICD-10-CM

## 2023-01-11 DIAGNOSIS — Z71.3 WEIGHT LOSS COUNSELING, ENCOUNTER FOR: ICD-10-CM

## 2023-01-11 DIAGNOSIS — J18.9 PNEUMONIA DUE TO INFECTIOUS ORGANISM, UNSPECIFIED LATERALITY, UNSPECIFIED PART OF LUNG: ICD-10-CM

## 2023-01-11 LAB
ALBUMIN SERPL BCP-MCNC: 4 G/DL (ref 3.5–5)
ALP SERPL-CCNC: 83 U/L (ref 46–116)
ALT SERPL W P-5'-P-CCNC: 27 U/L (ref 12–78)
ANION GAP SERPL CALCULATED.3IONS-SCNC: 4 MMOL/L (ref 4–13)
AST SERPL W P-5'-P-CCNC: 15 U/L (ref 5–45)
BASOPHILS # BLD AUTO: 0.07 THOUSANDS/ÂΜL (ref 0–0.1)
BASOPHILS NFR BLD AUTO: 1 % (ref 0–1)
BILIRUB SERPL-MCNC: 0.59 MG/DL (ref 0.2–1)
BUN SERPL-MCNC: 22 MG/DL (ref 5–25)
CALCIUM SERPL-MCNC: 9.2 MG/DL (ref 8.3–10.1)
CHLORIDE SERPL-SCNC: 111 MMOL/L (ref 96–108)
CHOLEST SERPL-MCNC: 167 MG/DL
CO2 SERPL-SCNC: 26 MMOL/L (ref 21–32)
CREAT SERPL-MCNC: 0.88 MG/DL (ref 0.6–1.3)
EOSINOPHIL # BLD AUTO: 0.17 THOUSAND/ÂΜL (ref 0–0.61)
EOSINOPHIL NFR BLD AUTO: 3 % (ref 0–6)
ERYTHROCYTE [DISTWIDTH] IN BLOOD BY AUTOMATED COUNT: 16.5 % (ref 11.6–15.1)
GFR SERPL CREATININE-BSD FRML MDRD: 81 ML/MIN/1.73SQ M
GLUCOSE P FAST SERPL-MCNC: 114 MG/DL (ref 65–99)
HCT VFR BLD AUTO: 47.2 % (ref 36.5–49.3)
HDLC SERPL-MCNC: 55 MG/DL
HGB BLD-MCNC: 14.9 G/DL (ref 12–17)
IMM GRANULOCYTES # BLD AUTO: 0.03 THOUSAND/UL (ref 0–0.2)
IMM GRANULOCYTES NFR BLD AUTO: 1 % (ref 0–2)
LDLC SERPL CALC-MCNC: 99 MG/DL (ref 0–100)
LYMPHOCYTES # BLD AUTO: 0.96 THOUSANDS/ÂΜL (ref 0.6–4.47)
LYMPHOCYTES NFR BLD AUTO: 16 % (ref 14–44)
MCH RBC QN AUTO: 29.4 PG (ref 26.8–34.3)
MCHC RBC AUTO-ENTMCNC: 31.6 G/DL (ref 31.4–37.4)
MCV RBC AUTO: 93 FL (ref 82–98)
MONOCYTES # BLD AUTO: 0.44 THOUSAND/ÂΜL (ref 0.17–1.22)
MONOCYTES NFR BLD AUTO: 8 % (ref 4–12)
NEUTROPHILS # BLD AUTO: 4.22 THOUSANDS/ÂΜL (ref 1.85–7.62)
NEUTS SEG NFR BLD AUTO: 71 % (ref 43–75)
NONHDLC SERPL-MCNC: 112 MG/DL
NRBC BLD AUTO-RTO: 0 /100 WBCS
PLATELET # BLD AUTO: 240 THOUSANDS/UL (ref 149–390)
PMV BLD AUTO: 9.9 FL (ref 8.9–12.7)
POTASSIUM SERPL-SCNC: 4.7 MMOL/L (ref 3.5–5.3)
PROT SERPL-MCNC: 7.5 G/DL (ref 6.4–8.4)
RBC # BLD AUTO: 5.06 MILLION/UL (ref 3.88–5.62)
SODIUM SERPL-SCNC: 141 MMOL/L (ref 135–147)
TRIGL SERPL-MCNC: 64 MG/DL
WBC # BLD AUTO: 5.89 THOUSAND/UL (ref 4.31–10.16)

## 2023-01-14 DIAGNOSIS — F02.80 DEMENTIA ASSOCIATED WITH OTHER UNDERLYING DISEASE WITHOUT BEHAVIORAL DISTURBANCE (HCC): ICD-10-CM

## 2023-01-14 RX ORDER — SERTRALINE HYDROCHLORIDE 25 MG/1
25 TABLET, FILM COATED ORAL
Qty: 30 TABLET | Refills: 0 | Status: CANCELLED | OUTPATIENT
Start: 2023-01-14

## 2023-01-16 ENCOUNTER — OFFICE VISIT (OUTPATIENT)
Dept: FAMILY MEDICINE CLINIC | Facility: CLINIC | Age: 83
End: 2023-01-16

## 2023-01-16 VITALS
SYSTOLIC BLOOD PRESSURE: 126 MMHG | HEART RATE: 96 BPM | DIASTOLIC BLOOD PRESSURE: 70 MMHG | TEMPERATURE: 98.3 F | WEIGHT: 169 LBS | HEIGHT: 69 IN | BODY MASS INDEX: 25.03 KG/M2 | OXYGEN SATURATION: 98 %

## 2023-01-16 DIAGNOSIS — J18.9 PNEUMONIA DUE TO INFECTIOUS ORGANISM, UNSPECIFIED LATERALITY, UNSPECIFIED PART OF LUNG: Primary | ICD-10-CM

## 2023-01-16 DIAGNOSIS — F02.818 DEMENTIA ASSOCIATED WITH OTHER UNDERLYING DISEASE WITH BEHAVIORAL DISTURBANCE: ICD-10-CM

## 2023-01-16 DIAGNOSIS — Z85.46 H/O PROSTATE CANCER: ICD-10-CM

## 2023-01-16 RX ORDER — TAMSULOSIN HYDROCHLORIDE 0.4 MG/1
0.4 CAPSULE ORAL
Qty: 90 CAPSULE | Refills: 3 | Status: SHIPPED | OUTPATIENT
Start: 2023-01-16

## 2023-01-16 NOTE — PROGRESS NOTES
Assessment/Plan:     Chronic Problems:  No problem-specific Assessment & Plan notes found for this encounter  Visit Diagnosis:  There are no diagnoses linked to this encounter  Subjective:    Patient ID: Vick Calderon is a [de-identified] y o  male  Follow-up  Here with daughter  Pneumonia initial diagnosis on 8 December 2022 check  Chest x-ray follow-up completed January 2023  Well denies any shortness of breath cough difficulty breathing, remains active energy levels improved blood pressure normal parametersr   Med refill urologist Dr Rosmery Vega prostate , doing well , neg  Changes noted , regular check up with   Dementia, weight slight  increase , appetite , fine , meds per neurology unchanged       The following portions of the patient's history were reviewed and updated as appropriate: allergies, current medications, past family history, past medical history, past social history, past surgical history and problem list     Review of Systems   Constitutional: Negative for appetite change, chills, fever and unexpected weight change  HENT: Negative for congestion, dental problem, ear pain, hearing loss, postnasal drip, rhinorrhea, sinus pressure, sinus pain, sneezing, sore throat, tinnitus and voice change  Eyes: Negative for visual disturbance  Respiratory: Negative for apnea, cough, chest tightness and shortness of breath  Cardiovascular: Negative for chest pain, palpitations and leg swelling  Gastrointestinal: Negative for abdominal pain, blood in stool, constipation, diarrhea, nausea and vomiting  Endocrine: Negative for cold intolerance, heat intolerance, polydipsia, polyphagia and polyuria  Genitourinary: Negative for decreased urine volume, difficulty urinating, dysuria, frequency and hematuria  Musculoskeletal: Negative for arthralgias, back pain, gait problem, joint swelling and myalgias  Skin: Negative for color change, rash and wound     Allergic/Immunologic: Negative for environmental allergies and food allergies  Neurological: Negative for dizziness, syncope, weakness, light-headedness, numbness and headaches  Hematological: Negative for adenopathy  Does not bruise/bleed easily  Psychiatric/Behavioral: Negative for sleep disturbance and suicidal ideas  The patient is not nervous/anxious  /70   Pulse 96   Temp 98 3 °F (36 8 °C)   Ht 5' 9" (1 753 m)   Wt 76 7 kg (169 lb)   SpO2 98%   BMI 24 96 kg/m²   Social History     Socioeconomic History   • Marital status:      Spouse name: Not on file   • Number of children: Not on file   • Years of education: Not on file   • Highest education level: Not on file   Occupational History   • Occupation: employed   Tobacco Use   • Smoking status: Former     Packs/day: 1 00     Years: 60 00     Pack years: 60 00     Types: Cigarettes     Quit date: 3/19/2019     Years since quitting: 3 8   • Smokeless tobacco: Never   • Tobacco comments:     daily   Vaping Use   • Vaping Use: Never used   Substance and Sexual Activity   • Alcohol use: No     Comment: former   • Drug use: No   • Sexual activity: Yes     Partners: Female   Other Topics Concern   • Not on file   Social History Narrative   • Not on file     Social Determinants of Health     Financial Resource Strain: Low Risk    • Difficulty of Paying Living Expenses: Not very hard   Food Insecurity: Not on file   Transportation Needs: No Transportation Needs   • Lack of Transportation (Medical): No   • Lack of Transportation (Non-Medical):  No   Physical Activity: Not on file   Stress: Not on file   Social Connections: Not on file   Intimate Partner Violence: Not on file   Housing Stability: Not on file     Past Medical History:   Diagnosis Date   • Alcoholism ([facility] ) 04/06/2018   • Alzheimer disease ([facility] )    • Anxiety    • Basal cell carcinoma 08/18/2020    Right Dr Yeimi hahn   • Basal cell carcinoma 06/03/2021    Right upper forehead   • Basal cell carcinoma (BCC) of skin of face 03/28/2022    Right nostril   • Bile duct disease    • Cancer [facility])     prostate, skin    • Cardiac arrest [facility])    • Cardiac arrest ([facility] ) 04/14/2018   • Choledocholithiasis    • Colon polyp    • Dementia (HCC)    • Depression    • Diverticulitis of colon    • Kidney stone    • Liver disease    • Malignant neoplasm of prostate (HCC)    • Mood disorder (HCC)    • Prostate cancer ([facility] )    • Right carotid bruit    • Seizure disorder ([facility] )    • Skin cancer      Family History   Problem Relation Age of Onset   • Cancer Mother    • Dementia Mother    • Alzheimer's disease Mother    • Sudden death Father      Past Surgical History:   Procedure Laterality Date   • CHOLECYSTECTOMY     • COLONOSCOPY     • ERCP N/A 03/09/2018    Procedure: ENDOSCOPIC RETROGRADE CHOLANGIOPANCREATOGRAPHY (ERCP); Surgeon: Karuna Durbin MD;  Location: [facility];  Service: Gastroenterology   • ERCP  04/13/2018    stenting   • ERCP N/A 06/08/2018    Procedure: ENDOSCOPIC RETROGRADE CHOLANGIOPANCREATOGRAPHY (ERCP); Surgeon: Karuna Durbin MD;  Location: [facility];  Service: Gastroenterology   • MOHS SURGERY  08/18/2020    Right nares, Dr Sujata Salinas   • [facility]  06/30/2021    Right upper forehead; Dr Sujata Salinas   • [address]  05/18/2022    [address]; Right nostril; Dr Josue Roberto   • [place] ERCP [address] BODY/STENT BILIARY/PANC DUCT N/A 04/13/2018    Procedure: ENDOSCOPIC RETROGRADE CHOLANGIOPANCREATOGRAPHY (ERCP);   Surgeon: Karuna Durbin MD;  Location: [facility];  Service: Gastroenterology   • [place] LAPAROSCOPY SURG CHOLECYSTECTOMY N/A 05/15/2018    Procedure: Pushpa Oliver;  Surgeon: Igor Aldrich MD;  Location: [facility];  Service: General   • PROSTATE SURGERY     • TONSILLECTOMY         Current Outpatient Medications:   •  acetaminophen (TYLENOL) 500 mg tablet, Take 500 mg by mouth as needed , Disp: , Rfl:   •  donepezil (ARICEPT) 10 mg tablet, TAKE 1 TABLET (10 MG TOTAL) BY MOUTH DAILY AT BEDTIME, Disp: 30 tablet, Rfl: 6  •  fexofenadine (ALLEGRA) 60 MG tablet, Take 60 mg by mouth daily, Disp: , Rfl:   •  Melatonin 3 MG CAPS, Take 3 mg by mouth daily at bedtime Patient takes 1 1/2 mg tablets, Disp: , Rfl:   •  memantine (NAMENDA) 5 mg tablet, TAKE 1 TABLET (5 MG TOTAL) BY MOUTH 2 (TWO) TIMES A DAY, Disp: 60 tablet, Rfl: 6  •  Multiple Vitamins-Minerals (CENTRUM SILVER ULTRA MENS PO), Take 1 tablet by mouth daily, Disp: , Rfl:   •  rosuvastatin (CRESTOR) 5 mg tablet, TAKE 1 TABLET (5 MG TOTAL) BY MOUTH DAILY, Disp: 90 tablet, Rfl: 0  •  sertraline (ZOLOFT) 25 mg tablet, TAKE 1 TABLET (25 MG TOTAL) BY MOUTH DAILY AT BEDTIME, Disp: 30 tablet, Rfl: 5  •  tamsulosin (FLOMAX) 0 4 mg, TAKE ONE TABLET DAILY WITH DINNER, Disp: 90 capsule, Rfl: 3    No Known Allergies       Lab Review   Appointment on 01/11/2023   Component Date Value   • Sodium 01/11/2023 141    • Potassium 01/11/2023 4 7    • Chloride 01/11/2023 111 (H)    • CO2 01/11/2023 26    • ANION GAP 01/11/2023 4    • BUN 01/11/2023 22    • Creatinine 01/11/2023 0 88    • Glucose, Fasting 01/11/2023 114 (H)    • Calcium 01/11/2023 9 2    • AST 01/11/2023 15    • ALT 01/11/2023 27    • Alkaline Phosphatase 01/11/2023 83    • Total Protein 01/11/2023 7 5    • Albumin 01/11/2023 4 0    • Total Bilirubin 01/11/2023 0 59    • eGFR 01/11/2023 81    • WBC 01/11/2023 5 89    • RBC 01/11/2023 5 06    • Hemoglobin 01/11/2023 14 9    • Hematocrit 01/11/2023 47 2    • MCV 01/11/2023 93    • MCH 01/11/2023 29 4    • MCHC 01/11/2023 31 6    • RDW 01/11/2023 16 5 (H)    • MPV 01/11/2023 9 9    • Platelets 94/32/4826 240    • nRBC 01/11/2023 0    • Neutrophils Relative 01/11/2023 71    • Immat GRANS % 01/11/2023 1    • Lymphocytes Relative 01/11/2023 16    • Monocytes Relative 01/11/2023 8    • Eosinophils Relative 01/11/2023 3    • Basophils Relative 01/11/2023 1    • Neutrophils Absolute 01/11/2023 4 22    • Immature Grans Absolute 01/11/2023 0 03    • Lymphocytes Absolute 01/11/2023 0 96    • Monocytes Absolute 01/11/2023 0 44    • Eosinophils Absolute 01/11/2023 0 17    • Basophils Absolute 01/11/2023 0 07    • Cholesterol 01/11/2023 167    • Triglycerides 01/11/2023 64    • HDL, Direct 01/11/2023 55    • LDL Calculated 01/11/2023 99    • Non-HDL-Chol (CHOL-HDL) 01/11/2023 112    Admission on 11/26/2022, Discharged on 11/26/2022   Component Date Value   • WBC 11/26/2022 6 14    • RBC 11/26/2022 4 94    • Hemoglobin 11/26/2022 14 6    • Hematocrit 11/26/2022 43 2    • MCV 11/26/2022 87    • MCH 11/26/2022 29 6    • MCHC 11/26/2022 33 8    • RDW 11/26/2022 14 6    • MPV 11/26/2022 9 4    • Platelets 91/87/3800 336    • nRBC 11/26/2022 0    • Neutrophils Relative 11/26/2022 70    • Immat GRANS % 11/26/2022 1    • Lymphocytes Relative 11/26/2022 15    • Monocytes Relative 11/26/2022 11    • Eosinophils Relative 11/26/2022 2    • Basophils Relative 11/26/2022 1    • Neutrophils Absolute 11/26/2022 4 35    • Immature Grans Absolute 11/26/2022 0 05    • Lymphocytes Absolute 11/26/2022 0 89    • Monocytes Absolute 11/26/2022 0 69    • Eosinophils Absolute 11/26/2022 0 13    • Basophils Absolute 11/26/2022 0 03    • Sodium 11/26/2022 133 (L)    • Potassium 11/26/2022 3 4 (L)    • Chloride 11/26/2022 93 (L)    • CO2 11/26/2022 22    • ANION GAP 11/26/2022 18 (H)    • BUN 11/26/2022 21    • Creatinine 11/26/2022 1 39 (H)    • Glucose 11/26/2022 91    • Calcium 11/26/2022 9 4    • Corrected Calcium 11/26/2022 10 4 (H)    • AST 11/26/2022 20    • ALT 11/26/2022 23    • Alkaline Phosphatase 11/26/2022 150 (H)    • Total Protein 11/26/2022 7 5    • Albumin 11/26/2022 2 8 (L)    • Total Bilirubin 11/26/2022 0 79    • eGFR 11/26/2022 47    • Ventricular Rate 11/26/2022 77    • Atrial Rate 11/26/2022 77    • CA Interval 11/26/2022 152    • QRSD Interval 11/26/2022 84    • QT Interval 11/26/2022 384    • QTC Interval 11/26/2022 434    • P Axis 11/26/2022 54    • QRS Axis 11/26/2022 71    • T Wave Axis 11/26/2022 64    • Ethanol Lvl 11/26/2022 <3    • Lipase 11/26/2022 178         Imaging: XR chest pa & lateral    Result Date: 1/13/2023  Narrative: CHEST INDICATION:   J18 9: Pneumonia, unspecified organism  COMPARISON:  CXR 1/23/2020, abdomen CT 11/26/2022, chest CT 3/22/2019  EXAM PERFORMED/VIEWS:  XR CHEST PA & LATERAL  DUAL ENERGY SUBTRACTION  FINDINGS: Cardiomediastinal silhouette appears unremarkable  The lungs are clear  No pneumothorax or pleural effusion  Osseous structures appear within normal limits for patient age  Impression: No acute cardiopulmonary disease  Workstation performed: [id number]       Objective:     Physical Exam  Constitutional:       General: He is not in acute distress  Appearance: He is well-developed  He is not ill-appearing or toxic-appearing  HENT:      Head: Normocephalic and atraumatic  Cardiovascular:      Rate and Rhythm: Normal rate and regular rhythm  Heart sounds: Normal heart sounds  Pulmonary:      Effort: Pulmonary effort is normal  No respiratory distress  Breath sounds: Normal breath sounds  No wheezing or rales  Musculoskeletal:         General: Normal range of motion  Cervical back: Normal range of motion and neck supple  Skin:     General: Skin is warm and dry  Neurological:      Mental Status: He is alert and oriented to person, place, and time  Deep Tendon Reflexes: Reflexes are normal and symmetric  Psychiatric:         Behavior: Behavior normal            There are no Patient Instructions on file for this visit  SAAD Britt    Portions of the record may have been created with voice recognition software  Occasional wrong word or "sound a like" substitutions may have occurred due to the inherent limitations of voice recognition software  Read the chart carefully and recognize, using context, where substitutions have occurred

## 2023-02-25 DIAGNOSIS — E78.2 MIXED HYPERLIPIDEMIA: ICD-10-CM

## 2023-02-25 RX ORDER — ROSUVASTATIN CALCIUM 5 MG/1
5 TABLET, COATED ORAL DAILY
Qty: 360 TABLET | Refills: 0 | Status: SHIPPED | OUTPATIENT
Start: 2023-02-25

## 2023-03-10 DIAGNOSIS — F02.80 DEMENTIA ASSOCIATED WITH OTHER UNDERLYING DISEASE WITHOUT BEHAVIORAL DISTURBANCE (HCC): ICD-10-CM

## 2023-03-10 RX ORDER — DONEPEZIL HYDROCHLORIDE 10 MG/1
10 TABLET, FILM COATED ORAL
Qty: 30 TABLET | Refills: 6 | Status: SHIPPED | OUTPATIENT
Start: 2023-03-10

## 2023-03-10 RX ORDER — MEMANTINE HYDROCHLORIDE 5 MG/1
5 TABLET ORAL 2 TIMES DAILY
Qty: 60 TABLET | Refills: 6 | Status: SHIPPED | OUTPATIENT
Start: 2023-03-10

## 2023-03-30 ENCOUNTER — HOSPITAL ENCOUNTER (EMERGENCY)
Facility: HOSPITAL | Age: 83
End: 2023-03-31
Attending: EMERGENCY MEDICINE

## 2023-03-30 ENCOUNTER — APPOINTMENT (EMERGENCY)
Dept: CT IMAGING | Facility: HOSPITAL | Age: 83
End: 2023-03-30

## 2023-03-30 ENCOUNTER — OFFICE VISIT (OUTPATIENT)
Dept: FAMILY MEDICINE CLINIC | Facility: CLINIC | Age: 83
End: 2023-03-30

## 2023-03-30 VITALS
SYSTOLIC BLOOD PRESSURE: 108 MMHG | WEIGHT: 155 LBS | HEIGHT: 69 IN | DIASTOLIC BLOOD PRESSURE: 70 MMHG | BODY MASS INDEX: 22.96 KG/M2 | HEART RATE: 94 BPM | OXYGEN SATURATION: 96 %

## 2023-03-30 DIAGNOSIS — R41.0 CONFUSION: ICD-10-CM

## 2023-03-30 DIAGNOSIS — R19.7 DIARRHEA: ICD-10-CM

## 2023-03-30 DIAGNOSIS — E86.0 DEHYDRATION: ICD-10-CM

## 2023-03-30 DIAGNOSIS — I81 PORTAL VEIN THROMBOSIS: Primary | ICD-10-CM

## 2023-03-30 DIAGNOSIS — R41.82 ALTERED MENTAL STATUS, UNSPECIFIED ALTERED MENTAL STATUS TYPE: Primary | ICD-10-CM

## 2023-03-30 DIAGNOSIS — R10.11 RIGHT UPPER QUADRANT ABDOMINAL PAIN: ICD-10-CM

## 2023-03-30 LAB
2HR DELTA HS TROPONIN: -1 NG/L
4HR DELTA HS TROPONIN: 0 NG/L
ALBUMIN SERPL BCP-MCNC: 3.1 G/DL (ref 3.5–5)
ALP SERPL-CCNC: 175 U/L (ref 34–104)
ALT SERPL W P-5'-P-CCNC: 18 U/L (ref 7–52)
ANION GAP SERPL CALCULATED.3IONS-SCNC: 13 MMOL/L (ref 4–13)
APTT PPP: 36 SECONDS (ref 23–37)
APTT PPP: >210 SECONDS (ref 23–37)
AST SERPL W P-5'-P-CCNC: 22 U/L (ref 13–39)
ATRIAL RATE: 93 BPM
BACTERIA UR QL AUTO: ABNORMAL /HPF
BASOPHILS # BLD MANUAL: 0 THOUSAND/UL (ref 0–0.1)
BASOPHILS NFR MAR MANUAL: 0 % (ref 0–1)
BILIRUB SERPL-MCNC: 1.62 MG/DL (ref 0.2–1)
BILIRUB UR QL STRIP: NEGATIVE
BUN SERPL-MCNC: 22 MG/DL (ref 5–25)
CALCIUM ALBUM COR SERPL-MCNC: 9.4 MG/DL (ref 8.3–10.1)
CALCIUM SERPL-MCNC: 8.7 MG/DL (ref 8.4–10.2)
CARDIAC TROPONIN I PNL SERPL HS: 10 NG/L
CARDIAC TROPONIN I PNL SERPL HS: 11 NG/L
CARDIAC TROPONIN I PNL SERPL HS: 11 NG/L
CHLORIDE SERPL-SCNC: 87 MMOL/L (ref 96–108)
CLARITY UR: CLEAR
CO2 SERPL-SCNC: 27 MMOL/L (ref 21–32)
COLOR UR: YELLOW
CREAT SERPL-MCNC: 0.71 MG/DL (ref 0.6–1.3)
EOSINOPHIL # BLD MANUAL: 0 THOUSAND/UL (ref 0–0.4)
EOSINOPHIL NFR BLD MANUAL: 0 % (ref 0–6)
ERYTHROCYTE [DISTWIDTH] IN BLOOD BY AUTOMATED COUNT: 14.5 % (ref 11.6–15.1)
GFR SERPL CREATININE-BSD FRML MDRD: 88 ML/MIN/1.73SQ M
GLUCOSE SERPL-MCNC: 122 MG/DL (ref 65–140)
GLUCOSE UR STRIP-MCNC: NEGATIVE MG/DL
HCT VFR BLD AUTO: 44.3 % (ref 36.5–49.3)
HGB BLD-MCNC: 14.9 G/DL (ref 12–17)
HGB UR QL STRIP.AUTO: ABNORMAL
INR PPP: 1.27 (ref 0.84–1.19)
KETONES UR STRIP-MCNC: ABNORMAL MG/DL
LACTATE SERPL-SCNC: 0.6 MMOL/L (ref 0.5–2)
LEUKOCYTE ESTERASE UR QL STRIP: NEGATIVE
LIPASE SERPL-CCNC: 29 U/L (ref 11–82)
LIPASE SERPL-CCNC: 56 U/L (ref 11–82)
LYMPHOCYTES # BLD AUTO: 0.7 THOUSAND/UL (ref 0.6–4.47)
LYMPHOCYTES # BLD AUTO: 3 % (ref 14–44)
MCH RBC QN AUTO: 28.3 PG (ref 26.8–34.3)
MCHC RBC AUTO-ENTMCNC: 33.6 G/DL (ref 31.4–37.4)
MCV RBC AUTO: 84 FL (ref 82–98)
MONOCYTES # BLD AUTO: 0.47 THOUSAND/UL (ref 0–1.22)
MONOCYTES NFR BLD: 2 % (ref 4–12)
MUCOUS THREADS UR QL AUTO: ABNORMAL
NEUTROPHILS # BLD MANUAL: 22.3 THOUSAND/UL (ref 1.85–7.62)
NEUTS BAND NFR BLD MANUAL: 1 % (ref 0–8)
NEUTS SEG NFR BLD AUTO: 94 % (ref 43–75)
NITRITE UR QL STRIP: NEGATIVE
NON-SQ EPI CELLS URNS QL MICRO: ABNORMAL /HPF
P AXIS: 58 DEGREES
PH UR STRIP.AUTO: 6.5 [PH]
PLATELET # BLD AUTO: 290 THOUSANDS/UL (ref 149–390)
PLATELET BLD QL SMEAR: ADEQUATE
PMV BLD AUTO: 9.7 FL (ref 8.9–12.7)
POTASSIUM SERPL-SCNC: 3.5 MMOL/L (ref 3.5–5.3)
PR INTERVAL: 128 MS
PROT SERPL-MCNC: 6.6 G/DL (ref 6.4–8.4)
PROT UR STRIP-MCNC: ABNORMAL MG/DL
PROTHROMBIN TIME: 15.7 SECONDS (ref 11.6–14.5)
QRS AXIS: 65 DEGREES
QRSD INTERVAL: 88 MS
QT INTERVAL: 378 MS
QTC INTERVAL: 469 MS
RBC # BLD AUTO: 5.26 MILLION/UL (ref 3.88–5.62)
RBC #/AREA URNS AUTO: ABNORMAL /HPF
RBC MORPH BLD: NORMAL
SODIUM SERPL-SCNC: 127 MMOL/L (ref 135–147)
SP GR UR STRIP.AUTO: 1.04 (ref 1–1.03)
T WAVE AXIS: 22 DEGREES
UROBILINOGEN UR STRIP-ACNC: 8 MG/DL
VENTRICULAR RATE: 93 BPM
WBC # BLD AUTO: 23.47 THOUSAND/UL (ref 4.31–10.16)
WBC #/AREA URNS AUTO: ABNORMAL /HPF

## 2023-03-30 RX ORDER — HEPARIN SODIUM 10000 [USP'U]/100ML
3-30 INJECTION, SOLUTION INTRAVENOUS
Status: DISCONTINUED | OUTPATIENT
Start: 2023-03-30 | End: 2023-03-31 | Stop reason: HOSPADM

## 2023-03-30 RX ORDER — METOCLOPRAMIDE HYDROCHLORIDE 5 MG/ML
10 INJECTION INTRAMUSCULAR; INTRAVENOUS ONCE
Status: COMPLETED | OUTPATIENT
Start: 2023-03-30 | End: 2023-03-30

## 2023-03-30 RX ORDER — HEPARIN SODIUM 1000 [USP'U]/ML
5600 INJECTION, SOLUTION INTRAVENOUS; SUBCUTANEOUS EVERY 6 HOURS PRN
Status: DISCONTINUED | OUTPATIENT
Start: 2023-03-30 | End: 2023-03-31 | Stop reason: HOSPADM

## 2023-03-30 RX ORDER — HEPARIN SODIUM 1000 [USP'U]/ML
5600 INJECTION, SOLUTION INTRAVENOUS; SUBCUTANEOUS ONCE
Status: COMPLETED | OUTPATIENT
Start: 2023-03-30 | End: 2023-03-30

## 2023-03-30 RX ORDER — HEPARIN SODIUM 1000 [USP'U]/ML
2800 INJECTION, SOLUTION INTRAVENOUS; SUBCUTANEOUS EVERY 6 HOURS PRN
Status: DISCONTINUED | OUTPATIENT
Start: 2023-03-30 | End: 2023-03-31 | Stop reason: HOSPADM

## 2023-03-30 RX ADMIN — METOCLOPRAMIDE 10 MG: 5 INJECTION, SOLUTION INTRAMUSCULAR; INTRAVENOUS at 15:59

## 2023-03-30 RX ADMIN — IOHEXOL 100 ML: 350 INJECTION, SOLUTION INTRAVENOUS at 13:55

## 2023-03-30 RX ADMIN — SODIUM CHLORIDE 500 ML: 0.9 INJECTION, SOLUTION INTRAVENOUS at 13:17

## 2023-03-30 RX ADMIN — HEPARIN SODIUM 5600 UNITS: 1000 INJECTION INTRAVENOUS; SUBCUTANEOUS at 16:53

## 2023-03-30 RX ADMIN — HEPARIN SODIUM 18 UNITS/KG/HR: 10000 INJECTION, SOLUTION INTRAVENOUS at 16:54

## 2023-03-30 NOTE — PROGRESS NOTES
Depression Screening and Follow-up Plan: Patient was screened for depression during today's encounter  They screened negative with a PHQ-2 score of 1  Assessment/Plan:     Chronic Problems:  No problem-specific Assessment & Plan notes found for this encounter  Visit Diagnosis:  Diagnoses and all orders for this visit:    Altered mental status, unspecified altered mental status type  -     Transfer to other facility    Dehydration  -     Transfer to other facility          Subjective:    Patient ID: Wale Egan is a [de-identified] y o  male  Here with daughter   Pt lives essentially on onwn , with daughters checking in on him twice a day   Over the past day daughter reports   Having some issue ,over all changes in mental status,  lack of appetite , weight loss , very difficult to force fluids , less responsive in conversation   Episode of diarrhea , past 2 wks   Unknown fever , unk urine out put nor intake   pmed hx  diverticulosis   dementia       Diarrhea   This is a new problem  The current episode started in the past 7 days  Associated symptoms include abdominal pain, headaches and weight loss  Abdominal Pain  This is a new problem  The current episode started yesterday  The onset quality is sudden  The problem occurs 2 to 4 times per day  The problem has been unchanged  The pain is located in the generalized abdominal region  The pain is at a severity of 4/10  The quality of the pain is a sensation of fullness  The abdominal pain does not radiate  Associated symptoms include anorexia, diarrhea, headaches, nausea and weight loss  The pain is aggravated by eating  The pain is relieved by nothing  Prior diagnostic workup includes CT scan, lower endoscopy and ultrasound         The following portions of the patient's history were reviewed and updated as appropriate: allergies, current medications, past family history, past medical history, past social history, past surgical history and problem list     Review "of Systems   Constitutional: Positive for appetite change, unexpected weight change and weight loss  Gastrointestinal: Positive for abdominal pain, anorexia, diarrhea and nausea  Neurological: Positive for headaches  /70   Pulse 94   Ht 5' 9\" (1 753 m)   Wt 70 3 kg (155 lb)   SpO2 96%   BMI 22 89 kg/m²   Social History     Socioeconomic History   • Marital status:      Spouse name: Not on file   • Number of children: Not on file   • Years of education: Not on file   • Highest education level: Not on file   Occupational History   • Occupation: employed   Tobacco Use   • Smoking status: Former     Packs/day: 1 00     Years: 60 00     Pack years: 60 00     Types: Cigarettes     Quit date: 3/19/2019     Years since quittin 0   • Smokeless tobacco: Never   • Tobacco comments:     daily   Vaping Use   • Vaping Use: Never used   Substance and Sexual Activity   • Alcohol use: No     Comment: former   • Drug use: No   • Sexual activity: Yes     Partners: Female   Other Topics Concern   • Not on file   Social History Narrative   • Not on file     Social Determinants of Health     Financial Resource Strain: Low Risk    • Difficulty of Paying Living Expenses: Not very hard   Food Insecurity: No Food Insecurity   • Worried About Running Out of Food in the Last Year: Never true   • Ran Out of Food in the Last Year: Never true   Transportation Needs: No Transportation Needs   • Lack of Transportation (Medical): No   • Lack of Transportation (Non-Medical):  No   Physical Activity: Not on file   Stress: Not on file   Social Connections: Not on file   Intimate Partner Violence: Not on file   Housing Stability: Low Risk    • Unable to Pay for Housing in the Last Year: No   • Number of Places Lived in the Last Year: 1   • Unstable Housing in the Last Year: No     Past Medical History:   Diagnosis Date   • Alcoholism ([facility] ) 2018   • Alzheimer disease ([facility] )    • Anxiety    • Basal cell carcinoma " 08/18/2020    Right selma, Dr Jennie Bey   • Basal cell carcinoma 06/03/2021    Right upper forehead   • Basal cell carcinoma (BCC) of skin of face 03/28/2022    Right nostril   • Bile duct disease    • Cancer [facility])     prostate, skin    • Cardiac arrest [facility])    • Cardiac arrest ([facility] ) 04/14/2018   • Choledocholithiasis    • Colon polyp    • Dementia (HCC)    • Depression    • Diverticulitis of colon    • Kidney stone    • Liver disease    • Malignant neoplasm of prostate ([facility] )    • Mood disorder ([facility] )    • Prostate cancer ([facility] )    • Right carotid bruit    • Seizure disorder ([facility] )    • Skin cancer      Family History   Problem Relation Age of Onset   • Cancer Mother    • Dementia Mother    • Alzheimer's disease Mother    • Sudden death Father      Past Surgical History:   Procedure Laterality Date   • CHOLECYSTECTOMY     • COLONOSCOPY     • ERCP N/A 03/09/2018    Procedure: ENDOSCOPIC RETROGRADE CHOLANGIOPANCREATOGRAPHY (ERCP); Surgeon: Kiera Zaldivar MD;  Location: [facility];  Service: Gastroenterology   • ERCP  04/13/2018    stenting   • ERCP N/A 06/08/2018    Procedure: ENDOSCOPIC RETROGRADE CHOLANGIOPANCREATOGRAPHY (ERCP); Surgeon: Kiera Zaldivar MD;  Location: [facility];  Service: Gastroenterology   • MOHS SURGERY  08/18/2020    Right narlara, Dr Jennie Bey   • MOHS SURGERY  06/30/2021    Right upper forehead; Dr Jennie Bey   • [address]  05/18/2022    [address]; Right nostril; Dr Tex Rooney   • [place] ERCP [address] BODY/STENT BILIARY/PANC DUCT N/A 04/13/2018    Procedure: ENDOSCOPIC RETROGRADE CHOLANGIOPANCREATOGRAPHY (ERCP); Surgeon: Kiera Zaldivar MD;  Location: [facility];  Service: Gastroenterology   • CA LAPAROSCOPY SURG CHOLECYSTECTOMY N/A 05/15/2018    Procedure: Ekta Mckeon;  Surgeon: Tai Griffith MD;  Location: [facility];  Service: General   • PROSTATE SURGERY     • TONSILLECTOMY       No current facility-administered medications for this visit      Current Outpatient Medications:   •  apixaban (Eliquis) 5 mg, Take 2 tablets (10 mg total) by mouth 2 (two) times a day for 7 days, THEN 1 tablet (5 mg total) 2 (two) times a day for 23 days  , Disp: 74 tablet, Rfl: 0    Facility-Administered Medications Ordered in Other Visits:   •  acetaminophen (TYLENOL) rectal suppository 650 mg, 650 mg, Rectal, Q4H PRN, Lorenza Newton PA-C  •  aluminum-magnesium hydroxide-simethicone (MYLANTA) oral suspension 30 mL, 30 mL, Oral, Q6H PRN, Roselyn Echevarria MD  •  apixaban (ELIQUIS) tablet 10 mg, 10 mg, Oral, BID, Peter Healy DO, 10 mg at 04/06/23 1719  •  cefepime (MAXIPIME) 1,000 mg in dextrose 5 % 50 mL IVPB, 1,000 mg, Intravenous, Q12H, Vidal Toth MD, Stopped at 04/07/23 0351  •  docusate sodium (COLACE) capsule 100 mg, 100 mg, Oral, BID, Roselyn Echevarria MD, 100 mg at 04/06/23 1720  •  donepezil (ARICEPT) tablet 10 mg, 10 mg, Oral, HS, Roselyn Echevarria MD, 10 mg at 04/06/23 2114  •  insulin lispro (HumaLOG) 100 units/mL subcutaneous injection 1-5 Units, 1-5 Units, Subcutaneous, TID AC **AND** Fingerstick Glucose (POCT), , , TID AC, Vidal Toth MD  •  insulin lispro (HumaLOG) 100 units/mL subcutaneous injection 1-5 Units, 1-5 Units, Subcutaneous, HS, Vidal Toth MD  •  loratadine (CLARITIN) tablet 5 mg, 5 mg, Oral, Daily, Roselyn Echevarria MD, 5 mg at 04/06/23 0275  •  LORazepam (ATIVAN) injection 0 5 mg, 0 5 mg, Intravenous, Once PRN, Lorenza Newton PA-C  •  LORazepam (ATIVAN) injection 0 5 mg, 0 5 mg, Intravenous, Once PRN, Lorenza Newton PA-C  •  melatonin tablet 3 mg, 3 mg, Oral, HS, Roselyn Echevarria MD, 3 mg at 04/06/23 2114  •  memantine (NAMENDA) tablet 5 mg, 5 mg, Oral, BID, Roselyn Echevarria MD, 5 mg at 04/06/23 1720  •  multi-electrolyte (PLASMALYTE-A/ISOLYTE-S PH 7 4) IV solution, 75 mL/hr, Intravenous, Continuous, Vidal Toth MD, Last Rate: 75 mL/hr at 04/07/23 0537, 75 mL/hr at 04/07/23 0537  •  multivitamin-minerals (CENTRUM) tablet 1 tablet, 1 tablet, Oral, Daily, Lance Meyer MD, 1 tablet at 04/06/23 0929  •  ondansetron (ZOFRAN) injection 4 mg, 4 mg, Intravenous, Q6H PRN, Lance Meyer MD, 4 mg at 04/04/23 1735  •  polyethylene glycol (MIRALAX) packet 17 g, 17 g, Oral, Daily, Lance Meyer MD, 17 g at 04/06/23 7740  •  pravastatin (PRAVACHOL) tablet 40 mg, 40 mg, Oral, After Carol Do MD, 40 mg at 04/06/23 1720  •  QUEtiapine (SEROquel) tablet 12 5 mg, 12 5 mg, Oral, HS, Peter Healy DO, 12 5 mg at 04/06/23 2114  •  sertraline (ZOLOFT) tablet 25 mg, 25 mg, Oral, HS, Lance Meyer MD, 25 mg at 04/06/23 2114  •  tamsulosin (FLOMAX) capsule 0 4 mg, 0 4 mg, Oral, After Carol Do MD, 0 4 mg at 04/06/23 1720    No Known Allergies       Lab Review   Appointment on 01/11/2023   Component Date Value   • Sodium 01/11/2023 141    • Potassium 01/11/2023 4 7    • Chloride 01/11/2023 111 (H)    • CO2 01/11/2023 26    • ANION GAP 01/11/2023 4    • BUN 01/11/2023 22    • Creatinine 01/11/2023 0 88    • Glucose, Fasting 01/11/2023 114 (H)    • Calcium 01/11/2023 9 2    • AST 01/11/2023 15    • ALT 01/11/2023 27    • Alkaline Phosphatase 01/11/2023 83    • Total Protein 01/11/2023 7 5    • Albumin 01/11/2023 4 0    • Total Bilirubin 01/11/2023 0 59    • eGFR 01/11/2023 81    • WBC 01/11/2023 5 89    • RBC 01/11/2023 5 06    • Hemoglobin 01/11/2023 14 9    • Hematocrit 01/11/2023 47 2    • MCV 01/11/2023 93    • MCH 01/11/2023 29 4    • MCHC 01/11/2023 31 6    • RDW 01/11/2023 16 5 (H)    • MPV 01/11/2023 9 9    • Platelets 56/46/0744 240    • nRBC 01/11/2023 0    • Neutrophils Relative 01/11/2023 71    • Immat GRANS % 01/11/2023 1    • Lymphocytes Relative 01/11/2023 16    • Monocytes Relative 01/11/2023 8    • Eosinophils Relative 01/11/2023 3    • Basophils Relative 01/11/2023 1    • Neutrophils Absolute 01/11/2023 4 22    • Immature Grans Absolute 01/11/2023 0 03    • Lymphocytes Absolute 01/11/2023 0 96    • Monocytes Absolute 01/11/2023 0 44    • Eosinophils Absolute 01/11/2023 0 17    • Basophils Absolute 01/11/2023 0 07    • Cholesterol 01/11/2023 167    • Triglycerides 01/11/2023 64    • HDL, Direct 01/11/2023 55    • LDL Calculated 01/11/2023 99    • Non-HDL-Chol (CHOL-HDL) 01/11/2023 112    Admission on 11/26/2022, Discharged on 11/26/2022   Component Date Value   • WBC 11/26/2022 6 14    • RBC 11/26/2022 4 94    • Hemoglobin 11/26/2022 14 6    • Hematocrit 11/26/2022 43 2    • MCV 11/26/2022 87    • MCH 11/26/2022 29 6    • MCHC 11/26/2022 33 8    • RDW 11/26/2022 14 6    • MPV 11/26/2022 9 4    • Platelets 82/15/6613 336    • nRBC 11/26/2022 0    • Neutrophils Relative 11/26/2022 70    • Immat GRANS % 11/26/2022 1    • Lymphocytes Relative 11/26/2022 15    • Monocytes Relative 11/26/2022 11    • Eosinophils Relative 11/26/2022 2    • Basophils Relative 11/26/2022 1    • Neutrophils Absolute 11/26/2022 4 35    • Immature Grans Absolute 11/26/2022 0 05    • Lymphocytes Absolute 11/26/2022 0 89    • Monocytes Absolute 11/26/2022 0 69    • Eosinophils Absolute 11/26/2022 0 13    • Basophils Absolute 11/26/2022 0 03    • Sodium 11/26/2022 133 (L)    • Potassium 11/26/2022 3 4 (L)    • Chloride 11/26/2022 93 (L)    • CO2 11/26/2022 22    • ANION GAP 11/26/2022 18 (H)    • BUN 11/26/2022 21    • Creatinine 11/26/2022 1 39 (H)    • Glucose 11/26/2022 91    • Calcium 11/26/2022 9 4    • Corrected Calcium 11/26/2022 10 4 (H)    • AST 11/26/2022 20    • ALT 11/26/2022 23    • Alkaline Phosphatase 11/26/2022 150 (H)    • Total Protein 11/26/2022 7 5    • Albumin 11/26/2022 2 8 (L)    • Total Bilirubin 11/26/2022 0 79    • eGFR 11/26/2022 47    • Ventricular Rate 11/26/2022 77    • Atrial Rate 11/26/2022 77    • OK Interval 11/26/2022 152    • QRSD Interval 11/26/2022 84    • QT Interval 11/26/2022 384    • QTC Interval 11/26/2022 434    • P Axis 11/26/2022 54 "  • QRS Axis 11/26/2022 71    • T Wave Axis 11/26/2022 64    • Ethanol Lvl 11/26/2022 <3    • Lipase 11/26/2022 178         Imaging: No results found  Objective:     Physical Exam  Constitutional:       Appearance: He is ill-appearing and toxic-appearing  Comments: Disheveled   Less responsive than in past encounters    Abdominal:      Tenderness: There is abdominal tenderness  Skin:     General: Skin is dry  Coloration: Skin is pale  There are no Patient Instructions on file for this visit  SAAD Lucas    Portions of the record may have been created with voice recognition software  Occasional wrong word or \"sound a like\" substitutions may have occurred due to the inherent limitations of voice recognition software  Read the chart carefully and recognize, using context, where substitutions have occurred    "

## 2023-03-30 NOTE — ED PROVIDER NOTES
"History  Chief Complaint   Patient presents with   • Altered Mental Status     Family states\"the last couple days patient not eating, not drinking, increased confusion, diarrhea, and unsteady on his feet\"  Patient sent by PCP  72-year-old male history of dementia and previous history of prostate cancer presenting with worsening confusion and decreased intake  Patient and daughter at bedside report history of dehydration with similar symptoms mainly acute on chronic confusion and fatigue with decreased appetite  Denies any nausea vomiting  Does report diarrhea for the past few days as well as generalized abdominal discomfort  Denies any chest pain shortness of breath  Patient also has hiccups for about the past 1 to 2 days  Denies any falls or head strike  Denies any urinary changes  Denies any other complaints  Chart reviewed  Past Medical History:  04/06/2018: Alcoholism ([place] [address] )  No date: Alzheimer disease ([place] [address] )  No date: Anxiety  08/18/2020: Basal cell carcinoma      Comment:  Right Dr Monique hahn Bolivar  06/03/2021: Basal cell carcinoma      Comment:  Right upper forehead  03/28/2022: Basal cell carcinoma (BCC) of skin of face      Comment:  Right nostril  No date: Bile duct disease  No date: Cancer [facility])      Comment:  prostate, skin   No date: Cardiac arrest ([place] [address] )  04/14/2018: Cardiac arrest ([place] [address] )  No date: Choledocholithiasis  No date: Colon polyp  No date: Dementia ([place] [address] )  No date: Depression  No date: Diverticulitis of colon  No date: Kidney stone  No date: Liver disease  No date: Malignant neoplasm of prostate ([place] [address] )  No date: Mood disorder ([place] [address] )  No date: Prostate cancer ([place] [address] )  No date: Right carotid bruit  No date: Seizure disorder ([place] [address] )  No date: Skin cancer  Family History: non-contributory  Social History            Prior to Admission Medications   Prescriptions Last Dose Informant Patient Reported? Taking?    Melatonin 3 MG CAPS   Yes Yes   Sig: Take 3 mg by mouth daily at bedtime Patient takes " 1 1/2 mg tablets   Multiple Vitamins-Minerals (CENTRUM SILVER ULTRA MENS PO)   Yes Yes   Sig: Take 1 tablet by mouth daily   acetaminophen (TYLENOL) 500 mg tablet   Yes Yes   Sig: Take 500 mg by mouth as needed    donepezil (ARICEPT) 10 mg tablet   No Yes   Sig: TAKE 1 TABLET (10 MG TOTAL) BY MOUTH DAILY AT BEDTIME   fexofenadine (ALLEGRA) 60 MG tablet   Yes Yes   Sig: Take 60 mg by mouth daily   memantine (NAMENDA) 5 mg tablet   No Yes   Sig: TAKE 1 TABLET (5 MG TOTAL) BY MOUTH 2 (TWO) TIMES A DAY   rosuvastatin (CRESTOR) 5 mg tablet   No Yes   Sig: TAKE 1 TABLET (5 MG TOTAL) BY MOUTH DAILY   sertraline (ZOLOFT) 25 mg tablet   No Yes   Sig: TAKE 1 TABLET (25 MG TOTAL) BY MOUTH DAILY AT BEDTIME   tamsulosin (FLOMAX) 0 4 mg   No Yes   Sig: Take 1 capsule (0 4 mg total) by mouth daily with dinner      Facility-Administered Medications: None       Past Medical History:   Diagnosis Date   • Alcoholism ([place] [address] ) 04/06/2018   • Alzheimer disease ([place] [address] )    • Anxiety    • Basal cell carcinoma 08/18/2020    Right nares, Dr Muriel Helms   • Basal cell carcinoma 06/03/2021    Right upper forehead   • Basal cell carcinoma (BCC) of skin of face 03/28/2022    Right nostril   • Bile duct disease    • Cancer [facility])     prostate, skin    • Cardiac arrest [facility])    • Cardiac arrest ([place] [address] ) 04/14/2018   • Choledocholithiasis    • Colon polyp    • Dementia (HCC)    • Depression    • Diverticulitis of colon    • Kidney stone    • Liver disease    • Malignant neoplasm of prostate ([place] [address] )    • Mood disorder (HCC)    • Prostate cancer ([place] [address] )    • Right carotid bruit    • Seizure disorder (HCC)    • Skin cancer        Past Surgical History:   Procedure Laterality Date   • CHOLECYSTECTOMY     • COLONOSCOPY     • ERCP N/A 03/09/2018    Procedure: ENDOSCOPIC RETROGRADE CHOLANGIOPANCREATOGRAPHY (ERCP);   Surgeon: Laura Rodriguez MD;  Location[facility];  Service: Gastroenterology   • ERCP  04/13/2018    stenting   • ERCP N/A 06/08/2018    Procedure: ENDOSCOPIC RETROGRADE CHOLANGIOPANCREATOGRAPHY (ERCP); Surgeon: Tay Pritchard MD;  Location: [facility];  Service: Gastroenterology   • [facility]    Right nares, Dr Miah Tyler   • MO[facility] SURGERY  2021    Right upper forehead; Dr Miah Tyler   • [address]  2022    [address]  [place]; Right nostril; Dr Lisset Pantoja   • [place] ERCP [address] BODY/STENT BILIARY/PANC DUCT N/A 2018    Procedure: ENDOSCOPIC RETROGRADE CHOLANGIOPANCREATOGRAPHY (ERCP); Surgeon: Tay Pritchard MD;  Location[facility];  Service: Gastroenterology   • [place] LAPAROSCOPY SURG CHOLECYSTECTOMY N/A 05/15/2018    Procedure: Lian Dowell;  Surgeon: Gosia Christopher MD;  Location: [facility];  Service: General   • PROSTATE SURGERY     • TONSILLECTOMY         Family History   Problem Relation Age of Onset   • Cancer Mother    • Dementia Mother    • Alzheimer's disease Mother    • Sudden death Father      I have reviewed and agree with the history as documented  E-Cigarette/Vaping   • E-Cigarette Use Never User      E-Cigarette/Vaping Substances   • Nicotine No    • THC No    • CBD No    • Flavoring No    • Other No    • Unknown No      Social History     Tobacco Use   • Smoking status: Former     Packs/day: 1 00     Years: 60 00     Pack years: 60 00     Types: Cigarettes     Quit date: 3/19/2019     Years since quittin 0   • Smokeless tobacco: Never   • Tobacco comments:     daily   Vaping Use   • Vaping Use: Never used   Substance Use Topics   • Alcohol use: No     Comment: former   • Drug use: No       Review of Systems   Constitutional: Positive for appetite change  Negative for chills, diaphoresis, fever and unexpected weight change  HENT: Negative for congestion and rhinorrhea  Eyes: Negative for photophobia and visual disturbance  Respiratory: Negative for cough, chest tightness and shortness of breath  Cardiovascular: Negative for chest pain, palpitations and leg swelling     Gastrointestinal: Positive for abdominal pain and diarrhea  Negative for abdominal distention, blood in stool, constipation, nausea and vomiting  Genitourinary: Negative for dysuria and hematuria  Musculoskeletal: Negative for back pain, joint swelling, neck pain and neck stiffness  Skin: Negative for color change, pallor, rash and wound  Neurological: Negative for dizziness, syncope, weakness, light-headedness and headaches  Psychiatric/Behavioral: Positive for confusion  Negative for agitation  All other systems reviewed and are negative  Physical Exam  Physical Exam  Vitals and nursing note reviewed  Constitutional:       General: He is not in acute distress  Appearance: Normal appearance  He is well-developed  He is not ill-appearing, toxic-appearing or diaphoretic  HENT:      Head: Normocephalic and atraumatic  Nose: Nose normal  No congestion or rhinorrhea  Mouth/Throat:      Mouth: Mucous membranes are moist       Pharynx: Oropharynx is clear  No oropharyngeal exudate or posterior oropharyngeal erythema  Eyes:      General: No scleral icterus  Right eye: No discharge  Left eye: No discharge  Extraocular Movements: Extraocular movements intact  Conjunctiva/sclera: Conjunctivae normal       Pupils: Pupils are equal, round, and reactive to light  Neck:      Vascular: No JVD  Trachea: No tracheal deviation  Comments: Supple  Normal range of motion  Cardiovascular:      Rate and Rhythm: Normal rate and regular rhythm  Heart sounds: Normal heart sounds  No murmur heard  No friction rub  No gallop  Comments: Normal rate and regular rhythm  Pulmonary:      Effort: Pulmonary effort is normal  No respiratory distress  Breath sounds: Normal breath sounds  No stridor  No wheezing or rales  Comments: Clear to auscultation bilaterally  Chest:      Chest wall: No tenderness     Abdominal:      General: Bowel sounds are normal  There is distension  Palpations: Abdomen is soft  Tenderness: There is abdominal tenderness  There is guarding  There is no right CVA tenderness, left CVA tenderness or rebound  Comments: Mild distention  Isolated tenderness outpatient with guarding right upper quadrant  Otherwise soft  Normal bowel sounds throughout   Musculoskeletal:         General: No swelling, tenderness, deformity or signs of injury  Normal range of motion  Cervical back: Normal range of motion and neck supple  No rigidity  No muscular tenderness  Right lower leg: No edema  Left lower leg: No edema  Lymphadenopathy:      Cervical: No cervical adenopathy  Skin:     General: Skin is warm and dry  Coloration: Skin is not pale  Findings: No erythema or rash  Neurological:      General: No focal deficit present  Mental Status: He is alert  He is confused  Sensory: No sensory deficit  Motor: No weakness or abnormal muscle tone  Coordination: Coordination normal       Gait: Gait normal       Comments: Alert  Strength and sensation grossly intact  Ambulatory without difficulty at baseline  Psychiatric:         Behavior: Behavior normal          Thought Content:  Thought content normal          Vital Signs  ED Triage Vitals   Temperature Pulse Respirations Blood Pressure SpO2   03/30/23 1232 03/30/23 1232 03/30/23 1232 03/30/23 1232 03/30/23 1232   97 8 °F (36 6 °C) 94 20 123/71 97 %      Temp src Heart Rate Source Patient Position - Orthostatic VS BP Location FiO2 (%)   -- 03/30/23 1500 03/30/23 1500 03/30/23 1500 --    Monitor Sitting Left arm       Pain Score       --                  Vitals:    03/30/23 1530 03/30/23 1630 03/30/23 1730 03/30/23 1830   BP: 119/63 122/64 125/58 126/60   Pulse: 84 91 86 90   Patient Position - Orthostatic VS: Lying Lying  Lying         Visual Acuity  Visual Acuity    Flowsheet Row Most Recent Value   L Pupil Size (mm) 3   R Pupil Size (mm) 3          ED Medications  Medications   heparin (porcine) 25,000 units in 0 45% NaCl 250 mL infusion (premix) (18 Units/kg/hr × 70 kg (Order-Specific) Intravenous New Bag 3/30/23 1654)   heparin (porcine) injection 5,600 Units (has no administration in time range)   heparin (porcine) injection 2,800 Units (has no administration in time range)   sodium chloride 0 9 % bolus 500 mL (0 mL Intravenous Stopped 3/30/23 1417)   metoclopramide (REGLAN) injection 10 mg (10 mg Intravenous Given 3/30/23 1559)   iohexol (OMNIPAQUE) 350 MG/ML injection (SINGLE-DOSE) 100 mL (100 mL Intravenous Given 3/30/23 1355)   heparin (porcine) injection 5,600 Units (5,600 Units Intravenous Given 3/30/23 1653)       Diagnostic Studies  Results Reviewed     Procedure Component Value Units Date/Time    Lipase [[id number]]  (Normal) Collected: 03/30/23 1725    Lab Status: Final result Specimen: Blood from Arm, Right Updated: 03/30/23 1757     Lipase 56 u/L     Lactic acid [[id number]]  (Normal) Collected: 03/30/23 1725    Lab Status: Final result Specimen: Blood from Arm, Right Updated: 03/30/23 1757     LACTIC ACID 0 6 mmol/L     Narrative:      Result may be elevated if tourniquet was used during collection      HS Troponin I 4hr [[id number]]  (Normal) Collected: 03/30/23 1714    Lab Status: Final result Specimen: Blood from Hand, Right Updated: 03/30/23 1748     hs TnI 4hr 11 ng/L      Delta 4hr hsTnI 0 ng/L     Protime-INR [[id number]]  (Abnormal) Collected: 03/30/23 1652    Lab Status: Final result Specimen: Blood from Arm, Right Updated: 03/30/23 1744     Protime 15 7 seconds      INR 1 27    APTT [[id number]]  (Normal) Collected: 03/30/23 1652    Lab Status: Final result Specimen: Blood from Arm, Right Updated: 03/30/23 1744     PTT 36 seconds     HS Troponin I 2hr [[id number]]  (Normal) Collected: 03/30/23 1555    Lab Status: Final result Specimen: Blood from Arm, Right Updated: 03/30/23 1651     hs TnI 2hr 10 ng/L      Delta 2hr hsTnI -1 ng/L     Manual Differential(PHLEBS Do Not Order) [[id number]]  (Abnormal) Collected: 03/30/23 1316    Lab Status: Final result Specimen: Blood from Arm, Right Updated: 03/30/23 1455     Segmented % 94 %      Bands % 1 %      Lymphocytes % 3 %      Monocytes % 2 %      Eosinophils, % 0 %      Basophils % 0 %      Absolute Neutrophils 22 30 Thousand/uL      Lymphocytes Absolute 0 70 Thousand/uL      Monocytes Absolute 0 47 Thousand/uL      Eosinophils Absolute 0 00 Thousand/uL      Basophils Absolute 0 00 Thousand/uL      Total Counted --     RBC Morphology Normal     Platelet Estimate Adequate    Urine Microscopic [[id number]]  (Abnormal) Collected: 03/30/23 1414    Lab Status: Final result Specimen: Urine, Clean Catch Updated: 03/30/23 1424     RBC, UA 20-30 /hpf      WBC, UA 1-2 /hpf      Epithelial Cells None Seen /hpf      Bacteria, UA None Seen /hpf      MUCUS THREADS Occasional    UA w Reflex to Microscopic w Reflex to Culture [[id number]]  (Abnormal) Collected: 03/30/23 1414    Lab Status: Final result Specimen: Urine, Clean Catch Updated: 03/30/23 1423     Color, UA Yellow     Clarity, UA Clear     Specific Gravity, UA 1 038     pH, UA 6 5     Leukocytes, UA Negative     Nitrite, UA Negative     Protein, UA Trace mg/dl      Glucose, UA Negative mg/dl      Ketones, UA 40 (2+) mg/dl      Urobilinogen, UA 8 0 mg/dl      Bilirubin, UA Negative     Occult Blood, UA Small    HS Troponin 0hr (reflex protocol) [[id number]]  (Normal) Collected: 03/30/23 1316    Lab Status: Final result Specimen: Blood from Arm, Right Updated: 03/30/23 1354     hs TnI 0hr 11 ng/L     Comprehensive metabolic panel [[id number]]  (Abnormal) Collected: 03/30/23 1316    Lab Status: Final result Specimen: Blood from Arm, Right Updated: 03/30/23 1345     Sodium 127 mmol/L      Potassium 3 5 mmol/L      Chloride 87 mmol/L      CO2 27 mmol/L      ANION GAP 13 mmol/L      BUN 22 mg/dL      Creatinine 0 71 mg/dL      Glucose 122 mg/dL      Calcium 8 7 mg/dL Corrected Calcium 9 4 mg/dL      AST 22 U/L      ALT 18 U/L      Alkaline Phosphatase 175 U/L      Total Protein 6 6 g/dL      Albumin 3 1 g/dL      Total Bilirubin 1 62 mg/dL      eGFR 88 ml/min/1 73sq m     Narrative:      [place] guidelines for Chronic Kidney Disease (CKD):   •  Stage 1 with normal or high GFR (GFR > 90 mL/min/1 73 square meters)  •  Stage 2 Mild CKD (GFR = 60-89 mL/min/1 73 square meters)  •  Stage 3A Moderate CKD (GFR = 45-59 mL/min/1 73 square meters)  •  Stage 3B Moderate CKD (GFR = 30-44 mL/min/1 73 square meters)  •  Stage 4 Severe CKD (GFR = 15-29 mL/min/1 73 square meters)  •  Stage 5 End Stage CKD (GFR <15 mL/min/1 73 square meters)  Note: GFR calculation is accurate only with a steady state creatinine    Lipase [[id number]]  (Normal) Collected: 03/30/23 1316    Lab Status: Final result Specimen: Blood from Arm, Right Updated: 03/30/23 1345     Lipase 29 u/L     CBC and differential [[id number]]  (Abnormal) Collected: 03/30/23 1316    Lab Status: Final result Specimen: Blood from Arm, Right Updated: 03/30/23 1329     WBC 23 47 Thousand/uL      RBC 5 26 Million/uL      Hemoglobin 14 9 g/dL      Hematocrit 44 3 %      MCV 84 fL      MCH 28 3 pg      MCHC 33 6 g/dL      RDW 14 5 %      MPV 9 7 fL      Platelets 433 Thousands/uL                  CT abdomen pelvis with contrast   Final Result by Shahida Doran MD (03/30 1539)      Significant image quality degradation secondary to motion artifact  Findings consistent with 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 extending to the portosplenic confluence and origin of the superior mesenteric vein  Extensive edema in the central mesentery  Secondary bowel wall thickening of the duodenal    sweep as well as the proximal colon  No pneumatosis to suggest bowel infarct  Pancreatic head enlargement, likely reactive in the setting of normal lipase versus residual prior pancreatitis        I personally discussed this study with Dr Desma Collet on 3/30/2023 at 3:38 PM                       Workstation performed: [id number]         CT head without contrast   Final Result by Ngozi Brady MD (03/30 1505)      No acute intracranial abnormality  Workstation performed: [id number]                    Procedures  Procedures         ED Course             HEART Risk Score    Flowsheet Row Most Recent Value   Heart Score Risk Calculator    History 0 Filed at: 03/30/2023 1500   ECG 0 Filed at: 03/30/2023 1500   Age 2 Filed at: 03/30/2023 1500   Risk Factors 1 Filed at: 03/30/2023 1500   Troponin 0 Filed at: 03/30/2023 1500   HEART Score 3 Filed at: 03/30/2023 1500                                      Medical Decision Making  29-year-old male history of dementia and previous history of prostate cancer presenting with worsening confusion and decreased intake  Similar symptoms in setting of dehydration in the past   Plan for cardiac evaluation including EKG and troponin  Also plan for abdominal labs plus CT abdomen pelvis  UA  Plan for IV fluids  Symptom management with IV Reglan  Reassess  EKG normal sinus rhythm without acute ST abnormality  Labs interpreted notable for white count of 23  Hiccups improving with Reglan  Heart rate improving with fluids  CT imaging notable for extensive thrombosis including portal vein and origin of superior mesenteric vein  Vascular consultation  Recommending IV heparin with IV heparin bolus  Vascular surgery recommending transfer to [facility] for further management and evaluation  Vascular also recommended general surgery consultation which was ordered  Stable after initiation of heparin  Plan transfer to [facility]  Amount and/or Complexity of Data Reviewed  Labs: ordered  Radiology: ordered  Risk  Prescription drug management            Disposition  Final diagnoses:   Right upper quadrant abdominal pain   Diarrhea   Confusion   Dehydration   Portal vein thrombosis     Time reflects when diagnosis was documented in both MDM as applicable and the Disposition within this note     Time User Action Codes Description Comment    3/30/2023  1:36 PM Bj Hamper Add [R10 11] Right upper quadrant abdominal pain     3/30/2023  1:36 PM Bj Hamper Add [R19 7] Diarrhea     3/30/2023  1:37 PM Bj Hamper Add [R41 0] Confusion     3/30/2023  1:37 PM Bj Hamper Add [E86 0] Dehydration     3/30/2023  4:23 PM Er[place], [address] Portal vein thrombosis     3/30/2023  4:23 PM Bj Hamper Modify [R10 11] Right upper quadrant abdominal pain     3/30/2023  4:23 PM [place], [address] [facility] Portal vein thrombosis       ED Disposition     ED Disposition   Transfer to Another Facility-In Network    Condition   --    Date/Time   Thu Mar 30, 2023  6:29 PM    Comment   Vandana Kelly should be transferred out to [facility]             MD Documentation    Adwoa Spencer Most Recent Value   Patient Condition The patient has been stabilized such that within reasonable medical probability, no material deterioration of the patient condition or the condition of the unborn child(abbi) is likely to result from the transfer   Reason for Transfer Level of Care needed not available at this facility   Benefits of Transfer Specialized equipment and/or services available at the receiving facility (Include comment)________________________  [vascular]   Risks of Transfer Potential for delay in receiving treatment, Possible worsening of condition or death during transfer, Potential deterioration of medical condition   Accepting Physician [address] Name, Timothy Oneill  [address]   Provider Certification General risk, such as traffic hazards, adverse weather conditions, rough terrain or turbulence, possible failure of equipment (including vehicle or aircraft), or consequences of actions of persons outside the control of the transport personnel, Risk of worsening condition RN Documentation    [address] Name, Corinna 6      Follow-up Information    None         Patient's Medications   Discharge Prescriptions    No medications on file       No discharge procedures on file      PDMP Review     None          ED Provider  Electronically Signed by           Wanda Garcia MD  03/30/23 9393

## 2023-03-30 NOTE — CONSULTS
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   CT of the abdomen and pelvis showed findings consistent with acute trombosis of the right and main portal veins extending to the portosplenic confluence and origin of the superior mesenteric vein with extensive edema in the central mesentery  Secondary bowel wall thickening of the duodenal sweep as well as the proximal colon  No pneumatosis to suggest bowel infarct  Pancreatic head enlargement likely reactive vs residual prior pancreatitis     No indication for emergent surgical intervention from the General Surgery standpoint at this time  Low suspicion for acute bowel ischemia as patient has remained hemodynamically stable and without complaints of abdominal pain  Similarly, lactic acid has remained non-elevated and there is only noted mild thickening of the duodenal sweep and proximal colon noted on imaging  On examination, abdomen also remains soft, non-distended, and completely non-tender to light or deep palpation at this time, no guarding or rebound, no peritoneal signs  Defer definitive management of extensive thrombosis to vascular team  Patient currently in process of being transferred down to [facility] for evaluation  Heparin gtt   Serial abdominal exams  Strict NPO at this time   Remainder of care per primary team      History of Present Illness     HPI:  Anirudh Napier is a [de-identified] y o  male who presents with failure to thrive  The patient's daughter is in the room and provides majority of history as she states her father is a poor historian secondary to dementia  Daughter states that her father had experiencing progressively worsening appetite over the last several days with simultaneous worsening weakness and confusion   She states that her sister brought him to his PCP's office earlier today but he was so weak he was unable to climb on the examination table and was subsequently referred to the ED  CT of the abdomen and pelvis showed extensive thrombosis of the right and main portal veins extending to the portosplenic confluence and origin of the SMV  At this time, the patient notes only some fatigue, he denies any current nausea, vomiting, or abdominal pain  He denies any pain associated with PO intake  The patient states he is only passing a small amount of gas, and that he cannot recall when his last bowel movement was  His daughter does note that he had previously called her two days ago with complaints of severe diarrhea and abdominal pain  The daughter states he took two tablets of imodium and the symptoms resolved afterwards  The patient denies noticing any bright red or dark blood in his stools  Prior surgical history on the abdomen include laparoscopic cholecystectomy  Review of Systems   Constitutional: Positive for fatigue  Negative for activity change, fever and unexpected weight change  HENT: Negative for congestion, sore throat, tinnitus, trouble swallowing and voice change  Eyes: Negative for photophobia, discharge and visual disturbance  Respiratory: Negative for apnea, cough, chest tightness, shortness of breath and wheezing  Cardiovascular: Negative for chest pain, palpitations and leg swelling  Gastrointestinal: Positive for abdominal pain and diarrhea  Negative for abdominal distention, nausea and vomiting  Endocrine: Negative for cold intolerance, polyphagia and polyuria  Genitourinary: Negative for decreased urine volume, difficulty urinating, flank pain, frequency, hematuria and urgency  Musculoskeletal: Negative for arthralgias, back pain, joint swelling and myalgias  Skin: Negative for color change, pallor, rash and wound     Neurological: Negative for dizziness, seizures, facial asymmetry, light-headedness and numbness  Psychiatric/Behavioral: Positive for confusion  Negative for agitation and behavioral problems  Historical Information   Past Medical History:   Diagnosis Date   • Alcoholism ([place] [address] ) 04/06/2018   • Alzheimer disease ([place] [address] )    • Anxiety    • Basal cell carcinoma 08/18/2020    Right nares, Dr Nereida White   • Basal cell carcinoma 06/03/2021    Right upper forehead   • Basal cell carcinoma (BCC) of skin of face 03/28/2022    Right nostril   • Bile duct disease    • Cancer [facility])     prostate, skin    • Cardiac arrest [facility])    • Cardiac arrest ([place] [address] ) 04/14/2018   • Choledocholithiasis    • Colon polyp    • Dementia (HCC)    • Depression    • Diverticulitis of colon    • Kidney stone    • Liver disease    • Malignant neoplasm of prostate ([place] [address] )    • Mood disorder ([place] [address] )    • Prostate cancer ([place] [address] )    • Right carotid bruit    • Seizure disorder ([place] [address] )    • Skin cancer      Past Surgical History:   Procedure Laterality Date   • CHOLECYSTECTOMY     • COLONOSCOPY     • ERCP N/A 03/09/2018    Procedure: ENDOSCOPIC RETROGRADE CHOLANGIOPANCREATOGRAPHY (ERCP); Surgeon: Isabelle Deluca MD;  [facility];  Service: Gastroenterology   • ERCP  04/13/2018    stenting   • ERCP N/A 06/08/2018    Procedure: ENDOSCOPIC RETROGRADE CHOLANGIOPANCREATOGRAPHY (ERCP); Surgeon: Isabelle Deluca MD;  [facility];  Service: Gastroenterology   • [facility]  08/18/2020    Right nares, Dr Nereida White   • [facility] SUR[facility]  06/30/2021    Right upper forehead; Dr Nereida White   • [address]  05/18/2022    [address]; Right nostril; Dr Peggy Smith   • [place] ERCP [address] BODY/STENT BILIARY/PANC DUCT N/A 04/13/2018    Procedure: ENDOSCOPIC RETROGRADE CHOLANGIOPANCREATOGRAPHY (ERCP);   Surgeon: Isabelle Deluca MD;  [facility];  Service: Gastroenterology   • [place] LAPAROSCOPY SURG CHOLECYSTECTOMY N/A 05/15/2018    Procedure: Kade Bro;  Surgeon: Suzanne Katz MD;  Location: [facility]; Service: General   • PROSTATE SURGERY     • TONSILLECTOMY       Social History   Social History     Substance and Sexual Activity   Alcohol Use No    Comment: former     Social History     Substance and Sexual Activity   Drug Use No     Social History     Tobacco Use   Smoking Status Former   • Packs/day: 1 00   • Years: 60 00   • Pack years: 60 00   • Types: Cigarettes   • Quit date: 3/19/2019   • Years since quittin 0   Smokeless Tobacco Never   Tobacco Comments    daily     Family History:   Family History   Problem Relation Age of Onset   • Cancer Mother    • Dementia Mother    • Alzheimer's disease Mother    • Sudden death Father        Meds/Allergies   PTA meds:   Prior to Admission Medications   Prescriptions Last Dose Informant Patient Reported? Taking?    Melatonin 3 MG CAPS   Yes Yes   Sig: Take 3 mg by mouth daily at bedtime Patient takes 1 1/2 mg tablets   Multiple Vitamins-Minerals (CENTRUM SILVER ULTRA MENS PO)   Yes Yes   Sig: Take 1 tablet by mouth daily   acetaminophen (TYLENOL) 500 mg tablet   Yes Yes   Sig: Take 500 mg by mouth as needed    donepezil (ARICEPT) 10 mg tablet   No Yes   Sig: TAKE 1 TABLET (10 MG TOTAL) BY MOUTH DAILY AT BEDTIME   fexofenadine (ALLEGRA) 60 MG tablet   Yes Yes   Sig: Take 60 mg by mouth daily   memantine (NAMENDA) 5 mg tablet   No Yes   Sig: TAKE 1 TABLET (5 MG TOTAL) BY MOUTH 2 (TWO) TIMES A DAY   rosuvastatin (CRESTOR) 5 mg tablet   No Yes   Sig: TAKE 1 TABLET (5 MG TOTAL) BY MOUTH DAILY   sertraline (ZOLOFT) 25 mg tablet   No Yes   Sig: TAKE 1 TABLET (25 MG TOTAL) BY MOUTH DAILY AT BEDTIME   tamsulosin (FLOMAX) 0 4 mg   No Yes   Sig: Take 1 capsule (0 4 mg total) by mouth daily with dinner      Facility-Administered Medications: None     No Known Allergies    Objective   First Vitals:   Blood Pressure: 123/71 (23 1232)  Pulse: 94 (23 1232)  Temperature: 97 8 °F (36 6 °C) (23 1232)  Respirations: 20 (23 1232)  SpO2: 97 % (23 1232)    Current Vitals:   Blood Pressure: 125/58 (03/30/23 1730)  Pulse: 86 (03/30/23 1730)  Temperature: 97 8 °F (36 6 °C) (03/30/23 1232)  Respirations: 22 (03/30/23 1730)  SpO2: 95 % (03/30/23 1730)      Intake/Output Summary (Last 24 hours) at 3/30/2023 1758  Last data filed at 3/30/2023 1417  Gross per 24 hour   Intake 500 ml   Output --   Net 500 ml       Invasive Devices     Peripheral Intravenous Line  Duration           Peripheral IV 03/30/23 Distal;Dorsal (posterior); Right Forearm <1 day    Peripheral IV 03/30/23 Right Antecubital <1 day                Physical Exam  Constitutional:       General: He is not in acute distress  Appearance: He is well-developed  He is not diaphoretic  HENT:      Head: Normocephalic and atraumatic  Right Ear: External ear normal       Left Ear: External ear normal       Mouth/Throat:      Pharynx: No oropharyngeal exudate  Eyes:      Conjunctiva/sclera: Conjunctivae normal       Pupils: Pupils are equal, round, and reactive to light  Neck:      Thyroid: No thyromegaly  Trachea: No tracheal deviation  Cardiovascular:      Rate and Rhythm: Normal rate and regular rhythm  Heart sounds: Normal heart sounds  No murmur heard  No friction rub  No gallop  Pulmonary:      Effort: Pulmonary effort is normal  No respiratory distress  Breath sounds: Normal breath sounds  No wheezing or rales  Chest:      Chest wall: No tenderness  Abdominal:      General: Bowel sounds are normal  There is no distension  Palpations: Abdomen is soft  Tenderness: There is no abdominal tenderness  There is no guarding or rebound  Comments: Abdomen soft, non-distended, normoactive bowel sounds, non-tender to light and deep palpation, no guarding or rebound, no peritoneal signs, prior scars from cholecystectomy well healed    Musculoskeletal:         General: No tenderness or deformity  Normal range of motion        Cervical back: Normal range of motion "and neck supple  Skin:     General: Skin is warm and dry  Coloration: Skin is not pale  Findings: No erythema or rash  Neurological:      Mental Status: He is alert and oriented to person, place, and time  Psychiatric:         Behavior: Behavior normal          Thought Content: Thought content normal          Lab Results: I have personally reviewed pertinent lab results      Recent Results (from the past 36 hour(s))   CBC and differential    Collection Time: 03/30/23  1:16 PM   Result Value Ref Range    WBC 23 47 (H) 4 31 - 10 16 Thousand/uL    RBC 5 26 3 88 - 5 62 Million/uL    Hemoglobin 14 9 12 0 - 17 0 g/dL    Hematocrit 44 3 36 5 - 49 3 %    MCV 84 82 - 98 fL    MCH 28 3 26 8 - 34 3 pg    MCHC 33 6 31 4 - 37 4 g/dL    RDW 14 5 11 6 - 15 1 %    MPV 9 7 8 9 - 12 7 fL    Platelets 488 877 - 829 Thousands/uL   Comprehensive metabolic panel    Collection Time: 03/30/23  1:16 PM   Result Value Ref Range    Sodium 127 (L) 135 - 147 mmol/L    Potassium 3 5 3 5 - 5 3 mmol/L    Chloride 87 (L) 96 - 108 mmol/L    CO2 27 21 - 32 mmol/L    ANION GAP 13 4 - 13 mmol/L    BUN 22 5 - 25 mg/dL    Creatinine 0 71 0 60 - 1 30 mg/dL    Glucose 122 65 - 140 mg/dL    Calcium 8 7 8 4 - 10 2 mg/dL    Corrected Calcium 9 4 8 3 - 10 1 mg/dL    AST 22 13 - 39 U/L    ALT 18 7 - 52 U/L    Alkaline Phosphatase 175 (H) 34 - 104 U/L    Total Protein 6 6 6 4 - 8 4 g/dL    Albumin 3 1 (L) 3 5 - 5 0 g/dL    Total Bilirubin 1 62 (H) 0 20 - 1 00 mg/dL    eGFR 88 ml/min/1 73sq m   Lipase    Collection Time: 03/30/23  1:16 PM   Result Value Ref Range    Lipase 29 11 - 82 u/L   HS Troponin 0hr (reflex protocol)    Collection Time: 03/30/23  1:16 PM   Result Value Ref Range    hs TnI 0hr 11 \"Refer to ACS Flowchart\"- see link ng/L   Manual Differential(PHLEBS Do Not Order)    Collection Time: 03/30/23  1:16 PM   Result Value Ref Range    Segmented % 94 (H) 43 - 75 %    Bands % 1 0 - 8 %    Lymphocytes % 3 (L) 14 - 44 %    Monocytes % 2 " "(L) 4 - 12 %    Eosinophils, % 0 0 - 6 %    Basophils % 0 0 - 1 %    Absolute Neutrophils 22 30 (H) 1 85 - 7 62 Thousand/uL    Lymphocytes Absolute 0 70 0 60 - 4 47 Thousand/uL    Monocytes Absolute 0 47 0 00 - 1 22 Thousand/uL    Eosinophils Absolute 0 00 0 00 - 0 40 Thousand/uL    Basophils Absolute 0 00 0 00 - 0 10 Thousand/uL    Total Counted      RBC Morphology Normal     Platelet Estimate Adequate Adequate   ECG 12 lead    Collection Time: 03/30/23  1:34 PM   Result Value Ref Range    Ventricular Rate 93 BPM    Atrial Rate 93 BPM    RI Interval 128 ms    QRSD Interval 88 ms    QT Interval 378 ms    QTC Interval 469 ms    P Axis 58 degrees    QRS Axis 65 degrees    T Wave Axis 22 degrees   UA w Reflex to Microscopic w Reflex to Culture    Collection Time: 03/30/23  2:14 PM    Specimen: Urine, Clean Catch   Result Value Ref Range    Color, UA Yellow     Clarity, UA Clear     Specific Gravity, UA 1 038 (H) 1 003 - 1 030    pH, UA 6 5 4 5, 5 0, 5 5, 6 0, 6 5, 7 0, 7 5, 8 0    Leukocytes, UA Negative Negative    Nitrite, UA Negative Negative    Protein, UA Trace (A) Negative mg/dl    Glucose, UA Negative Negative mg/dl    Ketones, UA 40 (2+) (A) Negative mg/dl    Urobilinogen, UA 8 0 (A) <2 0 mg/dl mg/dl    Bilirubin, UA Negative Negative    Occult Blood, UA Small (A) Negative   Urine Microscopic    Collection Time: 03/30/23  2:14 PM   Result Value Ref Range    RBC, UA 20-30 (A) None Seen, 1-2 /hpf    WBC, UA 1-2 None Seen, 1-2 /hpf    Epithelial Cells None Seen None Seen, Occasional /hpf    Bacteria, UA None Seen None Seen, Occasional /hpf    MUCUS THREADS Occasional (A) None Seen   HS Troponin I 2hr    Collection Time: 03/30/23  3:55 PM   Result Value Ref Range    hs TnI 2hr 10 \"Refer to ACS Flowchart\"- see link ng/L    Delta 2hr hsTnI -1 <20 ng/L   APTT    Collection Time: 03/30/23  4:52 PM   Result Value Ref Range    PTT 36 23 - 37 seconds   Protime-INR    Collection Time: 03/30/23  4:52 PM   Result Value Ref " "Range    Protime 15 7 (H) 11 6 - 14 5 seconds    INR 1 27 (H) 0 84 - 1 19   HS Troponin I 4hr    Collection Time: 03/30/23  5:14 PM   Result Value Ref Range    hs TnI 4hr 11 \"Refer to ACS Flowchart\"- see link ng/L    Delta 4hr hsTnI 0 <20 ng/L   Lactic acid    Collection Time: 03/30/23  5:25 PM   Result Value Ref Range    LACTIC ACID 0 6 0 5 - 2 0 mmol/L   Lipase    Collection Time: 03/30/23  5:25 PM   Result Value Ref Range    Lipase 56 11 - 82 u/L     Imaging: I have personally reviewed pertinent reports  CT head without contrast    Result Date: 3/30/2023  Impression: No acute intracranial abnormality  Workstation performed: [id number]     CT abdomen pelvis with contrast    Result Date: 3/30/2023  Impression: Significant image quality degradation secondary to motion artifact  Findings consistent with 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 extending to the portosplenic confluence and origin of the superior mesenteric vein  Extensive edema in the central mesentery  Secondary bowel wall thickening of the duodenal sweep as well as the proximal colon  No pneumatosis to suggest bowel infarct  Pancreatic head enlargement, likely reactive in the setting of normal lipase versus residual prior pancreatitis  I personally discussed this study with Dr Ebony Jalloh on 3/30/2023 at 3:38 PM   Workstation performed: [id number]       EKG, Pathology, and Other Studies: I have personally reviewed pertinent reports        "

## 2023-03-30 NOTE — EMTALA/ACUTE CARE TRANSFER
[address] [facility] [address]  [address] [facility] [address]  Dept: [phone]      IDAISP TRANSFER CONSENT    NAME Destiney Bolden                                         1940                              MRN [id number]    I have been informed of my rights regarding examination, treatment, and transfer   by Dr Emerita Bird MD    Benefits: Specialized equipment and/or services available at the receiving facility (Include comment)________________________ (vascular)    Risks: Potential for delay in receiving treatment, Possible worsening of condition or death during transfer, Potential deterioration of medical condition      Consent for Transfer:  I acknowledge that my medical condition has been evaluated and explained to me by the emergency department physician or other qualified medical person and/or my attending physician, who has recommended that I be transferred to the service of  Accepting Physician: Octavia Nobles at [address] Name, [address] : [place]  The above potential benefits of such transfer, the potential risks associated with such transfer, and the probable risks of not being transferred have been explained to me, and I fully understand them  The doctor has explained that, in my case, the benefits of transfer outweigh the risks  I agree to be transferred  I authorize the performance of emergency medical procedures and treatments upon me in both transit and upon arrival at the receiving facility  Additionally, I authorize the release of any and all medical records to the receiving facility and request they be transported with me, if possible  I understand that the safest mode of transportation during a medical emergency is an ambulance and that the Hospital advocates the use of this mode of transport   Risks of traveling to the receiving facility by car, including absence of medical control, life sustaining equipment, such as oxygen, and medical personnel has been explained to me and I fully understand them  (ISABELLA CORRECT BOX BELOW)  [  ]  I consent to the stated transfer and to be transported by ambulance/helicopter  [  ]  I consent to the stated transfer, but refuse transportation by ambulance and accept full responsibility for my transportation by car  I understand the risks of non-ambulance transfers and I exonerate the Hospital and its staff from any deterioration in my condition that results from this refusal     X___________________________________________    DATE  23  TIME________  Signature of patient or legally responsible individual signing on patient behalf           RELATIONSHIP TO PATIENT_________________________          Provider Certification    NAME Lolly Espinoza                                        [facility] 1940                              MRN [id number]    A medical screening exam was performed on the above named patient  Based on the examination:    Condition Necessitating Transfer The primary encounter diagnosis was Portal vein thrombosis  Diagnoses of Right upper quadrant abdominal pain, Diarrhea, Confusion, and Dehydration were also pertinent to this visit      Patient Condition: The patient has been stabilized such that within reasonable medical probability, no material deterioration of the patient condition or the condition of the unborn child(abbi) is likely to result from the transfer    Reason for Transfer: Level of Care needed not available at this facility    Transfer Requirements: [facility]   · Space available and qualified personnel available for treatment as acknowledged by    · Agreed to accept transfer and to provide appropriate medical treatment as acknowledged by       Silvia Torres  · Appropriate medical records of the examination and treatment of the patient are provided at the time of transfer   [address] _______  · Transfer will be performed by qualified personnel from and appropriate transfer equipment as required, including the use of necessary and appropriate life support measures  Provider Certification: I have examined the patient and explained the following risks and benefits of being transferred/refusing transfer to the patient/family:  General risk, such as traffic hazards, adverse weather conditions, rough terrain or turbulence, possible failure of equipment (including vehicle or aircraft), or consequences of actions of persons outside the control of the transport personnel, Risk of worsening condition      Based on these reasonable risks and benefits to the patient and/or the unborn child(abbi), and based upon the information available at the time of the patient’s examination, I certify that the medical benefits reasonably to be expected from the provision of appropriate medical treatments at another medical facility outweigh the increasing risks, if any, to the individual’s medical condition, and in the case of labor to the unborn child, from effecting the transfer      X____________________________________________ DATE 03/30/23        TIME_______      ORIGINAL - SEND TO MEDICAL RECORDS   COPY - SEND WITH PATIENT DURING TRANSFER

## 2023-03-30 NOTE — ED NOTES
Patient made aware urine sample is needed  Patient reports he is unable to provide at this time and will attempt after fluid bolus is finished        Colette Moya RN  03/30/23 5039

## 2023-03-31 VITALS
HEART RATE: 101 BPM | OXYGEN SATURATION: 95 % | TEMPERATURE: 97.8 F | DIASTOLIC BLOOD PRESSURE: 70 MMHG | SYSTOLIC BLOOD PRESSURE: 126 MMHG | RESPIRATION RATE: 20 BRPM

## 2023-03-31 PROBLEM — I81 PORTAL VEIN THROMBOSIS: Status: ACTIVE | Noted: 2023-03-31

## 2023-03-31 PROBLEM — E87.1 HYPONATREMIA: Status: ACTIVE | Noted: 2023-03-31

## 2023-03-31 PROBLEM — G93.41 ACUTE METABOLIC ENCEPHALOPATHY: Status: ACTIVE | Noted: 2023-03-31

## 2023-03-31 PROBLEM — R26.2 AMBULATORY DYSFUNCTION: Status: ACTIVE | Noted: 2023-03-31

## 2023-03-31 LAB
ANION GAP SERPL CALCULATED.3IONS-SCNC: 14 MMOL/L (ref 4–13)
APTT PPP: 36 SECONDS (ref 23–37)
BUN SERPL-MCNC: 20 MG/DL (ref 5–25)
CALCIUM SERPL-MCNC: 8.2 MG/DL (ref 8.4–10.2)
CHLORIDE SERPL-SCNC: 93 MMOL/L (ref 96–108)
CO2 SERPL-SCNC: 22 MMOL/L (ref 21–32)
CREAT SERPL-MCNC: 0.64 MG/DL (ref 0.6–1.3)
ERYTHROCYTE [DISTWIDTH] IN BLOOD BY AUTOMATED COUNT: 14.5 % (ref 11.6–15.1)
GFR SERPL CREATININE-BSD FRML MDRD: 92 ML/MIN/1.73SQ M
GLUCOSE SERPL-MCNC: 128 MG/DL (ref 65–140)
HCT VFR BLD AUTO: 38.8 % (ref 36.5–49.3)
HGB BLD-MCNC: 13.3 G/DL (ref 12–17)
LACTATE SERPL-SCNC: 0.6 MMOL/L (ref 0.5–2)
MCH RBC QN AUTO: 28.5 PG (ref 26.8–34.3)
MCHC RBC AUTO-ENTMCNC: 34.3 G/DL (ref 31.4–37.4)
MCV RBC AUTO: 83 FL (ref 82–98)
PLATELET # BLD AUTO: 239 THOUSANDS/UL (ref 149–390)
PMV BLD AUTO: 9.6 FL (ref 8.9–12.7)
POTASSIUM SERPL-SCNC: 3.3 MMOL/L (ref 3.5–5.3)
RBC # BLD AUTO: 4.66 MILLION/UL (ref 3.88–5.62)
SODIUM SERPL-SCNC: 129 MMOL/L (ref 135–147)
WBC # BLD AUTO: 22.17 THOUSAND/UL (ref 4.31–10.16)

## 2023-03-31 RX ORDER — MEMANTINE HYDROCHLORIDE 5 MG/1
5 TABLET ORAL 2 TIMES DAILY
Status: DISCONTINUED | OUTPATIENT
Start: 2023-03-31 | End: 2023-03-31 | Stop reason: HOSPADM

## 2023-03-31 RX ORDER — PRAVASTATIN SODIUM 40 MG
40 TABLET ORAL
Status: DISCONTINUED | OUTPATIENT
Start: 2023-04-01 | End: 2023-03-31

## 2023-03-31 RX ORDER — FENTANYL CITRATE 50 UG/ML
75 INJECTION, SOLUTION INTRAMUSCULAR; INTRAVENOUS ONCE
Status: DISCONTINUED | OUTPATIENT
Start: 2023-03-31 | End: 2023-03-31 | Stop reason: HOSPADM

## 2023-03-31 RX ORDER — TAMSULOSIN HYDROCHLORIDE 0.4 MG/1
0.4 CAPSULE ORAL
Status: DISCONTINUED | OUTPATIENT
Start: 2023-03-31 | End: 2023-03-31

## 2023-03-31 RX ORDER — DONEPEZIL HYDROCHLORIDE 5 MG/1
10 TABLET, FILM COATED ORAL
Status: DISCONTINUED | OUTPATIENT
Start: 2023-03-31 | End: 2023-03-31 | Stop reason: HOSPADM

## 2023-03-31 RX ORDER — LANOLIN ALCOHOL/MO/W.PET/CERES
3 CREAM (GRAM) TOPICAL
Status: DISCONTINUED | OUTPATIENT
Start: 2023-03-31 | End: 2023-03-31 | Stop reason: HOSPADM

## 2023-03-31 RX ORDER — SODIUM CHLORIDE 9 MG/ML
50 INJECTION, SOLUTION INTRAVENOUS CONTINUOUS
Status: DISCONTINUED | OUTPATIENT
Start: 2023-03-31 | End: 2023-03-31 | Stop reason: HOSPADM

## 2023-03-31 RX ORDER — MEMANTINE HYDROCHLORIDE 5 MG/1
5 TABLET ORAL 2 TIMES DAILY
Status: DISCONTINUED | OUTPATIENT
Start: 2023-03-31 | End: 2023-03-31

## 2023-03-31 RX ADMIN — HEPARIN SODIUM 5600 UNITS: 1000 INJECTION INTRAVENOUS; SUBCUTANEOUS at 07:25

## 2023-03-31 RX ADMIN — SERTRALINE HYDROCHLORIDE 25 MG: 50 TABLET ORAL at 01:36

## 2023-03-31 RX ADMIN — MELATONIN TAB 3 MG 3 MG: 3 TAB at 01:37

## 2023-03-31 RX ADMIN — DONEPEZIL HYDROCHLORIDE 10 MG: 5 TABLET, FILM COATED ORAL at 01:37

## 2023-03-31 RX ADMIN — SODIUM CHLORIDE 50 ML/HR: 0.9 INJECTION, SOLUTION INTRAVENOUS at 03:34

## 2023-03-31 NOTE — ED NOTES
P/u at [address] [facility]  Room 562  Dr Lamin Dugan accepting  [facility] [address], [address] [facility]  03/31/23 3863

## 2023-03-31 NOTE — CONSULTS
[address]  Consult  Name: Laz Lopez  MRN: [id number]  Unit/Bed#: ED 20 I Date of Admission: 3/30/2023   Date of Service: 3/31/2023 I Hospital Day: 0    Consult to internal medicine  Consult performed by: Jaimie Stone PA-C  Consult ordered by: Toshia Newman MD          Assessment/Plan   * Portal vein thrombosis  Assessment & Plan  · CT abdomen pelvis revealing acute thrombosis of right and main portal vein extending from portal splenic confluence and origin of superior mesenteric vein, no pneumatosis to suspect bowel infarct  · Per ED provider, discussed with vascular surgery who recommended transfer to [address] [place] [address] for further management, awaiting transfer  · Continue IV heparin drip with protocol as recommended by vascular surgery team  · Of note general surgery saw patient in consult in the ED who reports low suspicion of bowel ischemia at this time based on exam and negative lactic acid of 0 6, no surgical intervention at this time  · Based on general surgery recommendation keep patient strict n p o   · Recheck lactic in the morning    Acute metabolic encephalopathy  Assessment & Plan  · Worsening from baseline, poor oral intake most likely secondary to combination of acute thrombosis of portal vein and dementia  · CT head negative for acute change  · See plans under dementia and portal vein thrombosis    Hyponatremia  Assessment & Plan  · Sodium decreased at 127 most likely in setting of dehydration  · Start gentle IV fluid hydration at 50 mL/h over the next 10 hours  · Recheck BMP in a m      Dementia associated with 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 ([facility] )  Assessment & Plan  · Fall precautions, delirium precautions with lights off at night, lights on during the day, ambulatory with assistance during the day, provide with frequent verbal redirection  · Continue home p o  medications including Aricept, Namenda, Zoloft         VTE Prophylaxis:   IV "heparin drip    Recommendations for Discharge:  · Pending transfer to [facility] for vascular surgery management    Total Time Spent on Date of Encounter in care of patient: 45 minutes This time was spent on one or more of the following: performing physical exam; counseling and coordination of care; obtaining or reviewing history; documenting in the medical record; reviewing/ordering tests, medications or procedures; communicating with other healthcare professionals and discussing with patient's family/caregivers  Collaboration of Care: Were Recommendations Directly Discussed with Primary Treatment Team? Yes    History of Present Illness:  Rocky Choi is a [de-identified] y o  male who is originally admitted to the ED service due to worsening altered mental status from baseline, poor oral intake, concern for dehydration  We are consulted for medical management pending transfer to [facility] for further treatment for portal vein thrombosis with vascular surgery  Patient on approach is alert and oriented x1 to name only  Disoriented to place, time, situation  Patient reports his only complaint is he is upset he was sent \"here\", though, he reports he does not know where he is  He also reports he is thirsty  Currently denies any abdominal pain, chest pain, shortness of breath, nausea, vomiting  Review of Systems:  Review of Systems   Reason unable to perform ROS: Limited due to dementia  Respiratory: Negative for shortness of breath  Cardiovascular: Negative for chest pain  Gastrointestinal: Negative for abdominal pain, nausea and vomiting         Past Medical and Surgical History:   Past Medical History:   Diagnosis Date   • Alcoholism ([facility] ) 04/06/2018   • Alzheimer disease ([facility] )    • Anxiety    • Basal cell carcinoma 08/18/2020    Dr Christa Berrios   • Basal cell carcinoma 06/03/2021    Right upper forehead   • Basal cell carcinoma (BCC) of skin of face 03/28/2022    Right nostril   • Bile duct " disease    • Cancer [facility])     prostate, skin    • Cardiac arrest [facility])    • Cardiac arrest ([facility] ) 04/14/2018   • Choledocholithiasis    • Colon polyp    • Dementia (HCC)    • Depression    • Diverticulitis of colon    • Kidney stone    • Liver disease    • Malignant neoplasm of prostate ([facility] )    • Mood disorder ([facility] )    • Prostate cancer ([facility] )    • Right carotid bruit    • Seizure disorder [facility])    • Skin cancer        Past Surgical History:   Procedure Laterality Date   • CHOLECYSTECTOMY     • COLONOSCOPY     • ERCP N/A 03/09/2018    Procedure: ENDOSCOPIC RETROGRADE CHOLANGIOPANCREATOGRAPHY (ERCP); Surgeon: Isi Chirinos MD;  Location: [facility];  Service: Gastroenterology   • ERCP  04/13/2018    stenting   • ERCP N/A 06/08/2018    Procedure: ENDOSCOPIC RETROGRADE CHOLANGIOPANCREATOGRAPHY (ERCP); Surgeon: Isi Chirinos MD;  Location: [facility];  Service: Gastroenterology   [facility]  [facility]    Right nares, Dr Luigi Martinez   • [facility]  06/30/2021    Right upper forehead; Dr Luigi Martinez   • [address]  05/18/2022    [address]  [employer]; Right nostril; Dr Yaakov Gonzalez   • ID ERCP [address] BODY/STENT BILIARY/PANC DUCT N/A 04/13/2018    Procedure: ENDOSCOPIC RETROGRADE CHOLANGIOPANCREATOGRAPHY (ERCP);   Surgeon: Isi Chirinos MD;  Location: [facility];  Service: Gastroenterology   • ID LAPAROSCOPY SURG CHOLECYSTECTOMY N/A 05/15/2018    Procedure: Becky Kumar;  Surgeon: Mendel Priestly, MD;  Location: [facility];  Service: General   • PROSTATE SURGERY     • TONSILLECTOMY         Meds/Allergies:  all medications and allergies reviewed    Allergies: No Known Allergies    Social History:  Marital Status:   Substance Use History:   Social History     Substance and Sexual Activity   Alcohol Use No    Comment: former     Social History     Tobacco Use   Smoking Status Former   • Packs/day: 1 00   • Years: 60 00   • Pack years: 60 00   • Types: Cigarettes   • Quit date: 3/19/2019   • Years since quittin 0   Smokeless Tobacco Never   Tobacco Comments    daily     Social History     Substance and Sexual Activity   Drug Use No       Family History:  Family History   Problem Relation Age of Onset   • Cancer Mother    • Dementia Mother    • Alzheimer's disease Mother    • Sudden death Father        Physical Exam:   Vitals:   Blood Pressure: 143/72 (23 004)  Pulse: 104 (23)  Temperature: 97 8 °F (36 6 °C) (23 1232)  Respirations: 20 (23 004)  SpO2: 95 % (23)    Physical Exam  Vitals and nursing note reviewed  Constitutional:       General: He is not in acute distress  Appearance: Normal appearance  He is not diaphoretic  HENT:      Head: Normocephalic  Cardiovascular:      Rate and Rhythm: Normal rate and regular rhythm  Heart sounds: Normal heart sounds  Pulmonary:      Effort: Pulmonary effort is normal  No respiratory distress  Breath sounds: Normal breath sounds  Abdominal:      General: Abdomen is flat  Bowel sounds are normal  There is no distension  Palpations: Abdomen is soft  Tenderness: There is no abdominal tenderness  There is no guarding  Musculoskeletal:         General: No tenderness  Right lower leg: No edema  Left lower leg: No edema  Skin:     General: Skin is warm and dry  Neurological:      General: No focal deficit present  Mental Status: He is alert  He is disoriented     Psychiatric:         Mood and Affect: Mood normal           Additional Data:   Lab Results:    Results from last 7 days   Lab Units 23  1316   WBC Thousand/uL 23 47*   HEMOGLOBIN g/dL 14 9   HEMATOCRIT % 44 3   PLATELETS Thousands/uL 290   BANDS PCT % 1   LYMPHO PCT % 3*   MONO PCT % 2*   EOS PCT % 0     Results from last 7 days   Lab Units 23  1316   SODIUM mmol/L 127*   POTASSIUM mmol/L 3 5   CHLORIDE mmol/L 87*   CO2 mmol/L 27   BUN mg/dL 22   CREATININE mg/dL 0 71   ANION GAP mmol/L 13   CALCIUM mg/dL 8  7   ALBUMIN g/dL 3 1*   TOTAL BILIRUBIN mg/dL 1 62*   ALK PHOS U/L 175*   ALT U/L 18   AST U/L 22   GLUCOSE RANDOM mg/dL 122     Results from last 7 days   Lab Units 03/30/23  1652   INR  1 27*         Lab Results   Component Value Date/Time    HGBA1C 5 7 (H) 08/23/2022 08:11 AM    HGBA1C 5 7 (H) 08/16/2021 11:01 AM    HGBA1C 5 5 07/09/2020 09:11 AM         Results from last 7 days   Lab Units 03/30/23  1725   LACTIC ACID mmol/L 0 6       Imaging: Reviewed radiology reports from this admission including: abdominal/pelvic CT  CT abdomen pelvis with contrast   Final Result by Britni Crawford MD (03/30 4339)      Significant image quality degradation secondary to motion artifact  Findings consistent with 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 extending to the portosplenic confluence and origin of the superior mesenteric vein  Extensive edema in the central mesentery  Secondary bowel wall thickening of the duodenal    sweep as well as the proximal colon  No pneumatosis to suggest bowel infarct  Pancreatic head enlargement, likely reactive in the setting of normal lipase versus residual prior pancreatitis  I personally discussed this study with Dr Yecenia Pruitt on 3/30/2023 at 3:38 PM                       Workstation performed: [id number]         CT head without contrast   Final Result by Julius Willams MD (03/30 3085)      No acute intracranial abnormality  Workstation performed: [id number]             EKG, Pathology, and Other Studies Reviewed on Admission:   · EKG: Prolonged QTc, sinus rhythm with PACs    ** Please Note: This note may have been constructed using a voice recognition system   **

## 2023-03-31 NOTE — ED NOTES
Patient's bed alarm sounding, RN enters room  Patient trying to get out of bed  Pt's IV noted to be pulled out of arm  Dressing applied to site  Pt redirected back to bed  Will continue to monitor patient        Sil Johnson RN  03/31/23 6888

## 2023-03-31 NOTE — ASSESSMENT & PLAN NOTE
· Fall precautions, delirium precautions with lights off at night, lights on during the day, ambulatory with assistance during the day, provide with frequent verbal redirection  · Due to being strict n p o  for now would hold off on p o  medications including home Aricept, Namenda, Zoloft

## 2023-03-31 NOTE — ED NOTES
Patient's daughter Blanca Roman) notified of patient's transfer time to [facility] as well as the room number patient is assigned to        Angie Melgoza RN  03/31/23 8218

## 2023-03-31 NOTE — ASSESSMENT & PLAN NOTE
· CT abdomen pelvis revealing acute thrombosis of right and main portal vein extending from portal splenic confluence and origin of superior mesenteric vein, no pneumatosis to suspect bowel infarct  · Per ED provider, discussed with vascular surgery who recommended transfer to [facility] for further management, awaiting transfer  · Continue IV heparin drip with protocol as recommended by vascular surgery team  · Of note general surgery saw patient in consult in the ED who reports low suspicion of bowel ischemia at this time based on exam and negative lactic acid of 0 6, no surgical intervention at this time  · Based on general surgery recommendation keep patient strict n p o   · Recheck lactic in the morning

## 2023-03-31 NOTE — ASSESSMENT & PLAN NOTE
· Worsening from baseline, poor oral intake most likely secondary to combination of acute thrombosis of portal vein and dementia  · CT head negative for acute change  · See plans under dementia and portal vein thrombosis

## 2023-03-31 NOTE — ASSESSMENT & PLAN NOTE
· Sodium decreased at 127 most likely in setting of dehydration  · Start gentle IV fluid hydration at 50 mL/h over the next 10 hours  · Recheck BMP in a m

## 2023-03-31 NOTE — ASSESSMENT & PLAN NOTE
· CT abdomen pelvis revealing acute thrombosis of right and main portal vein extending from portal splenic confluence and origin of superior mesenteric vein, no pneumatosis to suspect bowel infarct  · Per ED provider, discussed with vascular surgery who recommended transfer to [facility] for further management, awaiting transfer  · Continue IV heparin drip with protocol as recommended by vascular surgery team  · Of note general surgery saw patient in consult in the ED who reports low suspicion of bowel ischemia at this time based on exam and negative lactic acid of 0 6, no surgical intervention at this time  · Based on general surgery recommendation keep patient strict n p o   · Recheck lactic in the morning

## 2023-03-31 NOTE — ASSESSMENT & PLAN NOTE
· Fall precautions, delirium precautions with lights off at night, lights on during the day, ambulatory with assistance during the day, provide with frequent verbal redirection  · Continue home p o  medications including Aricept, Namenda, Zoloft

## 2023-03-31 NOTE — ED NOTES
Patient attempting to get out of bed, despite being attached to cardiac monitor  Pt redirected back to bed and bed alarm placed on bed at this time          Ceferino Frazier RN  03/31/23 0025

## 2023-04-01 PROBLEM — R06.6 HICCUPS: Status: ACTIVE | Noted: 2023-04-01

## 2023-04-01 PROBLEM — D72.829 LEUKOCYTOSIS: Status: ACTIVE | Noted: 2023-04-01

## 2023-04-01 PROBLEM — J96.01 ACUTE RESPIRATORY FAILURE WITH HYPOXIA (HCC): Status: ACTIVE | Noted: 2023-04-01

## 2023-04-03 PROBLEM — E44.0 MODERATE PROTEIN-CALORIE MALNUTRITION (HCC): Status: ACTIVE | Noted: 2023-04-03

## 2023-04-03 PROBLEM — R13.10 DYSPHAGIA: Status: ACTIVE | Noted: 2023-04-03

## 2023-04-05 ENCOUNTER — ANESTHESIA (INPATIENT)
Dept: GASTROENTEROLOGY | Facility: HOSPITAL | Age: 83
End: 2023-04-05

## 2023-04-05 ENCOUNTER — ANESTHESIA EVENT (INPATIENT)
Dept: GASTROENTEROLOGY | Facility: HOSPITAL | Age: 83
End: 2023-04-05

## 2023-04-05 RX ORDER — SODIUM CHLORIDE 9 MG/ML
INJECTION, SOLUTION INTRAVENOUS CONTINUOUS PRN
Status: DISCONTINUED | OUTPATIENT
Start: 2023-04-05 | End: 2023-04-05

## 2023-04-05 RX ORDER — LIDOCAINE HYDROCHLORIDE 10 MG/ML
INJECTION, SOLUTION EPIDURAL; INFILTRATION; INTRACAUDAL; PERINEURAL AS NEEDED
Status: DISCONTINUED | OUTPATIENT
Start: 2023-04-05 | End: 2023-04-05

## 2023-04-05 RX ORDER — PROPOFOL 10 MG/ML
INJECTION, EMULSION INTRAVENOUS AS NEEDED
Status: DISCONTINUED | OUTPATIENT
Start: 2023-04-05 | End: 2023-04-05

## 2023-04-05 RX ORDER — PROPOFOL 10 MG/ML
INJECTION, EMULSION INTRAVENOUS CONTINUOUS PRN
Status: DISCONTINUED | OUTPATIENT
Start: 2023-04-05 | End: 2023-04-05

## 2023-04-05 RX ADMIN — PROPOFOL 120 MCG/KG/MIN: 10 INJECTION, EMULSION INTRAVENOUS at 12:28

## 2023-04-05 RX ADMIN — PROPOFOL 70 MG: 10 INJECTION, EMULSION INTRAVENOUS at 12:27

## 2023-04-05 RX ADMIN — SODIUM CHLORIDE: 0.9 INJECTION, SOLUTION INTRAVENOUS at 12:22

## 2023-04-05 RX ADMIN — LIDOCAINE HYDROCHLORIDE 50 MG: 10 INJECTION, SOLUTION EPIDURAL; INFILTRATION; INTRACAUDAL; PERINEURAL at 12:27

## 2023-04-05 RX ADMIN — PROPOFOL 30 MG: 10 INJECTION, EMULSION INTRAVENOUS at 12:31

## 2023-04-05 NOTE — ANESTHESIA PREPROCEDURE EVALUATION
"Procedure:  EGD    Relevant Problems   ANESTHESIA  Hx arrest on emergence- \"2018 on emergence from anesthesia following ERCP, thought to be respiratory in etiology but still no definitive results, no further issues in following anesthetics\"      CARDIO   (+) Borderline hyperlipidemia   (+) Mixed hyperlipidemia      GI/HEPATIC   (+) Dysphagia      /RENAL   (+) Malignant neoplasm of prostate (HCC)      NEURO/PSYCH   (+) Dementia associated with 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 (HCC)   (+) H/O prostate cancer      Other   (+) Moderate protein-calorie malnutrition (HCC)     No Known Allergies  Current Outpatient Medications   Medication Instructions   • acetaminophen (TYLENOL) 500 mg, Oral, As needed   • apixaban (Eliquis) 5 mg Take 2 tablets (10 mg total) by mouth 2 (two) times a day for 7 days, THEN 1 tablet (5 mg total) 2 (two) times a day for 23 days     • donepezil (ARICEPT) 10 mg, Oral, Daily at bedtime   • fexofenadine (ALLEGRA) 60 mg, Oral, Daily   • Melatonin 3 mg, Oral, Daily at bedtime, Patient takes 1 1/2 mg tablets    • memantine (NAMENDA) 5 mg, Oral, 2 times daily   • Multiple Vitamins-Minerals (CENTRUM SILVER ULTRA MENS PO) 1 tablet, Oral, Daily   • rosuvastatin (CRESTOR) 5 mg, Oral, Daily   • sertraline (ZOLOFT) 25 mg, Oral, Daily at bedtime   • tamsulosin (FLOMAX) 0 4 mg, Oral, Daily with dinner   -never started eliquis  -now on heparin gtt for acute portal vein thrombosis/nonocclusive SMV thrombus      Lab Results   Component Value Date    WBC 16 94 (H) 04/04/2023    HGB 11 2 (L) 04/04/2023    HCT 35 1 (L) 04/04/2023     04/04/2023    SODIUM 129 (L) 04/04/2023    K 4 3 04/04/2023     04/04/2023    CO2 21 04/04/2023    BUN 12 04/04/2023    CREATININE 0 40 (L) 04/04/2023    GLUC 95 04/04/2023    HGBA1C 5 7 (H) 08/23/2022    AST 28 04/04/2023    ALT 40 04/04/2023    ALKPHOS 155 (H) 04/04/2023    TBILI 0 58 04/04/2023    ALB 1 6 (L) 04/04/2023    PROTIME 16 0 (H) " 03/31/2023    PTT 63 (H) 04/05/2023    INR 1 26 (H) 03/31/2023     Vitals:    04/05/23 1124   BP: (!) 139/43   Pulse: 71   Resp: 18   Temp: (!) 96 6 °F (35 9 °C)   SpO2: 96%     EKG 4/1/23  Sinus tachycardia with Premature supraventricular complexes  Nonspecific ST and T wave abnormality  Abnormal ECG  When compared with ECG of 01-APR-2023 18:30, (unconfirmed)  No significant change was found  Confirmed by José Miguel Packer ([id number]) on 4/1/2023 6:59:08 PM     Echo 4/16/2018  SUMMARY     LEFT VENTRICLE:  Ejection fraction was estimated to be 60 %  Although no diagnostic regional wall motion abnormality was identified, this possibility cannot be completely excluded on the basis of this study      AORTIC VALVE:  Can not exclude bicuspid aortic valve  There was mild regurgitation  The valve was not well visualized      TRICUSPID VALVE:  There was trace regurgitation      AORTA:  The root exhibited mild dilatation  4 0 cm  Physical Exam    Airway    Mallampati score: II  TM Distance: >3 FB  Neck ROM: full     Dental   Comment: Denies loose/chipped teeth,     Cardiovascular  Rhythm: regular, Rate: normal,     Pulmonary  Pulmonary exam normal Breath sounds clear to auscultation,     Other Findings        Anesthesia Plan  ASA Score- 3     Anesthesia Type- IV sedation with anesthesia with ASA Monitors  Additional Monitors:   Airway Plan:     Comment: O2 mask, natural airway, EtCO2 monitor    Plan Factors-Exercise tolerance (METS): >4 METS  Chart reviewed  Imaging results reviewed  Existing labs reviewed  Patient summary reviewed  Patient is not a current smoker  Induction- intravenous  Postoperative Plan-     Informed Consent- Anesthetic plan and risks discussed with patient  I personally reviewed this patient with the CRNA  Discussed and agreed on the Anesthesia Plan with the CRNA  Yen Duong

## 2023-04-05 NOTE — ANESTHESIA POSTPROCEDURE EVALUATION
Post-Op Assessment Note    CV Status:  Stable  Pain Score: 0    Pain management: adequate     Mental Status:  Sleepy and arousable   Hydration Status:  Stable   PONV Controlled:  None   Airway Patency:  Patent      Post Op Vitals Reviewed: Yes      Staff: CRNA         There were no known notable events for this encounter      BP  109/56   Temp 98   Pulse 71   Resp 17   SpO2 98

## 2023-04-06 ENCOUNTER — TELEPHONE (OUTPATIENT)
Dept: HEMATOLOGY ONCOLOGY | Facility: CLINIC | Age: 83
End: 2023-04-06

## 2023-04-06 NOTE — TELEPHONE ENCOUNTER
New Patient Intake Form   Patient Details:    Rocky Choi  1940    Appointment Information   Who is calling to schedule? Padilla Badillo   If not self, what is the caller's name? NA   DID YOU CONFIRM INSURANCE WITH PATIENT? E verified, Routed to finance   Referring provider Dr Kiya Gray   What is the diagnosis? Portal Vein Thrombosis     Is there a confirmed tissue diagnosis? NA     Is there a biopsy ordered or pending? Please specify dates  If yes, route to NN/OCC   NA     Is patient aware of diagnosis? Yes     Have you had any imaging or labs done? If yes, where? (If imaging done outside of [facility], please remind patient to bring a disk ) Yes, [facility]     If imaging done at outside facility, did you instruct patient to obtain discs and bring to visit? NA   Have you been seen by another Oncologist/Hematologist?  If so, who and where? No   Are the records in [facility] or Care Everywhere? Yes   Does the patient have records at another facility/hospital?    If yes, Name of facility, city and state where facility is located  NA     Did you instruct patient to have records faxed to [facility] and provide rightfax number? NA   Preferred [place]   Is the patient willing to be seen by another provider? (This is for breast patients only) NA     Did you send new patient paperwork? Email or mail? NA   Miscellaneous Information: The patient has been scheduled for a HFU appointment with Kalin Neal PA-C on 4/27 at 1300 in the [facility]

## 2023-04-27 ENCOUNTER — OFFICE VISIT (OUTPATIENT)
Dept: HEMATOLOGY ONCOLOGY | Facility: CLINIC | Age: 83
End: 2023-04-27
Payer: COMMERCIAL

## 2023-04-27 VITALS
HEIGHT: 67 IN | DIASTOLIC BLOOD PRESSURE: 74 MMHG | SYSTOLIC BLOOD PRESSURE: 134 MMHG | TEMPERATURE: 98 F | BODY MASS INDEX: 22.13 KG/M2 | OXYGEN SATURATION: 98 % | WEIGHT: 141 LBS | RESPIRATION RATE: 16 BRPM | HEART RATE: 89 BPM

## 2023-04-27 DIAGNOSIS — K55.069 SUPERIOR MESENTERIC VEIN THROMBOSIS (HCC): ICD-10-CM

## 2023-04-27 DIAGNOSIS — R74.8 ELEVATED ALKALINE PHOSPHATASE LEVEL: ICD-10-CM

## 2023-04-27 DIAGNOSIS — K86.1 CHRONIC PANCREATITIS, UNSPECIFIED PANCREATITIS TYPE (HCC): ICD-10-CM

## 2023-04-27 DIAGNOSIS — K86.9 PANCREATIC LESION: ICD-10-CM

## 2023-04-27 DIAGNOSIS — C61 MALIGNANT NEOPLASM OF PROSTATE (HCC): ICD-10-CM

## 2023-04-27 DIAGNOSIS — I81 PORTAL VEIN THROMBOSIS: Primary | ICD-10-CM

## 2023-04-27 DIAGNOSIS — D72.829 LEUKOCYTOSIS, UNSPECIFIED TYPE: ICD-10-CM

## 2023-04-27 PROCEDURE — 99214 OFFICE O/P EST MOD 30 MIN: CPT | Performed by: PHYSICIAN ASSISTANT

## 2023-04-27 NOTE — PROGRESS NOTES
[facility] - Hematology & Medical Oncology  Outpatient Visit Encounter Note      Wanda Saul [de-identified] y o  male [id number] Date:  04/27/2023    HEMATOLOGICAL HISTORY        Clotting History SMV, portal vein thrombosis 2023 otherwise denies prior, no family hx   Bleeding History Denies   Cancer History Prostate cancer dx over 20 years ago Dr You Moreno s/p radiation + seeds no chemotherapy no resection of prostate at that time    Occupation Former         SUBJECTIVE      Wanda Saul is a [de-identified] y o  here for hospital follow up regarding portal vein and SMV thrombosis  Cholecystectomy 4 years ago in march stones were removed then went back in and flatline ended up taking the gallbladder out end of April 4 years ago     In review of the chart and talking with the patient, evaluated by hematology oncology team at [facility] 4/3/2023 with reports of patient being an 44-year-old male past medical history dementia with behavioral disturbance, alcohol abuse, prostate cancer, prior ERCP with stenting complicated by cardiac arrest 2019, status postcholecystectomy and stent removal, presented to emergency department at this time for reported confusion, abdominal pain, diarrhea, weight loss for about a week prior to admission  Hematology was consulted for portal vein and SMV thrombosis, vascular surgery was also consulted and appreciate their note states that they feel as though this is provoked in setting of hx ERCP &  cholecystectomy and stent along with more acute pancreatitis and patient was on heparin and transition to [address] upon discharge (Eliquis)  I reviewed the above history with patient and his daughter Beverly Moore, it appears as though patient may have had acute pancreatitis in the hospital but had delirium throughout this admission and could not report any pain associated with pancreatitis but daughter does note anorexia at this time which has been improving  "    Antiphospholipid antibodies, myeloproliferative work-up was ordered in the hospital   These have resulted and are negative for JAK2 with reflex, calor, MPL  Cardiolipin antibodies negative, beta-2 glycoprotein antibodies also negative  Patient reports that he is still residing at the nursing facility and he is taking what ever medication they gave him, daughter is sure that they are providing him with Eliquis 5 mg twice a day however daughter did drive patient to this appointment and I do not have a medication list   I asked her to please verify with the nursing facility that patient is currently taking this medication  I have refilled the prescription for the patient  He reports he is having no side effects to Eliquis, tolerating it well without any blood in stool or urine, dark tarry stool, excessive bruising  His ambulation is improved greatly, he is walking well with a walker and has not had a fall at the nursing facility per patient and per daughter  Currently denies chest pain, shortness of breath, abdominal pain, nausea, vomiting, diarrhea, constipation, extremity edema, gait changes  Denies constitutional symptoms  I have reviewed the relevant past medical, surgical, social and family history  I have also reviewed allergies and medications for this patient  Review of Systems  Review of Systems   All other systems reviewed and are negative          OBJECTIVE     Physical Exam  Vitals:    04/27/23 1300   BP: 134/74   BP Location: Left arm   Patient Position: Sitting   Cuff Size: Standard   Pulse: 89   Resp: 16   Temp: 98 °F (36 7 °C)   TempSrc: Temporal   SpO2: 98%   Weight: 64 kg (141 lb)   Height: 5' 7\" (1 702 m)       Physical Exam     Imaging  Relevant imaging reviewed in chart    CT Abdomen pelvis w contrast 3/30/2023  IMPRESSION:  Significant image quality degradation secondary to motion artifact      Findings consistent with 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 " extending to the portosplenic confluence and origin of the superior mesenteric vein  Extensive edema in the central mesentery  Secondary bowel wall thickening of the duodenal   sweep as well as the proximal colon  No pneumatosis to suggest bowel infarct  Pancreatic head enlargement, likely reactive in the setting of normal lipase versus residual prior pancreatitis  MRI abdomen w wo contrast and MRCP 4/2/2023  IMPRESSION:  1  Study limited by respiratory motion  Redemonstrated thrombosis of the SMV with nonocclusive thrombus involving the portal confluence extending into the main portal vein to the level of the intrahepatic portal vein bifurcation  Edematous infiltration   of the central mesentery again noted, similar to the recent CT study  Probable reactive enlargement of the pancreatic head without convincing evidence for pancreatitis or focal pancreatic mass within limitations  Continued cross-sectional imaging   follow-up advised in 6-8 weeks to reevaluate these findings unless clinically indicated sooner      2  No evidence of biliary obstruction and otherwise grossly unremarkable MRCP status post cholecystectomy      3  Trace abdominal ascites, likely reactive      4   Fatty liver        Labs  Relevant labs reviewed in chart     Component Ref Range & Units 4/6/23  4:37 AM 4/4/23  5:53 AM 4/3/23  5:05 AM 4/2/23  4:36 AM 4/1/23  2:58 AM 3/31/23  1:39 PM 3/31/23  4:45 AM   WBC 4 31 - 10 16 Thousand/uL 10 60 High   16 94 High   13 35 High   18 04 High   16 99 High   19 55 High   22 17 High     RBC 3 88 - 5 62 Million/uL 4 00  4 02  3 06 Low   4 09  4 14  4 50  4 66    Hemoglobin 12 0 - 17 0 g/dL 11 3 Low   11 2 Low   8 8 Low   11 3 Low   11 9 Low   12 6  13 3    Hematocrit 36 5 - 49 3 % 34 6 Low   35 1 Low   26 7 Low   35 1 Low   35 3 Low   38 4  38 8    MCV 82 - 98 fL 87  87  87  86  85  85  83    MCH 26 8 - 34 3 pg 28 3  27 9  28 8  27 6  28 7  28 0  28 5    MCHC 31 4 - 37 4 g/dL 32 7  31 9  33 0  32 2  33 7 32 8  34 3    RDW 11 6 - 15 1 % 14 7  14 9  15 0  14 7  14 5  14 6  14 5    MPV 8 9 - 12 7 fL 9 8  9 6  9 9  9 8  9 3  9 4  9 6    Platelets 717 - 181 Thousands/uL 332  279  207  220  213  220  239         Component Ref Range & Units 4/6/23  4:37 AM 3/30/23  1:16 PM 1/11/23  9:40 AM 11/26/22 11:09 AM 8/23/22  8:11 AM 8/16/21 11:01 AM 7/9/20  9:11 AM   Segmented % 43 - 75 % 85 High   94 High          Bands % 0 - 8 % 4  1         Lymphocytes % 14 - 44 % 4 Low   3 Low          Monocytes % 4 - 12 % 7  2 Low          Eosinophils, % 0 - 6 % 0  0  3  2  2  1  1    Basophils % 0 - 1 % 0  0  1  1  1  1  0    Absolute Neutrophils 1 85 - 7 62 Thousand/uL 9 43 High   22 30 High          Lymphocytes Absolute 0 60 - 4 47 Thousand/uL 0 42 Low   0 70         Monocytes Absolute 0 00 - 1 22 Thousand/uL 0 74  0 47         Eosinophils Absolute 0 00 - 0 40 Thousand/uL 0 00  0 00  0 17 R  0 13 R  0 08 R  0 05 R  0 05 R    Basophils Absolute 0 00 - 0 10 Thousand/uL 0 00  0 00  0 07 R  0 03 R  0 05 R  0 04 R  0 02 R    Total Counted            RBC Morphology  Present  Normal         Anisocytosis  Present          [place] Cells  Present          Poikilocytes  Present          Platelet Estimate Adequate Adequate  Adequate             ASSESSMENT & PLAN      Diagnosis ICD-10-CM Associated Orders   1  Portal vein thrombosis  I81 CBC and differential     Comprehensive metabolic panel     Gamma GT     CBC and differential     MRI abdomen w wo contrast     apixaban (Eliquis) 5 mg      2  Leukocytosis, unspecified type  D72 829 CBC and differential     CBC and differential      3  Elevated alkaline phosphatase level  R74 8 Gamma GT      4  Malignant neoplasm of prostate ([address] )  C61       5  Superior mesenteric vein thrombosis ([address] )  K55 069 MRI abdomen w wo contrast      6  Chronic pancreatitis, unspecified pancreatitis type ([address] )  K86 1 MRI abdomen w wo contrast      7   Pancreatic lesion  K86 9 MRI abdomen w wo contrast        [de-identified] y o  male presenting for evaluation of portal vein thrombosis  · Discussion  · Plan for 6 months of anticoagulation  · Would like to repeat CT abdomen pelvis approximately 3 months from date of having portal vein thrombosis which would be end of June/beginning of July  · Appears provoked and patient has fall risk and do not recommend indefinite anticoagulation for this patient  · Continue f/u with Dr You Moreno with urology   · Plan/Labs  · Labs as above, MRI abdomen in 4-6 weeks     Follow Up  • With me in office after MRI abdomen for further discussion       All questions were answered to the patient's satisfaction during this encounter  They appreciated and thanked me for spending time with them  The patient knows the contact information for our office and know to reach out for any relevant concerns related to this encounter  For all other listed problems and medical diagnosis in his chart - they are managed by PCP and/or other specialists, which patient acknowledges          Hematology & Medical Oncology

## 2023-05-26 ENCOUNTER — OFFICE VISIT (OUTPATIENT)
Dept: GASTROENTEROLOGY | Facility: CLINIC | Age: 83
End: 2023-05-26

## 2023-05-26 VITALS
BODY MASS INDEX: 22.91 KG/M2 | DIASTOLIC BLOOD PRESSURE: 83 MMHG | HEART RATE: 72 BPM | WEIGHT: 146 LBS | OXYGEN SATURATION: 97 % | HEIGHT: 67 IN | SYSTOLIC BLOOD PRESSURE: 137 MMHG

## 2023-05-26 DIAGNOSIS — I81 PORTAL VEIN THROMBOSIS: Primary | ICD-10-CM

## 2023-05-26 NOTE — PROGRESS NOTES
[facility] - Outpatient Follow-up Note  Arina Forte [de-identified] y o  male MRN: [id number]  Encounter: [id number]          ASSESSMENT AND PLAN:      1  Portal vein thrombosis  S/P admission at [facility] in April  No obvious source identified  He has f/u with hematology  He is maintained on Eliquis    Repeat CT is planned for 6/10 - will f/u on this    2  Dysphagia  Patient suffered aspiration pneumonia during admission  EGD showed esophagitis and esophageal ring  He is on Omeprazole 40mg daily   Speech/Swallow evaluation noted without obvious defects    ______________________________________________________________________    SUBJECTIVE: [de-identified] male with a history of dementia, hyperlipidemia, diverticulitis, colon polyps who presents for hospital follow-up  The patient was hospitalized in April at [facility] with a new portal vein clot  During his hospitalization he also suffered aspiration pneumonia  Gastroenterology was involved to assist in determining the source of the clot  There was no evidence of cirrhosis or malignancy  Hematology was involved without any obvious clotting defects  He is maintained on Eliquis 5 mg 2 times daily  He is following up in the outpatient setting with hematology  Work-up was initiated for dysphagia  He underwent an EGD with Dr Garcia Rough  This showed esophagitis and a ring in the esophagus  Biopsies were all benign  He was started on omeprazole 40 mg daily  He was also evaluated by speech and swallow who did not determine any obvious defects in oropharyngeal function  Since discharge the patient has been residing at [facility]  There does not seem to be any issues with his swallowing  Although he has dementia he specifically denies any problems  One of the main issues prior to the hospitalization was poor appetite and weight loss  Since discharge the patient has been eating well and gaining weight    His last colonoscopy was in October 2021 with 7 polyps removed  He was also noted to have severe diverticulosis  REVIEW OF SYSTEMS IS OTHERWISE NEGATIVE  Historical Information   Past Medical History:   Diagnosis Date   • Alcoholism ([place] [address] ) 04/06/2018   • Alzheimer disease ([place] [address] )    • Anxiety    • Basal cell carcinoma 08/18/2020    Right nares, Dr Radha Brooks   • Basal cell carcinoma 06/03/2021    Right upper forehead   • Basal cell carcinoma (BCC) of skin of face 03/28/2022    Right nostril   • Bile duct disease    • Cancer [facility])     prostate, skin    • Cardiac arrest [facility])    • Cardiac arrest ([place] [address] ) 04/14/2018   • Choledocholithiasis    • Colon polyp    • Dementia (HCC)    • Depression    • Diverticulitis of colon    • Kidney stone    • Liver disease    • Malignant neoplasm of prostate ([place] [address] )    • Mood disorder ([place] [address] )    • Prostate cancer ([place] [address] )    • Right carotid bruit    • Seizure disorder ([place] [address] )    • Skin cancer      Past Surgical History:   Procedure Laterality Date   • CHOLECYSTECTOMY     • COLONOSCOPY     • ERCP N/A 03/09/2018    Procedure: ENDOSCOPIC RETROGRADE CHOLANGIOPANCREATOGRAPHY (ERCP); Surgeon: Kamila Felipe MD;  Location: [facility];  Service: Gastroenterology   • ERCP  04/13/2018    stenting   • ERCP N/A 06/08/2018    Procedure: ENDOSCOPIC RETROGRADE CHOLANGIOPANCREATOGRAPHY (ERCP); Surgeon: Kamila Felipe MD;  Location: [facility];  Service: Gastroenterology   • [facility]  08/18/2020    Right nares, Dr Radha Brooks   • MOHS SURGERY  06/30/2021    Right upper forehead; Dr Radha Brooks   • [address]  05/18/2022    [address]  [employer]; Right nostril; Dr Hood Born   • [place] ERCP [address] BODY/STENT BILIARY/PANC DUCT N/A 04/13/2018    Procedure: ENDOSCOPIC RETROGRADE CHOLANGIOPANCREATOGRAPHY (ERCP);   Surgeon: Kamila Felipe MD;  Location: [facility];  Service: Gastroenterology   • [place] LAPAROSCOPY SURG CHOLECYSTECTOMY N/A 05/15/2018    Procedure: Alexa Rupesh;  Surgeon: Yogi Cintron MD;  Location: [facility];  Service: "General   • PROSTATE SURGERY     • TONSILLECTOMY       Social History   Social History     Substance and Sexual Activity   Alcohol Use Not Currently   • Alcohol/week: 10 0 standard drinks of alcohol   • Types: 10 Shots of liquor per week    Comment: Not since      Social History     Substance and Sexual Activity   Drug Use Never     Social History     Tobacco Use   Smoking Status Former   • Packs/day: 1 00   • Years: 60 00   • Total pack years: 60 00   • Types: Cigarettes, Pipe   • Start date: 1956   • Quit date: 3/19/2019   • Years since quittin 1   Smokeless Tobacco Never   Tobacco Comments    daily     Family History   Problem Relation Age of Onset   • Cancer Mother         Breast   • Dementia Mother    • Alzheimer's disease Mother    • Sudden death Father    • Alzheimer's disease Sister    • Swallowing difficulties Brother    • Breast cancer Daughter    • Skin cancer Daughter        Meds/Allergies       Current Outpatient Medications:   •  acetaminophen (TYLENOL) 500 mg tablet  •  apixaban (Eliquis) 5 mg  •  donepezil (ARICEPT) 10 mg tablet  •  fexofenadine (ALLEGRA) 60 MG tablet  •  Melatonin 3 MG CAPS  •  memantine (NAMENDA) 5 mg tablet  •  Multiple Vitamins-Minerals (CENTRUM SILVER ULTRA MENS PO)  •  omeprazole (PriLOSEC) 40 MG capsule  •  QUEtiapine (SEROquel) 25 mg tablet  •  rosuvastatin (CRESTOR) 5 mg tablet  •  sertraline (ZOLOFT) 25 mg tablet  •  tamsulosin (FLOMAX) 0 4 mg    No Known Allergies        Objective     Blood pressure 137/83, pulse 72, height 5' 7\" (1 702 m), weight 66 2 kg (146 lb), SpO2 97 %  Body mass index is 22 87 kg/m²  PHYSICAL EXAM:      General Appearance:   Alert, cooperative, no distress   HEENT:   Normocephalic, atraumatic, anicteric      Neck:  Supple, symmetrical, trachea midline   Lungs:   Clear to auscultation bilaterally; no rales, rhonchi or wheezing; respirations unlabored    Heart[de-identified]   Regular rate and rhythm; no murmur, rub, or gallop     Abdomen:   Soft, " non-tender, non-distended; normal bowel sounds; no masses, no organomegaly    Genitalia:   Deferred    Rectal:   Deferred    Extremities:  No cyanosis, clubbing or edema    Pulses:  2+ and symmetric    Skin:  No jaundice, rashes, or lesions    Lymph nodes:  No palpable cervical lymphadenopathy        Lab Results:   No visits with results within 1 Day(s) from this visit  Latest known visit with results is:   No results displayed because visit has over 200 results  Radiology Results:   No results found

## 2023-06-14 ENCOUNTER — TELEPHONE (OUTPATIENT)
Dept: HEMATOLOGY ONCOLOGY | Facility: CLINIC | Age: 83
End: 2023-06-14

## 2023-06-14 DIAGNOSIS — I81 PORTAL VEIN THROMBOSIS: Primary | ICD-10-CM

## 2023-06-14 NOTE — TELEPHONE ENCOUNTER
LVM to the patient in regards to rescheduling his follow up appt until after he completes his MRI  Informed patient I have rescheduled his appt to 7/21/23 at 11:30am  Informed patient if he needs anything in the meantime please call the office at [phone]

## 2023-06-27 ENCOUNTER — APPOINTMENT (OUTPATIENT)
Age: 83
End: 2023-06-27
Payer: COMMERCIAL

## 2023-06-27 DIAGNOSIS — I81 PORTAL VEIN THROMBOSIS: ICD-10-CM

## 2023-06-27 DIAGNOSIS — R74.8 ELEVATED ALKALINE PHOSPHATASE LEVEL: ICD-10-CM

## 2023-06-27 LAB
ALBUMIN SERPL BCP-MCNC: 3.7 G/DL (ref 3.5–5)
ALP SERPL-CCNC: 89 U/L (ref 46–116)
ALT SERPL W P-5'-P-CCNC: 39 U/L (ref 12–78)
ANION GAP SERPL CALCULATED.3IONS-SCNC: 5 MMOL/L
AST SERPL W P-5'-P-CCNC: 25 U/L (ref 5–45)
BASOPHILS # BLD AUTO: 0.07 THOUSANDS/ÂΜL (ref 0–0.1)
BASOPHILS NFR BLD AUTO: 2 % (ref 0–1)
BILIRUB SERPL-MCNC: 0.76 MG/DL (ref 0.2–1)
BUN SERPL-MCNC: 14 MG/DL (ref 5–25)
CALCIUM SERPL-MCNC: 9.2 MG/DL (ref 8.3–10.1)
CHLORIDE SERPL-SCNC: 109 MMOL/L (ref 96–108)
CO2 SERPL-SCNC: 24 MMOL/L (ref 21–32)
CREAT SERPL-MCNC: 1.1 MG/DL (ref 0.6–1.3)
EOSINOPHIL # BLD AUTO: 0.08 THOUSAND/ÂΜL (ref 0–0.61)
EOSINOPHIL NFR BLD AUTO: 2 % (ref 0–6)
ERYTHROCYTE [DISTWIDTH] IN BLOOD BY AUTOMATED COUNT: 14 % (ref 11.6–15.1)
GFR SERPL CREATININE-BSD FRML MDRD: 63 ML/MIN/1.73SQ M
GGT SERPL-CCNC: 14 U/L (ref 5–85)
GLUCOSE P FAST SERPL-MCNC: 106 MG/DL (ref 65–99)
HCT VFR BLD AUTO: 46.2 % (ref 36.5–49.3)
HGB BLD-MCNC: 15.3 G/DL (ref 12–17)
IMM GRANULOCYTES # BLD AUTO: 0.01 THOUSAND/UL (ref 0–0.2)
IMM GRANULOCYTES NFR BLD AUTO: 0 % (ref 0–2)
LYMPHOCYTES # BLD AUTO: 1.04 THOUSANDS/ÂΜL (ref 0.6–4.47)
LYMPHOCYTES NFR BLD AUTO: 23 % (ref 14–44)
MCH RBC QN AUTO: 29.9 PG (ref 26.8–34.3)
MCHC RBC AUTO-ENTMCNC: 33.1 G/DL (ref 31.4–37.4)
MCV RBC AUTO: 90 FL (ref 82–98)
MONOCYTES # BLD AUTO: 0.39 THOUSAND/ÂΜL (ref 0.17–1.22)
MONOCYTES NFR BLD AUTO: 9 % (ref 4–12)
NEUTROPHILS # BLD AUTO: 2.88 THOUSANDS/ÂΜL (ref 1.85–7.62)
NEUTS SEG NFR BLD AUTO: 64 % (ref 43–75)
NRBC BLD AUTO-RTO: 0 /100 WBCS
PLATELET # BLD AUTO: 174 THOUSANDS/UL (ref 149–390)
PMV BLD AUTO: 10.9 FL (ref 8.9–12.7)
POTASSIUM SERPL-SCNC: 4.1 MMOL/L (ref 3.5–5.3)
PROT SERPL-MCNC: 7 G/DL (ref 6.4–8.4)
RBC # BLD AUTO: 5.12 MILLION/UL (ref 3.88–5.62)
SODIUM SERPL-SCNC: 138 MMOL/L (ref 135–147)
WBC # BLD AUTO: 4.47 THOUSAND/UL (ref 4.31–10.16)

## 2023-06-27 PROCEDURE — 36415 COLL VENOUS BLD VENIPUNCTURE: CPT

## 2023-06-27 PROCEDURE — 85025 COMPLETE CBC W/AUTO DIFF WBC: CPT | Performed by: PHYSICIAN ASSISTANT

## 2023-06-27 PROCEDURE — 82977 ASSAY OF GGT: CPT

## 2023-06-27 PROCEDURE — 80053 COMPREHEN METABOLIC PANEL: CPT | Performed by: PHYSICIAN ASSISTANT

## 2023-06-28 ENCOUNTER — TELEPHONE (OUTPATIENT)
Dept: HEMATOLOGY ONCOLOGY | Facility: CLINIC | Age: 83
End: 2023-06-28

## 2023-06-28 DIAGNOSIS — R79.89 INCREASE IN CREATININE: Primary | ICD-10-CM

## 2023-06-28 NOTE — TELEPHONE ENCOUNTER
----- Message from Ponce Fuller PA-C sent at 6/28/2023 10:10 AM EDT -----  Patients kidney function worsened from prior, can you please have him repeat cmp next week? I will place order for him         Left voicemail for patient to call my TEAMs number to review

## 2023-06-30 ENCOUNTER — HOSPITAL ENCOUNTER (OUTPATIENT)
Dept: MRI IMAGING | Facility: HOSPITAL | Age: 83
End: 2023-06-30
Payer: COMMERCIAL

## 2023-06-30 DIAGNOSIS — K86.1 CHRONIC PANCREATITIS, UNSPECIFIED PANCREATITIS TYPE (HCC): ICD-10-CM

## 2023-06-30 DIAGNOSIS — K55.069 SUPERIOR MESENTERIC VEIN THROMBOSIS (HCC): ICD-10-CM

## 2023-06-30 DIAGNOSIS — I81 PORTAL VEIN THROMBOSIS: ICD-10-CM

## 2023-06-30 DIAGNOSIS — K86.9 PANCREATIC LESION: ICD-10-CM

## 2023-06-30 PROCEDURE — A9585 GADOBUTROL INJECTION: HCPCS | Performed by: PHYSICIAN ASSISTANT

## 2023-06-30 PROCEDURE — 74183 MRI ABD W/O CNTR FLWD CNTR: CPT

## 2023-06-30 PROCEDURE — G1004 CDSM NDSC: HCPCS

## 2023-06-30 RX ADMIN — GADOBUTROL 6 ML: 604.72 INJECTION INTRAVENOUS at 06:49

## 2023-07-10 ENCOUNTER — TELEPHONE (OUTPATIENT)
Dept: HEMATOLOGY ONCOLOGY | Facility: CLINIC | Age: 83
End: 2023-07-10

## 2023-07-10 NOTE — TELEPHONE ENCOUNTER
Sig findings on 6/30 mri    IMPRESSION:     Very limited by motion artifact.     Reidentified main portal vein thrombus now likely extending into the left portal vein and branches. The SMV is not well visualized. Consider CT evaluation.

## 2023-07-12 ENCOUNTER — TELEPHONE (OUTPATIENT)
Dept: HEMATOLOGY ONCOLOGY | Facility: CLINIC | Age: 83
End: 2023-07-12

## 2023-07-12 NOTE — TELEPHONE ENCOUNTER
Called patient with a new appt scheduled for CT scan . Talked with patient daughter and confirmed the appt

## 2023-07-13 ENCOUNTER — TELEPHONE (OUTPATIENT)
Dept: HEMATOLOGY ONCOLOGY | Facility: CLINIC | Age: 83
End: 2023-07-13

## 2023-07-13 NOTE — TELEPHONE ENCOUNTER
Called patient's daughter regarding patient's anticoagulation as well as spoke with nursing facility, they are unable to accommodate twice a day dosing due to staffing so we will change Lovenox to once a day dosing, notified daughter that this is not my fever dose but we agreed to proceed with this rather than start patient on Coumadin until we have the repeat CT scan. I will check in with patient on 7/21/2023. I have verbal confirmation from nursing staff at the patient care facility stating that they discontinued Eliquis, will discontinue twice a day 1 mg/kg Lovenox, will start 1.5 mg/kg Lovenox tomorrow they will be faxing the orders to sign for patient's CMP and once daily Lovenox dosing.

## 2023-07-14 DIAGNOSIS — F02.80 DEMENTIA ASSOCIATED WITH OTHER UNDERLYING DISEASE WITHOUT BEHAVIORAL DISTURBANCE (HCC): ICD-10-CM

## 2023-07-14 RX ORDER — SERTRALINE HYDROCHLORIDE 25 MG/1
25 TABLET, FILM COATED ORAL
Qty: 30 TABLET | Refills: 5 | Status: SHIPPED | OUTPATIENT
Start: 2023-07-14

## 2023-07-21 ENCOUNTER — OFFICE VISIT (OUTPATIENT)
Dept: HEMATOLOGY ONCOLOGY | Facility: CLINIC | Age: 83
End: 2023-07-21
Payer: COMMERCIAL

## 2023-07-21 VITALS
HEART RATE: 78 BPM | HEIGHT: 67 IN | DIASTOLIC BLOOD PRESSURE: 78 MMHG | SYSTOLIC BLOOD PRESSURE: 130 MMHG | TEMPERATURE: 98.4 F | BODY MASS INDEX: 22.76 KG/M2 | WEIGHT: 145 LBS | RESPIRATION RATE: 16 BRPM | OXYGEN SATURATION: 94 %

## 2023-07-21 DIAGNOSIS — K55.069 SUPERIOR MESENTERIC VEIN THROMBOSIS (HCC): ICD-10-CM

## 2023-07-21 DIAGNOSIS — I81 PORTAL VEIN THROMBOSIS: Primary | ICD-10-CM

## 2023-07-21 PROCEDURE — 99214 OFFICE O/P EST MOD 30 MIN: CPT | Performed by: PHYSICIAN ASSISTANT

## 2023-07-21 RX ORDER — ENOXAPARIN SODIUM 100 MG/ML
1 INJECTION SUBCUTANEOUS EVERY 12 HOURS
COMMUNITY

## 2023-07-21 NOTE — PROGRESS NOTES
[id number] [facility] - Hematology & Medical Oncology  Outpatient Visit Encounter Note      Donna Miller 82 y.o. male [id number] Date:  7/21/23    HEMATOLOGICAL HISTORY        Clotting History SMV, portal vein thrombosis 2023 otherwise denies prior, no family hx   Bleeding History Denies   Cancer History Prostate cancer dx over 20 years ago Dr. Jada Hodgson s/p radiation + seeds no chemotherapy no resection of prostate at that time    Occupation Former         SUBJECTIVE      Donna Miller is a 82 y.o. here for hospital follow up regarding portal vein and SMV thrombosis. Cholecystectomy 4 years ago in march stones were removed then went back in and flatline ended up taking the gallbladder out end of April 4 years ago     In review of the chart and talking with the patient, evaluated by hematology oncology team at [facility] 4/3/2023 with reports of patient being an 68-year-old male past medical history dementia with behavioral disturbance, alcohol abuse, prostate cancer, prior ERCP with stenting complicated by cardiac arrest 2019, status postcholecystectomy and stent removal, presented to emergency department at this time for reported confusion, abdominal pain, diarrhea, weight loss for about a week prior to admission. Hematology was consulted for portal vein and SMV thrombosis, vascular surgery was also consulted and appreciate their note states that they feel as though this is provoked in setting of hx ERCP &  cholecystectomy and stent along with more acute pancreatitis and patient was on heparin and transition to [address] upon discharge (Eliquis). I reviewed the above history with patient and his daughter Lourdes Rather, it appears as though patient may have had acute pancreatitis in the hospital but had delirium throughout this admission and could not report any pain associated with pancreatitis but daughter does note anorexia at this time which has been improving. Antiphospholipid antibodies, myeloproliferative work-up was ordered in the hospital.  These have resulted and are negative for JAK2 with reflex, calor, MPL. Cardiolipin antibodies negative, beta-2 glycoprotein antibodies also negative. Patient reports that he is still residing at the nursing facility and he is taking what ever medication they gave him, daughter is sure that they are providing him with Eliquis 5 mg twice a day however daughter did drive patient to this appointment and I do not have a medication list.  I asked her to please verify with the nursing facility that patient is currently taking this medication. I have refilled the prescription for the patient. He reports he is having no side effects to Eliquis, tolerating it well without any blood in stool or urine, dark tarry stool, excessive bruising. His ambulation is improved greatly, he is walking well with a walker and has not had a fall at the nursing facility per patient and per daughter. Currently denies chest pain, shortness of breath, abdominal pain, nausea, vomiting, diarrhea, constipation, extremity edema, gait changes. Denies constitutional symptoms. TODAY:    Patient's mobility has increased tremendously. He is able to walk without assistance, normal gait, no imbalance noted on exam.  Previous decision through telephone encounters after last MRI was performed of abdomen with questionable progression of portal vein thrombosis we had discontinued Eliquis and had placed patient on Lovenox. Unable to bridge effectively currently to Coumadin to place patient back on oral anticoagulation due to limitations at personal care home currently. He is overall tolerating Lovenox 1 mg/kg twice daily injections subcutaneously every day. He does not mind the self injections, has no evidence of excess bleeding or bruising. No pain over abdomen near his injection sites.     I have reviewed the relevant past medical, surgical, social and family history. I have also reviewed allergies and medications for this patient. Review of Systems  Review of Systems   All other systems reviewed and are negative. OBJECTIVE     Physical Exam  Vitals:    07/21/23 1114   BP: 130/78   BP Location: Right arm   Patient Position: Sitting   Cuff Size: Standard   Pulse: 78   Resp: 16   Temp: 98.4 °F (36.9 °C)   TempSrc: Temporal   SpO2: 94%   Weight: 65.8 kg (145 lb)   Height: 5' 7" (1.702 m)       Physical Exam  Vitals reviewed. Constitutional:       General: He is not in acute distress. Appearance: Normal appearance. He is not ill-appearing. HENT:      Head: Normocephalic and atraumatic. Cardiovascular:      Rate and Rhythm: Normal rate and regular rhythm. Heart sounds: Normal heart sounds. Pulmonary:      Effort: Pulmonary effort is normal. No respiratory distress. Abdominal:      General: There is no distension. Palpations: Abdomen is soft. There is no mass. Musculoskeletal:      Right lower leg: No edema. Left lower leg: No edema. Lymphadenopathy:      Cervical: No cervical adenopathy. Skin:     General: Skin is warm and dry. Findings: No bruising. Neurological:      Mental Status: He is alert and oriented to person, place, and time. Motor: No weakness. Gait: Gait normal.   Psychiatric:         Mood and Affect: Mood normal.         Behavior: Behavior normal.        Imaging  Relevant imaging reviewed in chart    MRI abdomen w wo contrast and mrcp 6/30/2023  IMPRESSION:     Very limited by motion artifact.     Reidentified main portal vein thrombus now likely extending into the left portal vein and branches. The SMV is not well visualized. Consider CT evaluation.     The study was marked in EPIC for significant notification.     CT Abdomen pelvis w contrast 3/30/2023  IMPRESSION:  Significant image quality degradation secondary to motion artifact.     Findings consistent with 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 extending to the portosplenic confluence and origin of the superior mesenteric vein. Extensive edema in the central mesentery. Secondary bowel wall thickening of the duodenal   sweep as well as the proximal colon. No pneumatosis to suggest bowel infarct. Pancreatic head enlargement, likely reactive in the setting of normal lipase versus residual prior pancreatitis. MRI abdomen w wo contrast and MRCP 4/2/2023  IMPRESSION:  1. Study limited by respiratory motion. Redemonstrated thrombosis of the SMV with nonocclusive thrombus involving the portal confluence extending into the main portal vein to the level of the intrahepatic portal vein bifurcation. Edematous infiltration   of the central mesentery again noted, similar to the recent CT study. Probable reactive enlargement of the pancreatic head without convincing evidence for pancreatitis or focal pancreatic mass within limitations. Continued cross-sectional imaging   follow-up advised in 6-8 weeks to reevaluate these findings unless clinically indicated sooner.     2. No evidence of biliary obstruction and otherwise grossly unremarkable MRCP status post cholecystectomy.     3. Trace abdominal ascites, likely reactive.     4.  Fatty liver.       Labs  Relevant labs reviewed in chart     Component Ref Range & Units 4/6/23  4:37 AM 4/4/23  5:53 AM 4/3/23  5:05 AM 4/2/23  4:36 AM 4/1/23  2:58 AM 3/31/23  1:39 PM 3/31/23  4:45 AM   WBC 4.31 - 10.16 Thousand/uL 10.60 High   16.94 High   13.35 High   18.04 High   16.99 High   19.55 High   22.17 High     RBC 3.88 - 5.62 Million/uL 4.00  4.02  3.06 Low   4.09  4.14  4.50  4.66    Hemoglobin 12.0 - 17.0 g/dL 11.3 Low   11.2 Low   8.8 Low   11.3 Low   11.9 Low   12.6  13.3    Hematocrit 36.5 - 49.3 % 34.6 Low   35.1 Low   26.7 Low   35.1 Low   35.3 Low   38.4  38.8    MCV 82 - 98 fL 87  87  87  86  85  85  83    MCH 26.8 - 34.3 pg 28.3  27.9  28.8  27.6  28.7  28.0  28.5    MCHC 31.4 - 37.4 g/dL 32.7 31.9  33.0  32.2  33.7  32.8  34.3    RDW 11.6 - 15.1 % 14.7  14.9  15.0  14.7  14.5  14.6  14.5    MPV 8.9 - 12.7 fL 9.8  9.6  9.9  9.8  9.3  9.4  9.6    Platelets 495 - 558 Thousands/uL 332  279  207  220  213  220  239          Ref Range & Units 7/13/23  6:15 AM   Glucose 70 - 100 mg/dL 107 High     BUN 7 - 25 mg/dL 15    Creatinine 0.70 - 1.30 mg/dL 1.03    Sodium 136 - 145 mmol/L 140    Potassium 3.5 - 5.1 mmol/L 4.4    Chloride 100 - 108 mmol/L 106    Carbon Dioxide 21 - 31 mmol/L 27    Calcium 8.6 - 10.2 mg/dL 9.4    Alkaline Phosphatase 34 - 104 U/L 105 High     Albumin 3.5 - 5.7 g/dL 4.2    Bilirubin, Total 0.2 - 1.1 mg/dL 0.6    Protein, Total 6.4 - 8.9 g/dL 6.7    AST 13 - 39 U/L 18    ALT 7 - 52 U/L 20    Anion Gap 4 - 18 8    eGFRcr >59 73      JAK2 w/reflex, calr, mpl mutational testing negativ 4/2023  Cardiolipin   Beta 3 glyco negativ e4/2023  ASSESSMENT & PLAN      Diagnosis ICD-10-CM Associated Orders   1. 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 male presenting for evaluation of portal vein thrombosis. · Discussion  · Original plan for aproximately 6 months of anticoagulation with continued risk vs benefit as patient was higher fall risk but was residing at care facility. VTE thought provoked secondary to pancreatitis. ·  Repeat imaging MRI as above due to pancreatitis, PV thrombosis concerning for progression of VTE while on therapeutic Eliquis, radiology recommend f/u / better characterization with CT which is ordered and scheduled for 8/25 this was only time available per daughter. · Patient was transitioned from xarelto to lovenox 1mg/kg BID as he is in a care facility, nursing staff there is administering a.m. dose, p.m. dose per daughter he has been tolerating well.  Will see patient back after CT scan for further discussion of duration of anticoagulation. Mobility has improved significantly since prior visit, walking without assistance, steady gait. · Lovenox was selected due to ease of administration, no need for lab monitoring currently (I.e. inr with coumadin). Ordered repeat CBC CMP prior to appointment. · Continue f/u with Dr. dAina Calixto with urology   · Plan/Labs  · As above    Follow Up  • With physician after CT       All questions were answered to the patient's satisfaction during this encounter. They appreciated and thanked me for spending time with them. The patient knows the contact information for our office and know to reach out for any relevant concerns related to this encounter. For all other listed problems and medical diagnosis in his chart - they are managed by PCP and/or other specialists, which patient acknowledges.         Hematology & Medical Oncology

## 2023-08-15 ENCOUNTER — TELEPHONE (OUTPATIENT)
Dept: HEMATOLOGY ONCOLOGY | Facility: MEDICAL CENTER | Age: 83
End: 2023-08-15

## 2023-08-15 ENCOUNTER — TELEPHONE (OUTPATIENT)
Dept: HEMATOLOGY ONCOLOGY | Facility: CLINIC | Age: 83
End: 2023-08-15

## 2023-08-15 NOTE — TELEPHONE ENCOUNTER
Appointment Change  Cancel, Reschedule, Change to Virtual      Who are you speaking with? daughter   If it is not the patient, are they listed on an active communication consent form? yes   Which provider is the appointment scheduled with? [facility]   When is the appointment scheduled? Please list date and time 8/30   At which location is the appointment scheduled to take place? [facility]   Was the appointment rescheduled or changed from an in person visit to a virtual visit? If so, please list the details of the change. Yes, 9/1 3:30pm Jack Perez   What is the reason for the appointment change? Provider change   Was [facility] transport scheduled for this visit? no   Does [facility] transport need to be scheduled for the new visit (if applicable) no   Does the patient need an infusion appointment rescheduled? no   Does the patient have an infusion appointment scheduled? If so, when? no   Is the patient undergoing chemotherapy? no   Was the no-show policy reviewed for appointments being changed with less then 24 hours of notice?  yes

## 2023-08-15 NOTE — TELEPHONE ENCOUNTER
Call from daughter Tex Camejo [phone] asking about anticoagulation orders for the patient  Spoke with Shyanne at [facility] [phone] who states patient is no longer on anticoagulation  Daughter will verify with rehab staff today and call my TEAMs number to update status of anticoagulation

## 2023-08-15 NOTE — TELEPHONE ENCOUNTER
Daughter Jimenez Kiser verified that patient is not on anticoagulation since last dose of lovenox given on 8/11/2023  D/W  [address]  Patient to return to lovenox 60mg q 12hr  Order faxed to [address] at [phone]  Nursing staff has left for the day  Michael Kiser will go to Center tomorrow and ensure that lovenox is reinstituted

## 2023-08-15 NOTE — TELEPHONE ENCOUNTER
Medication Refill Request   Who are you speaking with? daughter   If it is not the patient, are they listed on an active communication consent form? yes   Which medication is being requested for refill? Please list medication name and dosage enoxaparin (LOVENOX) 80 mg/0.8 mL        How many pills does the patient have left? none   Preferred Pharmacy / Address Done at [facility]   Who is the prescribing provider? Augustus Gonzalez   Call back number [phone]   Relevant Information Has this been discontinued?

## 2023-08-16 ENCOUNTER — TELEPHONE (OUTPATIENT)
Dept: HEMATOLOGY ONCOLOGY | Facility: MEDICAL CENTER | Age: 83
End: 2023-08-16

## 2023-08-25 ENCOUNTER — HOSPITAL ENCOUNTER (OUTPATIENT)
Dept: CT IMAGING | Facility: CLINIC | Age: 83
Discharge: HOME | End: 2023-08-25
Payer: COMMERCIAL

## 2023-08-25 DIAGNOSIS — K55.069 SUPERIOR MESENTERIC VEIN THROMBOSIS (HCC): ICD-10-CM

## 2023-08-25 DIAGNOSIS — I81 PORTAL VEIN THROMBOSIS: ICD-10-CM

## 2023-08-25 PROCEDURE — 74177 CT ABD & PELVIS W/CONTRAST: CPT

## 2023-08-25 PROCEDURE — G1004 CDSM NDSC: HCPCS

## 2023-08-25 RX ADMIN — IOHEXOL 100 ML: 350 INJECTION, SOLUTION INTRAVENOUS at 13:59

## 2023-08-28 ENCOUNTER — TELEPHONE (OUTPATIENT)
Dept: NEUROLOGY | Facility: CLINIC | Age: 83
End: 2023-08-28

## 2023-08-28 NOTE — TELEPHONE ENCOUNTER
Hello,     I have called the patient to re-schedule appt request no answer, LMOM.     Thank you,     Osmel Rhodes

## 2023-09-01 ENCOUNTER — TELEPHONE (OUTPATIENT)
Dept: HEMATOLOGY ONCOLOGY | Facility: MEDICAL CENTER | Age: 83
End: 2023-09-01

## 2023-09-01 ENCOUNTER — TELEPHONE (OUTPATIENT)
Dept: HEMATOLOGY ONCOLOGY | Facility: CLINIC | Age: 83
End: 2023-09-01

## 2023-09-01 NOTE — TELEPHONE ENCOUNTER
Call from daughter Janay May  [phone]  Patient is not allowed to receive lovenox after today at care facility  Patient will need other form of anticoagualtion   Will send to PA to advise

## 2023-09-01 NOTE — TELEPHONE ENCOUNTER
Received TEAMs call: This is [place]. I'm calling from [facility]. It's regarding Светлана Hatch. His date of birth is [birth year]. His doctor Pa Lind had ordered Lovenox for him. His daughter said that it's discontinued. At this time I need an order discontinue order for the chart and for the pharmacy. So they stopped sending it because they're still sending it. We don't have a stop order. My fax number is [phone]. My phone number is [phone]. Thank you. You received a voice mail from [phone].     Patient has appt today with PA  Voicemail left for [place] to make aware with my TEAMs number for questions

## 2023-09-01 NOTE — TELEPHONE ENCOUNTER
Voicemail was left for patient advising to return call and reschedule appointment as selene marcus is out of office today.    hopeline number left to assist

## 2023-09-01 NOTE — TELEPHONE ENCOUNTER
Daughter Kamla Andre calling to reschedule patient's appt  Was on hold with main number for 20 minutes  Will send to MA to reschedule appt

## 2023-09-01 NOTE — TELEPHONE ENCOUNTER
PA will see patient next Wednesday  MA will call daughter with appt  Home will continue to administer lovenox until appt  Per patient's daughter

## 2023-09-01 NOTE — TELEPHONE ENCOUNTER
Attempted to reach out to Jeremiah Duran, phone went to [employer].    Voicemail was left for Jeremiah Duran advising appointment time for patient was rescheduled to 9/15 at [facility] was instructed to return call to [facility] if appointment date/time do not work

## 2023-09-05 ENCOUNTER — TELEPHONE (OUTPATIENT)
Dept: HEMATOLOGY ONCOLOGY | Facility: CLINIC | Age: 83
End: 2023-09-05

## 2023-09-05 NOTE — TELEPHONE ENCOUNTER
Patient Call    Who are you speaking with? Patient    If it is not the patient, are they listed on an active communication consent form? N/A   What is the reason for this call? She asked what time Wednesday pt can be seen   Does this require a call back? Yes   If a call back is required, please list best call back number [phone]   If a call back is required, advise that a message will be forwarded to their care team and someone will return their call as soon as possible. Did you relay this information to the patient?  Yes

## 2023-09-05 NOTE — TELEPHONE ENCOUNTER
Call out to patient informing her of original date/time with Leslie Mota. Offered sooner appointment with Yumiko Kwon who patient had seen previously. Advised appointment with Yumiko Kwon could be sept 6th at 1pm if preferred to stay with her for sooner appointment. Advised to return call and reschedule if date/time of either appointment did not work for patient. Hopeline number provided.

## 2023-09-06 ENCOUNTER — APPOINTMENT (OUTPATIENT)
Dept: LAB | Facility: HOSPITAL | Age: 83
End: 2023-09-06
Payer: COMMERCIAL

## 2023-09-06 ENCOUNTER — OFFICE VISIT (OUTPATIENT)
Dept: HEMATOLOGY ONCOLOGY | Facility: CLINIC | Age: 83
End: 2023-09-06
Payer: COMMERCIAL

## 2023-09-06 VITALS
WEIGHT: 145 LBS | DIASTOLIC BLOOD PRESSURE: 84 MMHG | TEMPERATURE: 98.1 F | HEART RATE: 78 BPM | SYSTOLIC BLOOD PRESSURE: 126 MMHG | HEIGHT: 67 IN | BODY MASS INDEX: 22.76 KG/M2 | OXYGEN SATURATION: 95 % | RESPIRATION RATE: 16 BRPM

## 2023-09-06 DIAGNOSIS — C61 MALIGNANT NEOPLASM OF PROSTATE (HCC): Primary | ICD-10-CM

## 2023-09-06 DIAGNOSIS — I81 PORTAL VEIN THROMBOSIS: ICD-10-CM

## 2023-09-06 DIAGNOSIS — R16.0 HYPODENSE MASS OF LIVER: ICD-10-CM

## 2023-09-06 DIAGNOSIS — K86.9 PANCREATIC LESION: ICD-10-CM

## 2023-09-06 DIAGNOSIS — K55.069 SUPERIOR MESENTERIC VEIN THROMBOSIS (HCC): ICD-10-CM

## 2023-09-06 DIAGNOSIS — C61 MALIGNANT NEOPLASM OF PROSTATE (HCC): ICD-10-CM

## 2023-09-06 LAB
ALBUMIN SERPL BCP-MCNC: 4.4 G/DL (ref 3.5–5)
ALP SERPL-CCNC: 84 U/L (ref 34–104)
ALT SERPL W P-5'-P-CCNC: 24 U/L (ref 7–52)
ANION GAP SERPL CALCULATED.3IONS-SCNC: 7 MMOL/L
AST SERPL W P-5'-P-CCNC: 18 U/L (ref 13–39)
BILIRUB SERPL-MCNC: 0.94 MG/DL (ref 0.2–1)
BUN SERPL-MCNC: 17 MG/DL (ref 5–25)
CALCIUM SERPL-MCNC: 9.8 MG/DL (ref 8.4–10.2)
CHLORIDE SERPL-SCNC: 104 MMOL/L (ref 96–108)
CO2 SERPL-SCNC: 26 MMOL/L (ref 21–32)
CREAT SERPL-MCNC: 1.17 MG/DL (ref 0.6–1.3)
GFR SERPL CREATININE-BSD FRML MDRD: 58 ML/MIN/1.73SQ M
GLUCOSE SERPL-MCNC: 92 MG/DL (ref 65–140)
POTASSIUM SERPL-SCNC: 4.2 MMOL/L (ref 3.5–5.3)
PROT SERPL-MCNC: 7.3 G/DL (ref 6.4–8.4)
PSA SERPL-MCNC: 8.11 NG/ML (ref 0–4)
SODIUM SERPL-SCNC: 137 MMOL/L (ref 135–147)

## 2023-09-06 PROCEDURE — 84153 ASSAY OF PSA TOTAL: CPT

## 2023-09-06 PROCEDURE — 80053 COMPREHEN METABOLIC PANEL: CPT | Performed by: PHYSICIAN ASSISTANT

## 2023-09-06 PROCEDURE — 99214 OFFICE O/P EST MOD 30 MIN: CPT | Performed by: PHYSICIAN ASSISTANT

## 2023-09-06 PROCEDURE — 36415 COLL VENOUS BLD VENIPUNCTURE: CPT

## 2023-09-06 RX ORDER — ENOXAPARIN SODIUM 100 MG/ML
1 INJECTION SUBCUTANEOUS EVERY 12 HOURS
Refills: 0 | OUTPATIENT
Start: 2023-09-06

## 2023-09-06 NOTE — PROGRESS NOTES
[address] [facility] - Hematology & Medical Oncology  Outpatient Visit Encounter Note      Deisi Muro 82 y.o. male [id number] Date: 09/06/23      HEMATOLOGICAL HISTORY        Clotting History SMV, portal vein thrombosis 2023 otherwise denies prior, no family hx   Bleeding History Denies   Cancer History Prostate cancer dx over 20 years ago Dr. Nya Peng s/p radiation + seeds no chemotherapy no resection of prostate at that time    Occupation Former         SUBJECTIVE      Deisi Muro is a 82 y.o. here for hospital follow up regarding portal vein and SMV thrombosis. Cholecystectomy 4 years ago in march stones were removed then went back in and flatline ended up taking the gallbladder out end of April 4 years ago     In review of the chart and talking with the patient, evaluated by hematology oncology team at [facility] 4/3/2023 with reports of patient being an 82-year-old male past medical history dementia with behavioral disturbance, alcohol abuse, prostate cancer, prior ERCP with stenting complicated by cardiac arrest 2019, status postcholecystectomy and stent removal, presented to emergency department at this time for reported confusion, abdominal pain, diarrhea, weight loss for about a week prior to admission. Hematology was consulted for portal vein and SMV thrombosis, vascular surgery was also consulted and appreciate their note states that they feel as though this is provoked in setting of hx ERCP &  cholecystectomy and stent along with more acute pancreatitis and patient was on heparin and transition to [address] upon discharge (Eliquis). I reviewed the above history with patient and his daughter Bia Millre, it appears as though patient may have had acute pancreatitis in the hospital but had delirium throughout this admission and could not report any pain associated with pancreatitis but daughter does note anorexia at this time which has been improving. Antiphospholipid antibodies, myeloproliferative work-up was ordered in the hospital.  These have resulted and are negative for JAK2 with reflex, calor, MPL. Cardiolipin antibodies negative, beta-2 glycoprotein antibodies also negative. Patient reports that he is still residing at the nursing facility and he is taking what ever medication they gave him, daughter is sure that they are providing him with Eliquis 5 mg twice a day however daughter did drive patient to this appointment and I do not have a medication list.  I asked her to please verify with the nursing facility that patient is currently taking this medication. I have refilled the prescription for the patient. He reports he is having no side effects to Eliquis, tolerating it well without any blood in stool or urine, dark tarry stool, excessive bruising. His ambulation is improved greatly, he is walking well with a walker and has not had a fall at the nursing facility per patient and per daughter. Currently denies chest pain, shortness of breath, abdominal pain, nausea, vomiting, diarrhea, constipation, extremity edema, gait changes. Denies constitutional symptoms. TODAY:    Patient's mobility has increased tremendously. He is able to walk without assistance, normal gait, no imbalance noted on exam.  Previous decision through telephone encounters after last MRI was performed of abdomen with questionable progression of portal vein thrombosis we had discontinued Eliquis and had placed patient on Lovenox. Unable to bridge effectively currently to Coumadin to place patient back on oral anticoagulation due to limitations at personal care home currently. He is overall tolerating Lovenox 1 mg/kg twice daily injections subcutaneously every day. He does not mind the self injections, has no evidence of excess bleeding or bruising. No pain over abdomen near his injection sites.     Unfortunately, patient's care facility can no longer accommodate patient's lovenox therapy. Due to concern for appropriate bridge to coumadin and monitoring, will send script for pradaxa. See further discussion below. Patient had f/u CT scan for PV thrombosis, smv thrombosis showing interval resolution however 9mm and 7mm hypodensities of the liver. Radiologist recommended 2-3 month short interval follow up. Otherwise, patient doing well today no acute complaints or alarming sx. No bleeding reported by patient or family (here with son in law today). I have reviewed the relevant past medical, surgical, social and family history. I have also reviewed allergies and medications for this patient. Review of Systems  Review of Systems   All other systems reviewed and are negative. OBJECTIVE     Physical Exam  Vitals:    09/06/23 1322   BP: 126/84   BP Location: Left arm   Patient Position: Sitting   Cuff Size: Standard   Pulse: 78   Resp: 16   Temp: 98.1 °F (36.7 °C)   TempSrc: Temporal   SpO2: 95%   Weight: 65.8 kg (145 lb)   Height: 5' 7" (1.702 m)       Physical Exam  Vitals reviewed. Constitutional:       General: He is not in acute distress. Appearance: Normal appearance. He is not ill-appearing. HENT:      Head: Normocephalic and atraumatic. Eyes:      General: No scleral icterus. Cardiovascular:      Rate and Rhythm: Normal rate and regular rhythm. Heart sounds: Normal heart sounds. Pulmonary:      Effort: Pulmonary effort is normal. No respiratory distress. Abdominal:      General: There is no distension. Palpations: Abdomen is soft. There is no mass. Skin:     General: Skin is warm and dry. Findings: No bruising or rash. Neurological:      Mental Status: He is alert and oriented to person, place, and time. Motor: No weakness.       Gait: Gait normal.   Psychiatric:         Mood and Affect: Mood normal.         Behavior: Behavior normal.        Imaging  Relevant imaging reviewed in chart    CT Abdomen pelvis w contrast 8/25/2023  IMPRESSION:     Interval resolution of portal vein thrombosis. Interval development of hypodense hepatic lesions, not well depicted on prior studies as detailed above. Metastatic disease cannot be excluded. Recommend short-term follow-up in 2-3 months with contrast-enhanced CT or MRI. New pneumobilia, likely due to interval sphincterotomy. No biliary dilatation. Unchanged bilateral renal cysts and nonobstructive renal calculi. Diverticulosis coli. No diverticulitis. Other chronic findings, as per the body of the report. MRI abdomen w wo contrast and mrcp 6/30/2023  IMPRESSION:     Very limited by motion artifact.     Reidentified main portal vein thrombus now likely extending into the left portal vein and branches. The SMV is not well visualized. Consider CT evaluation.     The study was marked in EPIC for significant notification. CT Abdomen pelvis w contrast 3/30/2023  IMPRESSION:  Significant image quality degradation secondary to motion artifact.     Findings consistent with 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 extending to the portosplenic confluence and origin of the superior mesenteric vein. Extensive edema in the central mesentery. Secondary bowel wall thickening of the duodenal   sweep as well as the proximal colon. No pneumatosis to suggest bowel infarct. Pancreatic head enlargement, likely reactive in the setting of normal lipase versus residual prior pancreatitis. MRI abdomen w wo contrast and MRCP 4/2/2023  IMPRESSION:  1. Study limited by respiratory motion. Redemonstrated thrombosis of the SMV with nonocclusive thrombus involving the portal confluence extending into the main portal vein to the level of the intrahepatic portal vein bifurcation. Edematous infiltration   of the central mesentery again noted, similar to the recent CT study.  Probable reactive enlargement of the pancreatic head without convincing evidence for pancreatitis or focal pancreatic mass within limitations. Continued cross-sectional imaging   follow-up advised in 6-8 weeks to reevaluate these findings unless clinically indicated sooner.     2. No evidence of biliary obstruction and otherwise grossly unremarkable MRCP status post cholecystectomy.     3. Trace abdominal ascites, likely reactive.     4.  Fatty liver.       Labs  Relevant labs reviewed in chart     CBC 8/24 WNL    Component Ref Range & Units 4/6/23  4:37 AM 4/4/23  5:53 AM 4/3/23  5:05 AM 4/2/23  4:36 AM 4/1/23  2:58 AM 3/31/23  1:39 PM 3/31/23  4:45 AM   WBC 4.31 - 10.16 Thousand/uL 10.60 High   16.94 High   13.35 High   18.04 High   16.99 High   19.55 High   22.17 High     RBC 3.88 - 5.62 Million/uL 4.00  4.02  3.06 Low   4.09  4.14  4.50  4.66    Hemoglobin 12.0 - 17.0 g/dL 11.3 Low   11.2 Low   8.8 Low   11.3 Low   11.9 Low   12.6  13.3    Hematocrit 36.5 - 49.3 % 34.6 Low   35.1 Low   26.7 Low   35.1 Low   35.3 Low   38.4  38.8    MCV 82 - 98 fL 87  87  87  86  85  85  83    MCH 26.8 - 34.3 pg 28.3  27.9  28.8  27.6  28.7  28.0  28.5    MCHC 31.4 - 37.4 g/dL 32.7  31.9  33.0  32.2  33.7  32.8  34.3    RDW 11.6 - 15.1 % 14.7  14.9  15.0  14.7  14.5  14.6  14.5    MPV 8.9 - 12.7 fL 9.8  9.6  9.9  9.8  9.3  9.4  9.6    Platelets 479 - 076 Thousands/uL 332  279  207  220  213  220  239          Ref Range & Units 7/13/23  6:15 AM   Glucose 70 - 100 mg/dL 107 High     BUN 7 - 25 mg/dL 15    Creatinine 0.70 - 1.30 mg/dL 1.03    Sodium 136 - 145 mmol/L 140    Potassium 3.5 - 5.1 mmol/L 4.4    Chloride 100 - 108 mmol/L 106    Carbon Dioxide 21 - 31 mmol/L 27    Calcium 8.6 - 10.2 mg/dL 9.4    Alkaline Phosphatase 34 - 104 U/L 105 High     Albumin 3.5 - 5.7 g/dL 4.2    Bilirubin, Total 0.2 - 1.1 mg/dL 0.6    Protein, Total 6.4 - 8.9 g/dL 6.7    AST 13 - 39 U/L 18    ALT 7 - 52 U/L 20    Anion Gap 4 - 18 8    eGFRcr >59 73      JAK2 w/reflex, calr, mpl mutational testing negativ 4/2023  Cardiolipin   Beta 3 glyco negativ e4/2023  ASSESSMENT & PLAN      Diagnosis ICD-10-CM Associated Orders   1. Malignant neoplasm of prostate ([address])  C61 PSA Total, Diagnostic      2. Portal vein thrombosis  I81  Pradaxa 150mg BID      3. Superior mesenteric vein thrombosis (HCC)  K55.069 CBC differential   CMP       4. Pancreatic lesion  K86.9 MRI abdomen pelvis w/ wo contrast       5. Hypodense mass of Liver                               R16.0        MRI abdomen pelvis w/ wo contrast     82 y.o. male presenting for evaluation of portal vein thrombosis. · Discussion  · Original plan for aproximately 6 months of anticoagulation with continued risk vs benefit as patient was higher fall risk but was residing at care facility. VTE thought provoked secondary to pancreatitis. ·  Repeat imaging MRI as above due to pancreatitis, PV thrombosis concerning for progression of VTE while on therapeutic Eliquis, radiology recommend f/u / better characterization with CT which is ordered and scheduled for 8/25 this was only time available per daughter. · Patient was transitioned from xarelto to lovenox 1mg/kg BID as he is in a care facility, nursing staff there is administering a.m. dose, p.m. dose per daughter he has been tolerating well. Will see patient back after CT scan for further discussion of duration of anticoagulation. Mobility has improved significantly since prior visit, walking without assistance, steady gait. · TODAY: patient has no motor deficits, ambulating well without assistance, able to complete ADLs independently. Unfortunately, facility unable to accommodate lovenox. Will transition patient to pradaxa. Left instruction for nursing facility to please give pradaxa 2 hours prior to next scheduled dose of lovenox and d/c lovenox. No need for overlap. Pradaxa 150mg BID. Will continue [facility] for at least 6 months total from scan concerning for interval progression of PV thrombosis that was done in June 2023.  Standing order for monthly CBC, CMP  · Continue f/u with Dr. Rashard Mcmillan with urology, PSA ordered for patient. Regarding liver hypodensities, concern for hemangiomas vs ? Metastatic disease cannot be excluded per report. Will follow up with MRI in two months from date of CT. · Plan/Labs  · As above    Follow Up  • With physician after MRI      All questions were answered to the patient's satisfaction during this encounter. They appreciated and thanked me for spending time with them. The patient knows the contact information for our office and know to reach out for any relevant concerns related to this encounter. For all other listed problems and medical diagnosis in his chart - they are managed by PCP and/or other specialists, which patient acknowledges.         Hematology & Medical Oncology

## 2023-09-07 ENCOUNTER — TELEPHONE (OUTPATIENT)
Dept: HEMATOLOGY ONCOLOGY | Facility: CLINIC | Age: 83
End: 2023-09-07

## 2023-09-07 NOTE — TELEPHONE ENCOUNTER
Patient Call    Who are you speaking with? Pharmacy   Kimberly Rocha, [facility]   If it is not the patient, are they listed on an active communication consent form? N/A   What is the reason for this call? Kimberly Rocha calling in regards to medication that was prescribed for the patient, dabigatran etexilate (PRADAXA) 150 mg capsules. This medication is not covered under the patient's insurance. Kimberly Rocha would like a call back to discuss the next steps for medication. Does this require a call back? Yes   If a call back is required, please list best call back number [phone]   If a call back is required, advise that a message will be forwarded to their care team and someone will return their call as soon as possible. Did you relay this information to the patient?  Yes

## 2023-09-07 NOTE — TELEPHONE ENCOUNTER
Called patient's daughter Bashir Marcos. Pradaxa not covered by insurance. Will need to discuss options, coumadin further before starting. Need to ensure proper lab monitoring can be achieved. Patient's clot propogated June 2023. He will need 6 months minimum of AC from this point, until December of this year. LVM to please call back.      Called pharmacy, faxed orders to d/c pradaxa due to cost.

## 2023-09-11 ENCOUNTER — TELEPHONE (OUTPATIENT)
Dept: HEMATOLOGY ONCOLOGY | Facility: CLINIC | Age: 83
End: 2023-09-11

## 2023-09-11 NOTE — TELEPHONE ENCOUNTER
Called patients care home, will initiate coumadin bridge with lovenox. See media tab for orders. Also sent to pharmacy. Will start low coumadin 2.5mg due to patient's age. Patient to have INR checked before initiation, again 4-5 days after starting which works with care facility's Tuesday Thursday INR check availability. PCP within care facility to take over INR / coumadin management from 9/19 on. [address]  Phone: [phone]  Fax: [phone]    [facility]  Phone: [phone]  Fax: [phone]    Will leave message for Patient's daughter Neftali Zaragoza regarding this.

## 2023-09-18 ENCOUNTER — HOSPITAL ENCOUNTER (OUTPATIENT)
Dept: MRI IMAGING | Facility: HOSPITAL | Age: 83
Discharge: HOME | End: 2023-09-18
Payer: COMMERCIAL

## 2023-09-18 DIAGNOSIS — I81 PORTAL VEIN THROMBOSIS: ICD-10-CM

## 2023-09-18 DIAGNOSIS — K55.069 SUPERIOR MESENTERIC VEIN THROMBOSIS (HCC): ICD-10-CM

## 2023-09-18 DIAGNOSIS — C61 MALIGNANT NEOPLASM OF PROSTATE (HCC): ICD-10-CM

## 2023-09-18 DIAGNOSIS — K86.9 PANCREATIC LESION: ICD-10-CM

## 2023-09-18 PROCEDURE — 74183 MRI ABD W/O CNTR FLWD CNTR: CPT

## 2023-09-18 PROCEDURE — G1004 CDSM NDSC: HCPCS

## 2023-09-18 PROCEDURE — A9585 GADOBUTROL INJECTION: HCPCS | Performed by: PHYSICIAN ASSISTANT

## 2023-09-18 RX ORDER — GADOBUTROL 604.72 MG/ML
6 INJECTION INTRAVENOUS
Status: COMPLETED | OUTPATIENT
Start: 2023-09-18 | End: 2023-09-18

## 2023-09-18 RX ADMIN — GADOBUTROL 6 ML: 604.72 INJECTION INTRAVENOUS at 07:01

## 2023-09-25 ENCOUNTER — TELEPHONE (OUTPATIENT)
Dept: HEMATOLOGY ONCOLOGY | Facility: CLINIC | Age: 83
End: 2023-09-25

## 2023-10-03 ENCOUNTER — TELEPHONE (OUTPATIENT)
Dept: HEMATOLOGY ONCOLOGY | Facility: CLINIC | Age: 83
End: 2023-10-03

## 2023-10-03 NOTE — TELEPHONE ENCOUNTER
I contacted the patient to reschedule missed 9/28 appointment with Dr. Jayson Huertas. A detailed voicemail was left for the patient including the [facility] number [phone] to call back to schedule an appointment.

## 2023-10-05 ENCOUNTER — TELEPHONE (OUTPATIENT)
Dept: FAMILY MEDICINE CLINIC | Facility: CLINIC | Age: 83
End: 2023-10-05

## 2023-11-20 ENCOUNTER — RA CDI HCC (OUTPATIENT)
Dept: OTHER | Facility: HOSPITAL | Age: 83
End: 2023-11-20

## 2023-11-20 NOTE — PROGRESS NOTES
[address] [facility] coding opportunities       Chart reviewed, no opportunity found: CHART REVIEWED, NO OPPORTUNITY FOUND        Patients Insurance     [facility]

## 2024-02-20 ENCOUNTER — TELEPHONE (OUTPATIENT)
Dept: FAMILY MEDICINE CLINIC | Facility: CLINIC | Age: 84
End: 2024-02-20

## 2024-02-21 PROBLEM — J18.9 PNEUMONIA DUE TO INFECTIOUS ORGANISM: Status: RESOLVED | Noted: 2018-04-18 | Resolved: 2024-02-21

## 2024-03-28 NOTE — SOCIAL WORK
Cm met patient and daughter Saurabh Jacobsen at patient bedside, patient alert and oriented during interview  Daughter wanted to confirm patient dcp and pending procedures  Cm spoke with attending who informed cm that patient is awaiting an ECHO this am  Cm informed both patient and daughter of this  Patient confirmed he has adequate dme at home and would like to resume care with current vna  Attending mentioned that she speaks with patient wife Evon Diaz, regarding patient care on a regular basis  Daughter aware patient is awaiting Echo this am  Referral sent to [place] to confirm patient status 
none

## 2024-04-09 ENCOUNTER — TELEPHONE (OUTPATIENT)
Dept: INTERNAL MEDICINE CLINIC | Facility: CLINIC | Age: 84
End: 2024-04-09

## 2024-04-09 NOTE — TELEPHONE ENCOUNTER
This patient does not live in the area. Please confirm that he is not going to stay with the practice and route this message back to me.

## 2024-06-28 ENCOUNTER — TELEPHONE (OUTPATIENT)
Age: 84
End: 2024-06-28

## 2024-06-28 NOTE — TELEPHONE ENCOUNTER
Received phone call from Chance ARELLANO with [facility]. Calling regarding critical result. Pt is not established in our office nor has be seen pulmonary inpatient recently. Ordering provider does not work with  Pulmonary. Recommended [facility] contact the ordering provider.    no

## 2024-07-26 ENCOUNTER — TELEPHONE (OUTPATIENT)
Dept: HEMATOLOGY ONCOLOGY | Facility: CLINIC | Age: 84
End: 2024-07-26

## 2024-07-26 NOTE — TELEPHONE ENCOUNTER
Called patient to schedule appt per Phoebe Escamilla . Left message with info and with [facility] tel number

## 2024-08-28 ENCOUNTER — RA CDI HCC (OUTPATIENT)
Dept: OTHER | Facility: HOSPITAL | Age: 84
End: 2024-08-28

## 2024-09-18 ENCOUNTER — TELEPHONE (OUTPATIENT)
Dept: GASTROENTEROLOGY | Facility: CLINIC | Age: 84
End: 2024-09-18

## 2024-10-02 ENCOUNTER — TELEPHONE (OUTPATIENT)
Dept: HEMATOLOGY ONCOLOGY | Facility: CLINIC | Age: 84
End: 2024-10-02

## 2024-10-02 DIAGNOSIS — C61 MALIGNANT NEOPLASM OF PROSTATE (HCC): Primary | ICD-10-CM

## 2024-10-02 DIAGNOSIS — D72.829 LEUKOCYTOSIS, UNSPECIFIED TYPE: ICD-10-CM

## 2024-10-02 NOTE — TELEPHONE ENCOUNTER
Called and left  for Price as he didn't answer the phone. Reason for my call was to remind pt about lab orders in chart that he needs to get done prior to his upcoming appt. Provided [facility] # if pt may need to r/s

## 2024-10-03 ENCOUNTER — OFFICE VISIT (OUTPATIENT)
Dept: FAMILY MEDICINE CLINIC | Facility: CLINIC | Age: 84
End: 2024-10-03
Payer: COMMERCIAL

## 2024-10-03 VITALS
WEIGHT: 178 LBS | BODY MASS INDEX: 27.88 KG/M2 | HEART RATE: 64 BPM | TEMPERATURE: 97.7 F | SYSTOLIC BLOOD PRESSURE: 120 MMHG | OXYGEN SATURATION: 99 % | RESPIRATION RATE: 16 BRPM | DIASTOLIC BLOOD PRESSURE: 80 MMHG

## 2024-10-03 DIAGNOSIS — C61 MALIGNANT NEOPLASM OF PROSTATE (HCC): ICD-10-CM

## 2024-10-03 DIAGNOSIS — Z00.00 MEDICARE ANNUAL WELLNESS VISIT, SUBSEQUENT: ICD-10-CM

## 2024-10-03 DIAGNOSIS — F02.818 DEMENTIA ASSOCIATED WITH OTHER UNDERLYING DISEASE WITH BEHAVIORAL DISTURBANCE (HCC): ICD-10-CM

## 2024-10-03 DIAGNOSIS — F02.80 DEMENTIA IN OTHER DISEASES CLASSIFIED ELSEWHERE, UNSPECIFIED SEVERITY, WITHOUT BEHAVIORAL DISTURBANCE, PSYCHOTIC DISTURBANCE, MOOD DISTURBANCE, AND ANXIETY (HCC): ICD-10-CM

## 2024-10-03 DIAGNOSIS — R31.9 HEMATURIA, UNSPECIFIED TYPE: Primary | ICD-10-CM

## 2024-10-03 DIAGNOSIS — K55.069 SUPERIOR MESENTERIC VEIN THROMBOSIS (HCC): ICD-10-CM

## 2024-10-03 PROBLEM — F39 MOOD DISORDER (HCC): Status: RESOLVED | Noted: 2018-09-10 | Resolved: 2024-10-03

## 2024-10-03 PROBLEM — J96.01 ACUTE RESPIRATORY FAILURE WITH HYPOXIA (HCC): Status: RESOLVED | Noted: 2023-04-01 | Resolved: 2024-10-03

## 2024-10-03 PROBLEM — E44.0 MODERATE PROTEIN-CALORIE MALNUTRITION (HCC): Status: RESOLVED | Noted: 2023-04-03 | Resolved: 2024-10-03

## 2024-10-03 PROCEDURE — G0439 PPPS, SUBSEQ VISIT: HCPCS | Performed by: FAMILY MEDICINE

## 2024-10-03 PROCEDURE — 99214 OFFICE O/P EST MOD 30 MIN: CPT | Performed by: FAMILY MEDICINE

## 2024-10-03 RX ORDER — LOPERAMIDE HCL 2 MG
CAPSULE ORAL
COMMUNITY
Start: 2024-09-16

## 2024-10-03 RX ORDER — WARFARIN SODIUM 4 MG/1
TABLET ORAL
COMMUNITY
Start: 2024-09-19

## 2024-10-03 NOTE — PROGRESS NOTES
Ambulatory Visit  Name: Eloy Ortiz      : 1940      MRN: [id number]  Encounter Provider: SAAD Leyva  Encounter Date: 10/3/2024   Encounter department: [facility] [address] [place] [address]    Assessment & Plan  Hematuria, unspecified type  Presently resolved asymptomatic without associated symptoms, discussed potentials with family, current available INR within normal parameters ,will obtain urinalysis, encouraged to push fluids  Orders:    UA w Reflex to Microscopic w Reflex to Culture; Future    Superior mesenteric vein thrombosis (HCC)  Stable continue anticoagulation therapy follow-up hematology       Dementia in other diseases classified elsewhere, unspecified severity, without behavioral disturbance, psychotic disturbance, mood disturbance, and anxiety (HCC)  Negative changes noted, continue current care     Malignant neoplasm of prostate (HCC)  Continue follow-up urology       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 (HCC)  Stable negative changes noted       Medicare annual wellness visit, subsequent            Preventive health issues were discussed with patient, and age appropriate screening tests were ordered as noted in patient's After Visit Summary. Personalized health advice and appropriate referrals for health education or preventive services given if needed, as noted in patient's After Visit Summary.    History of Present Illness     Here with daughter , primary historian   Pt presently residing in [facility]    Noted apprx 2 wks prior blood in urine , reported by pt   Not noted by staff   Denies any associated symptoms   Neg burning , discomfort abd pain , neg back pain  Weight increase , appetite fine      past medical history dementia with behavioral disturbance, alcohol abuse, prostate cancer, prior ERCP with stenting complicated by cardiac arrest 2019, status postcholecystectomy       Patient Care Team:  Wilbert  SAAD Bo as PCP - General (Family Medicine)  Peterson Webb MD as PCP - PCP-[facility] (E)  Alon Bauer MD (Urology)  Katty Joseph PA-C as Physician Assistant (Physician Assistant)    Review of Systems  Medical History Reviewed by provider this encounter:       Annual Wellness Visit Questionnaire   Eloy is here for his Subsequent Wellness visit. Last Medicare Wellness visit information reviewed, patient interviewed, no change since last AWV.     Health Risk Assessment:   Patient rates overall health as good. Patient feels that their physical health rating is same. Patient is very satisfied with their life. Eyesight was rated as same. Hearing was rated as same. Patient feels that their emotional and mental health rating is same. Patients states they are never, rarely angry. Patient states they are never, rarely unusually tired/fatigued.     Depression Screening:   PHQ-2 Score: 0      Fall Risk Screening:   In the past year, patient has experienced: history of falling in past year    Number of falls: 1  Injured during fall?: Yes    Feels unsteady when standing or walking?: No    Worried about falling?: No      Home Safety:  Patient does not have trouble with stairs inside or outside of their home. Patient has working smoke alarms and has working carbon monoxide detector. Home safety hazards include: none. Lives in [facility]     Nutrition:   Current diet is Regular.     Medications:   Patient is currently taking over-the-counter supplements. OTC medications include: see medication list. Patient is able to manage medications.     Activities of Daily Living (ADLs)/Instrumental Activities of Daily Living (IADLs):   Walk and transfer into and out of bed and chair?: Yes  Dress and groom yourself?: Yes    Bathe or shower yourself?: Yes    Feed yourself? Yes  Do your laundry/housekeeping?: Yes  Manage your money, pay your bills and track your expenses?: No  Make your own meals?: No    Do your own  shopping?: No    Previous Hospitalizations:   Any hospitalizations or ED visits within the last 12 months?: Yes    How many hospitalizations have you had in the last year?: 1-2    Cognitive Screening:   Provider or family/friend/caregiver concerned regarding cognition?: Yes    Cognition Comments: Cristino hernandez , present resident [facility]     PREVENTIVE SCREENINGS      Cardiovascular Screening:    General: Screening Not Indicated and History Lipid Disorder      Diabetes Screening:     General: Screening Current      Colorectal Cancer Screening:     General: Screening Current      Prostate Cancer Screening:    General: History Prostate Cancer and Screening Not Indicated      Abdominal Aortic Aneurysm (AAA) Screening:    Risk factors include: tobacco use        Lung Cancer Screening:     General: Screening Not Indicated      Hepatitis C Screening:    General: Screening Current    Screening, Brief Intervention, and Referral to Treatment (SBIRT)    Screening      Single Item Drug Screening:  How often have you used an illegal drug (including marijuana) or a prescription medication for non-medical reasons in the past year? never    Single Item Drug Screen Score: 0  Interpretation: Negative screen for possible drug use disorder    Social Determinants of Health     Financial Resource Strain: Low Risk  (9/5/2022)    Overall Financial Resource Strain (CARDIA)     Difficulty of Paying Living Expenses: Not very hard   Food Insecurity: No Food Insecurity (10/3/2024)    Hunger Vital Sign     Worried About Running Out of Food in the Last Year: Never true     Ran Out of Food in the Last Year: Never true   Transportation Needs: No Transportation Needs (10/3/2024)    PRAPARE - Transportation     Lack of Transportation (Medical): No     Lack of Transportation (Non-Medical): No   Housing Stability: Unknown (10/3/2024)    Housing Stability Vital Sign     Unable to Pay for Housing in the Last Year: No     Homeless in the Last Year:  No   Utilities: Not At Risk (10/3/2024)    [facility] Utilities     Threatened with loss of utilities: No     No results found.    Objective     /80   Pulse 64   Temp 97.7 °F (36.5 °C)   Resp 16   Wt 80.7 kg (178 lb)   SpO2 99%   BMI 27.88 kg/m²     Physical Exam  Vitals and nursing note reviewed.   Constitutional:       General: He is not in acute distress.     Appearance: He is well-developed.   HENT:      Head: Normocephalic and atraumatic.   Eyes:      Conjunctiva/sclera: Conjunctivae normal.   Cardiovascular:      Rate and Rhythm: Normal rate and regular rhythm.      Heart sounds: No murmur heard.  Pulmonary:      Effort: Pulmonary effort is normal. No respiratory distress.      Breath sounds: Normal breath sounds.   Abdominal:      Palpations: Abdomen is soft.      Tenderness: There is no abdominal tenderness.   Musculoskeletal:         General: No swelling.      Cervical back: Neck supple.   Skin:     General: Skin is warm and dry.      Capillary Refill: Capillary refill takes less than 2 seconds.   Neurological:      Mental Status: He is alert.   Psychiatric:         Mood and Affect: Mood normal.

## 2024-10-03 NOTE — PATIENT INSTRUCTIONS
Medicare Preventive Visit Patient Instructions  Thank you for completing your Welcome to Medicare Visit or Medicare Annual Wellness Visit today. Your next wellness visit will be due in one year (10/4/2025).  The screening/preventive services that you may require over the next 5-10 years are detailed below. Some tests may not apply to you based off risk factors and/or age. Screening tests ordered at today's visit but not completed yet may show as past due. Also, please note that scanned in results may not display below.  Preventive Screenings:  Service Recommendations Previous Testing/Comments   Colorectal Cancer Screening  Colonoscopy    Fecal Occult Blood Test (FOBT)/Fecal Immunochemical Test (FIT)  Fecal DNA/Cologuard Test  Flexible Sigmoidoscopy Age: 45-75 years old   Colonoscopy: every 10 years (May be performed more frequently if at higher risk)  OR  FOBT/FIT: every 1 year  OR  Cologuard: every 3 years  OR  Sigmoidoscopy: every 5 years  Screening may be recommended earlier than age 45 if at higher risk for colorectal cancer. Also, an individualized decision between you and your healthcare provider will decide whether screening between the ages of 76-85 would be appropriate. Colonoscopy: 10/14/2021  FOBT/FIT: Not on file  Cologuard: Not on file  Sigmoidoscopy: Not on file    Screening Current     Prostate Cancer Screening Individualized decision between patient and health care provider in men between ages of 55-69   Medicare will cover every 12 months beginning on the day after your 50th birthday PSA: 8.11 ng/mL     History Prostate Cancer  Screening Not Indicated     Hepatitis C Screening Once for adults born between 1945 and 1965  More frequently in patients at high risk for Hepatitis C Hep C Antibody: 04/06/2018    Screening Current   Diabetes Screening 1-2 times per year if you're at risk for diabetes or have pre-diabetes Fasting glucose: 106 mg/dL (6/27/2023)  A1C: 5.7 % (8/23/2022)  Screening Current    Cholesterol Screening Once every 5 years if you don't have a lipid disorder. May order more often based on risk factors. Lipid panel: 04/12/2024  Screening Not Indicated  History Lipid Disorder      Other Preventive Screenings Covered by Medicare:  Abdominal Aortic Aneurysm (AAA) Screening: covered once if your at risk. You're considered to be at risk if you have a family history of AAA or a male between the age of 65-75 who smoking at least 100 cigarettes in your lifetime.  Lung Cancer Screening: covers low dose CT scan once per year if you meet all of the following conditions: (1) Age 55-77; (2) No signs or symptoms of lung cancer; (3) Current smoker or have quit smoking within the last 15 years; (4) You have a tobacco smoking history of at least 20 pack years (packs per day x number of years you smoked); (5) You get a written order from a healthcare provider.  Glaucoma Screening: covered annually if you're considered high risk: (1) You have diabetes OR (2) Family history of glaucoma OR (3)  aged 50 and older OR (4)  American aged 65 and older  Osteoporosis Screening: covered every 2 years if you meet one of the following conditions: (1) Have a vertebral abnormality; (2) On glucocorticoid therapy for more than 3 months; (3) Have primary hyperparathyroidism; (4) On osteoporosis medications and need to assess response to drug therapy.  HIV Screening: covered annually if you're between the age of 15-65. Also covered annually if you are younger than 15 and older than 65 with risk factors for HIV infection. For pregnant patients, it is covered up to 3 times per pregnancy.    Immunizations:  Immunization Recommendations   Influenza Vaccine Annual influenza vaccination during flu season is recommended for all persons aged >= 6 months who do not have contraindications   Pneumococcal Vaccine   * Pneumococcal conjugate vaccine = PCV13 (Prevnar 13), PCV15 (Vaxneuvance), PCV20 (Prevnar 20)  *  Pneumococcal polysaccharide vaccine = PPSV23 (Pneumovax) Adults 19-65 yo with certain risk factors or if 65+ yo  If never received any pneumonia vaccine: recommend Prevnar 20 (PCV20)  Give PCV20 if previously received 1 dose of PCV13 or PPSV23   Hepatitis B Vaccine 3 dose series if at intermediate or high risk (ex: diabetes, end stage renal disease, liver disease)   Respiratory syncytial virus (RSV) Vaccine - COVERED BY MEDICARE PART D  * RSVPreF3 (Arexvy) CDC recommends that adults 60 years of age and older may receive a single dose of RSV vaccine using shared clinical decision-making (SCDM)   Tetanus (Td) Vaccine - COST NOT COVERED BY MEDICARE PART B Following completion of primary series, a booster dose should be given every 10 years to maintain immunity against tetanus. Td may also be given as tetanus wound prophylaxis.   Tdap Vaccine - COST NOT COVERED BY MEDICARE PART B Recommended at least once for all adults. For pregnant patients, recommended with each pregnancy.   Shingles Vaccine (Shingrix) - COST NOT COVERED BY MEDICARE PART B  2 shot series recommended in those 19 years and older who have or will have weakened immune systems or those 50 years and older     Health Maintenance Due:      Topic Date Due   • Colorectal Cancer Screening  10/13/2024   • Hepatitis C Screening  Completed   • Lung Cancer Screening  Discontinued     Immunizations Due:  There are no preventive care reminders to display for this patient.  Advance Directives   What are advance directives?  Advance directives are legal documents that state your wishes and plans for medical care. These plans are made ahead of time in case you lose your ability to make decisions for yourself. Advance directives can apply to any medical decision, such as the treatments you want, and if you want to donate organs.   What are the types of advance directives?  There are many types of advance directives, and each state has rules about how to use them. You may  choose a combination of any of the following:  Living will:  This is a written record of the treatment you want. You can also choose which treatments you do not want, which to limit, and which to stop at a certain time. This includes surgery, medicine, IV fluid, and tube feedings.   Durable power of  for healthcare (DPAHC):  This is a written record that states who you want to make healthcare choices for you when you are unable to make them for yourself. This person, called a proxy, is usually a family member or a friend. You may choose more than 1 proxy.  Do not resuscitate (DNR) order:  A DNR order is used in case your heart stops beating or you stop breathing. It is a request not to have certain forms of treatment, such as CPR. A DNR order may be included in other types of advance directives.  Medical directive:  This covers the care that you want if you are in a coma, near death, or unable to make decisions for yourself. You can list the treatments you want for each condition. Treatment may include pain medicine, surgery, blood transfusions, dialysis, IV or tube feedings, and a ventilator (breathing machine).  Values history:  This document has questions about your views, beliefs, and how you feel and think about life. This information can help others choose the care that you would choose.  Why are advance directives important?  An advance directive helps you control your care. Although spoken wishes may be used, it is better to have your wishes written down. Spoken wishes can be misunderstood, or not followed. Treatments may be given even if you do not want them. An advance directive may make it easier for your family to make difficult choices about your care.   Fall Prevention    Fall prevention  includes ways to make your home and other areas safer. It also includes ways you can move more carefully to prevent a fall. Health conditions that cause changes in your blood pressure, vision, or muscle  strength and coordination may increase your risk for falls. Medicines may also increase your risk for falls if they make you dizzy, weak, or sleepy.   Fall prevention tips:   Stand or sit up slowly.    Use assistive devices as directed.    Wear shoes that fit well and have soles that .    Wear a personal alarm.    Stay active.    Manage your medical conditions.    Home Safety Tips:  Add items to prevent falls in the bathroom.    Keep paths clear.    Install bright lights in your home.    Keep items you use often on shelves within reach.    Paint or place reflective tape on the edges of your stairs.       © Copyright [employer] 2018 Information is for End User's use only and may not be sold, redistributed or otherwise used for commercial purposes. All illustrations and images included in CareNotes® are the copyrighted property of A.D.A.M., Inc. or [employer]

## 2024-10-09 ENCOUNTER — OFFICE VISIT (OUTPATIENT)
Dept: HEMATOLOGY ONCOLOGY | Facility: CLINIC | Age: 84
End: 2024-10-09
Payer: COMMERCIAL

## 2024-10-09 VITALS
HEIGHT: 68 IN | DIASTOLIC BLOOD PRESSURE: 76 MMHG | WEIGHT: 180 LBS | RESPIRATION RATE: 18 BRPM | SYSTOLIC BLOOD PRESSURE: 120 MMHG | TEMPERATURE: 97.9 F | OXYGEN SATURATION: 96 % | BODY MASS INDEX: 27.28 KG/M2 | HEART RATE: 58 BPM

## 2024-10-09 DIAGNOSIS — R16.0 HYPODENSE MASS OF LIVER: ICD-10-CM

## 2024-10-09 DIAGNOSIS — I81 PORTAL VEIN THROMBOSIS: ICD-10-CM

## 2024-10-09 DIAGNOSIS — Z85.46 HISTORY OF PROSTATE CANCER: ICD-10-CM

## 2024-10-09 DIAGNOSIS — Z09 ENCOUNTER FOR HEMATOLOGY FOLLOW-UP: Primary | ICD-10-CM

## 2024-10-09 PROCEDURE — 99214 OFFICE O/P EST MOD 30 MIN: CPT | Performed by: PHYSICIAN ASSISTANT

## 2024-10-09 NOTE — PROGRESS NOTES
[facility] [address] [address]  Hematology Ambulatory Follow-Up  Eloy Ortiz, 1940, [id number]  10/9/2024      Assessment and Plan   1. Encounter for hematology follow-up  2. Portal vein thrombosis  3. Hypodense mass of liver  4. History of prostate cancer  - US right upper quadrant; Future  - CBC and differential; Future  - Comprehensive metabolic panel; Future  - PSA Total, Diagnostic; Future  - Lupus anticoagulant; Future  - D-dimer, quantitative; Future    84-year-old male with past medical history of dementia, portal vein/SMV thrombosis. This was thought to be provoked from ERCP, cystectomy, acute pancreatitis.  He had MPN workup and antiphospholipid antibodies that were unremarkable.  He was on Eliquis therapy with progression of thrombosis on repeat imaging.  There was question whether or not he was getting the Eliquis as prescribed at rehab facility.  He has since been on Lovenox, Pradaxa and has been on Coumadin for the past year.  It appears that he was lost to follow-up.  Per his daughter, he has recent fall that resulted in emergency room visit.  Had prolonged bleeding from lesion above his right eye requiring sutures.  Last abdominal imaging showed resolution in the oral vein/SMV thrombosis.    Discussion/Plan   At this point, he has completed more than 1 year of anticoagulation therapy.  I think it is reasonable at this point to discontinue his Coumadin given falls and high bleeding risk.  We will have him repeat a D-dimer in approximately 2 weeks.   He should begin ASA 81mg daily   He is overdue for repeat abdominal imaging to assess hepatic lesions.  We will attempt to visualize hepatic lesions sonographically with RUQ US.   Check PSA given hx of prostate cancer   RTC 4-6 weeks     Patient and daughter are in agreement with above plan.  Patient advised to call the Hematology/Oncology office with any  "questions and concerns regarding the above.    Barrier(s) to care: None  The patient is able to self care.    Phoebe Escamilla PA-C   Medical Oncology/Hematology  [facility]    Subjective   No chief complaint on file.      History of present illness: 84-year-old male with history of dementia, prostate cancer who presents as follow-up for portal vein and SMV thrombosis.     Previously seen by Edna Lopez PA-C.  History as below:  He was evaluated by hematology at [facility] 03/03/2023 for findings of SMV and portal vein thrombosis.  Thought to be provoked from ERCP, cystectomy, acute pancreatitis.  He was started on heparin and eventually was transitioned to Eliquis therapy.    Antiphospholipid antibodies, myeloproliferative work-up was ordered in the hospital.  These have resulted and are negative for JAK2 with reflex, calor, MPL.  Cardiolipin antibodies negative, beta-2 glycoprotein antibodies also negative.     He had abdominal imaging 06/2023 that showed questionable progression of portal vein thrombosis.  There was question on whether or not he was getting the proper dose of Eliquis at rehab. Decision was made to transition the patient from Eliquis to Lovenox.  His rehab facility was unable to accommodate Lovenox injections and he was switched to Pradaxa around 09/2023.     10/09/24 :  No results found for: \"IRON\", \"TIBC\", \"FERRITIN\"  Lab Results   Component Value Date    WBC 5.34 09/06/2023    HGB 15.5 09/06/2023    HCT 47.7 09/06/2023    MCV 85 09/06/2023     09/06/2023     CT a/p 03/2023  Significant image quality degradation secondary to motion artifact.  Findings consistent with 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 extending to the portosplenic confluence and origin of the superior mesenteric vein.  Extensive edema in the central mesentery. Secondary bowel wall thickening of the duodenal   sweep as well as the proximal colon. No pneumatosis to suggest bowel " infarct. Pancreatic head enlargement, likely reactive in the setting of normal lipase versus residual prior pancreatitis.    MRI abdomen 06/2023  Reidentified main portal vein thrombus now likely extending into the left portal vein and branches. The SMV is not well visualized. Consider CT evaluation     CT a/p 08/2023  Impression   Interval resolution of portal vein thrombosis.  Interval development of hypodense hepatic lesions, not well depicted on prior studies as detailed above. Metastatic disease cannot be excluded. Recommend short-term follow-up in 2-3 months with contrast-enhanced CT or MRI.  New pneumobilia, likely due to interval sphincterotomy. No biliary dilatation.  Unchanged bilateral renal cysts and nonobstructive renal calculi.  Diverticulosis coli. No diverticulitis.  Other chronic findings, as per the body of the report.    Liver MRI 09/2023  Impression:   Hepatic segment 2 lesion is unchanged from April 2, 2023 and probably unchanged from November 26, 2022, and may be a hemangioma with an atypical enhancement pattern. Lesion may be evident sonographically, and follow-up ultrasound is advised in 3-6   months to confirm benignity.  2.  Subcentimeter hepatic segment 7 lesion lack suspicious features and is likely a cyst.  3.  No portal venous thrombus  4.  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    Interval history:   Presents with daughter    at some point he was switched to coumadin in fall of 2023. This is being managed by his senior living facility.    No prior dvt, no family history dvt   Recent fall. Fell from standing and hit head. Eye was bleeding for prolonged period of time.       Review of Systems   Constitutional:  Negative for fever and unexpected weight change.   Respiratory:  Negative for shortness of breath.    Cardiovascular:  Negative for chest pain.   Gastrointestinal:   Negative for blood in stool.   Genitourinary:  Negative for hematuria.   Hematological:  Negative for adenopathy.   All other systems reviewed and are negative.      Patient Active Problem List   Diagnosis    Transaminitis    Hypokalemia    Fall    Altered mental status    Total bilirubin, elevated    Weight loss counseling, encounter for    Common bile duct (CBD) obstruction    H/O prostate cancer    Common bile duct obstruction    Memory difficulty    Dementia associated with 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 (HCC)    Perforated intestine (HCC)    Traumatic hematuria    Choledocholithiasis    Cholelithiasis, common bile duct    Malignant neoplasm of prostate (HCC)    Pulmonary nodules    Borderline hyperlipidemia    Hyperglycemia    Mixed hyperlipidemia    Basal cell carcinoma (BCC) of skin of nose    Portal vein thrombosis    Hyponatremia    Acute metabolic encephalopathy    Ambulatory dysfunction    Leukocytosis    Hiccups    Dysphagia    Superior mesenteric vein thrombosis (HCC)     Past Medical History:   Diagnosis Date    Alcoholism (HCC) 04/06/2018    Alzheimer disease (HCC)     Anxiety     Basal cell carcinoma 08/18/2020    Right Dr. Boby hahn    Basal cell carcinoma 06/03/2021    Right upper forehead    Basal cell carcinoma (BCC) of skin of face 03/28/2022    Right nostril    Bile duct disease     Cancer (HCC)     prostate, skin     Cardiac arrest (HCC)     Cardiac arrest (HCC) 04/14/2018    Choledocholithiasis     Colon polyp     Dementia (HCC)     Depression     Diverticulitis of colon     Kidney stone     Liver disease     Malignant neoplasm of prostate (HCC)     Mood disorder (HCC)     Prostate cancer (HCC)     Right carotid bruit     Seizure disorder (HCC)     Skin cancer      Past Surgical History:   Procedure Laterality Date    CHOLECYSTECTOMY      COLONOSCOPY      ERCP N/A 03/09/2018    Procedure: ENDOSCOPIC RETROGRADE CHOLANGIOPANCREATOGRAPHY (ERCP);  Surgeon: Eloy MOSQUEDA  Antonio ALLISON MD;  Location: [facility];  Service: Gastroenterology    ERCP  2018    stenting    ERCP N/A 2018    Procedure: ENDOSCOPIC RETROGRADE CHOLANGIOPANCREATOGRAPHY (ERCP);  Surgeon: Eloy Alvarez III, MD;  Location: [facility];  Service: Gastroenterology    MOHS SURGERY  2020    Right nares, Dr. Landis    MOHS SURGERY  2021    Right upper forehead; Dr. Landis    MOHS SURGERY  2022    BCC; Right nostril; Dr. Orozco    [place] ERCP REMOVE FOREIGN BODY/STENT BILIARY/PANC DUCT N/A 2018    Procedure: ENDOSCOPIC RETROGRADE CHOLANGIOPANCREATOGRAPHY (ERCP);  Surgeon: Eloy Alvarez III, MD;  [facility]: [facility];  Service: Gastroenterology    [place] LAPAROSCOPY SURG CHOLECYSTECTOMY N/A 05/15/2018    Procedure: LAPAROSCOPIC CHOLECYSTECTOMY;  Surgeon: Mike Mcfadden MD;  Location: [facility];  Service: General    PROSTATE SURGERY      TONSILLECTOMY       Family History   Problem Relation Age of Onset    Cancer Mother         Breast    Dementia Mother     Alzheimer's disease Mother     Sudden death Father     Alzheimer's disease Sister     Swallowing difficulties Brother     Breast cancer Daughter     Skin cancer Daughter      Social History     Socioeconomic History    Marital status:      Spouse name: Not on file    Number of children: Not on file    Years of education: Not on file    Highest education level: Not on file   Occupational History    Occupation: employed   Tobacco Use    Smoking status: Former     Current packs/day: 0.00     Average packs/day: 1 pack/day for 63.2 years (63.2 ttl pk-yrs)     Types: Cigarettes, Pipe     Start date: 1956     Quit date: 3/19/2019     Years since quittin.5    Smokeless tobacco: Never    Tobacco comments:     daily   Vaping Use    Vaping status: Never Used   Substance and Sexual Activity    Alcohol use: Not Currently     Alcohol/week: 10.0 standard drinks of alcohol     Types: 10 Shots of liquor per week     Comment: Not since      Drug use: Never    Sexual activity: Not Currently     Partners: Female     Birth control/protection: None   Other Topics Concern    Not on file   Social History Narrative    Not on file     Social Determinants of Health     Financial Resource Strain: Low Risk  (9/5/2022)    Overall Financial Resource Strain (CARDIA)     Difficulty of Paying Living Expenses: Not very hard   Food Insecurity: No Food Insecurity (10/3/2024)    Hunger Vital Sign     Worried About Running Out of Food in the Last Year: Never true     Ran Out of Food in the Last Year: Never true   Transportation Needs: No Transportation Needs (10/3/2024)    PRAPARE - Transportation     Lack of Transportation (Medical): No     Lack of Transportation (Non-Medical): No   Physical Activity: Insufficiently Active (7/13/2020)    Exercise Vital Sign     Days of Exercise per Week: 2 days     Minutes of Exercise per Session: 30 min   Stress: No Stress Concern Present (7/13/2020)    Citizen of [place] [employer] - Occupational Stress Questionnaire     Feeling of Stress : Only a little   Social Connections: Not on file   Intimate Partner Violence: Not on file   Housing Stability: Unknown (10/3/2024)    Housing Stability Vital Sign     Unable to Pay for Housing in the Last Year: No     Number of Times Moved in the Last Year: Not on file     Homeless in the Last Year: No       Current Outpatient Medications:     acetaminophen (TYLENOL) 500 mg tablet, Take 500 mg by mouth as needed  (Patient not taking: Reported on 10/3/2024), Disp: , Rfl:     dabigatran etexilate (PRADAXA) 150 mg capsu, Take 1 capsule (150 mg total) by mouth 2 (two) times a day (Patient not taking: Reported on 10/3/2024), Disp: 180 capsule, Rfl: 1    donepezil (ARICEPT) 10 mg tablet, TAKE 1 TABLET (10 MG TOTAL) BY MOUTH DAILY AT BEDTIME, Disp: 30 tablet, Rfl: 6    fexofenadine (ALLEGRA) 60 MG tablet, Take 60 mg by mouth daily (Patient not taking: Reported on 9/6/2023), Disp: , Rfl:      loperamide (IMODIUM) 2 mg capsule, , Disp: , Rfl:     Melatonin 3 MG CAPS, Take 3 mg by mouth daily at bedtime Patient takes 1 1/2 mg tablets, Disp: , Rfl:     memantine (NAMENDA) 5 mg tablet, TAKE 1 TABLET (5 MG TOTAL) BY MOUTH 2 (TWO) TIMES A DAY, Disp: 60 tablet, Rfl: 6    Multiple Vitamins-Minerals (CENTRUM SILVER ULTRA MENS PO), Take 1 tablet by mouth daily, Disp: , Rfl:     omeprazole (PriLOSEC) 40 MG capsule, Take 1 capsule (40 mg total) by mouth daily (Patient taking differently: Take 20 mg by mouth daily), Disp: , Rfl: 0    QUEtiapine (SEROquel) 25 mg tablet, Take 0.5 tablets (12.5 mg total) by mouth daily at bedtime, Disp: , Rfl: 0    rosuvastatin (CRESTOR) 5 mg tablet, TAKE 1 TABLET (5 MG TOTAL) BY MOUTH DAILY, Disp: 360 tablet, Rfl: 0    sertraline (ZOLOFT) 25 mg tablet, TAKE 1 TABLET (25 MG TOTAL) BY MOUTH DAILY AT BEDTIME, Disp: 30 tablet, Rfl: 5    tamsulosin (FLOMAX) 0.4 mg, Take 1 capsule (0.4 mg total) by mouth daily with dinner, Disp: 90 capsule, Rfl: 3    warfarin (COUMADIN) 4 mg tablet, , Disp: , Rfl:   No Known Allergies    Objective   There were no vitals taken for this visit.   Physical Exam  Vitals reviewed.   HENT:      Head: Normocephalic.   Cardiovascular:      Rate and Rhythm: Normal rate and regular rhythm.      Heart sounds: Normal heart sounds.   Pulmonary:      Effort: Pulmonary effort is normal.      Breath sounds: Normal breath sounds.   Abdominal:      Palpations: Abdomen is soft.      Tenderness: There is no abdominal tenderness.   Musculoskeletal:      Cervical back: Neck supple.   Skin:     Findings: No rash.   Neurological:      Mental Status: He is alert.         Result Review  Labs:  No visits with results within 30 Day(s) from this visit.   Latest known visit with results is:   Appointment on 09/06/2023   Component Date Value Ref Range Status    PSA, Diagnostic 09/06/2023 8.11 (H)  0.00 - 4.00 ng/mL Final    [employer] Access chemiluminescent immunoassay. Confirm  baseline values for patients being serially monitored.       Imaging:   I reviewed relevant imaging    Please note:  This report has been generated by a voice recognition software system. Therefore there may be syntax, spelling, and/or grammatical errors. Please call if you have any questions.

## 2024-10-16 ENCOUNTER — HOSPITAL ENCOUNTER (OUTPATIENT)
Dept: ULTRASOUND IMAGING | Facility: CLINIC | Age: 84
Discharge: HOME | End: 2024-10-16
Payer: COMMERCIAL

## 2024-10-16 DIAGNOSIS — R16.0 HYPODENSE MASS OF LIVER: ICD-10-CM

## 2024-10-16 PROCEDURE — 76705 ECHO EXAM OF ABDOMEN: CPT

## 2024-10-22 ENCOUNTER — OFFICE VISIT (OUTPATIENT)
Dept: GASTROENTEROLOGY | Facility: CLINIC | Age: 84
End: 2024-10-22
Payer: COMMERCIAL

## 2024-10-22 VITALS
BODY MASS INDEX: 27.28 KG/M2 | OXYGEN SATURATION: 96 % | DIASTOLIC BLOOD PRESSURE: 63 MMHG | HEART RATE: 56 BPM | HEIGHT: 68 IN | WEIGHT: 180 LBS | SYSTOLIC BLOOD PRESSURE: 115 MMHG | TEMPERATURE: 97.6 F

## 2024-10-22 DIAGNOSIS — Z86.0100 HISTORY OF COLON POLYPS: Primary | ICD-10-CM

## 2024-10-22 PROCEDURE — 99203 OFFICE O/P NEW LOW 30 MIN: CPT | Performed by: INTERNAL MEDICINE

## 2024-10-22 NOTE — PATIENT INSTRUCTIONS
Scheduled date of colonoscopy (as of today):11/5/2024  Physician performing colonoscopy:Dr Hummel  Location of colonoscopy:[place]  Bowel prep reviewed with patient:Miralax/Dulcolax  Instructions reviewed with patient by:Flores HUYNH  Clearances:  Coumadin 5 day hold

## 2024-10-23 NOTE — PROGRESS NOTES
[facility] - Outpatient Consultation  Eloy Ortiz 84 y.o. male MRN: [id number]  Encounter: [id number]          ASSESSMENT AND PLAN:      1. History of colon polyps  - Colonoscopy; Future    ______________________________________________________________________    HPI: Price returns to the office today to schedule a routine surveillance colonoscopy.  His last colonoscopy was October 14, 2021.  There were 7 polyps identified at that time.  The majority of these polyps were adenomatous.  There is no family history for colon cancer or for colon polyp.  The patient denies abdominal pain, nausea, vomiting, diarrhea, constipation, rectal bleeding, hematemesis, melena, heartburn, dysphagia, odynophagia, bloating, gaseousness.  The patient denies any problems with the previous colonoscopy related to the patient's anesthesia, bowel prep, or actual procedure.      Historical Information   Past Medical History:   Diagnosis Date    Alcoholism (HCC) 04/06/2018    Alzheimer disease (HCC)     Anxiety     Basal cell carcinoma 08/18/2020    Right Dr. Boby hahn    Basal cell carcinoma 06/03/2021    Right upper forehead    Basal cell carcinoma (BCC) of skin of face 03/28/2022    Right nostril    Bile duct disease     Cancer (HCC)     prostate, skin     Cardiac arrest (HCC)     Cardiac arrest (HCC) 04/14/2018    Choledocholithiasis     Colon polyp     Dementia (HCC)     Depression     Diverticulitis of colon     Kidney stone     Liver disease     Malignant neoplasm of prostate (HCC)     Mood disorder (HCC)     Prostate cancer (HCC)     Right carotid bruit     Seizure disorder (HCC)     Skin cancer      Past Surgical History:   Procedure Laterality Date    CHOLECYSTECTOMY      COLONOSCOPY      ERCP N/A 03/09/2018    Procedure: ENDOSCOPIC RETROGRADE CHOLANGIOPANCREATOGRAPHY (ERCP);  Surgeon: Eloy Alvarez III, MD;  Location: [facility];  Service: Gastroenterology    ERCP  04/13/2018    stenting    ERCP  N/A 2018    Procedure: ENDOSCOPIC RETROGRADE CHOLANGIOPANCREATOGRAPHY (ERCP);  Surgeon: Eloy Alvarez III, MD;  Location: [facility];  Service: Gastroenterology    MOHS SURGERY  2020    Right nares,  Boby    MOHS SURGERY  2021    Right upper forehead; Dr. Landis    MOHS SURGERY  2022    BCC; Right nostril; Dr. Orozco    ID ERCP REMOVE FOREIGN BODY/STENT BILIARY/PANC DUCT N/A 2018    Procedure: ENDOSCOPIC RETROGRADE CHOLANGIOPANCREATOGRAPHY (ERCP);  Surgeon: Eloy Alvarez III, MD;  Location: [facility];  Service: Gastroenterology    ID LAPAROSCOPY SURG CHOLECYSTECTOMY N/A 05/15/2018    Procedure: LAPAROSCOPIC CHOLECYSTECTOMY;  Surgeon: Mike Mcfadden MD;  Location: [facility];  Service: General    PROSTATE SURGERY      TONSILLECTOMY       Social History   Social History     Substance and Sexual Activity   Alcohol Use Not Currently    Alcohol/week: 10.0 standard drinks of alcohol    Types: 10 Shots of liquor per week    Comment: Not since      Social History     Substance and Sexual Activity   Drug Use Never     Social History     Tobacco Use   Smoking Status Former    Current packs/day: 0.00    Average packs/day: 1 pack/day for 63.2 years (63.2 ttl pk-yrs)    Types: Cigarettes, Pipe    Start date: 1956    Quit date: 3/19/2019    Years since quittin.6   Smokeless Tobacco Never   Tobacco Comments    daily     Family History   Problem Relation Age of Onset    Cancer Mother         Breast    Dementia Mother     Alzheimer's disease Mother     Sudden death Father     Alzheimer's disease Sister     Swallowing difficulties Brother     Breast cancer Daughter     Skin cancer Daughter        Meds/Allergies       Current Outpatient Medications:     donepezil (ARICEPT) 10 mg tablet    loperamide (IMODIUM) 2 mg capsule    Melatonin 3 MG CAPS    memantine (NAMENDA) 5 mg tablet    Multiple Vitamins-Minerals (CENTRUM SILVER ULTRA MENS PO)    omeprazole (PriLOSEC) 40 MG capsule     "QUEtiapine (SEROquel) 25 mg tablet    rosuvastatin (CRESTOR) 5 mg tablet    sertraline (ZOLOFT) 25 mg tablet    tamsulosin (FLOMAX) 0.4 mg    warfarin (COUMADIN) 4 mg tablet    acetaminophen (TYLENOL) 500 mg tablet    dabigatran etexilate (PRADAXA) 150 mg capsu    fexofenadine (ALLEGRA) 60 MG tablet    No Known Allergies        Objective     Blood pressure 115/63, pulse 56, temperature 97.6 °F (36.4 °C), temperature source Temporal, height 5' 7.5\" (1.715 m), weight 81.6 kg (180 lb), SpO2 96%. Body mass index is 27.78 kg/m².        PHYSICAL EXAM:      General Appearance:   Alert, cooperative, no distress   HEENT:   Normocephalic, atraumatic, anicteric.     Neck:  Supple, symmetrical, trachea midline   Lungs:   Clear to auscultation bilaterally; no rales, rhonchi or wheezing; respirations unlabored    Heart::   Regular rate and rhythm; no murmur, rub, or gallop.   Abdomen:   Soft, non-tender, non-distended; normal bowel sounds; no masses, no organomegaly    Genitalia:   Deferred    Rectal:   Deferred    Extremities:  No cyanosis, clubbing or edema    Pulses:  2+ and symmetric    Skin:  No jaundice, rashes, or lesions    Lymph nodes:  No palpable cervical lymphadenopathy        Lab Results:   No visits with results within 1 Day(s) from this visit.   Latest known visit with results is:   Appointment on 09/06/2023   Component Date Value    PSA, Diagnostic 09/06/2023 8.11 (H)          Radiology Results:   US right upper quadrant    Result Date: 10/22/2024  Narrative: RIGHT UPPER QUADRANT ULTRASOUND INDICATION: R16.0: Hepatomegaly, not elsewhere classified. COMPARISON: MR abdomen 6/30/2023. CT abdomen/pelvis 8/25/2023. TECHNIQUE: Real-time ultrasound of the right upper quadrant was performed with a curvilinear transducer with both volumetric sweeps and still imaging techniques. FINDINGS: PANCREAS: Portions of the pancreas are obscured by bowel gas. Visualized portions of the pancreas are unremarkable. AORTA AND IVC: " Visualized portions are normal for patient age. LIVER: Size: Within normal range. The liver measures 16.4 cm in the midclavicular line. Contour: Surface contour is smooth. Parenchyma: Parenchyma demonstrates mildly increased echogenicity as can be seen in the setting of mild steatosis. Ill-defined area of echogenicity within the right hepatic lobe (series 1 image 30) is favored to be attributed to geographic fatty deposition versus artifact. No focal liver lesions are sonographically evident. Specifically, no sonographic findings to correlate with T1 hyperintense segment 2 lesion seen on prior exams. Limited imaging of the main portal vein shows it to be patent and hepatopetal. BILIARY: Patient has undergone cholecystectomy. No intrahepatic biliary dilatation. Pneumobilia is noted. This was seen on prior CT of August 2023 and is likely attributable to prior sphincterotomy. CBD measures 6.0 mm. No evidence of choledocholithiasis. KIDNEY: Right kidney measures 11.9 x 6.5 x 5.9 cm. Volume 240.6 mL No hydronephrosis. Cyst with thin septation is seen within the lower pole measuring 1.9 x 1.5 x 1.6 cm. No evidence of intrinsic vascularity on color Doppler images. ASCITES: None.     Impression: 1. No clear sonographic correlate to the T1 hypointense lesion seen within segment 2 on prior cross-sectional imaging. Follow-up with contrast-enhanced CT or MRI is recommended to confirm stability. 2. Diffusely echogenic appearance of the liver parenchyma suggestive of mild steatosis. Ill-defined area of increased echogenicity within the right hepatic lobe is favored to be attributable to artifact versus fatty infiltration. 3. Status post cholecystectomy with redemonstration of pneumobilia which is presumably secondary to prior sphincterotomy. The study was marked in EPIC for significant notification. Workstation performed: [id number]

## 2024-10-28 DIAGNOSIS — R93.2 ABNORMAL FINDING ON IMAGING OF LIVER: Primary | ICD-10-CM

## 2024-10-28 DIAGNOSIS — K76.9 LESION OF LIVER: ICD-10-CM

## 2024-10-30 ENCOUNTER — TELEPHONE (OUTPATIENT)
Age: 84
End: 2024-10-30

## 2024-10-30 NOTE — TELEPHONE ENCOUNTER
Daughter Christi Briseno on the consent called and cancelled the colonoscopy for her father because of his health issues.

## 2024-11-07 ENCOUNTER — TELEPHONE (OUTPATIENT)
Dept: HEMATOLOGY ONCOLOGY | Facility: CLINIC | Age: 84
End: 2024-11-07

## 2024-11-13 ENCOUNTER — TELEPHONE (OUTPATIENT)
Dept: HEMATOLOGY ONCOLOGY | Facility: CLINIC | Age: 84
End: 2024-11-13

## 2025-06-11 ENCOUNTER — TELEPHONE (OUTPATIENT)
Dept: INTERNAL MEDICINE CLINIC | Facility: CLINIC | Age: 85
End: 2025-06-11

## 2025-07-29 ENCOUNTER — TELEPHONE (OUTPATIENT)
Age: 85
End: 2025-07-29

## 2025-08-06 ENCOUNTER — DOCUMENTATION (OUTPATIENT)
Dept: ADMINISTRATIVE | Facility: OTHER | Age: 85
End: 2025-08-06

## (undated) DEVICE — LOCKING DEVICE AND BIOPSY CAP FOR USE WITH RX BILIARY SYSTEM: Brand: RX LOCKING DEVICE AND BIOPSY CAP

## (undated) DEVICE — IRRIG ENDO FLO TUBING

## (undated) DEVICE — GLOVE INDICATOR PI UNDERGLOVE SZ 7 BLUE

## (undated) DEVICE — SPHINCTEROTOME: Brand: DREAMTOME™ RX 44

## (undated) DEVICE — ESOPHAGEAL/PYLORIC/COLONIC/BILIARY WIREGUIDED BALLOON DILATATION CATHETER: Brand: CRE™ PRO

## (undated) DEVICE — INJECTION SYTEM RAPID REFILL RX

## (undated) DEVICE — SINGLE-USE SYRINGE/GAUGE ASSEMBLY: Brand: ALLIANCE II

## (undated) DEVICE — RETRIEVAL BALLOON CATHETER: Brand: EXTRACTOR™ PRO RX

## (undated) DEVICE — SUT MONOCRYL 4-0 PS-2 18 IN Y496G

## (undated) DEVICE — TUBING SUCTION 5MM X 12 FT

## (undated) DEVICE — ALLENTOWN LAP CHOLE APP PACK: Brand: CARDINAL HEALTH

## (undated) DEVICE — ENDOPOUCH RETRIEVER SPECIMEN RETRIEVAL BAGS: Brand: ENDOPOUCH RETRIEVER

## (undated) DEVICE — FLOSEAL MATRIX IS INDICATED IN SURGICAL PROCEDURES (OTHER THAN IN OPHTHALMIC) AS AN ADJUNCT TO HEMOSTASIS WHEN CONTROL OF BLEEDING BY LIGATURE OR CONVENTIONALPROCEDURES IS INEFFECTIVE OR IMPRACTICAL.: Brand: FLOSEAL HEMOSTATIC MATRIX

## (undated) DEVICE — DELIVERY SYSTEM NAVFLX 7FR X 202.5CM

## (undated) DEVICE — [HIGH FLOW INSUFFLATOR,  DO NOT USE IF PACKAGE IS DAMAGED,  KEEP DRY,  KEEP AWAY FROM SUNLIGHT,  PROTECT FROM HEAT AND RADIOACTIVE SOURCES.]: Brand: PNEUMOSURE

## (undated) DEVICE — PMI DISPOSABLE PUNCTURE CLOSURE DEVICE / SUTURE GRASPER: Brand: PMI

## (undated) DEVICE — SINGLE-USE POLYPECTOMY SNARE: Brand: SENSATION SHORT THROW

## (undated) DEVICE — ENDOPATH XCEL UNIVERSAL TROCAR STABLILITY SLEEVES: Brand: ENDOPATH XCEL

## (undated) DEVICE — LIGHT HANDLE COVER SLEEVE DISP BLUE STELLAR

## (undated) DEVICE — BALLOON DILATATION CATHETER: Brand: HURRICANE™ RX

## (undated) DEVICE — SUT VICRYL 0 REEL 54 IN J287G

## (undated) DEVICE — ENDOPATH XCEL BLADELESS TROCARS WITH STABILITY SLEEVES: Brand: ENDOPATH XCEL

## (undated) DEVICE — INFLATION DEVICE: Brand: ENCORE 26

## (undated) DEVICE — GLOVE SRG BIOGEL 7

## (undated) DEVICE — LIGAMAX 5 MM ENDOSCOPIC MULTIPLE CLIP APPLIER: Brand: LIGAMAX

## (undated) DEVICE — WIREGUIDED RETRIEVAL BASKET: Brand: TRAPEZOID RX

## (undated) DEVICE — DRAPE EQUIPMENT RF WAND

## (undated) DEVICE — INTENDED FOR TISSUE SEPARATION, AND OTHER PROCEDURES THAT REQUIRE A SHARP SURGICAL BLADE TO PUNCTURE OR CUT.: Brand: BARD-PARKER SAFETY BLADES SIZE 15, STERILE

## (undated) DEVICE — SCD SEQUENTIAL COMPRESSION COMFORT SLEEVE MEDIUM KNEE LENGTH: Brand: KENDALL SCD

## (undated) DEVICE — ADHESIVE SKN CLSR HISTOACRYL FLEX 0.5ML LF

## (undated) DEVICE — CHLORAPREP HI-LITE 26ML ORANGE

## (undated) DEVICE — ELECTRODE LAP L WIRE E-Z CLEAN 33CM -0100

## (undated) DEVICE — REM POLYHESIVE ADULT PATIENT RETURN ELECTRODE: Brand: VALLEYLAB

## (undated) DEVICE — FLOSEAL APPLICATOR TIP ENDOSCOPIC